# Patient Record
Sex: FEMALE | Race: WHITE | NOT HISPANIC OR LATINO | Employment: OTHER | URBAN - METROPOLITAN AREA
[De-identification: names, ages, dates, MRNs, and addresses within clinical notes are randomized per-mention and may not be internally consistent; named-entity substitution may affect disease eponyms.]

---

## 2017-07-26 ENCOUNTER — RECORDS - HEALTHEAST (OUTPATIENT)
Dept: LAB | Facility: CLINIC | Age: 82
End: 2017-07-26

## 2017-07-26 LAB
CHOLEST SERPL-MCNC: 165 MG/DL
FASTING STATUS PATIENT QL REPORTED: YES
HDLC SERPL-MCNC: 57 MG/DL
LDLC SERPL CALC-MCNC: 93 MG/DL
TRIGL SERPL-MCNC: 74 MG/DL

## 2018-02-28 ENCOUNTER — RECORDS - HEALTHEAST (OUTPATIENT)
Dept: LAB | Facility: CLINIC | Age: 83
End: 2018-02-28

## 2018-02-28 LAB
ANION GAP SERPL CALCULATED.3IONS-SCNC: 6 MMOL/L (ref 5–18)
BUN SERPL-MCNC: 17 MG/DL (ref 8–28)
CALCIUM SERPL-MCNC: 9.3 MG/DL (ref 8.5–10.5)
CHLORIDE BLD-SCNC: 103 MMOL/L (ref 98–107)
CHOLEST SERPL-MCNC: 158 MG/DL
CO2 SERPL-SCNC: 29 MMOL/L (ref 22–31)
CREAT SERPL-MCNC: 0.93 MG/DL (ref 0.6–1.1)
FASTING STATUS PATIENT QL REPORTED: YES
GFR SERPL CREATININE-BSD FRML MDRD: 58 ML/MIN/1.73M2
GLUCOSE BLD-MCNC: 88 MG/DL (ref 70–125)
HDLC SERPL-MCNC: 55 MG/DL
LDLC SERPL CALC-MCNC: 87 MG/DL
POTASSIUM BLD-SCNC: 5.9 MMOL/L (ref 3.5–5)
SODIUM SERPL-SCNC: 138 MMOL/L (ref 136–145)
TRIGL SERPL-MCNC: 82 MG/DL

## 2018-10-19 ENCOUNTER — RECORDS - HEALTHEAST (OUTPATIENT)
Dept: LAB | Facility: CLINIC | Age: 83
End: 2018-10-19

## 2018-10-19 LAB
ANION GAP SERPL CALCULATED.3IONS-SCNC: 10 MMOL/L (ref 5–18)
BUN SERPL-MCNC: 16 MG/DL (ref 8–28)
CALCIUM SERPL-MCNC: 9.7 MG/DL (ref 8.5–10.5)
CHLORIDE BLD-SCNC: 105 MMOL/L (ref 98–107)
CO2 SERPL-SCNC: 26 MMOL/L (ref 22–31)
CREAT SERPL-MCNC: 0.9 MG/DL (ref 0.6–1.1)
GFR SERPL CREATININE-BSD FRML MDRD: 60 ML/MIN/1.73M2
GLUCOSE BLD-MCNC: 85 MG/DL (ref 70–125)
POTASSIUM BLD-SCNC: 5.5 MMOL/L (ref 3.5–5)
SODIUM SERPL-SCNC: 141 MMOL/L (ref 136–145)

## 2019-04-17 ENCOUNTER — RECORDS - HEALTHEAST (OUTPATIENT)
Dept: LAB | Facility: CLINIC | Age: 84
End: 2019-04-17

## 2019-04-17 LAB
ANION GAP SERPL CALCULATED.3IONS-SCNC: 7 MMOL/L (ref 5–18)
BUN SERPL-MCNC: 14 MG/DL (ref 8–28)
CALCIUM SERPL-MCNC: 9.6 MG/DL (ref 8.5–10.5)
CHLORIDE BLD-SCNC: 105 MMOL/L (ref 98–107)
CHOLEST SERPL-MCNC: 162 MG/DL
CO2 SERPL-SCNC: 29 MMOL/L (ref 22–31)
CREAT SERPL-MCNC: 0.93 MG/DL (ref 0.6–1.1)
FASTING STATUS PATIENT QL REPORTED: NORMAL
GFR SERPL CREATININE-BSD FRML MDRD: 58 ML/MIN/1.73M2
GLUCOSE BLD-MCNC: 91 MG/DL (ref 70–125)
HDLC SERPL-MCNC: 63 MG/DL
LDLC SERPL CALC-MCNC: 81 MG/DL
POTASSIUM BLD-SCNC: 4.5 MMOL/L (ref 3.5–5)
SODIUM SERPL-SCNC: 141 MMOL/L (ref 136–145)
TRIGL SERPL-MCNC: 89 MG/DL
TSH SERPL DL<=0.005 MIU/L-ACNC: 1.01 UIU/ML (ref 0.3–5)

## 2019-10-23 ENCOUNTER — RECORDS - HEALTHEAST (OUTPATIENT)
Dept: LAB | Facility: CLINIC | Age: 84
End: 2019-10-23

## 2019-10-23 LAB
ANION GAP SERPL CALCULATED.3IONS-SCNC: 8 MMOL/L (ref 5–18)
BUN SERPL-MCNC: 11 MG/DL (ref 8–28)
CALCIUM SERPL-MCNC: 9.3 MG/DL (ref 8.5–10.5)
CHLORIDE BLD-SCNC: 107 MMOL/L (ref 98–107)
CHOLEST SERPL-MCNC: 142 MG/DL
CO2 SERPL-SCNC: 26 MMOL/L (ref 22–31)
CREAT SERPL-MCNC: 0.84 MG/DL (ref 0.6–1.1)
FASTING STATUS PATIENT QL REPORTED: NORMAL
GFR SERPL CREATININE-BSD FRML MDRD: >60 ML/MIN/1.73M2
GLUCOSE BLD-MCNC: 88 MG/DL (ref 70–125)
HDLC SERPL-MCNC: 59 MG/DL
LDLC SERPL CALC-MCNC: 70 MG/DL
POTASSIUM BLD-SCNC: 4.3 MMOL/L (ref 3.5–5)
SODIUM SERPL-SCNC: 141 MMOL/L (ref 136–145)
TRIGL SERPL-MCNC: 65 MG/DL

## 2020-05-08 ENCOUNTER — RECORDS - HEALTHEAST (OUTPATIENT)
Dept: LAB | Facility: CLINIC | Age: 85
End: 2020-05-08

## 2020-05-08 LAB
ANION GAP SERPL CALCULATED.3IONS-SCNC: 8 MMOL/L (ref 5–18)
BUN SERPL-MCNC: 20 MG/DL (ref 8–28)
CALCIUM SERPL-MCNC: 9.4 MG/DL (ref 8.5–10.5)
CHLORIDE BLD-SCNC: 100 MMOL/L (ref 98–107)
CHOLEST SERPL-MCNC: 179 MG/DL
CO2 SERPL-SCNC: 29 MMOL/L (ref 22–31)
CREAT SERPL-MCNC: 1.03 MG/DL (ref 0.6–1.1)
FASTING STATUS PATIENT QL REPORTED: NORMAL
GFR SERPL CREATININE-BSD FRML MDRD: 51 ML/MIN/1.73M2
GLUCOSE BLD-MCNC: 92 MG/DL (ref 70–125)
HDLC SERPL-MCNC: 66 MG/DL
LDLC SERPL CALC-MCNC: 96 MG/DL
POTASSIUM BLD-SCNC: 3.9 MMOL/L (ref 3.5–5)
SODIUM SERPL-SCNC: 137 MMOL/L (ref 136–145)
TRIGL SERPL-MCNC: 86 MG/DL

## 2020-09-09 ENCOUNTER — AMBULATORY - HEALTHEAST (OUTPATIENT)
Dept: SURGERY | Facility: HOSPITAL | Age: 85
End: 2020-09-09

## 2020-09-09 DIAGNOSIS — Z11.59 ENCOUNTER FOR SCREENING FOR OTHER VIRAL DISEASES: ICD-10-CM

## 2020-11-12 ENCOUNTER — RECORDS - HEALTHEAST (OUTPATIENT)
Dept: LAB | Facility: CLINIC | Age: 85
End: 2020-11-12

## 2020-11-12 LAB
ANION GAP SERPL CALCULATED.3IONS-SCNC: 6 MMOL/L (ref 5–18)
BUN SERPL-MCNC: 13 MG/DL (ref 8–28)
CALCIUM SERPL-MCNC: 9.5 MG/DL (ref 8.5–10.5)
CHLORIDE BLD-SCNC: 104 MMOL/L (ref 98–107)
CHOLEST SERPL-MCNC: 143 MG/DL
CO2 SERPL-SCNC: 29 MMOL/L (ref 22–31)
CREAT SERPL-MCNC: 0.87 MG/DL (ref 0.6–1.1)
FASTING STATUS PATIENT QL REPORTED: NORMAL
GFR SERPL CREATININE-BSD FRML MDRD: >60 ML/MIN/1.73M2
GLUCOSE BLD-MCNC: 89 MG/DL (ref 70–125)
HDLC SERPL-MCNC: 62 MG/DL
LDLC SERPL CALC-MCNC: 67 MG/DL
POTASSIUM BLD-SCNC: 5.5 MMOL/L (ref 3.5–5)
SODIUM SERPL-SCNC: 139 MMOL/L (ref 136–145)
TRIGL SERPL-MCNC: 71 MG/DL

## 2020-12-07 ENCOUNTER — AMBULATORY - HEALTHEAST (OUTPATIENT)
Dept: LAB | Facility: CLINIC | Age: 85
End: 2020-12-07

## 2020-12-07 DIAGNOSIS — Z11.59 ENCOUNTER FOR SCREENING FOR OTHER VIRAL DISEASES: ICD-10-CM

## 2020-12-08 ASSESSMENT — MIFFLIN-ST. JEOR: SCORE: 1053.59

## 2020-12-09 ENCOUNTER — COMMUNICATION - HEALTHEAST (OUTPATIENT)
Dept: SCHEDULING | Facility: CLINIC | Age: 85
End: 2020-12-09

## 2020-12-11 ENCOUNTER — SURGERY - HEALTHEAST (OUTPATIENT)
Dept: SURGERY | Facility: HOSPITAL | Age: 85
End: 2020-12-11

## 2020-12-11 ENCOUNTER — ANESTHESIA - HEALTHEAST (OUTPATIENT)
Dept: SURGERY | Facility: HOSPITAL | Age: 85
End: 2020-12-11

## 2020-12-11 ENCOUNTER — AMBULATORY - HEALTHEAST (OUTPATIENT)
Dept: OTHER | Facility: CLINIC | Age: 85
End: 2020-12-11

## 2021-05-13 ENCOUNTER — RECORDS - HEALTHEAST (OUTPATIENT)
Dept: LAB | Facility: CLINIC | Age: 86
End: 2021-05-13

## 2021-05-13 LAB
CHOLEST SERPL-MCNC: 152 MG/DL
FASTING STATUS PATIENT QL REPORTED: NORMAL
HDLC SERPL-MCNC: 57 MG/DL
LDLC SERPL CALC-MCNC: 77 MG/DL
TRIGL SERPL-MCNC: 89 MG/DL

## 2021-05-29 ENCOUNTER — RECORDS - HEALTHEAST (OUTPATIENT)
Dept: ADMINISTRATIVE | Facility: CLINIC | Age: 86
End: 2021-05-29

## 2021-06-05 VITALS — HEIGHT: 61 IN | WEIGHT: 146 LBS | BODY MASS INDEX: 27.56 KG/M2

## 2021-06-13 NOTE — ANESTHESIA PREPROCEDURE EVALUATION
Anesthesia Evaluation      Patient summary reviewed   No history of anesthetic complications     Airway   Mallampati: I  Neck ROM: full   Pulmonary - negative ROS and normal exam                          Cardiovascular - normal exam  Exercise tolerance: > or = 4 METS  (+) hypertension, valvular problems/murmurs AS, , hypercholesterolemia,     Rhythm: regular  Rate: normal,    murmur Location:upper left sternal border      Neuro/Psych - negative ROS     Endo/Other - negative ROS      GI/Hepatic/Renal    (+)   chronic renal disease CRI,           Dental - normal exam                        Anesthesia Plan  Planned anesthetic: MAC    ASA 3     Anesthetic plan and risks discussed with: patient  Anesthesia plan special considerations: antiemetics,   Post-op plan: routine recovery

## 2021-06-13 NOTE — ANESTHESIA POSTPROCEDURE EVALUATION
Patient: Jade Walker  Procedure(s):  COLONOSCOPY  Anesthesia type: MAC    Patient location: P-5  Last vitals:   Vitals Value Taken Time   /72 12/11/20 1531   Temp 35.6  C (96.1  F) 12/11/20 1517   Pulse 67 12/11/20 1534   Resp 16 12/11/20 1517   SpO2 100 % 12/11/20 1534   Vitals shown include unvalidated device data.  Post vital signs: stable  Level of consciousness: alert and conversant  Post-anesthesia pain: pain controlled  Post-anesthesia nausea and vomiting: no  Pulmonary: room air  Cardiovascular: stable and blood pressure at baseline  Hydration: adequate  Anesthetic events: no    QCDR Measures:  ASA# 11 - Siobhan-op Cardiac Arrest: ASA11B - Patient did NOT experience unanticipated cardiac arrest  ASA# 12 - Siobhan-op Mortality Rate: ASA12B - Patient did NOT die  ASA# 13 - PACU Re-Intubation Rate: ASA13B - Patient did NOT require a new airway mgmt  ASA# 10 - Composite Anes Safety: ASA10A - No serious adverse event    Additional Notes:

## 2021-06-13 NOTE — ANESTHESIA CARE TRANSFER NOTE
Last vitals:   Vitals:    12/11/20 1517   BP: 130/65   Pulse: 71   Resp: 16   Temp: (!) 35.6  C (96.1  F)   SpO2: 99%     Patient's level of consciousness is drowsy  Spontaneous respirations: yes  Maintains airway independently: yes  Dentition unchanged: yes  Oropharynx: oropharynx clear of all foreign objects    QCDR Measures:  ASA# 20 - Surgical Safety Checklist: WHO surgical safety checklist completed prior to induction    PQRS# 430 - Adult PONV Prevention: 4558F - Pt received => 2 anti-emetic agents (different classes) preop & intraop  ASA# 8 - Peds PONV Prevention: NA - Not pediatric patient, not GA or 2 or more risk factors NOT present  PQRS# 424 - Siobhan-op Temp Management: 4559F - At least one body temp DOCUMENTED => 35.5C or 95.9F within required timeframe  PQRS# 426 - PACU Transfer Protocol: - Transfer of care checklist used  ASA# 14 - Acute Post-op Pain: ASA14B - Patient did NOT experience pain >= 7 out of 10

## 2021-11-17 ENCOUNTER — LAB REQUISITION (OUTPATIENT)
Dept: LAB | Facility: CLINIC | Age: 86
End: 2021-11-17

## 2021-11-17 DIAGNOSIS — I12.9 HYPERTENSIVE CHRONIC KIDNEY DISEASE WITH STAGE 1 THROUGH STAGE 4 CHRONIC KIDNEY DISEASE, OR UNSPECIFIED CHRONIC KIDNEY DISEASE: ICD-10-CM

## 2021-11-17 DIAGNOSIS — E78.5 HYPERLIPIDEMIA, UNSPECIFIED: ICD-10-CM

## 2021-11-17 LAB
ANION GAP SERPL CALCULATED.3IONS-SCNC: 9 MMOL/L (ref 5–18)
BUN SERPL-MCNC: 18 MG/DL (ref 8–28)
CALCIUM SERPL-MCNC: 9.5 MG/DL (ref 8.5–10.5)
CHLORIDE BLD-SCNC: 103 MMOL/L (ref 98–107)
CHOLEST SERPL-MCNC: 150 MG/DL
CO2 SERPL-SCNC: 28 MMOL/L (ref 22–31)
CREAT SERPL-MCNC: 0.98 MG/DL (ref 0.6–1.1)
GFR SERPL CREATININE-BSD FRML MDRD: 53 ML/MIN/1.73M2
GLUCOSE BLD-MCNC: 85 MG/DL (ref 70–125)
HDLC SERPL-MCNC: 63 MG/DL
LDLC SERPL CALC-MCNC: 74 MG/DL
POTASSIUM BLD-SCNC: 4.6 MMOL/L (ref 3.5–5)
SODIUM SERPL-SCNC: 140 MMOL/L (ref 136–145)
TRIGL SERPL-MCNC: 66 MG/DL

## 2021-11-17 PROCEDURE — 80061 LIPID PANEL: CPT | Performed by: FAMILY MEDICINE

## 2021-11-17 PROCEDURE — 80048 BASIC METABOLIC PNL TOTAL CA: CPT | Performed by: FAMILY MEDICINE

## 2022-08-09 ENCOUNTER — LAB REQUISITION (OUTPATIENT)
Dept: LAB | Facility: CLINIC | Age: 87
End: 2022-08-09

## 2022-08-09 DIAGNOSIS — E78.5 HYPERLIPIDEMIA, UNSPECIFIED: ICD-10-CM

## 2022-08-09 DIAGNOSIS — I12.9 HYPERTENSIVE CHRONIC KIDNEY DISEASE WITH STAGE 1 THROUGH STAGE 4 CHRONIC KIDNEY DISEASE, OR UNSPECIFIED CHRONIC KIDNEY DISEASE: ICD-10-CM

## 2022-08-09 LAB
ANION GAP SERPL CALCULATED.3IONS-SCNC: 9 MMOL/L (ref 7–15)
BUN SERPL-MCNC: 13.1 MG/DL (ref 8–23)
CALCIUM SERPL-MCNC: 9.3 MG/DL (ref 8.8–10.2)
CHLORIDE SERPL-SCNC: 103 MMOL/L (ref 98–107)
CHOLEST SERPL-MCNC: 148 MG/DL
CREAT SERPL-MCNC: 0.89 MG/DL (ref 0.51–0.95)
DEPRECATED HCO3 PLAS-SCNC: 28 MMOL/L (ref 22–29)
GFR SERPL CREATININE-BSD FRML MDRD: 63 ML/MIN/1.73M2
GLUCOSE SERPL-MCNC: 87 MG/DL (ref 70–99)
HDLC SERPL-MCNC: 77 MG/DL
LDLC SERPL CALC-MCNC: 58 MG/DL
NONHDLC SERPL-MCNC: 71 MG/DL
POTASSIUM SERPL-SCNC: 4.4 MMOL/L (ref 3.4–5.3)
SODIUM SERPL-SCNC: 140 MMOL/L (ref 136–145)
TRIGL SERPL-MCNC: 65 MG/DL

## 2022-08-09 PROCEDURE — 80048 BASIC METABOLIC PNL TOTAL CA: CPT | Performed by: FAMILY MEDICINE

## 2022-08-09 PROCEDURE — 80061 LIPID PANEL: CPT | Performed by: FAMILY MEDICINE

## 2022-08-29 ENCOUNTER — TELEPHONE (OUTPATIENT)
Dept: CARDIOLOGY | Facility: CLINIC | Age: 87
End: 2022-08-29

## 2022-08-29 NOTE — TELEPHONE ENCOUNTER
----- Message from Nora De La Paz sent at 8/29/2022  8:46 AM CDT -----  Regarding: New PT  Referral: Aortic Valve Stenosis,Unspecified etiology

## 2022-09-25 ENCOUNTER — HEALTH MAINTENANCE LETTER (OUTPATIENT)
Age: 87
End: 2022-09-25

## 2022-10-06 ENCOUNTER — OFFICE VISIT (OUTPATIENT)
Dept: CARDIOLOGY | Facility: CLINIC | Age: 87
End: 2022-10-06
Attending: INTERNAL MEDICINE
Payer: COMMERCIAL

## 2022-10-06 ENCOUNTER — OFFICE VISIT (OUTPATIENT)
Dept: CARDIOLOGY | Facility: CLINIC | Age: 87
End: 2022-10-06
Attending: THORACIC SURGERY (CARDIOTHORACIC VASCULAR SURGERY)
Payer: COMMERCIAL

## 2022-10-06 VITALS
DIASTOLIC BLOOD PRESSURE: 77 MMHG | HEART RATE: 59 BPM | WEIGHT: 141.6 LBS | SYSTOLIC BLOOD PRESSURE: 163 MMHG | HEIGHT: 61 IN | BODY MASS INDEX: 26.73 KG/M2 | OXYGEN SATURATION: 100 %

## 2022-10-06 VITALS
WEIGHT: 141.6 LBS | SYSTOLIC BLOOD PRESSURE: 163 MMHG | BODY MASS INDEX: 26.73 KG/M2 | HEART RATE: 59 BPM | DIASTOLIC BLOOD PRESSURE: 77 MMHG | OXYGEN SATURATION: 100 % | HEIGHT: 61 IN

## 2022-10-06 DIAGNOSIS — I35.0 SEVERE AORTIC STENOSIS: Primary | ICD-10-CM

## 2022-10-06 DIAGNOSIS — I51.89 OTHER ILL-DEFINED HEART DISEASES: ICD-10-CM

## 2022-10-06 DIAGNOSIS — R09.89 CAROTID BRUIT, UNSPECIFIED LATERALITY: ICD-10-CM

## 2022-10-06 PROCEDURE — 99204 OFFICE O/P NEW MOD 45 MIN: CPT | Performed by: THORACIC SURGERY (CARDIOTHORACIC VASCULAR SURGERY)

## 2022-10-06 PROCEDURE — G0463 HOSPITAL OUTPT CLINIC VISIT: HCPCS | Mod: 25,27

## 2022-10-06 PROCEDURE — 99205 OFFICE O/P NEW HI 60 MIN: CPT

## 2022-10-06 PROCEDURE — 93005 ELECTROCARDIOGRAM TRACING: CPT

## 2022-10-06 PROCEDURE — G0463 HOSPITAL OUTPT CLINIC VISIT: HCPCS | Mod: 25

## 2022-10-06 RX ORDER — PREDNISONE 10 MG/1
TABLET ORAL
Qty: 9 TABLET | Refills: 1 | Status: SHIPPED | OUTPATIENT
Start: 2022-10-06 | End: 2022-11-11

## 2022-10-06 RX ORDER — LORATADINE 10 MG/1
10 TABLET ORAL DAILY
Status: ON HOLD | COMMUNITY
End: 2022-12-01

## 2022-10-06 RX ORDER — DIPHENHYDRAMINE HCL 25 MG
TABLET ORAL
Qty: 1 TABLET | Refills: 1 | Status: SHIPPED | OUTPATIENT
Start: 2022-10-06 | End: 2022-11-11

## 2022-10-06 RX ORDER — ASPIRIN 81 MG/1
243 TABLET, CHEWABLE ORAL ONCE
Status: CANCELLED | OUTPATIENT
Start: 2022-10-06

## 2022-10-06 RX ORDER — SODIUM CHLORIDE 9 MG/ML
INJECTION, SOLUTION INTRAVENOUS CONTINUOUS
Status: CANCELLED | OUTPATIENT
Start: 2022-10-06

## 2022-10-06 RX ORDER — ASPIRIN 325 MG
325 TABLET ORAL ONCE
Status: CANCELLED | OUTPATIENT
Start: 2022-10-06 | End: 2022-10-06

## 2022-10-06 RX ORDER — LIDOCAINE 40 MG/G
CREAM TOPICAL
Status: CANCELLED | OUTPATIENT
Start: 2022-10-06

## 2022-10-06 ASSESSMENT — PAIN SCALES - GENERAL
PAINLEVEL: NO PAIN (0)
PAINLEVEL: NO PAIN (0)

## 2022-10-06 NOTE — PROGRESS NOTES
[facility] HEART [facility]  CARDIOVASCULAR DIVISION    VALVE CLINIC CONSULTATION    PRIMARY CARDIOLOGIST: N/A      PERTINENT CLINICAL HISTORY & REASON FOR CONSULTATION:     Jade Walker is a very pleasant 87 year old female with severe aortic valvular stenosis referred to our clinic for evaluation and consideration for potential transcatheter aortic valve replacement (TAVR). Her severe aortic stenosis is characterized with an ROBIN of 0.6cm2, mean PG 35 mmHg with LVEF 60-65% echocardiogram 8/15/22 at [facility]. Her additional past medical history if notable for HTN, HLD, CKD stage III, contrast dye allergy, IBS.     Patient has a history of mild to moderate aortic stenosis dating back to 2016. Most recent echo shows progression of her aortic stenosis to the severe range. She says that she has been feeling well. She is fairly active. Over the past month she has been busy moving out of her house of 45 years to senior living. She has doing a lot of walking, lifting, climbing stairs. She denies chest pain. She has noticed a few episodes of shortness of breath and lightheadedness when walking up the stairs quickly. Also reports feeling more tired than normal and perhaps reduced exercise tolerance over the past year. She otherwise has no heart failure symptoms.      PAST MEDICAL HISTORY:     Past Medical History:   Diagnosis Date     Aortic stenosis      Aortic stenosis, moderate      Chronic kidney disease, stage III (moderate)      Colon polyp      Hyperlipidemia      Hypertension      Irritable bowel syndrome      Obesity (BMI 35.0-39.9 without comorbidity)         PAST SURGICAL HISTORY:     Past Surgical History:   Procedure Laterality Date     EYE SURGERY  06/2015     OTHER SURGICAL HISTORY Bilateral     cataracts     ZZHC COLONOSCOPY W/WO BRUSH/WASH N/A 12/11/2020    Procedure: COLONOSCOPY;  Surgeon: Stan Porter MD;  [place] [facility] [place]: [place]        CURRENT MEDICATIONS:      Current Outpatient Medications   Medication Sig Dispense Refill     atenoloL (TENORMIN) 100 MG tablet [ATENOLOL (TENORMIN) 100 MG TABLET] Take 100 mg by mouth daily.       atorvastatin (LIPITOR) 40 MG tablet [ATORVASTATIN (LIPITOR) 40 MG TABLET] Take 40 mg by mouth at bedtime.       B.ani/L.aci/L.sal/L.plan/L.Rey (PROBIOTIC FORMULA ORAL) [B.ANI/L.ACI/L.SAL/L.PLAN/L.REY (PROBIOTIC FORMULA ORAL)] Take 1 capsule by mouth daily.       calcium carbonate (OS-SHAHEEN) 600 mg calcium (1,500 mg) tablet [CALCIUM CARBONATE (OS-SHAHEEN) 600 MG CALCIUM (1,500 MG) TABLET] Take 600 mg by mouth 2 (two) times a day with meals.       cholecalciferol, vitamin D3, 1,000 unit (25 mcg) tablet [CHOLECALCIFEROL, VITAMIN D3, 1,000 UNIT (25 MCG) TABLET] Take 1,000 Units by mouth daily.       diphenhydrAMINE (BENADRYL) 25 mg capsule [DIPHENHYDRAMINE (BENADRYL) 25 MG CAPSULE] Take 25 mg by mouth at bedtime as needed for itching.       famotidine (PEPCID) 20 MG tablet [FAMOTIDINE (PEPCID) 20 MG TABLET] Take 20 mg by mouth 2 (two) times a day.       folic acid (FOLVITE) 1 MG tablet [FOLIC ACID (FOLVITE) 1 MG TABLET] Take 1 mg by mouth daily.       glucosamine/chondr more A sod (GLUCOSAMINE-CHONDROITIN) 1,500-1,200 mg/30 mL Liqd [GLUCOSAMINE/CHONDR MORE A SOD (GLUCOSAMINE-CHONDROITIN) 1,500-1,200 MG/30 ML LIQD] Take 1 tablet by mouth 2 (two) times a day.       ketoconazole (NIZORAL) 2 % cream [KETOCONAZOLE (NIZORAL) 2 % CREAM] Apply 1 application topically daily.       methylcellulose (CITRUCEL, SUCROSE,) [METHYLCELLULOSE (CITRUCEL, SUCROSE,)] Take 2 g by mouth 3 (three) times a day.       mupirocin (BACTROBAN) 2 % ointment [MUPIROCIN (BACTROBAN) 2 % OINTMENT] Apply topically 3 (three) times a day.       vit A/vit C/vit E/zinc/copper (PRESERVISION AREDS ORAL) [VIT A/VIT C/VIT E/ZINC/COPPER (PRESERVISION AREDS ORAL)] Take 1 tablet by mouth 2 (two) times a day.          ALLERGIES:     Allergies   Allergen Reactions     Azithromycin Hives     Iodinated  "Contrast Media [Diagnostic X-Ray Materials] Hives        FAMILY HISTORY:   No family history on file.     SOCIAL HISTORY:     Social History     Socioeconomic History     Marital status:    Tobacco Use     Smoking status: Never Smoker     Smokeless tobacco: Never Used   Substance and Sexual Activity     Alcohol use: Yes     Comment: Alcoholic Drinks/day: once a month     Drug use: Never        REVIEW OF SYSTEMS:     Constitutional: No fevers or chills  Skin: No new rash or itching  Eyes: No acute change in vision  Ears/Nose/Throat: No purulent rhinorrhea, new hearing loss, or new vertigo  Respiratory: No cough or hemoptysis  Cardiovascular: See HPI  Gastrointestinal: No change in appetite, vomiting, hematemesis or diarrhea  Genitourinary: No dysuria or hematuria  Musculoskeletal: No new back pain, neck pain or muscle pain  Neurologic: No new headaches, focal weakness or behavior changes  Psychiatric: No hallucinations, excessive alcohol consumption or illegal drug usage  Hematologic/Lymphatic/Immunologic: No bleeding, chills, fever, night sweats or weight loss  Endocrine: No new cold intolerance, heat intolerance, polyphagia, polydipsia or polyuria      PHYSICAL EXAMINATION:     BP (!) 163/77 (BP Location: Right arm, Patient Position: Supine, Cuff Size: Adult Regular)   Pulse 59   Ht 1.546 m (5' 0.87\")   Wt 64.2 kg (141 lb 9.6 oz)   SpO2 100%   BMI 26.87 kg/m      GENERAL: No acute distress.  HEENT: EOMI. Sclerae white, not injected. Nares clear. Pharynx without erythema or exudate.   Neck: No adenopathy. No thyromegaly. No jugular venous distension.   Heart: Regular rate and rhythm. 3/6 J CARLOS RUSB  Lungs: Clear to auscultation. No ronchi, wheezes, rales.   Abdomen: Soft, nontender, nondistended. Bowel sounds present.  Extremities: No clubbing, cyanosis, or edema.   Neurologic: Alert and oriented to person/place/time, normal speech and affect. No focal deficits.  Skin: No petechiae, purpura or rash.     " LABORATORY DATA:     LIPID RESULTS:  Lab Results   Component Value Date    CHOL 148 08/09/2022    HDL 77 08/09/2022    LDL 58 08/09/2022    TRIG 65 08/09/2022       LIVER ENZYME RESULTS:  No results found for: AST, ALT    CBC RESULTS:  No results found for: WBC, RBC, HGB, HCT, MCV, MCH, MCHC, RDW, PLT    BMP RESULTS:  Lab Results   Component Value Date     08/09/2022    POTASSIUM 4.4 08/09/2022    POTASSIUM 4.6 11/17/2021    CHLORIDE 103 08/09/2022    CHLORIDE 103 11/17/2021    CO2 28 08/09/2022    CO2 28 11/17/2021    ANIONGAP 9 08/09/2022    ANIONGAP 9 11/17/2021    GLC 87 08/09/2022    GLC 85 11/17/2021    BUN 13.1 08/09/2022    BUN 18 11/17/2021    CR 0.89 08/09/2022    GFRESTIMATED 63 08/09/2022    GFRESTIMATED >60 11/12/2020    GFRESTBLACK >60 11/12/2020    SHAHEEN 9.3 08/09/2022        A1C RESULTS:  No results found for: A1C    INR RESULTS:  No results found for: INR       PROCEDURES & FURTHER ASSESSMENTS:     ECG dated 10/7/22  Personally reviewed   NSR HR 62 with LBBB    Echocardiogram dated 8/15/22      STS RISK SCORE: 2.7%  FRAILTY SCORE: pending   NYHA CLASS: II     CLINICAL IMPRESSION:     Jade Walker is a very pleasant 87 year old female with severe aortic valvular stenosis referred to our clinic for evaluation and consideration for potential transcatheter aortic valve replacement (TAVR). Her severe aortic stenosis is characterized with an ROBIN of 0.6cm2, mean PG 35 mmHg with LVEF 60-65% echocardiogram 8/15/22 at [facility]. Her additional past medical history if notable for HTN, HLD, CKD stage III, contrast dye allergy, IBS.    Patient reports feeling well overall but has noticed some mild exertional dyspnea and exercise intolerance over the past year. We discussed the natural history of severe aortic stenosis, associated symptoms and available treatment options including transcatheter versus surgical aortic valve replacement. Patient is an appropriate candidate for transcatheter aortic  valve replacement based on age, frailty, co-morbidities and surgical mortality risk estimation of 2.7% based on the STS score.     The pre-procedural TAVR evaluation currently requires additional assessment with CT TAVR, coronary angiogram, right and left heart catheterization prior to presentation to the Heart team for discussion during our multi-disciplinary conference for consensus decision and procedural planning.     Plan Summary:  1) Severe Aortic Stenosis:  -Patient would like to discuss with her  prior to scheduling work-up  -If agreeable, will proceed with TAVR evaluation   -CT TAVR and carotid US   -Coronary angiogram, right heart catheterization and left heart catheterization  -Following completion of TAVR work-up patient case will be discussed during our multi-disciplinary conference for consensus decision and procedural planning.      Patient was evaluated in clinic with Dr. Tyler.    MAX Hardin, CNP  [facility] Cardiology Team    Greater than 45 minutes were spent with patient and family providing education regarding progression of aortic stenosis, symptom recognition and management as well as approach to treatment and post-procedural expectations.     CC  Patient Care Team:  Rolan Bradford MD as PCP - General (Family Practice)  Dave Merritt MD as MD (Family Medicine)

## 2022-10-06 NOTE — LETTER
9/15/2022      RE: Jade Walker  [address] [facility] [address]       Dear Colleague,    Thank you for the opportunity to participate in the care of your patient, Jade Walker, at the [facility] HEART CLINIC [place] at [facility]. Please see a copy of my visit note below.        [facility] HEART CARE  CARDIOVASCULAR DIVISION    VALVE CLINIC CONSULTATION    PRIMARY CARDIOLOGIST: N/A      PERTINENT CLINICAL HISTORY & REASON FOR CONSULTATION:     Jade Wakler is a very pleasant 87 year old female with severe aortic valvular stenosis referred to our clinic for evaluation and consideration for potential transcatheter aortic valve replacement (TAVR). Her severe aortic stenosis is characterized with an ROBIN of 0.6cm2, mean PG 35 mmHg with LVEF 60-65% echocardiogram 8/15/22 at [facility]. Her additional past medical history if notable for HTN, HLD, CKD stage III, contrast dye allergy, IBS.     Patient has a history of mild to moderate aortic stenosis dating back to 2016. Most recent echo shows progression of her aortic stenosis to the severe range. She says that she has been feeling well. She is fairly active. Over the past month she has been busy moving out of her house of 45 years to senior living. She has doing a lot of walking, lifting, climbing stairs. She denies chest pain. She has noticed a few episodes of shortness of breath and lightheadedness when walking up the stairs quickly. Also reports feeling more tired than normal and perhaps reduced exercise tolerance over the past year. She otherwise has no heart failure symptoms.      PAST MEDICAL HISTORY:     Past Medical History:   Diagnosis Date     Aortic stenosis      Aortic stenosis, moderate      Chronic kidney disease, stage III (moderate)      Colon polyp      Hyperlipidemia      Hypertension      Irritable bowel syndrome      Obesity (BMI 35.0-39.9 without comorbidity)         PAST  SURGICAL HISTORY:     Past Surgical History:   Procedure Laterality Date     EYE SURGERY  06/2015     OTHER SURGICAL HISTORY Bilateral     cataracts     ZZHC COLONOSCOPY W/WO BRUSH/WASH N/A 12/11/2020    Procedure: COLONOSCOPY;  Surgeon: Stan Porter MD;  Location: [facility];  Service: Gastroenterology        CURRENT MEDICATIONS:     Current Outpatient Medications   Medication Sig Dispense Refill     atenoloL (TENORMIN) 100 MG tablet [ATENOLOL (TENORMIN) 100 MG TABLET] Take 100 mg by mouth daily.       atorvastatin (LIPITOR) 40 MG tablet [ATORVASTATIN (LIPITOR) 40 MG TABLET] Take 40 mg by mouth at bedtime.       B.ani/L.aci/L.sal/L.plan/L.Rey (PROBIOTIC FORMULA ORAL) [B.ANI/L.ACI/L.SAL/L.PLAN/L.REY (PROBIOTIC FORMULA ORAL)] Take 1 capsule by mouth daily.       calcium carbonate (OS-SHAHEEN) 600 mg calcium (1,500 mg) tablet [CALCIUM CARBONATE (OS-SHAHEEN) 600 MG CALCIUM (1,500 MG) TABLET] Take 600 mg by mouth 2 (two) times a day with meals.       cholecalciferol, vitamin D3, 1,000 unit (25 mcg) tablet [CHOLECALCIFEROL, VITAMIN D3, 1,000 UNIT (25 MCG) TABLET] Take 1,000 Units by mouth daily.       diphenhydrAMINE (BENADRYL) 25 mg capsule [DIPHENHYDRAMINE (BENADRYL) 25 MG CAPSULE] Take 25 mg by mouth at bedtime as needed for itching.       famotidine (PEPCID) 20 MG tablet [FAMOTIDINE (PEPCID) 20 MG TABLET] Take 20 mg by mouth 2 (two) times a day.       folic acid (FOLVITE) 1 MG tablet [FOLIC ACID (FOLVITE) 1 MG TABLET] Take 1 mg by mouth daily.       glucosamine/chondr more A sod (GLUCOSAMINE-CHONDROITIN) 1,500-1,200 mg/30 mL Liqd [GLUCOSAMINE/CHONDR MORE A SOD (GLUCOSAMINE-CHONDROITIN) 1,500-1,200 MG/30 ML LIQD] Take 1 tablet by mouth 2 (two) times a day.       ketoconazole (NIZORAL) 2 % cream [KETOCONAZOLE (NIZORAL) 2 % CREAM] Apply 1 application topically daily.       methylcellulose (CITRUCEL, SUCROSE,) [METHYLCELLULOSE (CITRUCEL, SUCROSE,)] Take 2 g by mouth 3 (three) times a day.       mupirocin (BACTROBAN) 2 %  "ointment [MUPIROCIN (BACTROBAN) 2 % OINTMENT] Apply topically 3 (three) times a day.       vit A/vit C/vit E/zinc/copper (PRESERVISION AREDS ORAL) [VIT A/VIT C/VIT E/ZINC/COPPER (PRESERVISION AREDS ORAL)] Take 1 tablet by mouth 2 (two) times a day.          ALLERGIES:     Allergies   Allergen Reactions     Azithromycin Hives     Iodinated Contrast Media [Diagnostic X-Ray Materials] Hives        FAMILY HISTORY:   No family history on file.     SOCIAL HISTORY:     Social History     Socioeconomic History     Marital status:    Tobacco Use     Smoking status: Never Smoker     Smokeless tobacco: Never Used   Substance and Sexual Activity     Alcohol use: Yes     Comment: Alcoholic Drinks/day: once a month     Drug use: Never        REVIEW OF SYSTEMS:     Constitutional: No fevers or chills  Skin: No new rash or itching  Eyes: No acute change in vision  Ears/Nose/Throat: No purulent rhinorrhea, new hearing loss, or new vertigo  Respiratory: No cough or hemoptysis  Cardiovascular: See HPI  Gastrointestinal: No change in appetite, vomiting, hematemesis or diarrhea  Genitourinary: No dysuria or hematuria  Musculoskeletal: No new back pain, neck pain or muscle pain  Neurologic: No new headaches, focal weakness or behavior changes  Psychiatric: No hallucinations, excessive alcohol consumption or illegal drug usage  Hematologic/Lymphatic/Immunologic: No bleeding, chills, fever, night sweats or weight loss  Endocrine: No new cold intolerance, heat intolerance, polyphagia, polydipsia or polyuria      PHYSICAL EXAMINATION:     BP (!) 163/77 (BP Location: Right arm, Patient Position: Supine, Cuff Size: Adult Regular)   Pulse 59   Ht 1.546 m (5' 0.87\")   Wt 64.2 kg (141 lb 9.6 oz)   SpO2 100%   BMI 26.87 kg/m      GENERAL: No acute distress.  HEENT: EOMI. Sclerae white, not injected. Nares clear. Pharynx without erythema or exudate.   Neck: No adenopathy. No thyromegaly. No jugular venous distension.   Heart: Regular " rate and rhythm. 3/6 J CARLOS RUSB  Lungs: Clear to auscultation. No ronchi, wheezes, rales.   Abdomen: Soft, nontender, nondistended. Bowel sounds present.  Extremities: No clubbing, cyanosis, or edema.   Neurologic: Alert and oriented to person/place/time, normal speech and affect. No focal deficits.  Skin: No petechiae, purpura or rash.     LABORATORY DATA:     LIPID RESULTS:  Lab Results   Component Value Date    CHOL 148 08/09/2022    HDL 77 08/09/2022    LDL 58 08/09/2022    TRIG 65 08/09/2022       LIVER ENZYME RESULTS:  No results found for: AST, ALT    CBC RESULTS:  No results found for: WBC, RBC, HGB, HCT, MCV, MCH, MCHC, RDW, PLT    BMP RESULTS:  Lab Results   Component Value Date     08/09/2022    POTASSIUM 4.4 08/09/2022    POTASSIUM 4.6 11/17/2021    CHLORIDE 103 08/09/2022    CHLORIDE 103 11/17/2021    CO2 28 08/09/2022    CO2 28 11/17/2021    ANIONGAP 9 08/09/2022    ANIONGAP 9 11/17/2021    GLC 87 08/09/2022    GLC 85 11/17/2021    BUN 13.1 08/09/2022    BUN 18 11/17/2021    CR 0.89 08/09/2022    GFRESTIMATED 63 08/09/2022    GFRESTIMATED >60 11/12/2020    GFRESTBLACK >60 11/12/2020    SHAHEEN 9.3 08/09/2022        A1C RESULTS:  No results found for: A1C    INR RESULTS:  No results found for: INR       PROCEDURES & FURTHER ASSESSMENTS:     ECG dated 10/7/22  Personally reviewed   NSR HR 62 with LBBB    Echocardiogram dated 8/15/22      STS RISK SCORE: 2.7%  FRAILTY SCORE: pending   NYHA CLASS: II     CLINICAL IMPRESSION:     Jade Walker is a very pleasant 87 year old female with severe aortic valvular stenosis referred to our clinic for evaluation and consideration for potential transcatheter aortic valve replacement (TAVR). Her severe aortic stenosis is characterized with an ROBIN of 0.6cm2, mean PG 35 mmHg with LVEF 60-65% echocardiogram 8/15/22 at [facility]. Her additional past medical history if notable for HTN, HLD, CKD stage III, contrast dye allergy, IBS.    Patient reports feeling well  overall but has noticed some mild exertional dyspnea and exercise intolerance over the past year. We discussed the natural history of severe aortic stenosis, associated symptoms and available treatment options including transcatheter versus surgical aortic valve replacement. Patient is an appropriate candidate for transcatheter aortic valve replacement based on age, frailty, co-morbidities and surgical mortality risk estimation of 2.7% based on the STS score.     The pre-procedural TAVR evaluation currently requires additional assessment with CT TAVR, coronary angiogram, right and left heart catheterization prior to presentation to the Heart team for discussion during our multi-disciplinary conference for consensus decision and procedural planning.     Plan Summary:  1) Severe Aortic Stenosis:  -Patient would like to discuss with her  prior to scheduling work-up  -If agreeable, will proceed with TAVR evaluation   -CT TAVR and carotid US   -Coronary angiogram, right heart catheterization and left heart catheterization  -Following completion of TAVR work-up patient case will be discussed during our multi-disciplinary conference for consensus decision and procedural planning.      Patient was evaluated in clinic with Dr. Tyler.    MAX Hardin, CNP  [facility] Cardiology Team    Greater than 45 minutes were spent with patient and family providing education regarding progression of aortic stenosis, symptom recognition and management as well as approach to treatment and post-procedural expectations.     CC  Patient Care Team:  Rolan Bradford MD as PCP - General (Family Practice)  Dave Merritt MD as MD (Family Medicine)

## 2022-10-06 NOTE — PROGRESS NOTES
[facility] CARDIOTHORACIC SURGERY CONSULT  Patient Name: Jade Walker  Medical Record Number: [id number]  YOB: 1935  Room Number: Room/bed info not found  Referring Physician: Dr. Tyler    CC: Severe aortic stenosis    History of Present Illness: Jade Walker is a very pleasant 87 year old female with severe aortic valvular stenosis referred to our clinic for evaluation and consideration for potential transcatheter aortic valve replacement (TAVR). Her severe aortic stenosis is characterized with an ROBIN of 0.6cm2, mean PG 35 mmHg with LVEF 60-65% echocardiogram 8/15/22 at [facility]. Her additional past medical history if notable for HTN, HLD, CKD stage III, contrast dye allergy, IBS.      Patient has a history of mild to moderate aortic stenosis dating back to 2016. Most recent echo shows progression of her aortic stenosis to the severe range. She says that she has been feeling well. She is fairly active. Over the past month she has been busy moving out of her house of 45 years to senior living. She has doing a lot of walking, lifting, climbing stairs. She denies chest pain. She has noticed a few episodes of shortness of breath and lightheadedness when walking up the stairs quickly. Also reports feeling more tired than normal and perhaps reduced exercise tolerance over the past year. She otherwise has no heart failure symptoms.     Assessment and Plan:  Jade Walker is a 87 year old female with severe aortic stenosis  1) Recommend TAVR  2) Surgical bailout - yes   3) Please call with questions or concerns.     Thank you for the opportunity to participate in the care of this patient.    Dallin Bone MD  Cardiothoracic Surgery  [phone]    Past Medical History:  Past Medical History:   Diagnosis Date     Aortic stenosis      Aortic stenosis, moderate      Chronic kidney disease, stage III (moderate) (H)      Colon polyp      Hyperlipidemia      Hypertension      Irritable  bowel syndrome      Obesity (BMI 35.0-39.9 without comorbidity)        Past Surgical History:  Past Surgical History:   Procedure Laterality Date     EYE SURGERY  06/2015     OTHER SURGICAL HISTORY Bilateral     cataracts     ZZHC COLONOSCOPY W/WO BRUSH/WASH N/A 12/11/2020    Procedure: COLONOSCOPY;  Surgeon: Stan Porter MD;  [place] [facility] [place];  Service: Gastroenterology        Family History:   No family history on file.    Social History:  Social History     Socioeconomic History     Marital status:      Spouse name: Not on file     Number of children: Not on file     Years of education: Not on file     Highest education level: Not on file   Occupational History     Not on file   Tobacco Use     Smoking status: Never Smoker     Smokeless tobacco: Never Used   Substance and Sexual Activity     Alcohol use: Yes     Comment: Alcoholic Drinks/day: once a month     Drug use: Never     Sexual activity: Not on file   Other Topics Concern     Not on file   Social History Narrative     Not on file     Social Determinants of Health     Financial Resource Strain: Not on file   Food Insecurity: Not on file   Transportation Needs: Not on file   Physical Activity: Not on file   Stress: Not on file   Social Connections: Not on file   Intimate Partner Violence: Not on file   Housing Stability: Not on file       Allergies:   Allergies   Allergen Reactions     Azithromycin Hives     Iodinated Contrast Media [Diagnostic X-Ray Materials] Hives       Medications:  Current Outpatient Medications   Medication     atenoloL (TENORMIN) 100 MG tablet     atorvastatin (LIPITOR) 40 MG tablet     B.ani/L.aci/L.sal/L.plan/L.Rey (PROBIOTIC FORMULA ORAL)     cholecalciferol, vitamin D3, 1,000 unit (25 mcg) tablet     famotidine (PEPCID) 20 MG tablet     folic acid (FOLVITE) 1 MG tablet     glucosamine/chondr more A sod (GLUCOSAMINE-CHONDROITIN) 1,500-1,200 mg/30 mL Liqd     loratadine (CLARITIN) 10 MG tablet     melatonin 3  MG tablet     polyethylene glycol-propylene glycol (SYSTANE ULTRA) 0.4-0.3 % SOLN ophthalmic solution     vit A/vit C/vit E/zinc/copper (PRESERVISION AREDS ORAL)     calcium carbonate (OS-SHAHEEN) 600 mg calcium (1,500 mg) tablet     diphenhydrAMINE (BENADRYL) 25 mg capsule     ketoconazole (NIZORAL) 2 % cream     methylcellulose (CITRUCEL, SUCROSE,)     mupirocin (BACTROBAN) 2 % ointment     No current facility-administered medications for this visit.       Review of Systems:   A 10 point ROS was performed and is negative other than HPI.    Physical Exam:  Pulse:  [59] 59  BP: (163)/(77) 163/77  SpO2:  [100 %] 100 %    Gen: NAD, resting comfortably in chair   Lungs: CTAB, non-labored breathing   CV: regular rhythm, normal rate   Abd: Soft, not tender, not distended   Ext: Motor, sensation, pulses intact   Neuro: AOx3    Labs:  Lab Results   Component Value Date    WBC 9.1 10/12/2022     Lab Results   Component Value Date    RBC 3.81 10/12/2022     Lab Results   Component Value Date    HGB 12.7 10/12/2022     Lab Results   Component Value Date    HCT 38.3 10/12/2022     No components found for: MCT  Lab Results   Component Value Date     10/12/2022     Lab Results   Component Value Date    MCH 33.3 10/12/2022     Lab Results   Component Value Date    MCHC 33.2 10/12/2022     Lab Results   Component Value Date    RDW 13.9 10/12/2022     Lab Results   Component Value Date     10/12/2022     Last Comprehensive Metabolic Panel:  Sodium   Date Value Ref Range Status   10/12/2022 139 136 - 145 mmol/L Final     Potassium   Date Value Ref Range Status   10/12/2022 3.9 3.4 - 5.3 mmol/L Final   11/17/2021 4.6 3.5 - 5.0 mmol/L Final     Chloride   Date Value Ref Range Status   10/12/2022 102 98 - 107 mmol/L Final   11/17/2021 103 98 - 107 mmol/L Final     Carbon Dioxide (CO2)   Date Value Ref Range Status   10/12/2022 25 22 - 29 mmol/L Final   11/17/2021 28 22 - 31 mmol/L Final     Anion Gap   Date Value Ref Range  Status   10/12/2022 12 7 - 15 mmol/L Final   11/17/2021 9 5 - 18 mmol/L Final     Glucose   Date Value Ref Range Status   10/12/2022 166 (H) 70 - 99 mg/dL Final   11/17/2021 85 70 - 125 mg/dL Final     Urea Nitrogen   Date Value Ref Range Status   10/12/2022 15.7 8.0 - 23.0 mg/dL Final   11/17/2021 18 8 - 28 mg/dL Final     Creatinine   Date Value Ref Range Status   10/12/2022 0.92 0.51 - 0.95 mg/dL Final     GFR Estimate   Date Value Ref Range Status   10/12/2022 61 >60 mL/min/1.73m2 Final     Comment:     Effective December 21, 2021 eGFRcr in adults is calculated using the 2021 CKD-EPI creatinine equation which includes age and gender (Yun vargas al., NEJ, DOI: 10.1056/VNEZea5038602)   11/12/2020 >60 >60 mL/min/1.73m2 Final     Calcium   Date Value Ref Range Status   10/12/2022 9.6 8.8 - 10.2 mg/dL Final       Imaging:  Echocardiogram dated 8/15/22     STS RISK SCORE: 2.7%  FRAILTY SCORE: pending   NYHA CLASS: II

## 2022-10-06 NOTE — LETTER
10/6/2022      RE: Jade Walker  [address] [place] [address]       Dear Colleague,    Thank you for the opportunity to participate in the care of your patient, Jade Walker, at the [facility] HEART [facility]INIC [place] [facility]. Please see a copy of my visit note below.    [facility] CARDIOTHORACIC SURGERY CONSULT  Patient Name: Jade Walker  Medical Record Number: [id number]  YOB: 1935  Room Number: Room/bed info not found  Referring Physician: Dr. Tyler    CC: Severe aortic stenosis    History of Present Illness: Jade Walker is a very pleasant 87 year old female with severe aortic valvular stenosis referred to our clinic for evaluation and consideration for potential transcatheter aortic valve replacement (TAVR). Her severe aortic stenosis is characterized with an ROBIN of 0.6cm2, mean PG 35 mmHg with LVEF 60-65% echocardiogram 8/15/22 at [facility]. Her additional past medical history if notable for HTN, HLD, CKD stage III, contrast dye allergy, IBS.      Patient has a history of mild to moderate aortic stenosis dating back to 2016. Most recent echo shows progression of her aortic stenosis to the severe range. She says that she has been feeling well. She is fairly active. Over the past month she has been busy moving out of her house of 45 years to senior living. She has doing a lot of walking, lifting, climbing stairs. She denies chest pain. She has noticed a few episodes of shortness of breath and lightheadedness when walking up the stairs quickly. Also reports feeling more tired than normal and perhaps reduced exercise tolerance over the past year. She otherwise has no heart failure symptoms.     Assessment and Plan:  Jade Walker is a 87 year old female with severe aortic stenosis  1) Recommend TAVR  2) Surgical bailout - yes   3) Please call with questions or concerns.     Thank you for the opportunity  to participate in the care of this patient.    Dallin Bone MD  Cardiothoracic Surgery  [phone]    Past Medical History:  Past Medical History:   Diagnosis Date     Aortic stenosis      Aortic stenosis, moderate      Chronic kidney disease, stage III (moderate) (H)      Colon polyp      Hyperlipidemia      Hypertension      Irritable bowel syndrome      Obesity (BMI 35.0-39.9 without comorbidity)        Past Surgical History:  Past Surgical History:   Procedure Laterality Date     EYE SURGERY  06/2015     OTHER SURGICAL HISTORY Bilateral     cataracts     ZZHC COLONOSCOPY W/WO BRUSH/WASH N/A 12/11/2020    Procedure: COLONOSCOPY;  Surgeon: Stan Porter MD;  Location: [facility] [place];  Service: Gastroenterology        Family History:   No family history on file.    Social History:  Social History     Socioeconomic History     Marital status:      Spouse name: Not on file     Number of children: Not on file     Years of education: Not on file     Highest education level: Not on file   Occupational History     Not on file   Tobacco Use     Smoking status: Never Smoker     Smokeless tobacco: Never Used   Substance and Sexual Activity     Alcohol use: Yes     Comment: Alcoholic Drinks/day: once a month     Drug use: Never     Sexual activity: Not on file   Other Topics Concern     Not on file   Social History Narrative     Not on file     Social Determinants of Health     Financial Resource Strain: Not on file   Food Insecurity: Not on file   Transportation Needs: Not on file   Physical Activity: Not on file   Stress: Not on file   Social Connections: Not on file   Intimate Partner Violence: Not on file   Housing Stability: Not on file       Allergies:   Allergies   Allergen Reactions     Azithromycin Hives     Iodinated Contrast Media [Diagnostic X-Ray Materials] Hives       Medications:  Current Outpatient Medications   Medication     atenoloL (TENORMIN) 100 MG tablet     atorvastatin (LIPITOR) 40  MG tablet     B.ani/L.aci/L.sal/L.plan/L.Rey (PROBIOTIC FORMULA ORAL)     cholecalciferol, vitamin D3, 1,000 unit (25 mcg) tablet     famotidine (PEPCID) 20 MG tablet     folic acid (FOLVITE) 1 MG tablet     glucosamine/chondr more A sod (GLUCOSAMINE-CHONDROITIN) 1,500-1,200 mg/30 mL Liqd     loratadine (CLARITIN) 10 MG tablet     melatonin 3 MG tablet     polyethylene glycol-propylene glycol (SYSTANE ULTRA) 0.4-0.3 % SOLN ophthalmic solution     vit A/vit C/vit E/zinc/copper (PRESERVISION AREDS ORAL)     calcium carbonate (OS-SHAHEEN) 600 mg calcium (1,500 mg) tablet     diphenhydrAMINE (BENADRYL) 25 mg capsule     ketoconazole (NIZORAL) 2 % cream     methylcellulose (CITRUCEL, SUCROSE,)     mupirocin (BACTROBAN) 2 % ointment     No current facility-administered medications for this visit.       Review of Systems:   A 10 point ROS was performed and is negative other than HPI.    Physical Exam:  Pulse:  [59] 59  BP: (163)/(77) 163/77  SpO2:  [100 %] 100 %    Gen: NAD, resting comfortably in chair   Lungs: CTAB, non-labored breathing   CV: regular rhythm, normal rate   Abd: Soft, not tender, not distended   Ext: Motor, sensation, pulses intact   Neuro: AOx3    Labs:  Lab Results   Component Value Date    WBC 9.1 10/12/2022     Lab Results   Component Value Date    RBC 3.81 10/12/2022     Lab Results   Component Value Date    HGB 12.7 10/12/2022     Lab Results   Component Value Date    HCT 38.3 10/12/2022     No components found for: MCT  Lab Results   Component Value Date     10/12/2022     Lab Results   Component Value Date    MCH 33.3 10/12/2022     Lab Results   Component Value Date    MCHC 33.2 10/12/2022     Lab Results   Component Value Date    RDW 13.9 10/12/2022     Lab Results   Component Value Date     10/12/2022     Last Comprehensive Metabolic Panel:  Sodium   Date Value Ref Range Status   10/12/2022 139 136 - 145 mmol/L Final     Potassium   Date Value Ref Range Status   10/12/2022 3.9 3.4 - 5.3  mmol/L Final   11/17/2021 4.6 3.5 - 5.0 mmol/L Final     Chloride   Date Value Ref Range Status   10/12/2022 102 98 - 107 mmol/L Final   11/17/2021 103 98 - 107 mmol/L Final     Carbon Dioxide (CO2)   Date Value Ref Range Status   10/12/2022 25 22 - 29 mmol/L Final   11/17/2021 28 22 - 31 mmol/L Final     Anion Gap   Date Value Ref Range Status   10/12/2022 12 7 - 15 mmol/L Final   11/17/2021 9 5 - 18 mmol/L Final     Glucose   Date Value Ref Range Status   10/12/2022 166 (H) 70 - 99 mg/dL Final   11/17/2021 85 70 - 125 mg/dL Final     Urea Nitrogen   Date Value Ref Range Status   10/12/2022 15.7 8.0 - 23.0 mg/dL Final   11/17/2021 18 8 - 28 mg/dL Final     Creatinine   Date Value Ref Range Status   10/12/2022 0.92 0.51 - 0.95 mg/dL Final     GFR Estimate   Date Value Ref Range Status   10/12/2022 61 >60 mL/min/1.73m2 Final     Comment:     Effective December 21, 2021 eGFRcr in adults is calculated using the 2021 CKD-EPI creatinine equation which includes age and gender (Yun et al., NEJ, DOI: 10.1056/BEEZyz0061898)   11/12/2020 >60 >60 mL/min/1.73m2 Final     Calcium   Date Value Ref Range Status   10/12/2022 9.6 8.8 - 10.2 mg/dL Final       Imaging:  Echocardiogram dated 8/15/22     STS RISK SCORE: 2.7%  FRAILTY SCORE: pending   NYHA CLASS: II      Please do not hesitate to contact me if you have any questions/concerns.     Sincerely,     Dallin Bone MD

## 2022-10-06 NOTE — PATIENT INSTRUCTIONS
You were seen today in the Cardiovascular Clinic at the [facility].      Cardiology Providers you saw during your visit:  Jazmine Pathak CNP and Dr. Tyler    Recommendations:   forward with the needed testing for the TAVR procedure. Once all the testing is done I will present all the information to the team to determine plan of care.    We are going to schedule you for a CT angiogram of the chest, abdomen, and pelvis (CT TAVR):  -Please drink plenty of water the day before the test  -No caffeine, alcohol or smoking 12 hours prior to test  -Nothing by mouth 3 hours prior, however it is OK to take your medications with a sip of water.  -If you are on oral diabetes medications:  Do not take on the day of the procedure.  If you take Glucophage or Metformin:  Do not take 48 hours post procedure.    -Report to the [facility] Waiting room in the [facility], [place] [address]        You will/have be scheduled for a coronary angiogram on at the [facility].     Address:   [address] [place] [address]     Instructions:   1. Please make arrangements to have a  as you will not be allowed to drive following the procedure.  is available at no additional cost in front of the building.   2. 8 hours prior to arrival stop all food, drink, milk and chewing tobacco.   3. Take a 325 mg aspirin the day before and the day of your procedure, unless you take a daily 81 mg baby aspirin.   4. Make arrangements to have someone stay with you the night after your procedure.   5. You will need to take a home COVID test the day prior to your angiogram. Please take a picture of the results to bring with to show the nursing staff.    Please arrive at the [facility] Waiting Room at.     You will be escorted back to the pre procedure area called 2A. Here they will insert an IV, draw labs, and obtain a short medical history.  Please bring an updated list of your current medications.     A physician will come and talk with you about the procedure and obtain consent.     A nurse from the Cardiac Catheterization Lab will then escort you to the procedure area. You will be receiving sedation during the procedure so you will need someone to drive you to and from the hospital.     After the procedure you will recover for a short period (2 - 6 hours). You will be discharged with instructions for post procedure care.     However, depending on what the angiogram shows you may have to have stents placed. Most patient are able to go home the same day but sometimes it might require an overnight stay. We ask that you bring a small bag of necessities for your comfort if you would need to stay overnight. DO NOT BRING ANY VALUABLES!           Thank you for your visit today!     Mary Reilly, HADLEY  Structural Heart RN Specialists  TAVR, MitraClip and Watchman Programs  [facility]    Aziza Jax, [facility] Office: [phone]

## 2022-10-10 LAB
ATRIAL RATE - MUSE: 62 BPM
DIASTOLIC BLOOD PRESSURE - MUSE: NORMAL MMHG
INTERPRETATION ECG - MUSE: NORMAL
P AXIS - MUSE: 67 DEGREES
PR INTERVAL - MUSE: 192 MS
QRS DURATION - MUSE: 136 MS
QT - MUSE: 446 MS
QTC - MUSE: 452 MS
R AXIS - MUSE: -29 DEGREES
SYSTOLIC BLOOD PRESSURE - MUSE: NORMAL MMHG
T AXIS - MUSE: 100 DEGREES
VENTRICULAR RATE- MUSE: 62 BPM

## 2022-10-10 NOTE — PROGRESS NOTES
Structural Heart Coordinator visit:  Provided additional education regarding TAVR/MitraClip procedure, after being present for discussion with physician. Explained the work-up process and next steps for patient. Patient provided our direct contact number and instructed to call with any questions.     Frailty Score: 1/4    Completed frailty testing and KCCQ.       5 meter walk: 5  Trial 1:5  Trail 2: 5  Trial 3: 5    : Did not perform  Albumin:None on file  Eye ball test: Pass  ADL: 6/6    Dental Clearance: Passed      KCCQ Results:   1a. 5  1b. 5  1c. 5  2. 5  3. 6  4. 6  5. 5  6. 5  7. 4  8a. 5  8b. 5  8c. 5      Millie Camara CMA  Structural Heart   [facility]

## 2022-10-12 ENCOUNTER — HOSPITAL ENCOUNTER (OUTPATIENT)
Dept: ULTRASOUND IMAGING | Facility: CLINIC | Age: 87
Discharge: HOME | End: 2022-10-12
Attending: INTERNAL MEDICINE
Payer: COMMERCIAL

## 2022-10-12 ENCOUNTER — LAB (OUTPATIENT)
Dept: LAB | Facility: CLINIC | Age: 87
End: 2022-10-12
Attending: FAMILY MEDICINE
Payer: COMMERCIAL

## 2022-10-12 ENCOUNTER — HOSPITAL ENCOUNTER (OUTPATIENT)
Dept: CT IMAGING | Facility: CLINIC | Age: 87
Discharge: HOME | End: 2022-10-12
Attending: INTERNAL MEDICINE
Payer: COMMERCIAL

## 2022-10-12 DIAGNOSIS — I51.89 OTHER ILL-DEFINED HEART DISEASES: ICD-10-CM

## 2022-10-12 DIAGNOSIS — I35.0 SEVERE AORTIC STENOSIS: ICD-10-CM

## 2022-10-12 DIAGNOSIS — R09.89 CAROTID BRUIT, UNSPECIFIED LATERALITY: ICD-10-CM

## 2022-10-12 LAB
ALBUMIN SERPL BCG-MCNC: 4.1 G/DL (ref 3.5–5.2)
ALP SERPL-CCNC: 78 U/L (ref 35–104)
ALT SERPL W P-5'-P-CCNC: 17 U/L (ref 10–35)
ANION GAP SERPL CALCULATED.3IONS-SCNC: 12 MMOL/L (ref 7–15)
AST SERPL W P-5'-P-CCNC: 25 U/L (ref 10–35)
BILIRUB SERPL-MCNC: 0.5 MG/DL
BUN SERPL-MCNC: 15.7 MG/DL (ref 8–23)
CALCIUM SERPL-MCNC: 9.6 MG/DL (ref 8.8–10.2)
CHLORIDE SERPL-SCNC: 102 MMOL/L (ref 98–107)
CREAT SERPL-MCNC: 0.92 MG/DL (ref 0.51–0.95)
DEPRECATED HCO3 PLAS-SCNC: 25 MMOL/L (ref 22–29)
ERYTHROCYTE [DISTWIDTH] IN BLOOD BY AUTOMATED COUNT: 13.9 % (ref 10–15)
GFR SERPL CREATININE-BSD FRML MDRD: 61 ML/MIN/1.73M2
GLUCOSE SERPL-MCNC: 166 MG/DL (ref 70–99)
HCT VFR BLD AUTO: 38.3 % (ref 35–47)
HGB BLD-MCNC: 12.7 G/DL (ref 11.7–15.7)
HOLD SPECIMEN: NORMAL
MCH RBC QN AUTO: 33.3 PG (ref 26.5–33)
MCHC RBC AUTO-ENTMCNC: 33.2 G/DL (ref 31.5–36.5)
MCV RBC AUTO: 101 FL (ref 78–100)
PLATELET # BLD AUTO: 313 10E3/UL (ref 150–450)
POTASSIUM SERPL-SCNC: 3.9 MMOL/L (ref 3.4–5.3)
PROT SERPL-MCNC: 7 G/DL (ref 6.4–8.3)
RBC # BLD AUTO: 3.81 10E6/UL (ref 3.8–5.2)
SODIUM SERPL-SCNC: 139 MMOL/L (ref 136–145)
WBC # BLD AUTO: 9.1 10E3/UL (ref 4–11)

## 2022-10-12 PROCEDURE — 85027 COMPLETE CBC AUTOMATED: CPT

## 2022-10-12 PROCEDURE — 74174 CTA ABD&PLVS W/CONTRAST: CPT | Mod: 26 | Performed by: STUDENT IN AN ORGANIZED HEALTH CARE EDUCATION/TRAINING PROGRAM

## 2022-10-12 PROCEDURE — 71275 CT ANGIOGRAPHY CHEST: CPT | Mod: 26 | Performed by: STUDENT IN AN ORGANIZED HEALTH CARE EDUCATION/TRAINING PROGRAM

## 2022-10-12 PROCEDURE — 80053 COMPREHEN METABOLIC PANEL: CPT

## 2022-10-12 PROCEDURE — 250N000011 HC RX IP 250 OP 636: Performed by: STUDENT IN AN ORGANIZED HEALTH CARE EDUCATION/TRAINING PROGRAM

## 2022-10-12 PROCEDURE — 74174 CTA ABD&PLVS W/CONTRAST: CPT

## 2022-10-12 PROCEDURE — 93880 EXTRACRANIAL BILAT STUDY: CPT | Mod: 26 | Performed by: RADIOLOGY

## 2022-10-12 PROCEDURE — 93880 EXTRACRANIAL BILAT STUDY: CPT

## 2022-10-12 PROCEDURE — 71275 CT ANGIOGRAPHY CHEST: CPT

## 2022-10-12 RX ORDER — IOPAMIDOL 755 MG/ML
120 INJECTION, SOLUTION INTRAVASCULAR ONCE
Status: COMPLETED | OUTPATIENT
Start: 2022-10-12 | End: 2022-10-12

## 2022-10-12 RX ADMIN — IOPAMIDOL 120 ML: 755 INJECTION, SOLUTION INTRAVENOUS at 10:13

## 2022-10-19 ENCOUNTER — TELEPHONE (OUTPATIENT)
Dept: CARDIOLOGY | Facility: CLINIC | Age: 87
End: 2022-10-19

## 2022-10-19 NOTE — TELEPHONE ENCOUNTER
Call complete for pre procedure reminder and updated visitor policy.  COVID Negative on home test.

## 2022-10-20 ENCOUNTER — HOSPITAL ENCOUNTER (OUTPATIENT)
Facility: CLINIC | Age: 87
Discharge: HOME | End: 2022-10-20
Attending: INTERNAL MEDICINE | Admitting: INTERNAL MEDICINE
Payer: COMMERCIAL

## 2022-10-20 ENCOUNTER — APPOINTMENT (OUTPATIENT)
Dept: MEDSURG UNIT | Facility: CLINIC | Age: 87
End: 2022-10-20
Attending: INTERNAL MEDICINE
Payer: COMMERCIAL

## 2022-10-20 ENCOUNTER — APPOINTMENT (OUTPATIENT)
Dept: LAB | Facility: CLINIC | Age: 87
End: 2022-10-20
Attending: INTERNAL MEDICINE
Payer: COMMERCIAL

## 2022-10-20 VITALS
DIASTOLIC BLOOD PRESSURE: 58 MMHG | WEIGHT: 139.5 LBS | SYSTOLIC BLOOD PRESSURE: 141 MMHG | OXYGEN SATURATION: 99 % | BODY MASS INDEX: 26.34 KG/M2 | RESPIRATION RATE: 16 BRPM | HEART RATE: 82 BPM | HEIGHT: 61 IN | TEMPERATURE: 97.7 F

## 2022-10-20 DIAGNOSIS — R09.89 CAROTID BRUIT: ICD-10-CM

## 2022-10-20 DIAGNOSIS — I51.89 OTHER ILL-DEFINED HEART DISEASES: ICD-10-CM

## 2022-10-20 DIAGNOSIS — R09.89 CAROTID BRUIT, UNSPECIFIED LATERALITY: ICD-10-CM

## 2022-10-20 DIAGNOSIS — I35.0 SEVERE AORTIC STENOSIS: ICD-10-CM

## 2022-10-20 DIAGNOSIS — Z98.890 S/P CORONARY ANGIOGRAM: Primary | ICD-10-CM

## 2022-10-20 LAB
ANION GAP SERPL CALCULATED.3IONS-SCNC: 11 MMOL/L (ref 7–15)
APTT PPP: 23 SECONDS (ref 22–38)
BUN SERPL-MCNC: 16.8 MG/DL (ref 8–23)
CALCIUM SERPL-MCNC: 9.7 MG/DL (ref 8.8–10.2)
CHLORIDE SERPL-SCNC: 103 MMOL/L (ref 98–107)
CREAT SERPL-MCNC: 0.86 MG/DL (ref 0.51–0.95)
DEPRECATED HCO3 PLAS-SCNC: 25 MMOL/L (ref 22–29)
ERYTHROCYTE [DISTWIDTH] IN BLOOD BY AUTOMATED COUNT: 14.2 % (ref 10–15)
GFR SERPL CREATININE-BSD FRML MDRD: 66 ML/MIN/1.73M2
GLUCOSE SERPL-MCNC: 119 MG/DL (ref 70–99)
HCT VFR BLD AUTO: 38.5 % (ref 35–47)
HGB BLD-MCNC: 11.6 G/DL (ref 11.7–15.7)
HGB BLD-MCNC: 12.8 G/DL (ref 11.7–15.7)
INR PPP: 1 (ref 0.85–1.15)
MCH RBC QN AUTO: 33.2 PG (ref 26.5–33)
MCHC RBC AUTO-ENTMCNC: 33.2 G/DL (ref 31.5–36.5)
MCV RBC AUTO: 100 FL (ref 78–100)
OXYHGB MFR BLDV: 71 % (ref 92–100)
PLATELET # BLD AUTO: 346 10E3/UL (ref 150–450)
POTASSIUM SERPL-SCNC: 4.1 MMOL/L (ref 3.4–5.3)
RBC # BLD AUTO: 3.86 10E6/UL (ref 3.8–5.2)
SODIUM SERPL-SCNC: 139 MMOL/L (ref 136–145)
WBC # BLD AUTO: 8.6 10E3/UL (ref 4–11)

## 2022-10-20 PROCEDURE — 85610 PROTHROMBIN TIME: CPT | Performed by: INTERNAL MEDICINE

## 2022-10-20 PROCEDURE — 85730 THROMBOPLASTIN TIME PARTIAL: CPT | Mod: GZ | Performed by: INTERNAL MEDICINE

## 2022-10-20 PROCEDURE — 36415 COLL VENOUS BLD VENIPUNCTURE: CPT | Performed by: INTERNAL MEDICINE

## 2022-10-20 PROCEDURE — 93456 R HRT CORONARY ARTERY ANGIO: CPT | Performed by: INTERNAL MEDICINE

## 2022-10-20 PROCEDURE — 93456 R HRT CORONARY ARTERY ANGIO: CPT | Mod: 26 | Performed by: INTERNAL MEDICINE

## 2022-10-20 PROCEDURE — 999N000134 HC STATISTIC PP CARE STAGE 3

## 2022-10-20 PROCEDURE — 999N000142 HC STATISTIC PROCEDURE PREP ONLY

## 2022-10-20 PROCEDURE — C1887 CATHETER, GUIDING: HCPCS | Performed by: INTERNAL MEDICINE

## 2022-10-20 PROCEDURE — 999N000054 HC STATISTIC EKG NON-CHARGEABLE

## 2022-10-20 PROCEDURE — 99153 MOD SED SAME PHYS/QHP EA: CPT | Performed by: INTERNAL MEDICINE

## 2022-10-20 PROCEDURE — 93010 ELECTROCARDIOGRAM REPORT: CPT | Mod: 76 | Performed by: INTERNAL MEDICINE

## 2022-10-20 PROCEDURE — 99152 MOD SED SAME PHYS/QHP 5/>YRS: CPT | Mod: GC | Performed by: INTERNAL MEDICINE

## 2022-10-20 PROCEDURE — 258N000003 HC RX IP 258 OP 636: Performed by: INTERNAL MEDICINE

## 2022-10-20 PROCEDURE — 93005 ELECTROCARDIOGRAM TRACING: CPT

## 2022-10-20 PROCEDURE — 99152 MOD SED SAME PHYS/QHP 5/>YRS: CPT | Performed by: INTERNAL MEDICINE

## 2022-10-20 PROCEDURE — 250N000009 HC RX 250: Performed by: NURSE PRACTITIONER

## 2022-10-20 PROCEDURE — 82310 ASSAY OF CALCIUM: CPT | Performed by: INTERNAL MEDICINE

## 2022-10-20 PROCEDURE — 250N000011 HC RX IP 250 OP 636: Performed by: INTERNAL MEDICINE

## 2022-10-20 PROCEDURE — 85018 HEMOGLOBIN: CPT

## 2022-10-20 PROCEDURE — 82810 BLOOD GASES O2 SAT ONLY: CPT

## 2022-10-20 PROCEDURE — C1894 INTRO/SHEATH, NON-LASER: HCPCS | Performed by: INTERNAL MEDICINE

## 2022-10-20 PROCEDURE — 250N000009 HC RX 250: Performed by: INTERNAL MEDICINE

## 2022-10-20 PROCEDURE — 85027 COMPLETE CBC AUTOMATED: CPT | Performed by: INTERNAL MEDICINE

## 2022-10-20 PROCEDURE — 272N000001 HC OR GENERAL SUPPLY STERILE: Performed by: INTERNAL MEDICINE

## 2022-10-20 RX ORDER — LIDOCAINE 40 MG/G
CREAM TOPICAL
Status: DISCONTINUED | OUTPATIENT
Start: 2022-10-20 | End: 2022-10-20 | Stop reason: HOSPADM

## 2022-10-20 RX ORDER — NALOXONE HYDROCHLORIDE 0.4 MG/ML
0.2 INJECTION, SOLUTION INTRAMUSCULAR; INTRAVENOUS; SUBCUTANEOUS
Status: DISCONTINUED | OUTPATIENT
Start: 2022-10-20 | End: 2022-10-20 | Stop reason: HOSPADM

## 2022-10-20 RX ORDER — NICARDIPINE HYDROCHLORIDE 2.5 MG/ML
INJECTION INTRAVENOUS
Status: DISCONTINUED | OUTPATIENT
Start: 2022-10-20 | End: 2022-10-20 | Stop reason: HOSPADM

## 2022-10-20 RX ORDER — OXYCODONE HYDROCHLORIDE 5 MG/1
5 TABLET ORAL EVERY 4 HOURS PRN
Status: DISCONTINUED | OUTPATIENT
Start: 2022-10-20 | End: 2022-10-20 | Stop reason: HOSPADM

## 2022-10-20 RX ORDER — IOPAMIDOL 755 MG/ML
INJECTION, SOLUTION INTRAVASCULAR
Status: DISCONTINUED | OUTPATIENT
Start: 2022-10-20 | End: 2022-10-20 | Stop reason: HOSPADM

## 2022-10-20 RX ORDER — DIPHENHYDRAMINE HYDROCHLORIDE 50 MG/ML
INJECTION INTRAMUSCULAR; INTRAVENOUS
Status: DISCONTINUED | OUTPATIENT
Start: 2022-10-20 | End: 2022-10-20 | Stop reason: HOSPADM

## 2022-10-20 RX ORDER — FENTANYL CITRATE 50 UG/ML
INJECTION, SOLUTION INTRAMUSCULAR; INTRAVENOUS
Status: DISCONTINUED | OUTPATIENT
Start: 2022-10-20 | End: 2022-10-20 | Stop reason: HOSPADM

## 2022-10-20 RX ORDER — FENTANYL CITRATE 50 UG/ML
25 INJECTION, SOLUTION INTRAMUSCULAR; INTRAVENOUS
Status: DISCONTINUED | OUTPATIENT
Start: 2022-10-20 | End: 2022-10-20 | Stop reason: HOSPADM

## 2022-10-20 RX ORDER — ATROPINE SULFATE 0.1 MG/ML
0.5 INJECTION INTRAVENOUS
Status: DISCONTINUED | OUTPATIENT
Start: 2022-10-20 | End: 2022-10-20 | Stop reason: HOSPADM

## 2022-10-20 RX ORDER — ASPIRIN 81 MG/1
243 TABLET, CHEWABLE ORAL ONCE
Status: DISCONTINUED | OUTPATIENT
Start: 2022-10-20 | End: 2022-10-20 | Stop reason: HOSPADM

## 2022-10-20 RX ORDER — FLUMAZENIL 0.1 MG/ML
0.2 INJECTION, SOLUTION INTRAVENOUS
Status: DISCONTINUED | OUTPATIENT
Start: 2022-10-20 | End: 2022-10-20 | Stop reason: HOSPADM

## 2022-10-20 RX ORDER — NALOXONE HYDROCHLORIDE 0.4 MG/ML
0.4 INJECTION, SOLUTION INTRAMUSCULAR; INTRAVENOUS; SUBCUTANEOUS
Status: DISCONTINUED | OUTPATIENT
Start: 2022-10-20 | End: 2022-10-20 | Stop reason: HOSPADM

## 2022-10-20 RX ORDER — ASPIRIN 325 MG
325 TABLET ORAL ONCE
Status: DISCONTINUED | OUTPATIENT
Start: 2022-10-20 | End: 2022-10-20 | Stop reason: HOSPADM

## 2022-10-20 RX ORDER — HEPARIN SODIUM 1000 [USP'U]/ML
INJECTION, SOLUTION INTRAVENOUS; SUBCUTANEOUS
Status: DISCONTINUED | OUTPATIENT
Start: 2022-10-20 | End: 2022-10-20 | Stop reason: HOSPADM

## 2022-10-20 RX ORDER — LIDOCAINE 40 MG/G
CREAM TOPICAL ONCE
Status: COMPLETED | OUTPATIENT
Start: 2022-10-20 | End: 2022-10-20

## 2022-10-20 RX ORDER — SODIUM CHLORIDE 9 MG/ML
INJECTION, SOLUTION INTRAVENOUS CONTINUOUS
Status: DISCONTINUED | OUTPATIENT
Start: 2022-10-20 | End: 2022-10-20 | Stop reason: HOSPADM

## 2022-10-20 RX ORDER — OXYCODONE HYDROCHLORIDE 10 MG/1
10 TABLET ORAL EVERY 4 HOURS PRN
Status: DISCONTINUED | OUTPATIENT
Start: 2022-10-20 | End: 2022-10-20 | Stop reason: HOSPADM

## 2022-10-20 RX ADMIN — LIDOCAINE: 40 CREAM TOPICAL at 12:59

## 2022-10-20 RX ADMIN — SODIUM CHLORIDE: 9 INJECTION, SOLUTION INTRAVENOUS at 12:19

## 2022-10-20 ASSESSMENT — ACTIVITIES OF DAILY LIVING (ADL)
ADLS_ACUITY_SCORE: 35

## 2022-10-20 NOTE — PROGRESS NOTES
Pt is here for RHC and coronary angiogram for TAVR work up.  Pt is stable, AVSS, pain free.  Prep completed. Consent signed. Lab results reviewed.  IVF infusing at 150ml/hr via PIV.  Groin prepped and pp marked.  Pt is allergy to Contrast dye (rash and hives).  Pt reported that she premedicated herself with Prednisone 60 mg last night and 30 mg PO this morning.  Pt also stated she took Benadryl 25 mg PO about an hour ago.  Pt already has rash on arms and chest area from the contrast dye she was given yesterday for  CTA.  Pt's spouse, Trevon is at the [facility] waiting room and will be transporting pt to home post procedure.  Trevon does not have a cell phone with him.  He will be waiting at the [facility] waiting room.

## 2022-10-20 NOTE — Clinical Note
dry, intact, no bleeding and no hematoma. 7fr RIJ sheath removed, manual pressure applied, hemostasis achieved, bandage placed.  6fr RRA sheath removed, TR band placed with 14ml of air.

## 2022-10-20 NOTE — PROGRESS NOTES
Patient meets criteria for discharge. PIV removed. Patient and  verbalized understanding of all discharge instructions, all questions answered. Patient declined offer of wheelchair and ambulated to main entrance with this RN and  to get car from [facility] for discharge home. Patient has all belongings.

## 2022-10-20 NOTE — DISCHARGE INSTRUCTIONS
Going Home after Coronary Angiogram  For 24 hours:        Have an adult stay with you for 24 hours.        Relax and take it easy.        Drink plenty of fluids.        You may eat your normal diet, unless your doctor tells you otherwise.        Do NOT make any important or legal decisions.        Do NOT drive or operate machines at home or at work.        Do NOT drink alcohol.   Do NOT smoke.     Medicines:        If you have begun Plavix (clopidogrel), Effient (prasugrel), or Brilinta (ticagrelor), do not stop taking it until you talk to your heart doctor (cardiologist).        If you are on metformin (Glucophage), do not restart it until you have blood tests (within 2 to 3 days after discharge). When your doctor tells you it is safe, you may restart the metformin.        If you have stopped any other medicines, check with your nurse or provider about when to restart them.    Care of wrist or arm site:        It is normal to have soreness at the puncture site and mild tingling in your hand for up to 3 days.        Remove the Band-Aid after 24 hours. If there is minor oozing, apply another Band-aid and remove it after 12 hours.         Do NOT take a bath, or use a hot tub or pool for the next 48 hours. You may shower tomorrow afternoon.        It is normal to have a small bruise.  There should not be a lump at the site.        Do not scrub the site.        Do not use lotion or powder near the puncture site for 3 days.        For 2 days, do not use your hand or arm to support your weight (such as rising from a chair) or bend your wrist (such as lifting a garage door).        For 2 days, do not lift more than 5 pounds or exercise your arm (tennis, golf or bowling).    If you start bleeding from the site in your arm: Sit down and press firmly on the site with your fingers for 10 minutes. Call your doctor as soon as you can.    Call 911 right away if you have bleeding that is heavy or does not stop.     Call your  doctor if:        You have a large or growing hard lump around the site.        The site is red, swollen, hot or tender.        Blood or fluid is draining from the site.        You have chills or a fever greater than 101 F (38 C).        Your arm turns bluish, feels numb or cool.        You have hives, a rash or unusual itching.     Discharge Instructions Post Right Heart Cath      AFTER YOU GO HOME:  DO drink plenty of fluids  DO resume your regular diet and medications unless otherwise instructed by your Primary Physician  Do Not scrub the procedure site vigorously  No lotion or powder to the puncture site for 3 days    CALL YOUR PRIMARY PHYSICIAN IF: You may resume all normal activity.  Monitor neck site for bleeding, swelling, or voice changes. If you notice bleeding or swelling immediately apply pressure to the site and call number below to speak with Cardiology Fellow.  If you experience any changes in your breathing you should call your doctor immediately or come to the closest Emergency Department.  Do not drive yourself.    ADDITIONAL INSTRUCTIONS: Medications: You are to resume all home medications including anticoagulation therapy unless otherwise advised by your primary cardiologist or nurse coordinator.    Follow Up: Per your primary cardiology team                                                         [facility] at [place]:   [phone] (7 days a week)      Cardiology Fellow on call (24 hours per day) at [facility]:   [phone] (ask for Cardiology Fellow on call)

## 2022-10-20 NOTE — PROGRESS NOTES
Patient ambulated in the hallway, to the restroom, and back to room. She voided without issue. She tolerated a snack box and fluids. Right neck site is soft and flat to palpation with no bleeding or hematoma. Right wrist TR band is deflated and removed, site is soft and flat to palpation with no bleeding or hematoma. She is alert and oriented x 4 and able to make needs known. Will continue to monitor.

## 2022-10-20 NOTE — PROGRESS NOTES
Patient arrived to , bay 31, via litter from the cardiac cath lab s/p CORs/RHC/LHC. Right neck site has small bandage in place with no bleeding or hematoma, right wrist site has a TR band in place with 14 ml of air with plan to begin deflation at 1535. Patient denies pain or discomfort. She is alert and oriented x 4 and able to make needs known. Will continue to monitor.

## 2022-10-21 LAB
ATRIAL RATE - MUSE: 70 BPM
ATRIAL RATE - MUSE: 71 BPM
DIASTOLIC BLOOD PRESSURE - MUSE: NORMAL MMHG
DIASTOLIC BLOOD PRESSURE - MUSE: NORMAL MMHG
INTERPRETATION ECG - MUSE: NORMAL
INTERPRETATION ECG - MUSE: NORMAL
P AXIS - MUSE: 69 DEGREES
P AXIS - MUSE: 70 DEGREES
PR INTERVAL - MUSE: 180 MS
PR INTERVAL - MUSE: 194 MS
QRS DURATION - MUSE: 138 MS
QRS DURATION - MUSE: 144 MS
QT - MUSE: 442 MS
QT - MUSE: 444 MS
QTC - MUSE: 477 MS
QTC - MUSE: 482 MS
R AXIS - MUSE: -28 DEGREES
R AXIS - MUSE: -43 DEGREES
SYSTOLIC BLOOD PRESSURE - MUSE: NORMAL MMHG
SYSTOLIC BLOOD PRESSURE - MUSE: NORMAL MMHG
T AXIS - MUSE: 105 DEGREES
T AXIS - MUSE: 105 DEGREES
VENTRICULAR RATE- MUSE: 70 BPM
VENTRICULAR RATE- MUSE: 71 BPM

## 2022-10-27 DIAGNOSIS — I35.0 SEVERE AORTIC STENOSIS: Primary | ICD-10-CM

## 2022-10-27 NOTE — PROGRESS NOTES
Date: 10/27/2022    Time of Call: 2:27 PM     Diagnosis:  Severe aortic stenosis     [ TORB ] Ordering provider: Jazmine Pathak  Order: ECHO     Order received by: Mary Reilly RN     Follow-up/additional notes:

## 2022-10-28 ENCOUNTER — ANCILLARY PROCEDURE (OUTPATIENT)
Dept: CARDIOLOGY | Facility: CLINIC | Age: 87
End: 2022-10-28
Attending: NURSE PRACTITIONER
Payer: COMMERCIAL

## 2022-10-28 DIAGNOSIS — I35.0 SEVERE AORTIC STENOSIS: ICD-10-CM

## 2022-10-28 LAB — LVEF ECHO: NORMAL

## 2022-10-28 PROCEDURE — 93306 TTE W/DOPPLER COMPLETE: CPT | Performed by: INTERNAL MEDICINE

## 2022-11-03 DIAGNOSIS — I50.33 ACUTE ON CHRONIC DIASTOLIC (CONGESTIVE) HEART FAILURE (H): ICD-10-CM

## 2022-11-03 DIAGNOSIS — I35.0 SEVERE AORTIC STENOSIS: Primary | ICD-10-CM

## 2022-11-03 NOTE — PROGRESS NOTES
Patient's case was discussed at Valve conference, attended by structural heart cardiologists, CV surgeons, CNP's, and structural heart care coordinators, on November 3, 2022    Patient is appropriate to proceed with TAVR  Contreras Valve  Size 23 mm   Approach: TF  Alma:  No   Special considerations:  None at this time.      Contacted patient to review discussion at Valve Conference.  Through shared decision making, patient agrees with the plan as outlined above.       Mary Reilly, RN  Structural Heart RN Specialists  TAVR, MitraClip and Watchman Programs  [facility]  Office: [phone]

## 2022-11-03 NOTE — PROGRESS NOTES
Date: 11/3/2022    Time of Call: 11:53 AM     Diagnosis:  Severe aortic stenosis     [ TORB ] Ordering provider: Jazmine Pathak  Order: TAVR scheduling order, ECHO, Labs and covid testing     Order received by: Mary Reilly RN     Follow-up/additional notes:

## 2022-11-11 DIAGNOSIS — R09.89 CAROTID BRUIT, UNSPECIFIED LATERALITY: ICD-10-CM

## 2022-11-11 DIAGNOSIS — I51.89 OTHER ILL-DEFINED HEART DISEASES: ICD-10-CM

## 2022-11-11 DIAGNOSIS — I35.0 SEVERE AORTIC STENOSIS: Primary | ICD-10-CM

## 2022-11-11 DIAGNOSIS — I35.0 SEVERE AORTIC STENOSIS: ICD-10-CM

## 2022-11-11 RX ORDER — ASPIRIN 325 MG
325 TABLET ORAL DAILY
Status: CANCELLED | OUTPATIENT
Start: 2022-11-11

## 2022-11-11 RX ORDER — PREDNISONE 10 MG/1
TABLET ORAL
Qty: 9 TABLET | Refills: 1 | Status: ON HOLD | OUTPATIENT
Start: 2022-11-11 | End: 2022-12-01

## 2022-11-11 RX ORDER — LIDOCAINE 40 MG/G
CREAM TOPICAL
Status: CANCELLED | OUTPATIENT
Start: 2022-11-11

## 2022-11-11 RX ORDER — CEFAZOLIN SODIUM 2 G/50ML
2 SOLUTION INTRAVENOUS
Status: CANCELLED | OUTPATIENT
Start: 2022-11-11

## 2022-11-11 RX ORDER — DIPHENHYDRAMINE HCL 25 MG
CAPSULE ORAL
Qty: 1 CAPSULE | Refills: 1 | Status: ON HOLD | OUTPATIENT
Start: 2022-11-11 | End: 2022-12-01

## 2022-11-11 RX ORDER — SODIUM CHLORIDE 9 MG/ML
INJECTION, SOLUTION INTRAVENOUS CONTINUOUS
Status: CANCELLED | OUTPATIENT
Start: 2022-11-11

## 2022-11-11 NOTE — PROGRESS NOTES
Date: 11/11/2022    Time of Call: 9:34 AM     Diagnosis:  Severe aortic stenosis     [ TORB ] Ordering provider: Jazmine Pathak  Order: TAVR pre procedure order set, blood components     Order received by: Carlie Reilly RN     Follow-up/additional notes:

## 2022-11-11 NOTE — PROGRESS NOTES
Check in to the hospital Admissions/3C at 8:00am.    Nothing to eat after midnight; water, apple juice or 7up/Sprite is OK up to two hours prior to your scheduled procedure.  You may take your medications in the morning with a sip of water.    Take a shower, with antibacterial soap/wipes, the night before the procedure, and the morning of the procedure.      Diagnosis: Severe aortic stenosis  Plan:  1. TAVR procedure scheduled for 11/16/22.      2. Medications instructions:  -- Take Prednisone 60 mg the evening before and 30 mg the morning of your TAVR procedure  -- Take Aspirin 325 mg the evening before and 325 mg the morning of your TAVR procedure.  -- Hold all supplements the morning of your TAVR.  -- Hold your Metformin the morning of your TAVR procedure.      If any questions please contact:  Mary Reilly RN  Structural Heart RN Specialists  TAVR, MitraClip and Watchman Programs  [facility]  [phone]

## 2022-11-13 LAB
ABO/RH(D): NORMAL
ANTIBODY SCREEN: NEGATIVE
SPECIMEN EXPIRATION DATE: NORMAL

## 2022-11-13 NOTE — PROGRESS NOTES
STRUCTURAL HEART CLINIC  H&P    Referring Provider: Dr. Tyler/Jazmine VIDAL    History of Present Illness  Jade Walker is an 87 year old female who presents for pre-operative H&P in preparation for transcatheter aortic valve replacement on 11/16/22 at [facility].     She has severe aortic stenosis characterized with an ROBIN of 0.68cm2, mean PG 41 mmHg, peak V 4.1 m/s with LVEF 60-65% and DI 0.21 by echo October 2022. Patient reports symptoms of occasional shortness of breath and lightheadedness when walking up the stairs quickly. Also reports feeling more tired than normal and perhaps reduced exercise tolerance over the past year. Patient was evaluated in structural heart clinic where she was deemed an appropriate TAVR candidate. She was counseled for the above procedure.     Her additional past medical history if notable for HTN, HLD, non obstructive CAD, CKD stage III, contrast dye allergy, IBS.     History of blood transfusions/reactions: No  History of abnormal bleeding/anti-platelet use: No  Steroid use in the last year: yes for contrast dye allergy  Personal or family history with difficulty with anesthesia: No   Infection precautions: No   Difficulty w/bedrest: No    Current Medications:  Current Outpatient Medications   Medication Sig Dispense Refill     atenoloL (TENORMIN) 100 MG tablet [ATENOLOL (TENORMIN) 100 MG TABLET] Take 100 mg by mouth daily.       atorvastatin (LIPITOR) 40 MG tablet [ATORVASTATIN (LIPITOR) 40 MG TABLET] Take 40 mg by mouth at bedtime.       B.ani/L.aci/L.sal/L.plan/L.Rey (PROBIOTIC FORMULA ORAL) [B.ANI/L.ACI/L.SAL/L.PLAN/L.REY (PROBIOTIC FORMULA ORAL)] Take 1 capsule by mouth daily.       cholecalciferol, vitamin D3, 1,000 unit (25 mcg) tablet [CHOLECALCIFEROL, VITAMIN D3, 1,000 UNIT (25 MCG) TABLET] Take 1,000 Units by mouth daily.       diphenhydrAMINE (BANOPHEN) 25 MG capsule TAKE 1 CAPSULE (25) MG AT THE TIME OF TESTING WHEN YOU ARRIVE TO  THE HOSPITAL 1 capsule 1     diphenhydrAMINE (BENADRYL) 25 mg capsule Take 25 mg by mouth nightly as needed       famotidine (PEPCID) 20 MG tablet Take 20 mg by mouth daily       folic acid (FOLVITE) 1 MG tablet [FOLIC ACID (FOLVITE) 1 MG TABLET] Take 1 mg by mouth daily.       loratadine (CLARITIN) 10 MG tablet Take 10 mg by mouth daily       melatonin 3 MG tablet Take 1 mg by mouth nightly as needed for sleep       polyethylene glycol-propylene glycol (SYSTANE ULTRA) 0.4-0.3 % SOLN ophthalmic solution Place 1 drop into both eyes every hour as needed for dry eyes       predniSONE (DELTASONE) 10 MG tablet TAKE 6 TABLETS (60 MG) NIGHT BEFORE AND 3 TABLETS (30 MG) THE MORNING OF CT 9 tablet 1     vit A/vit C/vit E/zinc/copper (PRESERVISION AREDS ORAL) [VIT A/VIT C/VIT E/ZINC/COPPER (PRESERVISION AREDS ORAL)] Take 1 tablet by mouth 2 (two) times a day.         Allergies:     Allergies   Allergen Reactions     Azithromycin Hives     Iodinated Contrast Media [Diagnostic X-Ray Materials] Hives and Rash       Past Medical History:  Past Medical History:   Diagnosis Date     Aortic stenosis      Aortic stenosis, moderate      Chronic kidney disease, stage III (moderate) (H)      Colon polyp      Hyperlipidemia      Hypertension      Irritable bowel syndrome      Obesity (BMI 35.0-39.9 without comorbidity)        Past Surgical History:  Past Surgical History:   Procedure Laterality Date     CV CORONARY ANGIOGRAM N/A 10/20/2022    Procedure: Coronary Angiogram [[id number]];  Surgeon: Tone Hollins MD;  Location:  HEART CARDIAC CATH LAB     CV LEFT HEART CATH N/A 10/20/2022    Procedure: Left Heart Cath;  Surgeon: Tone Hollins MD;  Location:  HEART CARDIAC CATH LAB     CV RIGHT HEART CATH MEASUREMENTS RECORDED N/A 10/20/2022    Procedure: Right Heart Cath;  Surgeon: Tone Hollins MD;  Location:  HEART CARDIAC CATH LAB     EYE SURGERY  06/2015     OTHER SURGICAL HISTORY Bilateral     cataracts      "ZZHC COLONOSCOPY W/WO BRUSH/WASH N/A 12/11/2020    Procedure: COLONOSCOPY;  Surgeon: Stan Porter MD;  Location: [facility];  Service: Gastroenterology       Family History:  No family history on file.    Social History:  Social History     Socioeconomic History     Marital status:    Tobacco Use     Smoking status: Never     Smokeless tobacco: Never   Substance and Sexual Activity     Alcohol use: Yes     Comment: Alcoholic Drinks/day: once a month     Drug use: Never       Review of Systems:    Functional status: Independent in ADL's. Able to achieve METS > 4.     Constitutional: No fever, chills, or sweats. No weight gain/loss   ENT: No visual disturbance, ear ache, epistaxis, sore throat  Allergies/Immunologic: Negative.   Respiratory: No cough, hemoptysia  Cardiovascular: As per HPI  GI: No nausea, vomiting, hematemesis, melena, or hematochezia  : No urinary frequency, dysuria, or hematuria  Integument: Negative  Psychiatric: Negative  Neuro: Negative  Endocrinology: Negative   Musculoskeletal: Negative      Physical Exam:  Vitals: BP (!) 149/76 (BP Location: Right arm, Patient Position: Chair, Cuff Size: Adult Regular)   Pulse 58   Ht 1.549 m (5' 1\")   Wt 64.3 kg (141 lb 11.2 oz)   SpO2 99%   BMI 26.77 kg/m     General: NAD  HEENT:  Dentition intact.    Neck: No jugular venous distension.   Heart: RRR with 3./6 J CARLOS at RUSB  Lungs: CTA.    Abdomen: Soft, nontender, nondistended.   Extremities: No clubbing, cyanosis, or edema.  The pulses are +4/4 at the radial, brachial, femoral, popliteal, DP, and PT sites bilaterally.  No bruits are noted.  Neurologic: Alert and oriented to person/place/time, normal speech, gait and affect  Skin: No petechiae, purpura or rash.      Diagnostic Studies:      ECG 10/20/22:  Personally reviewed and interpreted by me.  NSR HR 70s with LBBB    ECHO 10/2022:  ______________________________________________________________________________  Interpretation " Summary  Severe aortic stenosis (PV 4.0 m/s MG 42 mmHg DVI 0.21.)  Mild aortic insufficiency is present.  Global and regional left ventricular function is normal with an EF of 60-65%.  Right ventricular function, chamber size, wall motion, and thickness are  normal.  Previous study not available for comparison.  ______________________________________________________________________________  Left Ventricle  Global and regional left ventricular function is normal with an EF of 60-65%.  Left ventricular size is normal. Relative wall thickness is increased  consistent with concentric remodeling. Thickening of the anterobasal septum is  present. Left ventricular diastolic function is indeterminate.     Right Ventricle  Right ventricular function, chamber size, wall motion, and thickness are  normal.     Atria  The right atria appears normal. Mild left atrial enlargement is present.     Mitral Valve  Mild mitral insufficiency is present.     Aortic Valve  Mild aortic insufficiency is present. Severe aortic stenosis (PV 4.0 m/s MG 42  mmHg DVI 0.21.).     Tricuspid Valve  Mild tricuspid insufficiency is present. The right ventricular systolic  pressure is approximated at 30.2 mmHg plus the right atrial pressure.     Pulmonic Valve  The pulmonic valve is normal.     Vessels  The aorta root is normal. The inferior vena cava was normal in size with  preserved respiratory variability. IVC diameter <2.1 cm collapsing >50% with  sniff suggests a normal RA pressure of 3 mmHg.     Pericardium  No pericardial effusion is present.     Compared to Previous Study  Previous study not available for comparison.    Laboratory Studies:  Personally reviewed and interpreted by me.    LIPID RESULTS:  Lab Results   Component Value Date    CHOL 148 08/09/2022    HDL 77 08/09/2022    LDL 58 08/09/2022    TRIG 65 08/09/2022       LIVER ENZYME RESULTS:  Lab Results   Component Value Date    AST 25 10/12/2022    ALT 17 10/12/2022       CBC  RESULTS:  Lab Results   Component Value Date    WBC 8.0 11/14/2022    RBC 3.45 (L) 11/14/2022    HGB 11.7 11/14/2022    HCT 35.0 11/14/2022     (H) 11/14/2022    MCH 33.9 (H) 11/14/2022    MCHC 33.4 11/14/2022    RDW 13.8 11/14/2022     11/14/2022       BMP RESULTS:  Lab Results   Component Value Date     10/20/2022    POTASSIUM 4.1 10/20/2022    POTASSIUM 4.6 11/17/2021    CHLORIDE 103 10/20/2022    CHLORIDE 103 11/17/2021    CO2 25 10/20/2022    CO2 28 11/17/2021    ANIONGAP 11 10/20/2022    ANIONGAP 9 11/17/2021     (H) 10/20/2022    GLC 85 11/17/2021    BUN 16.8 10/20/2022    BUN 18 11/17/2021    CR 0.86 10/20/2022    GFRESTIMATED 66 10/20/2022    GFRESTIMATED >60 11/12/2020    GFRESTBLACK >60 11/12/2020    SHAHEEN 9.7 10/20/2022        A1C RESULTS:  No results found for: A1C    INR RESULTS:  Lab Results   Component Value Date    INR 1.00 10/20/2022       Assessment and Plan   Jade Walker is an 87 year old female who presents for pre-operative H&P in preparation for transcatheter aortic valve replacement on 11/16/22 at [facility].     She has the following specific operative considerations:      - RCRI score 1 corresponding with a 1% perioperative risk of MACE      - JORDEN # of risks 2/8      - VTE risk = 1. If equal to or > 4, pharmacologic prophylaxis is indicated      - Risk of PONV score = 2. If > 2, anti-emetic intervention recommended    1. Severe aortic valve stenosis:  2. Chronic diastolic heart failure, r/t valvular disease:   Patient with severe aortic stenosis by echo w/ symptoms of mild exertional dyspnea and fatigue. She has been evaluated by the our structural heart team and has been deemed an appropriate candidate for transcatheter aortic valve replacement. We discussed the procedure in detail, including pre and post-procedure care, restrictions and follow-up. She understands risks including 5-10% risk of heart block leading to permanent  pacemaker placement, 3% risk of bleeding, infection, vascular complication, 1-3% risk of stroke and very low risk (<1%) of serious complications such as cardiac perforation, aortic root rupture, dissection or death. Patient is unsure if she would like surgical bailout, she would like to think about it and let us know day of procedure.     All questions answered  Type and screen orders complete  COVID test ordered  Supplies for scrubbing provided  Has known contrast dye allergy, will be pre treated  No trouble with anesthesia in the past  Labs today WNL, no s/s of infection    3. Non obstructive CAD:  4. HTN:  5. HLD:  History of HTN and HLD well controlled. Pre-TAVR coronary angiogram showed non obstructive CAD. Patient denies angina. Plan to continue ASA, atenolol and atorvastatin     6. Mild CKD  7. Contrast dye allergy:  Baseline creatinine GFR upper 50s to 60s. Stable today. Has known contrast dye allergy, will pretreat with prednisone and benadryl.     Medication Recommendations:  Acei/ARB: N/A  Diuretic: N/A  Antiplatelet: Start  mg night before and morning of procedure  Coumadin/NOAC: N/A  BB: Continue perioperatively without interruption  Supplements: Hold morning of procedure    Patient is optimized and is acceptable candidate for the proposed procedure.  No further diagnostic evaluation is needed. Pre-procedure instructions provided in written format.     MAX Hardin, CNP  Structural Heart Care Nurse Practitioner  [facility]  Clinic: [phone]  Pager: [phone]

## 2022-11-14 ENCOUNTER — OFFICE VISIT (OUTPATIENT)
Dept: CARDIOLOGY | Facility: CLINIC | Age: 87
End: 2022-11-14
Attending: NURSE PRACTITIONER
Payer: COMMERCIAL

## 2022-11-14 ENCOUNTER — LAB (OUTPATIENT)
Dept: LAB | Facility: CLINIC | Age: 87
End: 2022-11-14
Payer: COMMERCIAL

## 2022-11-14 VITALS
SYSTOLIC BLOOD PRESSURE: 149 MMHG | HEIGHT: 61 IN | BODY MASS INDEX: 26.75 KG/M2 | WEIGHT: 141.7 LBS | HEART RATE: 58 BPM | OXYGEN SATURATION: 99 % | DIASTOLIC BLOOD PRESSURE: 76 MMHG

## 2022-11-14 DIAGNOSIS — Z98.890 S/P CORONARY ANGIOGRAM: ICD-10-CM

## 2022-11-14 DIAGNOSIS — I50.33 ACUTE ON CHRONIC DIASTOLIC (CONGESTIVE) HEART FAILURE (H): ICD-10-CM

## 2022-11-14 DIAGNOSIS — I35.0 SEVERE AORTIC STENOSIS: ICD-10-CM

## 2022-11-14 DIAGNOSIS — Z01.818 PRE-OP EXAM: Primary | ICD-10-CM

## 2022-11-14 LAB
ALBUMIN SERPL BCG-MCNC: 4.1 G/DL (ref 3.5–5.2)
ALP SERPL-CCNC: 76 U/L (ref 35–104)
ALT SERPL W P-5'-P-CCNC: 13 U/L (ref 10–35)
ANION GAP SERPL CALCULATED.3IONS-SCNC: 8 MMOL/L (ref 7–15)
AST SERPL W P-5'-P-CCNC: 22 U/L (ref 10–35)
BILIRUB SERPL-MCNC: 0.4 MG/DL
BUN SERPL-MCNC: 15.7 MG/DL (ref 8–23)
CALCIUM SERPL-MCNC: 9.2 MG/DL (ref 8.8–10.2)
CHLORIDE SERPL-SCNC: 105 MMOL/L (ref 98–107)
CREAT SERPL-MCNC: 0.88 MG/DL (ref 0.51–0.95)
DEPRECATED HCO3 PLAS-SCNC: 28 MMOL/L (ref 22–29)
ERYTHROCYTE [DISTWIDTH] IN BLOOD BY AUTOMATED COUNT: 13.8 % (ref 10–15)
GFR SERPL CREATININE-BSD FRML MDRD: 64 ML/MIN/1.73M2
GLUCOSE SERPL-MCNC: 87 MG/DL (ref 70–99)
HCT VFR BLD AUTO: 35 % (ref 35–47)
HGB BLD-MCNC: 11.7 G/DL (ref 11.7–15.7)
INR PPP: 1 (ref 0.85–1.15)
MCH RBC QN AUTO: 33.9 PG (ref 26.5–33)
MCHC RBC AUTO-ENTMCNC: 33.4 G/DL (ref 31.5–36.5)
MCV RBC AUTO: 101 FL (ref 78–100)
NT-PROBNP SERPL-MCNC: 564 PG/ML (ref 0–1800)
PLATELET # BLD AUTO: 281 10E3/UL (ref 150–450)
POTASSIUM SERPL-SCNC: 4.1 MMOL/L (ref 3.4–5.3)
PROT SERPL-MCNC: 6.6 G/DL (ref 6.4–8.3)
RBC # BLD AUTO: 3.45 10E6/UL (ref 3.8–5.2)
SARS-COV-2 RNA RESP QL NAA+PROBE: NEGATIVE
SODIUM SERPL-SCNC: 141 MMOL/L (ref 136–145)
WBC # BLD AUTO: 8 10E3/UL (ref 4–11)

## 2022-11-14 PROCEDURE — 86900 BLOOD TYPING SEROLOGIC ABO: CPT | Mod: 90 | Performed by: PATHOLOGY

## 2022-11-14 PROCEDURE — 36415 COLL VENOUS BLD VENIPUNCTURE: CPT | Performed by: PATHOLOGY

## 2022-11-14 PROCEDURE — U0005 INFEC AGEN DETEC AMPLI PROBE: HCPCS | Mod: 90 | Performed by: PATHOLOGY

## 2022-11-14 PROCEDURE — 86901 BLOOD TYPING SEROLOGIC RH(D): CPT | Mod: 90 | Performed by: PATHOLOGY

## 2022-11-14 PROCEDURE — 85610 PROTHROMBIN TIME: CPT | Performed by: PATHOLOGY

## 2022-11-14 PROCEDURE — U0003 INFECTIOUS AGENT DETECTION BY NUCLEIC ACID (DNA OR RNA); SEVERE ACUTE RESPIRATORY SYNDROME CORONAVIRUS 2 (SARS-COV-2) (CORONAVIRUS DISEASE [COVID-19]), AMPLIFIED PROBE TECHNIQUE, MAKING USE OF HIGH THROUGHPUT TECHNOLOGIES AS DESCRIBED BY CMS-2020-01-R: HCPCS | Mod: 90 | Performed by: PATHOLOGY

## 2022-11-14 PROCEDURE — 85027 COMPLETE CBC AUTOMATED: CPT | Performed by: PATHOLOGY

## 2022-11-14 PROCEDURE — 80053 COMPREHEN METABOLIC PANEL: CPT | Performed by: PATHOLOGY

## 2022-11-14 PROCEDURE — 83880 ASSAY OF NATRIURETIC PEPTIDE: CPT | Performed by: PATHOLOGY

## 2022-11-14 PROCEDURE — 99214 OFFICE O/P EST MOD 30 MIN: CPT | Performed by: NURSE PRACTITIONER

## 2022-11-14 PROCEDURE — 99000 SPECIMEN HANDLING OFFICE-LAB: CPT | Performed by: PATHOLOGY

## 2022-11-14 PROCEDURE — 86850 RBC ANTIBODY SCREEN: CPT | Mod: 90 | Performed by: PATHOLOGY

## 2022-11-14 RX ORDER — ASPIRIN 81 MG/1
81 TABLET, CHEWABLE ORAL DAILY
Status: ON HOLD | COMMUNITY
End: 2022-11-24

## 2022-11-14 ASSESSMENT — PAIN SCALES - GENERAL: PAINLEVEL: NO PAIN (0)

## 2022-11-14 NOTE — LETTER
11/14/2022      RE: Jade Walker  [address] [facility] [address]       Dear Colleague,    Thank you for the opportunity to participate in the care of your patient, Jade Walker, at the [facility] [place] at [facility]. Please see a copy of my visit note below.        STRUCTURAL HEART CLINIC  H&P    Referring Provider: Dr. Tyler/Jazmine VIDAL    History of Present Illness  Jade Walker is an 87 year old female who presents for pre-operative H&P in preparation for transcatheter aortic valve replacement on 11/16/22 at [facility].     She has severe aortic stenosis characterized with an ROBIN of 0.68cm2, mean PG 41 mmHg, peak V 4.1 m/s with LVEF 60-65% and DI 0.21 by echo October 2022. Patient reports symptoms of occasional shortness of breath and lightheadedness when walking up the stairs quickly. Also reports feeling more tired than normal and perhaps reduced exercise tolerance over the past year. Patient was evaluated in structural heart clinic where she was deemed an appropriate TAVR candidate. She was counseled for the above procedure.     Her additional past medical history if notable for HTN, HLD, non obstructive CAD, CKD stage III, contrast dye allergy, IBS.     History of blood transfusions/reactions: No  History of abnormal bleeding/anti-platelet use: No  Steroid use in the last year: yes for contrast dye allergy  Personal or family history with difficulty with anesthesia: No   Infection precautions: No   Difficulty w/bedrest: No    Current Medications:  Current Outpatient Medications   Medication Sig Dispense Refill     atenoloL (TENORMIN) 100 MG tablet [ATENOLOL (TENORMIN) 100 MG TABLET] Take 100 mg by mouth daily.       atorvastatin (LIPITOR) 40 MG tablet [ATORVASTATIN (LIPITOR) 40 MG TABLET] Take 40 mg by mouth at bedtime.       B.ani/L.aci/L.sal/L.plan/L.Rey (PROBIOTIC FORMULA ORAL)  [B.ANI/L.ACI/L.SAL/L.PLAN/L.CHAPARRITA (PROBIOTIC FORMULA ORAL)] Take 1 capsule by mouth daily.       cholecalciferol, vitamin D3, 1,000 unit (25 mcg) tablet [CHOLECALCIFEROL, VITAMIN D3, 1,000 UNIT (25 MCG) TABLET] Take 1,000 Units by mouth daily.       diphenhydrAMINE (BANOPHEN) 25 MG capsule TAKE 1 CAPSULE (25) MG AT THE TIME OF TESTING WHEN YOU ARRIVE TO THE HOSPITAL 1 capsule 1     diphenhydrAMINE (BENADRYL) 25 mg capsule Take 25 mg by mouth nightly as needed       famotidine (PEPCID) 20 MG tablet Take 20 mg by mouth daily       folic acid (FOLVITE) 1 MG tablet [FOLIC ACID (FOLVITE) 1 MG TABLET] Take 1 mg by mouth daily.       loratadine (CLARITIN) 10 MG tablet Take 10 mg by mouth daily       melatonin 3 MG tablet Take 1 mg by mouth nightly as needed for sleep       polyethylene glycol-propylene glycol (SYSTANE ULTRA) 0.4-0.3 % SOLN ophthalmic solution Place 1 drop into both eyes every hour as needed for dry eyes       predniSONE (DELTASONE) 10 MG tablet TAKE 6 TABLETS (60 MG) NIGHT BEFORE AND 3 TABLETS (30 MG) THE MORNING OF CT 9 tablet 1     vit A/vit C/vit E/zinc/copper (PRESERVISION AREDS ORAL) [VIT A/VIT C/VIT E/ZINC/COPPER (PRESERVISION AREDS ORAL)] Take 1 tablet by mouth 2 (two) times a day.         Allergies:     Allergies   Allergen Reactions     Azithromycin Hives     Iodinated Contrast Media [Diagnostic X-Ray Materials] Hives and Rash       Past Medical History:  Past Medical History:   Diagnosis Date     Aortic stenosis      Aortic stenosis, moderate      Chronic kidney disease, stage III (moderate) (H)      Colon polyp      Hyperlipidemia      Hypertension      Irritable bowel syndrome      Obesity (BMI 35.0-39.9 without comorbidity)        Past Surgical History:  Past Surgical History:   Procedure Laterality Date     CV CORONARY ANGIOGRAM N/A 10/20/2022    Procedure: Coronary Angiogram [[id number]];  Surgeon: Tone Hollins MD;  Location:  HEART CARDIAC CATH LAB     CV LEFT HEART CATH N/A  "10/20/2022    Procedure: Left Heart Cath;  Surgeon: Tone Hollins MD;  Location:  HEART CARDIAC CATH LAB     CV RIGHT HEART CATH MEASUREMENTS RECORDED N/A 10/20/2022    Procedure: Right Heart Cath;  Surgeon: Tone Hollins MD;  Location:  HEART CARDIAC CATH LAB     EYE SURGERY  06/2015     OTHER SURGICAL HISTORY Bilateral     cataracts     ZZHC COLONOSCOPY W/WO BRUSH/WASH N/A 12/11/2020    Procedure: COLONOSCOPY;  Surgeon: Stan Porter MD;  [place] [facility] [place]  Service: Gastroenterology       Family History:  No family history on file.    Social History:  Social History     Socioeconomic History     Marital status:    Tobacco Use     Smoking status: Never     Smokeless tobacco: Never   Substance and Sexual Activity     Alcohol use: Yes     Comment: Alcoholic Drinks/day: once a month     Drug use: Never       Review of Systems:    Functional status: Independent in ADL's. Able to achieve METS > 4.     Constitutional: No fever, chills, or sweats. No weight gain/loss   ENT: No visual disturbance, ear ache, epistaxis, sore throat  Allergies/Immunologic: Negative.   Respiratory: No cough, hemoptysia  Cardiovascular: As per HPI  GI: No nausea, vomiting, hematemesis, melena, or hematochezia  : No urinary frequency, dysuria, or hematuria  Integument: Negative  Psychiatric: Negative  Neuro: Negative  Endocrinology: Negative   Musculoskeletal: Negative      Physical Exam:  Vitals: BP (!) 149/76 (BP Location: Right arm, Patient Position: Chair, Cuff Size: Adult Regular)   Pulse 58   Ht 1.549 m (5' 1\")   Wt 64.3 kg (141 lb 11.2 oz)   SpO2 99%   BMI 26.77 kg/m     General: NAD  HEENT:  Dentition intact.    Neck: No jugular venous distension.   Heart: RRR with 3./6 J CARLOS at RUSB  Lungs: CTA.    Abdomen: Soft, nontender, nondistended.   Extremities: No clubbing, cyanosis, or edema.  The pulses are +4/4 at the radial, brachial, femoral, popliteal, DP, and PT sites bilaterally.  No " bruits are noted.  Neurologic: Alert and oriented to person/place/time, normal speech, gait and affect  Skin: No petechiae, purpura or rash.      Diagnostic Studies:      ECG 10/20/22:  Personally reviewed and interpreted by me.  NSR HR 70s with LBBB    ECHO 10/2022:  ______________________________________________________________________________  Interpretation Summary  Severe aortic stenosis (PV 4.0 m/s MG 42 mmHg DVI 0.21.)  Mild aortic insufficiency is present.  Global and regional left ventricular function is normal with an EF of 60-65%.  Right ventricular function, chamber size, wall motion, and thickness are  normal.  Previous study not available for comparison.  ______________________________________________________________________________  Left Ventricle  Global and regional left ventricular function is normal with an EF of 60-65%.  Left ventricular size is normal. Relative wall thickness is increased  consistent with concentric remodeling. Thickening of the anterobasal septum is  present. Left ventricular diastolic function is indeterminate.     Right Ventricle  Right ventricular function, chamber size, wall motion, and thickness are  normal.     Atria  The right atria appears normal. Mild left atrial enlargement is present.     Mitral Valve  Mild mitral insufficiency is present.     Aortic Valve  Mild aortic insufficiency is present. Severe aortic stenosis (PV 4.0 m/s MG 42  mmHg DVI 0.21.).     Tricuspid Valve  Mild tricuspid insufficiency is present. The right ventricular systolic  pressure is approximated at 30.2 mmHg plus the right atrial pressure.     Pulmonic Valve  The pulmonic valve is normal.     Vessels  The aorta root is normal. The inferior vena cava was normal in size with  preserved respiratory variability. IVC diameter <2.1 cm collapsing >50% with  sniff suggests a normal RA pressure of 3 mmHg.     Pericardium  No pericardial effusion is present.     Compared to Previous Study  Previous  study not available for comparison.    Laboratory Studies:  Personally reviewed and interpreted by me.    LIPID RESULTS:  Lab Results   Component Value Date    CHOL 148 08/09/2022    HDL 77 08/09/2022    LDL 58 08/09/2022    TRIG 65 08/09/2022       LIVER ENZYME RESULTS:  Lab Results   Component Value Date    AST 25 10/12/2022    ALT 17 10/12/2022       CBC RESULTS:  Lab Results   Component Value Date    WBC 8.0 11/14/2022    RBC 3.45 (L) 11/14/2022    HGB 11.7 11/14/2022    HCT 35.0 11/14/2022     (H) 11/14/2022    MCH 33.9 (H) 11/14/2022    MCHC 33.4 11/14/2022    RDW 13.8 11/14/2022     11/14/2022       BMP RESULTS:  Lab Results   Component Value Date     10/20/2022    POTASSIUM 4.1 10/20/2022    POTASSIUM 4.6 11/17/2021    CHLORIDE 103 10/20/2022    CHLORIDE 103 11/17/2021    CO2 25 10/20/2022    CO2 28 11/17/2021    ANIONGAP 11 10/20/2022    ANIONGAP 9 11/17/2021     (H) 10/20/2022    GLC 85 11/17/2021    BUN 16.8 10/20/2022    BUN 18 11/17/2021    CR 0.86 10/20/2022    GFRESTIMATED 66 10/20/2022    GFRESTIMATED >60 11/12/2020    GFRESTBLACK >60 11/12/2020    SHAHEEN 9.7 10/20/2022        A1C RESULTS:  No results found for: A1C    INR RESULTS:  Lab Results   Component Value Date    INR 1.00 10/20/2022       Assessment and Plan   Jade Walker is an 87 year old female who presents for pre-operative H&P in preparation for transcatheter aortic valve replacement on 11/16/22 at [facility].     She has the following specific operative considerations:      - RCRI score 1 corresponding with a 1% perioperative risk of MACE      - JORDEN # of risks 2/8      - VTE risk = 1. If equal to or > 4, pharmacologic prophylaxis is indicated      - Risk of PONV score = 2. If > 2, anti-emetic intervention recommended    1. Severe aortic valve stenosis:  2. Chronic diastolic heart failure, r/t valvular disease:   Patient with severe aortic stenosis by echo w/ symptoms of mild  exertional dyspnea and fatigue. She has been evaluated by the our structural heart team and has been deemed an appropriate candidate for transcatheter aortic valve replacement. We discussed the procedure in detail, including pre and post-procedure care, restrictions and follow-up. She understands risks including 5-10% risk of heart block leading to permanent pacemaker placement, 3% risk of bleeding, infection, vascular complication, 1-3% risk of stroke and very low risk (<1%) of serious complications such as cardiac perforation, aortic root rupture, dissection or death. Patient is unsure if she would like surgical bailout, she would like to think about it and let us know day of procedure.     All questions answered  Type and screen orders complete  COVID test ordered  Supplies for scrubbing provided  Has known contrast dye allergy, will be pre treated  No trouble with anesthesia in the past  Labs today WNL, no s/s of infection    3. Non obstructive CAD:  4. HTN:  5. HLD:  History of HTN and HLD well controlled. Pre-TAVR coronary angiogram showed non obstructive CAD. Patient denies angina. Plan to continue ASA, atenolol and atorvastatin     6. Mild CKD  7. Contrast dye allergy:  Baseline creatinine GFR upper 50s to 60s. Stable today. Has known contrast dye allergy, will pretreat with prednisone and benadryl.     Medication Recommendations:  Acei/ARB: N/A  Diuretic: N/A  Antiplatelet: Start  mg night before and morning of procedure  Coumadin/NOAC: N/A  BB: Continue perioperatively without interruption  Supplements: Hold morning of procedure    Patient is optimized and is acceptable candidate for the proposed procedure.  No further diagnostic evaluation is needed. Pre-procedure instructions provided in written format.     MAX Hardin, CNP  Structural Heart Care Nurse Practitioner  [facility]  Clinic: [phone]  Pager: [phone]

## 2022-11-14 NOTE — NURSING NOTE
Chief Complaint   Patient presents with     Follow Up     Return TAVR- H&P- TAVR     Vitals were taken and medications reconciled.    Dominguez Eduardo, JULISSA  12:41 PM

## 2022-11-14 NOTE — PATIENT INSTRUCTIONS
Check in to the hospital Admissions/3C at 8:00am.     Nothing to eat after midnight; water, apple juice or 7up/Sprite is OK up to two hours prior to your scheduled procedure.  You may take your medications in the morning with a sip of water.     Take a shower, with antibacterial soap/wipes, the night before the procedure, and the morning of the procedure.        Diagnosis: Severe aortic stenosis  Plan:  1. TAVR procedure scheduled for 11/16/22.       2. Medications instructions:  -- Take Prednisone 60 mg the evening before and 30 mg the morning of your TAVR procedure  -- Take 25 mg benadryl morning of procedure  -- Take Aspirin 325 mg the evening before and 325 mg the morning of your TAVR procedure.  -- Okay to atorvastatin and atenolol prior to procedure  -- Hold all supplements the morning of your TAVR.     If any questions please contact:  Mary Reilly RN  Structural Heart RN Specialists  TAVR, [facility]ra[facility] and Watchman Programs  [facility]  [phone]

## 2022-11-15 ENCOUNTER — ANESTHESIA EVENT (OUTPATIENT)
Dept: SURGERY | Facility: CLINIC | Age: 87
DRG: 266 | End: 2022-11-15
Payer: COMMERCIAL

## 2022-11-16 ENCOUNTER — APPOINTMENT (OUTPATIENT)
Dept: GENERAL RADIOLOGY | Facility: CLINIC | Age: 87
DRG: 266 | End: 2022-11-16
Attending: THORACIC SURGERY (CARDIOTHORACIC VASCULAR SURGERY)
Payer: COMMERCIAL

## 2022-11-16 ENCOUNTER — ANESTHESIA (OUTPATIENT)
Dept: SURGERY | Facility: CLINIC | Age: 87
DRG: 266 | End: 2022-11-16
Payer: COMMERCIAL

## 2022-11-16 ENCOUNTER — APPOINTMENT (OUTPATIENT)
Dept: GENERAL RADIOLOGY | Facility: CLINIC | Age: 87
DRG: 266 | End: 2022-11-16
Attending: INTERNAL MEDICINE
Payer: COMMERCIAL

## 2022-11-16 ENCOUNTER — HOSPITAL ENCOUNTER (INPATIENT)
Facility: CLINIC | Age: 87
LOS: 15 days | Discharge: NURSING, REHAB OR LONG-TERM CARE | DRG: 266 | End: 2022-12-01
Attending: INTERNAL MEDICINE | Admitting: INTERNAL MEDICINE
Payer: COMMERCIAL

## 2022-11-16 ENCOUNTER — HOSPITAL ENCOUNTER (OUTPATIENT)
Dept: CARDIOLOGY | Facility: CLINIC | Age: 87
Discharge: HOME | DRG: 266 | End: 2022-11-16
Attending: NURSE PRACTITIONER | Admitting: INTERNAL MEDICINE
Payer: COMMERCIAL

## 2022-11-16 DIAGNOSIS — I35.0 SEVERE AORTIC STENOSIS: Primary | ICD-10-CM

## 2022-11-16 DIAGNOSIS — I48.0 PAROXYSMAL ATRIAL FIBRILLATION (H): ICD-10-CM

## 2022-11-16 DIAGNOSIS — I35.0 SEVERE AORTIC STENOSIS: ICD-10-CM

## 2022-11-16 LAB
ACT BLD: 105 SECONDS (ref 74–150)
ACT BLD: 233 SECONDS (ref 74–150)
ALBUMIN SERPL BCG-MCNC: 2.5 G/DL (ref 3.5–5.2)
ALBUMIN SERPL BCG-MCNC: 2.8 G/DL (ref 3.5–5.2)
ALP SERPL-CCNC: 40 U/L (ref 35–104)
ALP SERPL-CCNC: 41 U/L (ref 35–104)
ALT SERPL W P-5'-P-CCNC: 24 U/L (ref 10–35)
ALT SERPL W P-5'-P-CCNC: 24 U/L (ref 10–35)
ANION GAP SERPL CALCULATED.3IONS-SCNC: 12 MMOL/L (ref 7–15)
ANION GAP SERPL CALCULATED.3IONS-SCNC: 12 MMOL/L (ref 7–15)
APTT PPP: 30 SECONDS (ref 22–38)
APTT PPP: 33 SECONDS (ref 22–38)
AST SERPL W P-5'-P-CCNC: 69 U/L (ref 10–35)
AST SERPL W P-5'-P-CCNC: 95 U/L (ref 10–35)
BASE EXCESS BLDA CALC-SCNC: -1 MMOL/L (ref -9.6–2)
BASE EXCESS BLDA CALC-SCNC: -1.2 MMOL/L (ref -9–1.8)
BASE EXCESS BLDA CALC-SCNC: -1.5 MMOL/L (ref -9–1.8)
BASE EXCESS BLDA CALC-SCNC: -1.8 MMOL/L (ref -9.6–2)
BASE EXCESS BLDA CALC-SCNC: -1.8 MMOL/L (ref -9.6–2)
BASE EXCESS BLDA CALC-SCNC: -2 MMOL/L (ref -9.6–2)
BASE EXCESS BLDA CALC-SCNC: 0.4 MMOL/L (ref -9.6–2)
BASE EXCESS BLDA CALC-SCNC: 1 MMOL/L (ref -9.6–2)
BASE EXCESS BLDA CALC-SCNC: 2.2 MMOL/L (ref -9.6–2)
BASE EXCESS BLDA CALC-SCNC: 2.6 MMOL/L (ref -9–1.8)
BASE EXCESS BLDV CALC-SCNC: 2.3 MMOL/L (ref -8.1–1.9)
BILIRUB SERPL-MCNC: 0.8 MG/DL
BILIRUB SERPL-MCNC: 1 MG/DL
BLD PROD TYP BPU: NORMAL
BLOOD COMPONENT TYPE: NORMAL
BUN SERPL-MCNC: 12.2 MG/DL (ref 8–23)
BUN SERPL-MCNC: 14 MG/DL (ref 8–23)
CA-I BLD-MCNC: 3.3 MG/DL (ref 4.4–5.2)
CA-I BLD-MCNC: 3.4 MG/DL (ref 4.4–5.2)
CA-I BLD-MCNC: 3.6 MG/DL (ref 4.4–5.2)
CA-I BLD-MCNC: 3.9 MG/DL (ref 4.4–5.2)
CA-I BLD-MCNC: 4.1 MG/DL (ref 4.4–5.2)
CA-I BLD-MCNC: 4.3 MG/DL (ref 4.4–5.2)
CA-I BLD-MCNC: 4.5 MG/DL (ref 4.4–5.2)
CA-I BLD-MCNC: 4.7 MG/DL (ref 4.4–5.2)
CA-I BLD-MCNC: 4.8 MG/DL (ref 4.4–5.2)
CA-I BLD-MCNC: 4.9 MG/DL (ref 4.4–5.2)
CALCIUM SERPL-MCNC: 8.7 MG/DL (ref 8.8–10.2)
CALCIUM SERPL-MCNC: 9.3 MG/DL (ref 8.8–10.2)
CHLORIDE SERPL-SCNC: 106 MMOL/L (ref 98–107)
CHLORIDE SERPL-SCNC: 106 MMOL/L (ref 98–107)
CODING SYSTEM: NORMAL
CREAT SERPL-MCNC: 0.68 MG/DL (ref 0.51–0.95)
CREAT SERPL-MCNC: 0.83 MG/DL (ref 0.51–0.95)
CROSSMATCH: NORMAL
DEPRECATED HCO3 PLAS-SCNC: 20 MMOL/L (ref 22–29)
DEPRECATED HCO3 PLAS-SCNC: 20 MMOL/L (ref 22–29)
ERYTHROCYTE [DISTWIDTH] IN BLOOD BY AUTOMATED COUNT: 16 % (ref 10–15)
ERYTHROCYTE [DISTWIDTH] IN BLOOD BY AUTOMATED COUNT: 16.1 % (ref 10–15)
ERYTHROCYTE [DISTWIDTH] IN BLOOD BY AUTOMATED COUNT: 16.7 % (ref 10–15)
FIBRINOGEN PPP-MCNC: 155 MG/DL (ref 170–490)
GFR SERPL CREATININE-BSD FRML MDRD: 69 ML/MIN/1.73M2
GFR SERPL CREATININE-BSD FRML MDRD: 85 ML/MIN/1.73M2
GLUCOSE BLD-MCNC: 139 MG/DL (ref 70–99)
GLUCOSE BLD-MCNC: 149 MG/DL (ref 70–99)
GLUCOSE BLD-MCNC: 157 MG/DL (ref 70–99)
GLUCOSE BLD-MCNC: 169 MG/DL (ref 70–99)
GLUCOSE BLD-MCNC: 171 MG/DL (ref 70–99)
GLUCOSE BLD-MCNC: 182 MG/DL (ref 70–99)
GLUCOSE BLD-MCNC: 184 MG/DL (ref 70–99)
GLUCOSE BLD-MCNC: 191 MG/DL (ref 70–99)
GLUCOSE BLDC GLUCOMTR-MCNC: 123 MG/DL (ref 70–99)
GLUCOSE BLDC GLUCOMTR-MCNC: 137 MG/DL (ref 70–99)
GLUCOSE BLDC GLUCOMTR-MCNC: 138 MG/DL (ref 70–99)
GLUCOSE BLDC GLUCOMTR-MCNC: 144 MG/DL (ref 70–99)
GLUCOSE BLDC GLUCOMTR-MCNC: 146 MG/DL (ref 70–99)
GLUCOSE BLDC GLUCOMTR-MCNC: 173 MG/DL (ref 70–99)
GLUCOSE SERPL-MCNC: 146 MG/DL (ref 70–99)
GLUCOSE SERPL-MCNC: 180 MG/DL (ref 70–99)
HCO3 BLD-SCNC: 22 MMOL/L (ref 21–28)
HCO3 BLD-SCNC: 23 MMOL/L (ref 21–28)
HCO3 BLD-SCNC: 24 MMOL/L (ref 21–28)
HCO3 BLDA-SCNC: 22 MMOL/L (ref 21–28)
HCO3 BLDA-SCNC: 23 MMOL/L (ref 21–28)
HCO3 BLDA-SCNC: 25 MMOL/L (ref 21–28)
HCO3 BLDA-SCNC: 26 MMOL/L (ref 21–28)
HCO3 BLDV-SCNC: 27 MMOL/L (ref 21–28)
HCT VFR BLD AUTO: 26.4 % (ref 35–47)
HCT VFR BLD AUTO: 27 % (ref 35–47)
HCT VFR BLD AUTO: 29.3 % (ref 35–47)
HGB BLD-MCNC: 10.3 G/DL (ref 11.7–15.7)
HGB BLD-MCNC: 10.5 G/DL (ref 11.7–15.7)
HGB BLD-MCNC: 11.6 G/DL (ref 11.7–15.7)
HGB BLD-MCNC: 6.7 G/DL (ref 11.7–15.7)
HGB BLD-MCNC: 6.8 G/DL (ref 11.7–15.7)
HGB BLD-MCNC: 8.2 G/DL (ref 11.7–15.7)
HGB BLD-MCNC: 8.6 G/DL (ref 11.7–15.7)
HGB BLD-MCNC: 9.1 G/DL (ref 11.7–15.7)
HGB BLD-MCNC: 9.2 G/DL (ref 11.7–15.7)
HGB BLD-MCNC: 9.4 G/DL (ref 11.7–15.7)
HGB BLD-MCNC: 9.5 G/DL (ref 11.7–15.7)
INR PPP: 1.3 (ref 0.85–1.15)
INR PPP: 1.36 (ref 0.85–1.15)
ISSUE DATE AND TIME: NORMAL
LACTATE BLD-SCNC: 0.8 MMOL/L
LACTATE BLD-SCNC: 1.3 MMOL/L
LACTATE BLD-SCNC: 1.8 MMOL/L
LACTATE BLD-SCNC: 2 MMOL/L
LACTATE BLD-SCNC: 2 MMOL/L
LACTATE BLD-SCNC: 2.1 MMOL/L
LACTATE BLD-SCNC: 2.2 MMOL/L
LACTATE BLD-SCNC: 3 MMOL/L
LACTATE SERPL-SCNC: 2 MMOL/L (ref 0.7–2)
LACTATE SERPL-SCNC: 2.1 MMOL/L (ref 0.7–2)
LACTATE SERPL-SCNC: 2.4 MMOL/L (ref 0.7–2)
LVEF ECHO: NORMAL
MAGNESIUM SERPL-MCNC: 1.9 MG/DL (ref 1.7–2.3)
MAGNESIUM SERPL-MCNC: 3.1 MG/DL (ref 1.7–2.3)
MCH RBC QN AUTO: 30.7 PG (ref 26.5–33)
MCH RBC QN AUTO: 30.8 PG (ref 26.5–33)
MCH RBC QN AUTO: 31.1 PG (ref 26.5–33)
MCHC RBC AUTO-ENTMCNC: 34.1 G/DL (ref 31.5–36.5)
MCHC RBC AUTO-ENTMCNC: 34.5 G/DL (ref 31.5–36.5)
MCHC RBC AUTO-ENTMCNC: 35.2 G/DL (ref 31.5–36.5)
MCV RBC AUTO: 87 FL (ref 78–100)
MCV RBC AUTO: 90 FL (ref 78–100)
MCV RBC AUTO: 90 FL (ref 78–100)
O2/TOTAL GAS SETTING VFR VENT: 100 %
O2/TOTAL GAS SETTING VFR VENT: 30 %
O2/TOTAL GAS SETTING VFR VENT: 40 %
O2/TOTAL GAS SETTING VFR VENT: 40 %
O2/TOTAL GAS SETTING VFR VENT: 50 %
O2/TOTAL GAS SETTING VFR VENT: 60 %
O2/TOTAL GAS SETTING VFR VENT: 60 %
O2/TOTAL GAS SETTING VFR VENT: 80 %
O2/TOTAL GAS SETTING VFR VENT: 80 %
OXYHGB MFR BLD: 98 % (ref 92–100)
OXYHGB MFR BLD: 98 % (ref 92–100)
OXYHGB MFR BLD: 99 % (ref 92–100)
OXYHGB MFR BLDA: 98 % (ref 92–100)
OXYHGB MFR BLDV: 77 % (ref 92–100)
PCO2 BLD: 26 MM HG (ref 35–45)
PCO2 BLD: 29 MM HG (ref 35–45)
PCO2 BLD: 38 MM HG (ref 35–45)
PCO2 BLDA: 30 MM HG (ref 35–45)
PCO2 BLDA: 31 MM HG (ref 35–45)
PCO2 BLDA: 34 MM HG (ref 35–45)
PCO2 BLDA: 35 MM HG (ref 35–45)
PCO2 BLDA: 35 MM HG (ref 35–45)
PCO2 BLDA: 39 MM HG (ref 35–45)
PCO2 BLDA: 41 MM HG (ref 35–45)
PCO2 BLDV: 40 MM HG (ref 40–50)
PH BLD: 7.39 [PH] (ref 7.35–7.45)
PH BLD: 7.49 [PH] (ref 7.35–7.45)
PH BLD: 7.58 [PH] (ref 7.35–7.45)
PH BLDA: 7.37 [PH] (ref 7.35–7.45)
PH BLDA: 7.38 [PH] (ref 7.35–7.45)
PH BLDA: 7.41 [PH] (ref 7.35–7.45)
PH BLDA: 7.47 [PH] (ref 7.35–7.45)
PH BLDA: 7.47 [PH] (ref 7.35–7.45)
PH BLDA: 7.48 [PH] (ref 7.35–7.45)
PH BLDA: 7.49 [PH] (ref 7.35–7.45)
PH BLDV: 7.43 [PH] (ref 7.32–7.43)
PHOSPHATE SERPL-MCNC: 3.1 MG/DL (ref 2.5–4.5)
PHOSPHATE SERPL-MCNC: 3.6 MG/DL (ref 2.5–4.5)
PLATELET # BLD AUTO: 201 10E3/UL (ref 150–450)
PLATELET # BLD AUTO: 207 10E3/UL (ref 150–450)
PLATELET # BLD AUTO: 228 10E3/UL (ref 150–450)
PO2 BLD: 171 MM HG (ref 80–105)
PO2 BLD: 192 MM HG (ref 80–105)
PO2 BLD: 237 MM HG (ref 80–105)
PO2 BLDA: 137 MM HG (ref 80–105)
PO2 BLDA: 170 MM HG (ref 80–105)
PO2 BLDA: 203 MM HG (ref 80–105)
PO2 BLDA: 292 MM HG (ref 80–105)
PO2 BLDA: 292 MM HG (ref 80–105)
PO2 BLDA: 462 MM HG (ref 80–105)
PO2 BLDA: 507 MM HG (ref 80–105)
PO2 BLDV: 38 MM HG (ref 25–47)
POTASSIUM BLD-SCNC: 3.4 MMOL/L (ref 3.5–5)
POTASSIUM BLD-SCNC: 3.5 MMOL/L (ref 3.5–5)
POTASSIUM BLD-SCNC: 3.9 MMOL/L (ref 3.5–5)
POTASSIUM BLD-SCNC: 4 MMOL/L (ref 3.5–5)
POTASSIUM BLD-SCNC: 4.3 MMOL/L (ref 3.5–5)
POTASSIUM BLD-SCNC: 4.5 MMOL/L (ref 3.5–5)
POTASSIUM SERPL-SCNC: 3.9 MMOL/L (ref 3.4–5.3)
POTASSIUM SERPL-SCNC: 4 MMOL/L (ref 3.4–5.3)
PROT SERPL-MCNC: 3.8 G/DL (ref 6.4–8.3)
PROT SERPL-MCNC: 4.2 G/DL (ref 6.4–8.3)
RBC # BLD AUTO: 2.93 10E6/UL (ref 3.8–5.2)
RBC # BLD AUTO: 2.99 10E6/UL (ref 3.8–5.2)
RBC # BLD AUTO: 3.36 10E6/UL (ref 3.8–5.2)
SODIUM BLD-SCNC: 135 MMOL/L (ref 133–144)
SODIUM BLD-SCNC: 136 MMOL/L (ref 133–144)
SODIUM BLD-SCNC: 136 MMOL/L (ref 133–144)
SODIUM BLD-SCNC: 137 MMOL/L (ref 133–144)
SODIUM BLD-SCNC: 137 MMOL/L (ref 133–144)
SODIUM BLD-SCNC: 138 MMOL/L (ref 133–144)
SODIUM BLD-SCNC: 141 MMOL/L (ref 133–144)
SODIUM BLD-SCNC: 141 MMOL/L (ref 133–144)
SODIUM SERPL-SCNC: 138 MMOL/L (ref 136–145)
SODIUM SERPL-SCNC: 138 MMOL/L (ref 136–145)
UNIT ABO/RH: NORMAL
UNIT NUMBER: NORMAL
UNIT STATUS: NORMAL
UNIT TYPE ISBT: 2800
UNIT TYPE ISBT: 5100
UNIT TYPE ISBT: 6200
UNIT TYPE ISBT: 8400
UNIT TYPE ISBT: 8400
WBC # BLD AUTO: 10.5 10E3/UL (ref 4–11)
WBC # BLD AUTO: 15.4 10E3/UL (ref 4–11)
WBC # BLD AUTO: 16.5 10E3/UL (ref 4–11)

## 2022-11-16 PROCEDURE — 999N000063 XR CHEST PORT 1 VIEW

## 2022-11-16 PROCEDURE — 93010 ELECTROCARDIOGRAM REPORT: CPT | Mod: 76 | Performed by: INTERNAL MEDICINE

## 2022-11-16 PROCEDURE — 93325 DOPPLER ECHO COLOR FLOW MAPG: CPT | Mod: 26 | Performed by: INTERNAL MEDICINE

## 2022-11-16 PROCEDURE — 82805 BLOOD GASES W/O2 SATURATION: CPT

## 2022-11-16 PROCEDURE — P9016 RBC LEUKOCYTES REDUCED: HCPCS

## 2022-11-16 PROCEDURE — P9037 PLATE PHERES LEUKOREDU IRRAD: HCPCS

## 2022-11-16 PROCEDURE — 84155 ASSAY OF PROTEIN SERUM: CPT

## 2022-11-16 PROCEDURE — 250N000009 HC RX 250: Performed by: REGISTERED NURSE

## 2022-11-16 PROCEDURE — 30283B1 TRANSFUSION OF NONAUTOLOGOUS 4-FACTOR PROTHROMBIN COMPLEX CONCENTRATE INTO VEIN, PERCUTANEOUS APPROACH: ICD-10-PCS | Performed by: THORACIC SURGERY (CARDIOTHORACIC VASCULAR SURGERY)

## 2022-11-16 PROCEDURE — C1730 CATH, EP, 19 OR FEW ELECT: HCPCS | Performed by: INTERNAL MEDICINE

## 2022-11-16 PROCEDURE — 85027 COMPLETE CBC AUTOMATED: CPT

## 2022-11-16 PROCEDURE — 999N000065 XR CHEST PORT 1 VIEW

## 2022-11-16 PROCEDURE — 82330 ASSAY OF CALCIUM: CPT

## 2022-11-16 PROCEDURE — 250N000011 HC RX IP 250 OP 636: Performed by: NURSE PRACTITIONER

## 2022-11-16 PROCEDURE — 85018 HEMOGLOBIN: CPT

## 2022-11-16 PROCEDURE — 02QL0ZZ REPAIR LEFT VENTRICLE, OPEN APPROACH: ICD-10-PCS | Performed by: THORACIC SURGERY (CARDIOTHORACIC VASCULAR SURGERY)

## 2022-11-16 PROCEDURE — 85384 FIBRINOGEN ACTIVITY: CPT | Performed by: INTERNAL MEDICINE

## 2022-11-16 PROCEDURE — C1760 CLOSURE DEV, VASC: HCPCS | Performed by: INTERNAL MEDICINE

## 2022-11-16 PROCEDURE — 250N000011 HC RX IP 250 OP 636: Performed by: INTERNAL MEDICINE

## 2022-11-16 PROCEDURE — 85347 COAGULATION TIME ACTIVATED: CPT

## 2022-11-16 PROCEDURE — 83735 ASSAY OF MAGNESIUM: CPT

## 2022-11-16 PROCEDURE — 278N000051 HC OR IMPLANT GENERAL: Performed by: INTERNAL MEDICINE

## 2022-11-16 PROCEDURE — 83605 ASSAY OF LACTIC ACID: CPT

## 2022-11-16 PROCEDURE — 85610 PROTHROMBIN TIME: CPT

## 2022-11-16 PROCEDURE — P9059 PLASMA, FRZ BETWEEN 8-24HOUR: HCPCS

## 2022-11-16 PROCEDURE — 258N000003 HC RX IP 258 OP 636: Performed by: REGISTERED NURSE

## 2022-11-16 PROCEDURE — 5A1955Z RESPIRATORY VENTILATION, GREATER THAN 96 CONSECUTIVE HOURS: ICD-10-PCS | Performed by: THORACIC SURGERY (CARDIOTHORACIC VASCULAR SURGERY)

## 2022-11-16 PROCEDURE — C1769 GUIDE WIRE: HCPCS | Performed by: INTERNAL MEDICINE

## 2022-11-16 PROCEDURE — 250N000009 HC RX 250: Performed by: NURSE ANESTHETIST, CERTIFIED REGISTERED

## 2022-11-16 PROCEDURE — 258N000003 HC RX IP 258 OP 636

## 2022-11-16 PROCEDURE — 272N000085 HC PACK CELL SAVER CSP: Performed by: INTERNAL MEDICINE

## 2022-11-16 PROCEDURE — 250N000011 HC RX IP 250 OP 636: Performed by: SURGERY

## 2022-11-16 PROCEDURE — 250N000011 HC RX IP 250 OP 636: Performed by: REGISTERED NURSE

## 2022-11-16 PROCEDURE — C1898 LEAD, PMKR, OTHER THAN TRANS: HCPCS | Performed by: INTERNAL MEDICINE

## 2022-11-16 PROCEDURE — 33335 INSERT MAJOR VESSEL GRAFT: CPT | Performed by: THORACIC SURGERY (CARDIOTHORACIC VASCULAR SURGERY)

## 2022-11-16 PROCEDURE — 999N000141 HC STATISTIC PRE-PROCEDURE NURSING ASSESSMENT: Performed by: INTERNAL MEDICINE

## 2022-11-16 PROCEDURE — 999N000157 HC STATISTIC RCP TIME EA 10 MIN

## 2022-11-16 PROCEDURE — 250N000009 HC RX 250: Performed by: INTERNAL MEDICINE

## 2022-11-16 PROCEDURE — 83735 ASSAY OF MAGNESIUM: CPT | Performed by: SURGERY

## 2022-11-16 PROCEDURE — 250N000015 HC RX FACTOR IP MED 636 OP 636: Performed by: INTERNAL MEDICINE

## 2022-11-16 PROCEDURE — 250N000024 HC ISOFLURANE, PER MIN: Performed by: INTERNAL MEDICINE

## 2022-11-16 PROCEDURE — 82803 BLOOD GASES ANY COMBINATION: CPT

## 2022-11-16 PROCEDURE — 85730 THROMBOPLASTIN TIME PARTIAL: CPT

## 2022-11-16 PROCEDURE — 250N000013 HC RX MED GY IP 250 OP 250 PS 637

## 2022-11-16 PROCEDURE — 360N000079 HC SURGERY LEVEL 6, PER MIN: Performed by: INTERNAL MEDICINE

## 2022-11-16 PROCEDURE — 82805 BLOOD GASES W/O2 SATURATION: CPT | Performed by: SURGERY

## 2022-11-16 PROCEDURE — 85014 HEMATOCRIT: CPT | Performed by: INTERNAL MEDICINE

## 2022-11-16 PROCEDURE — 71045 X-RAY EXAM CHEST 1 VIEW: CPT | Mod: 26 | Performed by: RADIOLOGY

## 2022-11-16 PROCEDURE — 85610 PROTHROMBIN TIME: CPT | Performed by: INTERNAL MEDICINE

## 2022-11-16 PROCEDURE — 93321 DOPPLER ECHO F-UP/LMTD STD: CPT | Mod: 26 | Performed by: INTERNAL MEDICINE

## 2022-11-16 PROCEDURE — 5A1221Z PERFORMANCE OF CARDIAC OUTPUT, CONTINUOUS: ICD-10-PCS | Performed by: THORACIC SURGERY (CARDIOTHORACIC VASCULAR SURGERY)

## 2022-11-16 PROCEDURE — 272N000001 HC OR GENERAL SUPPLY STERILE: Performed by: INTERNAL MEDICINE

## 2022-11-16 PROCEDURE — 93325 DOPPLER ECHO COLOR FLOW MAPG: CPT

## 2022-11-16 PROCEDURE — 84450 TRANSFERASE (AST) (SGOT): CPT

## 2022-11-16 PROCEDURE — 410N000003 HC PER-PERFUSION 1ST 30 MIN: Performed by: INTERNAL MEDICINE

## 2022-11-16 PROCEDURE — 272N000088 HC PUMP APP ADULT PERFUSION: Performed by: INTERNAL MEDICINE

## 2022-11-16 PROCEDURE — 258N000003 HC RX IP 258 OP 636: Performed by: SURGERY

## 2022-11-16 PROCEDURE — 84100 ASSAY OF PHOSPHORUS: CPT | Performed by: SURGERY

## 2022-11-16 PROCEDURE — 83605 ASSAY OF LACTIC ACID: CPT | Performed by: SURGERY

## 2022-11-16 PROCEDURE — 33361 REPLACE AORTIC VALVE PERQ: CPT | Mod: Q0 | Performed by: INTERNAL MEDICINE

## 2022-11-16 PROCEDURE — 84100 ASSAY OF PHOSPHORUS: CPT

## 2022-11-16 PROCEDURE — 84295 ASSAY OF SERUM SODIUM: CPT

## 2022-11-16 PROCEDURE — 93308 TTE F-UP OR LMTD: CPT | Mod: 26 | Performed by: INTERNAL MEDICINE

## 2022-11-16 PROCEDURE — 200N000002 HC R&B ICU UMMC

## 2022-11-16 PROCEDURE — 86923 COMPATIBILITY TEST ELECTRIC: CPT

## 2022-11-16 PROCEDURE — 94002 VENT MGMT INPAT INIT DAY: CPT

## 2022-11-16 PROCEDURE — 93321 DOPPLER ECHO F-UP/LMTD STD: CPT

## 2022-11-16 PROCEDURE — 250N000011 HC RX IP 250 OP 636

## 2022-11-16 PROCEDURE — 93005 ELECTROCARDIOGRAM TRACING: CPT

## 2022-11-16 PROCEDURE — 84132 ASSAY OF SERUM POTASSIUM: CPT

## 2022-11-16 PROCEDURE — 85018 HEMOGLOBIN: CPT | Performed by: SURGERY

## 2022-11-16 PROCEDURE — 74018 RADEX ABDOMEN 1 VIEW: CPT | Mod: 26 | Performed by: RADIOLOGY

## 2022-11-16 PROCEDURE — 272N000002 HC OR SUPPLY OTHER OPNP: Performed by: INTERNAL MEDICINE

## 2022-11-16 PROCEDURE — 85730 THROMBOPLASTIN TIME PARTIAL: CPT | Performed by: INTERNAL MEDICINE

## 2022-11-16 PROCEDURE — 3E043XZ INTRODUCTION OF VASOPRESSOR INTO CENTRAL VEIN, PERCUTANEOUS APPROACH: ICD-10-PCS | Performed by: ANESTHESIOLOGY

## 2022-11-16 PROCEDURE — 370N000017 HC ANESTHESIA TECHNICAL FEE, PER MIN: Performed by: INTERNAL MEDICINE

## 2022-11-16 PROCEDURE — 999N000065 XR ABDOMEN PORT 1 VIEW

## 2022-11-16 PROCEDURE — 82810 BLOOD GASES O2 SAT ONLY: CPT

## 2022-11-16 PROCEDURE — 02RF38Z REPLACEMENT OF AORTIC VALVE WITH ZOOPLASTIC TISSUE, PERCUTANEOUS APPROACH: ICD-10-PCS | Performed by: THORACIC SURGERY (CARDIOTHORACIC VASCULAR SURGERY)

## 2022-11-16 PROCEDURE — 33335 INSERT MAJOR VESSEL GRAFT: CPT | Mod: 80 | Performed by: SURGERY

## 2022-11-16 PROCEDURE — 82330 ASSAY OF CALCIUM: CPT | Performed by: SURGERY

## 2022-11-16 PROCEDURE — C1894 INTRO/SHEATH, NON-LASER: HCPCS | Performed by: INTERNAL MEDICINE

## 2022-11-16 DEVICE — IMP VALVE AORTIC SAPEIN 3 TAVR 23MM COMMANDER S3UCM223A: Type: IMPLANTABLE DEVICE | Site: HEART | Status: FUNCTIONAL

## 2022-11-16 RX ORDER — CEFAZOLIN SODIUM/WATER 2 G/20 ML
2 SYRINGE (ML) INTRAVENOUS
Status: COMPLETED | OUTPATIENT
Start: 2022-11-16 | End: 2022-11-16

## 2022-11-16 RX ORDER — ATROPINE SULFATE 0.4 MG/ML
AMPUL (ML) INJECTION PRN
Status: DISCONTINUED | OUTPATIENT
Start: 2022-11-16 | End: 2022-11-16

## 2022-11-16 RX ORDER — PROCHLORPERAZINE MALEATE 5 MG
5 TABLET ORAL EVERY 6 HOURS PRN
Status: DISCONTINUED | OUTPATIENT
Start: 2022-11-16 | End: 2022-11-18

## 2022-11-16 RX ORDER — HEPARIN SODIUM 1000 [USP'U]/ML
INJECTION, SOLUTION INTRAVENOUS; SUBCUTANEOUS PRN
Status: DISCONTINUED | OUTPATIENT
Start: 2022-11-16 | End: 2022-11-16

## 2022-11-16 RX ORDER — CALCIUM GLUCONATE 20 MG/ML
1 INJECTION, SOLUTION INTRAVENOUS
Status: DISPENSED | OUTPATIENT
Start: 2022-11-16 | End: 2022-11-22

## 2022-11-16 RX ORDER — PANTOPRAZOLE SODIUM 40 MG/1
40 TABLET, DELAYED RELEASE ORAL DAILY
Status: DISCONTINUED | OUTPATIENT
Start: 2022-11-16 | End: 2022-11-17

## 2022-11-16 RX ORDER — PROPOFOL 10 MG/ML
5-75 INJECTION, EMULSION INTRAVENOUS CONTINUOUS
Status: DISCONTINUED | OUTPATIENT
Start: 2022-11-16 | End: 2022-11-22

## 2022-11-16 RX ORDER — VASOPRESSIN IN 0.9 % NACL 2 UNIT/2ML
SYRINGE (ML) INTRAVENOUS PRN
Status: DISCONTINUED | OUTPATIENT
Start: 2022-11-16 | End: 2022-11-16

## 2022-11-16 RX ORDER — MAGNESIUM SULFATE HEPTAHYDRATE 40 MG/ML
2 INJECTION, SOLUTION INTRAVENOUS ONCE
Status: COMPLETED | OUTPATIENT
Start: 2022-11-16 | End: 2022-11-16

## 2022-11-16 RX ORDER — SODIUM CHLORIDE, SODIUM LACTATE, POTASSIUM CHLORIDE, CALCIUM CHLORIDE 600; 310; 30; 20 MG/100ML; MG/100ML; MG/100ML; MG/100ML
INJECTION, SOLUTION INTRAVENOUS CONTINUOUS PRN
Status: DISCONTINUED | OUTPATIENT
Start: 2022-11-16 | End: 2022-11-16

## 2022-11-16 RX ORDER — SODIUM CHLORIDE 9 MG/ML
INJECTION, SOLUTION INTRAVENOUS CONTINUOUS
Status: DISCONTINUED | OUTPATIENT
Start: 2022-11-16 | End: 2022-11-16 | Stop reason: HOSPADM

## 2022-11-16 RX ORDER — CEFAZOLIN SODIUM 1 G/3ML
1 INJECTION, POWDER, FOR SOLUTION INTRAMUSCULAR; INTRAVENOUS EVERY 8 HOURS
Status: COMPLETED | OUTPATIENT
Start: 2022-11-16 | End: 2022-11-17

## 2022-11-16 RX ORDER — ASPIRIN 325 MG
325 TABLET ORAL DAILY
Status: DISCONTINUED | OUTPATIENT
Start: 2022-11-16 | End: 2022-11-16 | Stop reason: HOSPADM

## 2022-11-16 RX ORDER — HYDROMORPHONE HCL IN WATER/PF 6 MG/30 ML
0.2 PATIENT CONTROLLED ANALGESIA SYRINGE INTRAVENOUS
Status: DISCONTINUED | OUTPATIENT
Start: 2022-11-16 | End: 2022-12-01

## 2022-11-16 RX ORDER — SODIUM CHLORIDE, SODIUM GLUCONATE, SODIUM ACETATE, POTASSIUM CHLORIDE AND MAGNESIUM CHLORIDE 526; 502; 368; 37; 30 MG/100ML; MG/100ML; MG/100ML; MG/100ML; MG/100ML
INJECTION, SOLUTION INTRAVENOUS CONTINUOUS PRN
Status: DISCONTINUED | OUTPATIENT
Start: 2022-11-16 | End: 2022-11-16

## 2022-11-16 RX ORDER — BISACODYL 10 MG
10 SUPPOSITORY, RECTAL RECTAL DAILY PRN
Status: DISCONTINUED | OUTPATIENT
Start: 2022-11-16 | End: 2022-12-01 | Stop reason: HOSPADM

## 2022-11-16 RX ORDER — HEPARIN SODIUM 5000 [USP'U]/.5ML
5000 INJECTION, SOLUTION INTRAVENOUS; SUBCUTANEOUS EVERY 8 HOURS
Status: DISCONTINUED | OUTPATIENT
Start: 2022-11-17 | End: 2022-11-26

## 2022-11-16 RX ORDER — NICOTINE POLACRILEX 4 MG
15-30 LOZENGE BUCCAL
Status: DISCONTINUED | OUTPATIENT
Start: 2022-11-16 | End: 2022-11-27 | Stop reason: CLARIF

## 2022-11-16 RX ORDER — ESMOLOL HYDROCHLORIDE 10 MG/ML
INJECTION INTRAVENOUS PRN
Status: DISCONTINUED | OUTPATIENT
Start: 2022-11-16 | End: 2022-11-16

## 2022-11-16 RX ORDER — ACETAMINOPHEN 325 MG/1
650 TABLET ORAL EVERY 4 HOURS PRN
Status: DISCONTINUED | OUTPATIENT
Start: 2022-11-19 | End: 2022-11-18

## 2022-11-16 RX ORDER — NICARDIPINE HCL-0.9% SOD CHLOR 1 MG/10 ML
SYRINGE (ML) INTRAVENOUS PRN
Status: DISCONTINUED | OUTPATIENT
Start: 2022-11-16 | End: 2022-11-16

## 2022-11-16 RX ORDER — HYDROMORPHONE HCL IN WATER/PF 6 MG/30 ML
0.1 PATIENT CONTROLLED ANALGESIA SYRINGE INTRAVENOUS
Status: DISCONTINUED | OUTPATIENT
Start: 2022-11-16 | End: 2022-12-01

## 2022-11-16 RX ORDER — NALOXONE HYDROCHLORIDE 0.4 MG/ML
0.2 INJECTION, SOLUTION INTRAMUSCULAR; INTRAVENOUS; SUBCUTANEOUS
Status: DISCONTINUED | OUTPATIENT
Start: 2022-11-16 | End: 2022-12-01 | Stop reason: HOSPADM

## 2022-11-16 RX ORDER — FENTANYL CITRATE 50 UG/ML
INJECTION, SOLUTION INTRAMUSCULAR; INTRAVENOUS PRN
Status: DISCONTINUED | OUTPATIENT
Start: 2022-11-16 | End: 2022-11-16

## 2022-11-16 RX ORDER — IOPAMIDOL 755 MG/ML
INJECTION, SOLUTION INTRAVASCULAR
Status: DISCONTINUED | OUTPATIENT
Start: 2022-11-16 | End: 2022-11-16 | Stop reason: HOSPADM

## 2022-11-16 RX ORDER — NOREPINEPHRINE BITARTRATE 0.06 MG/ML
.01-.6 INJECTION, SOLUTION INTRAVENOUS CONTINUOUS
Status: DISCONTINUED | OUTPATIENT
Start: 2022-11-16 | End: 2022-11-16 | Stop reason: HOSPADM

## 2022-11-16 RX ORDER — CALCIUM CHLORIDE 100 MG/ML
INJECTION INTRAVENOUS; INTRAVENTRICULAR PRN
Status: DISCONTINUED | OUTPATIENT
Start: 2022-11-16 | End: 2022-11-16

## 2022-11-16 RX ORDER — DEXMEDETOMIDINE HYDROCHLORIDE 4 UG/ML
.1-1.2 INJECTION, SOLUTION INTRAVENOUS CONTINUOUS
Status: DISCONTINUED | OUTPATIENT
Start: 2022-11-16 | End: 2022-11-16 | Stop reason: HOSPADM

## 2022-11-16 RX ORDER — PROTAMINE SULFATE 10 MG/ML
INJECTION, SOLUTION INTRAVENOUS PRN
Status: DISCONTINUED | OUTPATIENT
Start: 2022-11-16 | End: 2022-11-16

## 2022-11-16 RX ORDER — LIDOCAINE 40 MG/G
CREAM TOPICAL
Status: DISCONTINUED | OUTPATIENT
Start: 2022-11-16 | End: 2022-12-01

## 2022-11-16 RX ORDER — POLYETHYLENE GLYCOL 3350 17 G/17G
17 POWDER, FOR SOLUTION ORAL DAILY
Status: DISCONTINUED | OUTPATIENT
Start: 2022-11-17 | End: 2022-11-18

## 2022-11-16 RX ORDER — AMOXICILLIN 250 MG
1 CAPSULE ORAL 2 TIMES DAILY
Status: DISCONTINUED | OUTPATIENT
Start: 2022-11-16 | End: 2022-11-18

## 2022-11-16 RX ORDER — HYDRALAZINE HYDROCHLORIDE 20 MG/ML
10 INJECTION INTRAMUSCULAR; INTRAVENOUS EVERY 30 MIN PRN
Status: DISCONTINUED | OUTPATIENT
Start: 2022-11-16 | End: 2022-12-01

## 2022-11-16 RX ORDER — OXYCODONE HYDROCHLORIDE 5 MG/1
5 TABLET ORAL EVERY 4 HOURS PRN
Status: DISCONTINUED | OUTPATIENT
Start: 2022-11-16 | End: 2022-11-18

## 2022-11-16 RX ORDER — NITROGLYCERIN 10 MG/100ML
INJECTION INTRAVENOUS PRN
Status: DISCONTINUED | OUTPATIENT
Start: 2022-11-16 | End: 2022-11-16

## 2022-11-16 RX ORDER — ONDANSETRON 2 MG/ML
INJECTION INTRAMUSCULAR; INTRAVENOUS PRN
Status: DISCONTINUED | OUTPATIENT
Start: 2022-11-16 | End: 2022-11-16

## 2022-11-16 RX ORDER — ASPIRIN 81 MG/1
81 TABLET, CHEWABLE ORAL DAILY
Status: DISCONTINUED | OUTPATIENT
Start: 2022-11-17 | End: 2022-11-25

## 2022-11-16 RX ORDER — ACETAMINOPHEN 325 MG/1
975 TABLET ORAL EVERY 8 HOURS
Status: COMPLETED | OUTPATIENT
Start: 2022-11-16 | End: 2022-11-19

## 2022-11-16 RX ORDER — DEXTROSE MONOHYDRATE 25 G/50ML
25-50 INJECTION, SOLUTION INTRAVENOUS
Status: DISCONTINUED | OUTPATIENT
Start: 2022-11-16 | End: 2022-11-27 | Stop reason: CLARIF

## 2022-11-16 RX ORDER — ONDANSETRON 2 MG/ML
4 INJECTION INTRAMUSCULAR; INTRAVENOUS EVERY 6 HOURS PRN
Status: DISCONTINUED | OUTPATIENT
Start: 2022-11-16 | End: 2022-12-01 | Stop reason: HOSPADM

## 2022-11-16 RX ORDER — MUPIROCIN 20 MG/G
0.5 OINTMENT TOPICAL 2 TIMES DAILY
Status: DISCONTINUED | OUTPATIENT
Start: 2022-11-16 | End: 2022-11-19

## 2022-11-16 RX ORDER — LIDOCAINE 40 MG/G
CREAM TOPICAL
Status: DISCONTINUED | OUTPATIENT
Start: 2022-11-16 | End: 2022-11-16 | Stop reason: HOSPADM

## 2022-11-16 RX ORDER — NALOXONE HYDROCHLORIDE 0.4 MG/ML
0.4 INJECTION, SOLUTION INTRAMUSCULAR; INTRAVENOUS; SUBCUTANEOUS
Status: DISCONTINUED | OUTPATIENT
Start: 2022-11-16 | End: 2022-12-01 | Stop reason: HOSPADM

## 2022-11-16 RX ORDER — ONDANSETRON 4 MG/1
4 TABLET, ORALLY DISINTEGRATING ORAL EVERY 6 HOURS PRN
Status: DISCONTINUED | OUTPATIENT
Start: 2022-11-16 | End: 2022-12-01 | Stop reason: HOSPADM

## 2022-11-16 RX ORDER — CALCIUM GLUCONATE 20 MG/ML
2 INJECTION, SOLUTION INTRAVENOUS
Status: ACTIVE | OUTPATIENT
Start: 2022-11-16 | End: 2022-11-22

## 2022-11-16 RX ORDER — DEXMEDETOMIDINE HYDROCHLORIDE 4 UG/ML
.2-.7 INJECTION, SOLUTION INTRAVENOUS CONTINUOUS
Status: DISCONTINUED | OUTPATIENT
Start: 2022-11-16 | End: 2022-11-18

## 2022-11-16 RX ORDER — NOREPINEPHRINE BITARTRATE 0.06 MG/ML
.01-.6 INJECTION, SOLUTION INTRAVENOUS CONTINUOUS
Status: DISCONTINUED | OUTPATIENT
Start: 2022-11-16 | End: 2022-11-21

## 2022-11-16 RX ORDER — PROPOFOL 10 MG/ML
INJECTION, EMULSION INTRAVENOUS PRN
Status: DISCONTINUED | OUTPATIENT
Start: 2022-11-16 | End: 2022-11-16

## 2022-11-16 RX ORDER — EPHEDRINE SULFATE 50 MG/ML
INJECTION, SOLUTION INTRAVENOUS PRN
Status: DISCONTINUED | OUTPATIENT
Start: 2022-11-16 | End: 2022-11-16

## 2022-11-16 RX ADMIN — Medication 100 MG: at 13:22

## 2022-11-16 RX ADMIN — EPHEDRINE SULFATE 15 MG: 50 INJECTION, SOLUTION INTRAVENOUS at 13:16

## 2022-11-16 RX ADMIN — PHENYLEPHRINE HYDROCHLORIDE 100 MCG: 10 INJECTION INTRAVENOUS at 12:24

## 2022-11-16 RX ADMIN — HEPARIN SODIUM 26000 UNITS: 1000 INJECTION INTRAVENOUS; SUBCUTANEOUS at 13:44

## 2022-11-16 RX ADMIN — Medication 2 G: at 14:21

## 2022-11-16 RX ADMIN — Medication 40 MG: at 19:59

## 2022-11-16 RX ADMIN — ONDANSETRON 4 MG: 2 INJECTION INTRAMUSCULAR; INTRAVENOUS at 12:00

## 2022-11-16 RX ADMIN — FENTANYL CITRATE 250 MCG: 50 INJECTION, SOLUTION INTRAMUSCULAR; INTRAVENOUS at 13:25

## 2022-11-16 RX ADMIN — EPHEDRINE SULFATE 10 MG: 50 INJECTION, SOLUTION INTRAVENOUS at 11:15

## 2022-11-16 RX ADMIN — Medication 100 MCG: at 10:55

## 2022-11-16 RX ADMIN — Medication 100 MCG: at 11:28

## 2022-11-16 RX ADMIN — NOREPINEPHRINE BITARTRATE 12.8 MCG: 1 INJECTION, SOLUTION, CONCENTRATE INTRAVENOUS at 12:56

## 2022-11-16 RX ADMIN — PROTHROMBIN, COAGULATION FACTOR VII HUMAN, COAGULATION FACTOR IX HUMAN, COAGULATION FACTOR X HUMAN, PROTEIN C, PROTEIN S HUMAN, AND WATER 1090 UNITS: KIT at 12:41

## 2022-11-16 RX ADMIN — Medication 1 UNITS: at 13:56

## 2022-11-16 RX ADMIN — PHENYLEPHRINE HYDROCHLORIDE 100 MCG: 10 INJECTION INTRAVENOUS at 10:49

## 2022-11-16 RX ADMIN — PHENYLEPHRINE HYDROCHLORIDE 200 MCG: 10 INJECTION INTRAVENOUS at 11:16

## 2022-11-16 RX ADMIN — EPHEDRINE SULFATE 10 MG: 50 INJECTION, SOLUTION INTRAVENOUS at 15:06

## 2022-11-16 RX ADMIN — PHENYLEPHRINE HYDROCHLORIDE 100 MCG: 10 INJECTION INTRAVENOUS at 12:04

## 2022-11-16 RX ADMIN — SODIUM CHLORIDE, POTASSIUM CHLORIDE, SODIUM LACTATE AND CALCIUM CHLORIDE 1000 ML: 600; 310; 30; 20 INJECTION, SOLUTION INTRAVENOUS at 22:05

## 2022-11-16 RX ADMIN — Medication 30 MG: at 14:57

## 2022-11-16 RX ADMIN — Medication 50 MCG/HR: at 19:41

## 2022-11-16 RX ADMIN — FENTANYL CITRATE 25 MCG: 50 INJECTION, SOLUTION INTRAMUSCULAR; INTRAVENOUS at 10:54

## 2022-11-16 RX ADMIN — PROPOFOL 60 MG: 10 INJECTION, EMULSION INTRAVENOUS at 13:22

## 2022-11-16 RX ADMIN — PHENYLEPHRINE HYDROCHLORIDE 200 MCG: 10 INJECTION INTRAVENOUS at 13:22

## 2022-11-16 RX ADMIN — PHENYLEPHRINE HYDROCHLORIDE 100 MCG: 10 INJECTION INTRAVENOUS at 11:35

## 2022-11-16 RX ADMIN — PROTAMINE SULFATE 250 MG: 10 INJECTION, SOLUTION INTRAVENOUS at 14:26

## 2022-11-16 RX ADMIN — NITROGLYCERIN 100 MCG: 10 INJECTION INTRAVENOUS at 13:38

## 2022-11-16 RX ADMIN — PHENYLEPHRINE HYDROCHLORIDE 50 MCG: 10 INJECTION INTRAVENOUS at 11:11

## 2022-11-16 RX ADMIN — ATROPINE SULFATE 0.4 MG: 0.4 INJECTION, SOLUTION INTRAMUSCULAR; INTRAVENOUS; SUBCUTANEOUS at 13:12

## 2022-11-16 RX ADMIN — Medication 20 MG: at 16:15

## 2022-11-16 RX ADMIN — EPHEDRINE SULFATE 15 MG: 50 INJECTION, SOLUTION INTRAVENOUS at 12:57

## 2022-11-16 RX ADMIN — FENTANYL CITRATE 50 MCG: 50 INJECTION, SOLUTION INTRAMUSCULAR; INTRAVENOUS at 10:16

## 2022-11-16 RX ADMIN — SODIUM CHLORIDE, POTASSIUM CHLORIDE, SODIUM LACTATE AND CALCIUM CHLORIDE 500 ML: 600; 310; 30; 20 INJECTION, SOLUTION INTRAVENOUS at 20:28

## 2022-11-16 RX ADMIN — PHENYLEPHRINE HYDROCHLORIDE 100 MCG: 10 INJECTION INTRAVENOUS at 10:47

## 2022-11-16 RX ADMIN — FENTANYL CITRATE 250 MCG: 50 INJECTION, SOLUTION INTRAMUSCULAR; INTRAVENOUS at 13:34

## 2022-11-16 RX ADMIN — PHENYLEPHRINE HYDROCHLORIDE 100 MCG: 10 INJECTION INTRAVENOUS at 10:52

## 2022-11-16 RX ADMIN — ACETAMINOPHEN 975 MG: 325 TABLET, FILM COATED ORAL at 19:59

## 2022-11-16 RX ADMIN — SENNOSIDES AND DOCUSATE SODIUM 1 TABLET: 50; 8.6 TABLET ORAL at 20:02

## 2022-11-16 RX ADMIN — PHENYLEPHRINE HYDROCHLORIDE 300 MCG: 10 INJECTION INTRAVENOUS at 13:44

## 2022-11-16 RX ADMIN — CEFAZOLIN 1 G: 1 INJECTION, POWDER, FOR SOLUTION INTRAMUSCULAR; INTRAVENOUS at 21:48

## 2022-11-16 RX ADMIN — NITROGLYCERIN 100 MCG: 10 INJECTION INTRAVENOUS at 13:40

## 2022-11-16 RX ADMIN — MAGNESIUM SULFATE IN WATER 2 G: 40 INJECTION, SOLUTION INTRAVENOUS at 20:43

## 2022-11-16 RX ADMIN — PHENYLEPHRINE HYDROCHLORIDE 100 MCG: 10 INJECTION INTRAVENOUS at 13:31

## 2022-11-16 RX ADMIN — Medication 2 G: at 10:25

## 2022-11-16 RX ADMIN — HEPARIN SODIUM 10000 UNITS: 1000 INJECTION INTRAVENOUS; SUBCUTANEOUS at 10:57

## 2022-11-16 RX ADMIN — FENTANYL CITRATE 250 MCG: 50 INJECTION, SOLUTION INTRAMUSCULAR; INTRAVENOUS at 13:21

## 2022-11-16 RX ADMIN — SODIUM CHLORIDE, POTASSIUM CHLORIDE, SODIUM LACTATE AND CALCIUM CHLORIDE: 600; 310; 30; 20 INJECTION, SOLUTION INTRAVENOUS at 10:06

## 2022-11-16 RX ADMIN — PROPOFOL 50 MCG/KG/MIN: 10 INJECTION, EMULSION INTRAVENOUS at 16:42

## 2022-11-16 RX ADMIN — MIDAZOLAM 1 MG: 1 INJECTION INTRAMUSCULAR; INTRAVENOUS at 12:47

## 2022-11-16 RX ADMIN — NICARDIPINE HYDROCHLORIDE 2 MG/HR: 0.2 INJECTION, SOLUTION INTRAVENOUS at 11:02

## 2022-11-16 RX ADMIN — PROTHROMBIN, COAGULATION FACTOR VII HUMAN, COAGULATION FACTOR IX HUMAN, COAGULATION FACTOR X HUMAN, PROTEIN C, PROTEIN S HUMAN, AND WATER 531 UNITS: KIT at 14:46

## 2022-11-16 RX ADMIN — Medication 0.5 UNITS: at 13:34

## 2022-11-16 RX ADMIN — PHENYLEPHRINE HYDROCHLORIDE 200 MCG: 10 INJECTION INTRAVENOUS at 13:32

## 2022-11-16 RX ADMIN — FENTANYL CITRATE 250 MCG: 50 INJECTION, SOLUTION INTRAMUSCULAR; INTRAVENOUS at 13:38

## 2022-11-16 RX ADMIN — HEPARIN SODIUM 3000 UNITS: 1000 INJECTION INTRAVENOUS; SUBCUTANEOUS at 11:10

## 2022-11-16 RX ADMIN — DEXMEDETOMIDINE HYDROCHLORIDE 0.3 MCG/KG/HR: 4 INJECTION, SOLUTION INTRAVENOUS at 10:17

## 2022-11-16 RX ADMIN — SODIUM CHLORIDE, POTASSIUM CHLORIDE, SODIUM LACTATE AND CALCIUM CHLORIDE 500 ML: 600; 310; 30; 20 INJECTION, SOLUTION INTRAVENOUS at 18:16

## 2022-11-16 RX ADMIN — CALCIUM CHLORIDE 1000 MG: 100 INJECTION INTRAVENOUS; INTRAVENTRICULAR at 15:53

## 2022-11-16 RX ADMIN — PROTAMINE SULFATE 100 MG: 10 INJECTION, SOLUTION INTRAVENOUS at 11:29

## 2022-11-16 RX ADMIN — NITROGLYCERIN 50 MCG: 10 INJECTION INTRAVENOUS at 14:29

## 2022-11-16 RX ADMIN — ESMOLOL HYDROCHLORIDE 20 MG: 10 INJECTION, SOLUTION INTRAVENOUS at 13:29

## 2022-11-16 RX ADMIN — VASOPRESSIN 2.4 UNITS/HR: 20 INJECTION INTRAVENOUS at 23:18

## 2022-11-16 RX ADMIN — PHENYLEPHRINE HYDROCHLORIDE 50 MCG: 10 INJECTION INTRAVENOUS at 11:14

## 2022-11-16 RX ADMIN — ATROPINE SULFATE 0.2 MG: 0.4 INJECTION, SOLUTION INTRAMUSCULAR; INTRAVENOUS; SUBCUTANEOUS at 12:57

## 2022-11-16 RX ADMIN — NITROGLYCERIN 100 MCG: 10 INJECTION INTRAVENOUS at 14:31

## 2022-11-16 RX ADMIN — PHENYLEPHRINE HYDROCHLORIDE 100 MCG: 10 INJECTION INTRAVENOUS at 13:29

## 2022-11-16 RX ADMIN — MIDAZOLAM 1 MG: 1 INJECTION INTRAMUSCULAR; INTRAVENOUS at 10:16

## 2022-11-16 RX ADMIN — HUMAN INSULIN 1 UNITS/HR: 100 INJECTION, SOLUTION SUBCUTANEOUS at 14:44

## 2022-11-16 RX ADMIN — DEXMEDETOMIDINE HYDROCHLORIDE 3 MCG/KG/HR: 4 INJECTION, SOLUTION INTRAVENOUS at 10:25

## 2022-11-16 RX ADMIN — MIDAZOLAM 1 MG: 1 INJECTION INTRAMUSCULAR; INTRAVENOUS at 12:51

## 2022-11-16 RX ADMIN — Medication 0.03 MCG/KG/MIN: at 10:51

## 2022-11-16 RX ADMIN — SODIUM CHLORIDE, SODIUM GLUCONATE, SODIUM ACETATE, POTASSIUM CHLORIDE AND MAGNESIUM CHLORIDE: 526; 502; 368; 37; 30 INJECTION, SOLUTION INTRAVENOUS at 10:06

## 2022-11-16 ASSESSMENT — ENCOUNTER SYMPTOMS: SEIZURES: 0

## 2022-11-16 ASSESSMENT — ACTIVITIES OF DAILY LIVING (ADL)
ADLS_ACUITY_SCORE: 31
ADLS_ACUITY_SCORE: 20
ADLS_ACUITY_SCORE: 31
ADLS_ACUITY_SCORE: 20

## 2022-11-16 ASSESSMENT — COPD QUESTIONNAIRES: COPD: 0

## 2022-11-16 NOTE — ANESTHESIA CARE TRANSFER NOTE
Patient: Jade Walker    Procedure: Procedure(s):  Transfemoral (Contreras) Transcatheter Aortic Valve Replacement  Median Sternotomy, repair of aortic VALVE,  cardiopulmonary bypass PUMP, INTRAOPERATIVE TRANSESOPHAGEAL ECHOCARDIOGRAM PER ANESTHESIA       Diagnosis: Severe aortic stenosis [I35.0]  Diagnosis Additional Information: No value filed.    Anesthesia Type:   MAC     Note:    Oropharynx: endotracheal tube in place and ventilatory support  Level of Consciousness: unresponsive      Independent Airway: airway patency not satisfactory and stable  Dentition: dentition unchanged  Vital Signs Stable: post-procedure vital signs reviewed and stable  Report to RN Given: handoff report given  Patient transferred to: ICU    ICU Handoff: Call for PAUSE to initiate/utilize ICU HANDOFF, Identified Patient, Identified Responsible Provider, Reviewed the Pertinent Medical History, Discussed Surgical Course, Reviewed Intra-OP Anesthesia Management and Issues during Anesthesia, Set Expectations for Post Procedure Period and Allowed Opportunity for Questions and Acknowledgement of Understanding      Vitals:  Vitals Value Taken Time   /78    Temp 37    Pulse 99 11/16/22 1727   Resp 19 11/16/22 1727   SpO2 99    Vitals shown include unvalidated device data.    Electronically Signed By: Charles Patrick Schlatter, APRN CRNA  November 16, 2022  5:28 PM

## 2022-11-16 NOTE — ANESTHESIA PROCEDURE NOTES
Airway       Patient location during procedure: OR       Procedure Start/Stop Times: 11/16/2022 1:23 PM  Staff -        CRNA: Kev Pizarro APRN CRNA       Other Anesthesia Staff: Kev Pizarro APRN CRNA       Performed By: CRNAIndications and Patient Condition       Indications for airway management: dolly-procedural       Induction type:intravenous       Mask difficulty assessment: 1 - vent by mask    Final Airway Details       Final airway type: endotracheal airway       Successful airway: ETT - single  Endotracheal Airway Details        ETT size (mm): 7.0       Cuffed: yes       Successful intubation technique: direct laryngoscopy       DL Blade Type: MAC 3       Grade View of Cords: 1       Adjucts: stylet       Position: Right       Measured from: gums/teeth       Secured at (cm): 22       Bite block used: None    Post intubation assessment        Placement verified by: capnometry and chest rise        Number of attempts at approach: 1       Number of other approaches attempted: 0       Secured with: cloth tape       Ease of procedure: easy       Dentition: Intact and Unchanged    Medication(s) Administered   Medication Administration Time: 11/16/2022 1:23 PM

## 2022-11-16 NOTE — Clinical Note
Patient went into complete heart block [General Appearance - Well Developed] : well developed [Normal Appearance] : normal appearance [Well Groomed] : well groomed [General Appearance - Well Nourished] : well nourished [No Deformities] : no deformities [General Appearance - In No Acute Distress] : no acute distress [Normal Conjunctiva] : the conjunctiva exhibited no abnormalities [Eyelids - No Xanthelasma] : the eyelids demonstrated no xanthelasmas [Normal Oral Mucosa] : normal oral mucosa [No Oral Pallor] : no oral pallor [No Oral Cyanosis] : no oral cyanosis [Normal Jugular Venous A Waves Present] : normal jugular venous A waves present [Normal Jugular Venous V Waves Present] : normal jugular venous V waves present [No Jugular Venous Richard A Waves] : no jugular venous richard A waves [Respiration, Rhythm And Depth] : normal respiratory rhythm and effort [Exaggerated Use Of Accessory Muscles For Inspiration] : no accessory muscle use [Auscultation Breath Sounds / Voice Sounds] : lungs were clear to auscultation bilaterally [Abdomen Soft] : soft [Abdomen Tenderness] : non-tender [Abdomen Mass (___ Cm)] : no abdominal mass palpated [Abnormal Walk] : normal gait [Gait - Sufficient For Exercise Testing] : the gait was sufficient for exercise testing [Nail Clubbing] : no clubbing of the fingernails [Cyanosis, Localized] : no localized cyanosis [Petechial Hemorrhages (___cm)] : no petechial hemorrhages [Skin Color & Pigmentation] : normal skin color and pigmentation [] : no rash [No Venous Stasis] : no venous stasis [No Skin Ulcers] : no skin ulcer [Skin Lesions] : no skin lesions [No Xanthoma] : no  xanthoma was observed [Oriented To Time, Place, And Person] : oriented to person, place, and time [Affect] : the affect was normal [Mood] : the mood was normal [No Anxiety] : not feeling anxious [FreeTextEntry1] : Regular rate and rhythm, NL S1, S2, non-displaced PMI, chest non-tender; no rubs,heaves  or gallops a  Grade 2/6 systolic murmur noted at the LSB

## 2022-11-16 NOTE — Clinical Note
Temporary pacemaker Rate= 130bpmPacer wire was captured appropriately and Pacing catheter was left in place

## 2022-11-16 NOTE — LETTER
Transition Communication Hand-off for Care Transitions to Next Level of Care Provider    Name: Jade Walker    : 1935    MRN #: [id number]    Primary Care Provider: Dave Merritt     Primary Clinic: [address] [place] [address]; [facility] [address]     Reason for Hospitalization:  Severe aortic stenosis [I35.0]    Admit Date/Time: 2022  7:37 AM    Discharge Date: 2022  12:30 PM    Payor Source: Payor: [facility] / Plan: [facility] MEDICARE / Product Type: HMO         Reason for Communication Hand-off Referral: Avoidable readmission within 30 days    Discharge Plan: Transitional Care    Concern for non-adherence with plan of care: N     Discharge Needs Assessment:  Needs    Flowsheet Row Most Recent Value   Equipment Currently Used at Home none        Follow-up specialty is recommended: Y   Follow-up plan:    Future Appointments   Date Time Provider Department Center   2022 11:15 AM Donovan Carrasco, PT [facility]   2022  1:00 PM Jazmine Pathak NP [facility]     Any outstanding tests or procedures:        Referrals     Future Labs/Procedures    CARDIAC REHAB REFERRAL     Process Instructions:    Advance to Wellness and Exercise for Life (WEL) Program or to another maintenance exercise program upon completion of phase 2 cardiac rehab.    Weight mgt program is only offered at [facility].    *This therapy referral will be filtered to a centralized scheduling office at [facility] and the patient will receive a call to schedule an appointment at a [employer] [place] location most convenient for them. *        Comments:    Please be aware that coverage of these services is subject to the terms and limitations of your health insurance plan.  Call member services at your health plan with any benefit or coverage questions.     Order is sent electronically to central rehab scheduling. Call [phone] if you haven't been contacted regarding these appointments within 2 business days of  discharge.  If you have not heard from the scheduling office within 2 business days, please call [phone] for [place], [phone] for [place] and [phone] for [place].    Occupational Therapy Adult Consult     Comments:    Evaluate and treat as clinically indicated.    Reason:  Debility    Physical Therapy Adult Consult     Comments:    Evaluate and treat as clinically indicated.    Reason:  Debility after cardiac surgery            Key Recommendations:  External Handoff    ABILIO Collins    AVS/Discharge Summary is the source of truth; this is a helpful guide for improved communication of patient story

## 2022-11-16 NOTE — H&P
CV ICU H&P    ASSESSMENT: Jade Walker is a 87 year old female with PMHx of severe aortic stenosis HTN, HLD, non-obstructive CAD, CKD stage III, IBS s/p TAVR by c/b LV perforation surgically repaired by Dr. Pulido 11/16. Intraop got 600 cellsaver, 2 plts, 1 ffp, 4 prbcs, and 1500 kcentra. Valve well seated.        CO-MORBIDITIES:   Severe aortic stenosis      PLAN:  Neuro/ pain/ sedation:  #Acute postoperative pain  #Sedation  - Monitor neurological status. Notify the MD for any acute changes in exam.  - Pain: fentanyl gtt. Scheduled tylenol. PRN tylenol, oxycodone, dilaudid.  - Sedation: propofol gtt    Pulmonary care:   - mechanical ventilation  - Goal SpO2 > 92%  - Incentive spirometer Q15-30 minutes when awake.    Cardiovascular:    # Severe aortic stenosis s/p TAVR  # Pericardial effusion s/p pericardial drain  # Hypertension  # Hyperlipidemia  # Non obstructive CAD  - Epinephrine, norepinephrine gtts for inotropic and pressor support  - wean pressors as tolerated  - Monitor hemodynamic status.     GI care:   # Irritable Bowel Syndrome     - NPO except ice chips and medications  - Nutrition consulted, appreciate recommendations  - PPI for bowel prophylaxis  - Bowel regimen: MiraLAX, senna    Renal/ Fluid Balance:    # Chronic Kidney Disease Stage 3  - ICU electrolyte replacement protocol  - Strict I&Os  - monitor UOP    Endocrine:    #Stress hyperglycemia  - insulin gtt    ID/ Antibiotics:  #Postoperative prophylactic antibiotics  - Completing perioperative course of antibiotics  - no indication for further antibiotics    Heme:     # Acute blood loss anemia  # Thrombocytopenia  # Pericardial effusion s/p pericardiocentesis  - Hgb goal > 7.0  - Hemoglobin stable.    Prophylaxis:    - VTE: SCD's, heparin 5000u sq  - Bowel regimen: PPI, MiraLAX, senna    Lines/ tubes/ drains:  - ETT  - L femoral MAC CVC,  - L femoral sheath a line  - 2x set of v wires (not paced)  - 2x med CTs  - Vivian  OG    Disposition:  - CVICU    ICU Checklist  F - Feeding:   A - Analgesia:   S - Sedation:   T - Thromboembolic prophylaxis:      H - Head of bed elevated  U - Ulcer prophylaxis:  G - Glycemic control  S - SBT     B - Bowel regimen  I - Indwelling catheter  D - De-escalation of antibiotics    Patient seen, findings and plan discussed with CVICU staff.    Lucius Shirley MD  Anesthesiology PGY-3 / CA-2  [facility]com *[id number]     ====================================    HPI:   Jade Walker is a 87 year old female with PMHx of severe aortic stenosis HTN, HLD, non-obstructive CAD, CKD stage III, IBS s/p TAVR by c/b LV perforation surgically repaired by Dr. Pulido 11/16. Intraop got 600 cellsaver, 2 plts, 1 ffp, 4 prbcs, and 1500 kcentra. Valve well seated.     PAST MEDICAL HISTORY:   Past Medical History:   Diagnosis Date     Aortic stenosis      Aortic stenosis, moderate      Chronic kidney disease, stage III (moderate) (H)      Colon polyp      Hyperlipidemia      Hypertension      Irritable bowel syndrome      Obesity (BMI 35.0-39.9 without comorbidity)        PAST SURGICAL HISTORY:   Past Surgical History:   Procedure Laterality Date     CV CORONARY ANGIOGRAM N/A 10/20/2022    Procedure: Coronary Angiogram [[id number]];  Surgeon: Tone oHllins MD;  Location:  HEART CARDIAC CATH LAB     CV LEFT HEART CATH N/A 10/20/2022    Procedure: Left Heart Cath;  Surgeon: Tone Hollins MD;  Location:  HEART CARDIAC CATH LAB     CV RIGHT HEART CATH MEASUREMENTS RECORDED N/A 10/20/2022    Procedure: Right Heart Cath;  Surgeon: Tone Hollins MD;  Location:  HEART CARDIAC CATH LAB     EYE SURGERY  06/2015     OTHER SURGICAL HISTORY Bilateral     cataracts     ZZHC COLONOSCOPY W/WO BRUSH/WASH N/A 12/11/2020    Procedure: COLONOSCOPY;  Surgeon: Stan Porter MD;  Location: [facility] [place];  Service: Gastroenterology       FAMILY HISTORY: History reviewed. No pertinent family history.    SOCIAL HISTORY:    Social History     Tobacco Use     Smoking status: Never     Smokeless tobacco: Never   Substance Use Topics     Alcohol use: Yes     Comment: Alcoholic Drinks/day: once a month         OBJECTIVE:  1. VITAL SIGNS:   Temp:  [97.6  F (36.4  C)] 97.6  F (36.4  C)  Pulse:  [64] 64  Resp:  [15] 15  BP: (170)/(74) 170/74  SpO2:  [99 %] 99 %    Vent Mode: CMV/AC  (Continuous Mandatory Ventilation/ Assist Control)  Resp Rate (Set): 16 breaths/min  Tidal Volume (Set, mL): 450 mL  PEEP (cm H2O): 5 cmH2O  Resp: 15      2. INTAKE/ OUTPUT:   I/O last 3 completed shifts:  In: 5532 [I.V.:4002]  Out: -       3. PHYSICAL EXAMINATION:   General: sedated intubated  Neuro: pupils equal and reactive, 3mm  Resp: Vent sound B/l  CV: RRR  Abdomen: Soft, Non-distended  Incisions:CDI  Extremities: warm and well perfused      4. INVESTIGATIONS:   Arterial Blood Gases   Recent Labs   Lab 11/16/22 1737 11/16/22  1627 11/16/22  1542 11/16/22  1436   PH 7.58* 7.49* 7.47* 7.47*   PCO2 26* 30* 31* 34*   PO2 237* 203* 292* 507*   HCO3 24 23 22 25     Complete Blood Count   Recent Labs   Lab 11/16/22  1736 11/16/22  1630 11/16/22  1627 11/16/22  1542 11/16/22  1145 11/14/22  1346   WBC 15.4* 10.5  --   --   --  8.0   HGB 10.3* 9.2* 9.5* 10.5*   < > 11.7    201  --   --   --  281    < > = values in this interval not displayed.     Basic Metabolic Panel  Recent Labs   Lab 11/16/22  1733 11/16/22  1627 11/16/22  1542 11/16/22  1436 11/16/22  1411 11/16/22  0826 11/14/22  1346   NA  --  141 141 137 136   < > 141   POTASSIUM  --  3.5 3.4* 4.0 4.3   < > 4.1   CHLORIDE  --   --   --   --   --   --  105   CO2  --   --   --   --   --   --  28   BUN  --   --   --   --   --   --  15.7   CR  --   --   --   --   --   --  0.88   * 149* 157* 191* 184*   < > 87    < > = values in this interval not displayed.     Liver Function Tests  Recent Labs   Lab 11/16/22  1630 11/14/22  1346   AST  --  22   ALT  --  13   ALKPHOS  --  76   BILITOTAL  --  0.4    ALBUMIN  --  4.1   INR 1.36* 1.00     Pancreatic Enzymes  No lab results found in last 7 days.  Coagulation Profile  Recent Labs   Lab 22  1630 22  1346   INR 1.36* 1.00   PTT 33  --          5. RADIOLOGY:   Recent Results (from the past 24 hour(s))   Echocardiogram Complete   Result Value    LVEF  60-65%    [facility]    [id number]  [id number]  [id number]  [id number]^REI^BRENDAN^NICOLLE     [facility],[place]  Echocardiography Laboratory  [address] [place] [id number]     Name: KALA PEPPER  MRN: [id number]  : 1935  Study Date: 2022 09:23 AM  Age: 87 yrs  Gender: Female  Patient Location: [facility]  Reason For Study: Severe aortic stenosis  Ordering Physician: BRENDAN MINAYA  Referring Physician: BRENDAN MINAYA  Performed By: Lelo Mckeon     BSA: 1.6 m2  Height: 61 in  Weight: 141 lb  HR: 64  ______________________________________________________________________________  Procedure  Complete Portable Echo Adult.  ______________________________________________________________________________  Interpretation Summary  Intra-procedure TTE for TAVR with Contreras 23 mm valve     Pre-procedure:  Global and regional left ventricular function is normal with an EF of 60-65%.  No pericardial effusion is present.  Severe aortic stenosis.     Post-procedure:  Valve is well seated. Mean gr 3 mmHg, no PVL. Normal appearance of aortic  root.  No change in cardiac function.  Moderate circumferential pericardial effusion noted. Emergent  pericardiocentesis performed with trivial residual fluid.  ______________________________________________________________________________  Left Ventricle  Global and regional left ventricular function is normal with an EF of 60-65%.  Mild concentric wall thickening consistent with left ventricular hypertrophy  is present. Thickening of the anterobasal septum is present.     Right Ventricle  The right ventricle is normal size.  Global right ventricular function is  normal.     Mitral Valve  Severe mitral annular calcification is present.     Tricuspid Valve  Mild tricuspid insufficiency is present. The right ventricular systolic  pressure is approximated at 27.9 mmHg plus the right atrial pressure.     Pericardium  No pericardial effusion is present.  ______________________________________________________________________________  MMode/2D Measurements & Calculations  LVOT diam: 2.0 cm  LVOT area: 3.1 cm2     Doppler Measurements & Calculations  MV max P.1 mmHg  MV mean PG: 3.0 mmHg  MV V2 VTI: 43.3 cm  MVA(VTI): 1.7 cm2  Ao V2 max: 125.0 cm/sec  Ao max P.3 mmHg  Ao V2 mean: 87.0 cm/sec  Ao mean PG: 3.0 mmHg  Ao V2 VTI: 29.6 cm  ROBIN(I,D): 2.5 cm2  ROBIN(V,D): 2.5 cm2  LV V1 max PG: 3.9 mmHg  LV V1 max: 98.5 cm/sec  LV V1 VTI: 23.5 cm  SV(LVOT): 73.8 ml  SI(LVOT): 45.3 ml/m2  TR max domo: 264.0 cm/sec  TR max P.9 mmHg  AV Domo Ratio (DI): 0.79  ROBIN Index (cm2/m2): 1.5     ______________________________________________________________________________  Report approved by: Teresa Walsh 2022 01:19 PM         XR Chest Port 1 View    Narrative    Chest x-ray one view  2022 3:13 PM      HISTORY: Emergency case portable chest in OR to check for anything  retained    COMPARISON: None    TECHNIQUE: Portable supine AP    FINDINGS:   TAVR noted. 2 surgical sponges in the surgical field. Transesophageal  echo probe. ET tube projects 2 cm above the [place]. Temporary pacer  wires noted. Trachea is midline. Cardiomediastinal silhouette is  within normal limits. No appreciable pleural effusion or pneumothorax.  No focal airspace opacities. Visualized upper abdomen is unremarkable.  No acute osseous abnormality.        Impression    IMPRESSION:   2 surgical sponges are seen in the surgical field. No metallic foreign  bodies noted. Findings were communicated to the OR nurse who expressed  understanding.    I have personally  reviewed the examination and initial interpretation  and I agree with the findings.    KHLOE STOUT MD         SYSTEM ID:  [id number]       =========================================

## 2022-11-16 NOTE — Clinical Note
Potential access sites were evaluated for patency using ultrasound.   The left femoral artery was selected. Access was obtained under with Sonosite guidance using a standard 18 guage needle with direct visualization of needle entry.

## 2022-11-16 NOTE — ANESTHESIA PREPROCEDURE EVALUATION
Anesthesia Pre-Procedure Evaluation    Patient: Jade Walker   MRN: [id number] : 1935        Procedure : Procedure(s):  Transfemoral (Contreras) Transcatheter Aortic Valve Replacement  with possible open heart bypass and or balloon pump placement and any indicated procedure          Past Medical History:   Diagnosis Date     Aortic stenosis      Aortic stenosis, moderate      Chronic kidney disease, stage III (moderate) (H)      Colon polyp      Hyperlipidemia      Hypertension      Irritable bowel syndrome      Obesity (BMI 35.0-39.9 without comorbidity)       Past Surgical History:   Procedure Laterality Date     CV CORONARY ANGIOGRAM N/A 10/20/2022    Procedure: Coronary Angiogram [[id number]];  Surgeon: Tone Hollins MD;  Location:  HEART CARDIAC CATH LAB     CV LEFT HEART CATH N/A 10/20/2022    Procedure: Left Heart Cath;  Surgeon: Tone Hollins MD;  Location:  HEART CARDIAC CATH LAB     CV RIGHT HEART CATH MEASUREMENTS RECORDED N/A 10/20/2022    Procedure: Right Heart Cath;  Surgeon: Tone Hollins MD;  Location:  HEART CARDIAC CATH LAB     EYE SURGERY  2015     OTHER SURGICAL HISTORY Bilateral     cataracts     ZZHC COLONOSCOPY W/WO BRUSH/WASH N/A 2020    Procedure: COLONOSCOPY;  Surgeon: Stan Porter MD;  Location: [facility];  Service: Gastroenterology      Allergies   Allergen Reactions     Azithromycin Hives     Iodinated Contrast Media [Diagnostic X-Ray Materials] Hives and Rash      Social History     Tobacco Use     Smoking status: Never     Smokeless tobacco: Never   Substance Use Topics     Alcohol use: Yes     Comment: Alcoholic Drinks/day: once a month      Wt Readings from Last 1 Encounters:   22 64 kg (141 lb 1.5 oz)        Anesthesia Evaluation   Pt has had prior anesthetic. Type: MAC (endoscopy and epidural for childbirth).        ROS/MED HX  ENT/Pulmonary:  - neg pulmonary ROS  (-) asthma and COPD   Neurologic:  - neg  neurologic ROS  (-) no seizures and no CVA   Cardiovascular:     (+) hypertension-----JOHN. valvular problems/murmurs type: AS and AI severe AS, mild AI.     METS/Exercise Tolerance:     Hematologic:  - neg hematologic  ROS     Musculoskeletal:  - neg musculoskeletal ROS     GI/Hepatic:  - neg GI/hepatic ROS  (-) GERD (takes PPI, but no GERD)   Renal/Genitourinary:     (+) renal disease, Pt does not require dialysis,     Endo:  - neg endo ROS  (-) Type I DM   Psychiatric/Substance Use:  - neg psychiatric ROS     Infectious Disease:  - neg infectious disease ROS     Malignancy:  - neg malignancy ROS     Other:  - neg other ROS          Physical Exam    Airway        Mallampati: I       Respiratory Devices and Support         Dental           Cardiovascular   cardiovascular exam normal       Rhythm and rate: regular and normal     Pulmonary   pulmonary exam normal                OUTSIDE LABS:  CBC:   Lab Results   Component Value Date    WBC 8.0 11/14/2022    WBC 8.6 10/20/2022    HGB 11.7 11/14/2022    HGB 11.6 (L) 10/20/2022    HCT 35.0 11/14/2022    HCT 38.5 10/20/2022     11/14/2022     10/20/2022     BMP:   Lab Results   Component Value Date     11/14/2022     10/20/2022    POTASSIUM 4.1 11/14/2022    POTASSIUM 4.1 10/20/2022    CHLORIDE 105 11/14/2022    CHLORIDE 103 10/20/2022    CO2 28 11/14/2022    CO2 25 10/20/2022    BUN 15.7 11/14/2022    BUN 16.8 10/20/2022    CR 0.88 11/14/2022    CR 0.86 10/20/2022     (H) 11/16/2022    GLC 87 11/14/2022     COAGS:   Lab Results   Component Value Date    PTT 23 10/20/2022    INR 1.00 11/14/2022     POC: No results found for: BGM, HCG, HCGS  HEPATIC:   Lab Results   Component Value Date    ALBUMIN 4.1 11/14/2022    PROTTOTAL 6.6 11/14/2022    ALT 13 11/14/2022    AST 22 11/14/2022    ALKPHOS 76 11/14/2022    BILITOTAL 0.4 11/14/2022     OTHER:   Lab Results   Component Value Date    SHAHEEN 9.2 11/14/2022    TSH 1.01 04/17/2019        Anesthesia Plan    ASA Status:  3   NPO Status:  NPO Appropriate    Anesthesia Type: MAC.     - Reason for MAC: chronic cardiopulmonary disease, immobility needed, straight local not clinically adequate   Induction: Intravenous.           Consents    Anesthesia Plan(s) and associated risks, benefits, and realistic alternatives discussed. Questions answered and patient/representative(s) expressed understanding.     - Discussed: Risks, Benefits and Alternatives for BOTH SEDATION and the PROCEDURE were discussed     - Discussed with:  Spouse, Patient    Use of blood products discussed: Yes.     - Discussed with: Patient.     - Consented: consented to blood products            Reason for refusal: other.     Postoperative Care    Pain management: IV analgesics, Oral pain medications.   PONV prophylaxis: Ondansetron (or other 5HT-3)     Comments:                Raul Nixon MD

## 2022-11-16 NOTE — Clinical Note
6 Fr pigtail exchanged for a 4 Fr pigtail through the 7 Fr sheath in the left femoral artery over the exchange length wire

## 2022-11-16 NOTE — ANESTHESIA PROCEDURE NOTES
Perioperative KULDIP Procedure Note    Staff -        Anesthesiologist:  Raul Nixon MD       Performed By: anesthesiologist  Preanesthesia Checklist:  Patient identified, IV assessed, risks and benefits discussed, monitors and equipment assessed, procedure being performed at surgeon's request and anesthesia consent obtained.    KULDIP Probe Insertion    Probe Status PRE Insertion: NO obvious damage  Probe type:  Adult 2D  Bite block used:   Soft  Insertion Technique: Easy, no oropharyngeal manipulation  Insertion complications: None obvious  Billing Report:KULDIP report by Anesthesiologist (See Separate Report note)  Probe Status POST Removal: NO obvious damage    KULDIP Report  General Procedure Information  Images for this study have been archived.  Modalities: 2D, CW Doppler and PW Doppler  Diagnostic indications for KULDIP:   Aortic stenosis  Aortic insufficiency  Diagnostic Indications comments: TAVR /w bleeding, emergent sternotomy.  Echocardiographic and Doppler Measurements  Right Ventricle:  Cavity size normal.    Hypertrophy not present.   Thrombus not present.    Global function normal.     Left Ventricle:  Cavity size normal.    Hypertrophy not present.   Thrombus not present.   Global Function normal.       Ventricular Regional Function:  1- Basal Anteroseptal:  normal  2- Basal Anterior:  normal  3- Basal Anterolateral:  normal  4- Basal Inferolateral:  normal  5- Basal Inferior:  normal  6- Basal Inferoseptal:  normal  7- Mid Anteroseptal:  normal  8- Mid Anterior:  normal  9- Mid Anterolateral:  normal  10- Mid Inferolateral:  normal  11- Mid Inferior:  normal  12- Mid Inferoseptal:  normal  13- Apical Anterior:  normal  14- Apical Lateral:  normal  15- Apical Inferior:  normal  16- Apical Septal:  normal  17- [place]:  normal    Valves  Aortic Valve: Annulus bioprosthetic.  Stenosis not present.  Mean Gradient: 7 mmHg.  Regurgitation absent.  Leaflets normal.  Leaflet motions normal.    Mitral Valve: Annulus  normal.  Stenosis not present.  Regurgitation +1.  Leaflets normal.  Leaflet motions normal.    Tricuspid Valve: Annulus normal.  Stenosis not present.  Regurgitation +1.  Leaflets normal.  Leaflet motions normal.    Pulmonic Valve: Annulus normal.  Stenosis not present.  Regurgitation absent.      Aorta: Ascending Aorta: Size normal.  Dissection not present.  Plaque thickness less than 3 mm.  Mobile plaque not present.    Aortic Arch: Size normal.    Dissection not present.   Plaque thickness less than 3 mm.   Mobile plaque not present.    Descending Aorta: Size normal.    Dissection not present.   Plaque thickness less than 3 mm.   Mobile plaque not present.      Right Atrium:  Size normal.   Spontaneous echo contrast not present.   Thrombus not present.   Tumor not present.   Device not present.     Left Atrium: Size normal.  Spontaneous echo contrast not present.  Thrombus not present.  Tumor not present.  Device not present.    Left atrial appendage normal.     Atrial Septum: Intra-atrial septal morphology normal.       Ventricular Septum: Intra-ventricular septum morphology normal.          Post Intervention Findings  Procedure(s) performed:  Other (see comments). Global function:  Unchanged. Regional wall motion: Unchanged. Surgeon(s) notified of all postintervention findings: Yes.             Post Intervention comments: S/p TAVR bleeding into pericardial space initially undeterminate but on opening chest discovered lateral LV perforation near the base that was  repaired by surgeon. Pre intervention small, underfilled LV despite lack of pericardial effusion (chest had been opened). Normal BiV Fx. Post intervention normal BiV Fx, Good Functioning prosthetic AV. No AI. Mild TR/MR. No aortic damage. Aortic calcifications. .    Echocardiogram Comments

## 2022-11-16 NOTE — Clinical Note
Potential access sites were evaluated for patency using ultrasound.   The right femoral artery was selected. Access was obtained under with Sonosite guidance using a standard 18 guage needle with direct visualization of needle entry.

## 2022-11-16 NOTE — Clinical Note
Potential access sites were evaluated for patency using ultrasound.   The left femoral vein was selected. Access was obtained under with Sonosite guidance using a standard 18 guage needle with direct visualization of needle entry.

## 2022-11-17 ENCOUNTER — APPOINTMENT (OUTPATIENT)
Dept: CARDIOLOGY | Facility: CLINIC | Age: 87
DRG: 266 | End: 2022-11-17
Attending: INTERNAL MEDICINE
Payer: COMMERCIAL

## 2022-11-17 ENCOUNTER — APPOINTMENT (OUTPATIENT)
Dept: GENERAL RADIOLOGY | Facility: CLINIC | Age: 87
DRG: 266 | End: 2022-11-17
Attending: INTERNAL MEDICINE
Payer: COMMERCIAL

## 2022-11-17 DIAGNOSIS — Z95.2 S/P TAVR (TRANSCATHETER AORTIC VALVE REPLACEMENT): Primary | ICD-10-CM

## 2022-11-17 LAB
ALBUMIN SERPL BCG-MCNC: 3.2 G/DL (ref 3.5–5.2)
ALBUMIN SERPL BCG-MCNC: 3.3 G/DL (ref 3.5–5.2)
ALP SERPL-CCNC: 34 U/L (ref 35–104)
ALP SERPL-CCNC: 37 U/L (ref 35–104)
ALT SERPL W P-5'-P-CCNC: 24 U/L (ref 10–35)
ALT SERPL W P-5'-P-CCNC: 27 U/L (ref 10–35)
ANION GAP SERPL CALCULATED.3IONS-SCNC: 12 MMOL/L (ref 7–15)
ANION GAP SERPL CALCULATED.3IONS-SCNC: 9 MMOL/L (ref 7–15)
ANION GAP SERPL CALCULATED.3IONS-SCNC: 9 MMOL/L (ref 7–15)
APTT PPP: 29 SECONDS (ref 22–38)
AST SERPL W P-5'-P-CCNC: 141 U/L (ref 10–35)
AST SERPL W P-5'-P-CCNC: 172 U/L (ref 10–35)
ATRIAL RATE - MUSE: 59 BPM
ATRIAL RATE - MUSE: 77 BPM
ATRIAL RATE - MUSE: 89 BPM
BASE EXCESS BLDA CALC-SCNC: -0.3 MMOL/L (ref -9–1.8)
BASE EXCESS BLDA CALC-SCNC: -1.9 MMOL/L (ref -9–1.8)
BASE EXCESS BLDA CALC-SCNC: -2.7 MMOL/L (ref -9–1.8)
BASE EXCESS BLDA CALC-SCNC: -3.1 MMOL/L (ref -9–1.8)
BASE EXCESS BLDA CALC-SCNC: -3.8 MMOL/L (ref -9–1.8)
BILIRUB SERPL-MCNC: 0.4 MG/DL
BILIRUB SERPL-MCNC: 0.5 MG/DL
BLD PROD TYP BPU: NORMAL
BLD PROD TYP BPU: NORMAL
BLOOD COMPONENT TYPE: NORMAL
BLOOD COMPONENT TYPE: NORMAL
BUN SERPL-MCNC: 15.3 MG/DL (ref 8–23)
BUN SERPL-MCNC: 16 MG/DL (ref 8–23)
BUN SERPL-MCNC: 16.6 MG/DL (ref 8–23)
CA-I BLD-MCNC: 6.6 MG/DL (ref 4.4–5.2)
CALCIUM SERPL-MCNC: 10 MG/DL (ref 8.8–10.2)
CALCIUM SERPL-MCNC: 11.6 MG/DL (ref 8.8–10.2)
CALCIUM SERPL-MCNC: 8.7 MG/DL (ref 8.8–10.2)
CHLORIDE SERPL-SCNC: 106 MMOL/L (ref 98–107)
CHLORIDE SERPL-SCNC: 108 MMOL/L (ref 98–107)
CHLORIDE SERPL-SCNC: 108 MMOL/L (ref 98–107)
CODING SYSTEM: NORMAL
CODING SYSTEM: NORMAL
CREAT SERPL-MCNC: 1.06 MG/DL (ref 0.51–0.95)
CREAT SERPL-MCNC: 1.08 MG/DL (ref 0.51–0.95)
CREAT SERPL-MCNC: 1.09 MG/DL (ref 0.51–0.95)
CROSSMATCH: NORMAL
CROSSMATCH: NORMAL
DEPRECATED HCO3 PLAS-SCNC: 19 MMOL/L (ref 22–29)
DEPRECATED HCO3 PLAS-SCNC: 20 MMOL/L (ref 22–29)
DEPRECATED HCO3 PLAS-SCNC: 20 MMOL/L (ref 22–29)
DIASTOLIC BLOOD PRESSURE - MUSE: NORMAL MMHG
ERYTHROCYTE [DISTWIDTH] IN BLOOD BY AUTOMATED COUNT: 15.1 % (ref 10–15)
ERYTHROCYTE [DISTWIDTH] IN BLOOD BY AUTOMATED COUNT: 15.2 % (ref 10–15)
ERYTHROCYTE [DISTWIDTH] IN BLOOD BY AUTOMATED COUNT: 16.9 % (ref 10–15)
GFR SERPL CREATININE-BSD FRML MDRD: 50 ML/MIN/1.73M2
GFR SERPL CREATININE-BSD FRML MDRD: 50 ML/MIN/1.73M2
GFR SERPL CREATININE-BSD FRML MDRD: 51 ML/MIN/1.73M2
GLUCOSE BLDC GLUCOMTR-MCNC: 123 MG/DL (ref 70–99)
GLUCOSE BLDC GLUCOMTR-MCNC: 124 MG/DL (ref 70–99)
GLUCOSE BLDC GLUCOMTR-MCNC: 128 MG/DL (ref 70–99)
GLUCOSE BLDC GLUCOMTR-MCNC: 131 MG/DL (ref 70–99)
GLUCOSE BLDC GLUCOMTR-MCNC: 133 MG/DL (ref 70–99)
GLUCOSE BLDC GLUCOMTR-MCNC: 133 MG/DL (ref 70–99)
GLUCOSE BLDC GLUCOMTR-MCNC: 135 MG/DL (ref 70–99)
GLUCOSE BLDC GLUCOMTR-MCNC: 137 MG/DL (ref 70–99)
GLUCOSE BLDC GLUCOMTR-MCNC: 142 MG/DL (ref 70–99)
GLUCOSE BLDC GLUCOMTR-MCNC: 144 MG/DL (ref 70–99)
GLUCOSE BLDC GLUCOMTR-MCNC: 145 MG/DL (ref 70–99)
GLUCOSE BLDC GLUCOMTR-MCNC: 149 MG/DL (ref 70–99)
GLUCOSE BLDC GLUCOMTR-MCNC: 151 MG/DL (ref 70–99)
GLUCOSE BLDC GLUCOMTR-MCNC: 155 MG/DL (ref 70–99)
GLUCOSE BLDC GLUCOMTR-MCNC: 161 MG/DL (ref 70–99)
GLUCOSE BLDC GLUCOMTR-MCNC: 168 MG/DL (ref 70–99)
GLUCOSE BLDC GLUCOMTR-MCNC: 193 MG/DL (ref 70–99)
GLUCOSE SERPL-MCNC: 152 MG/DL (ref 70–99)
GLUCOSE SERPL-MCNC: 155 MG/DL (ref 70–99)
GLUCOSE SERPL-MCNC: 179 MG/DL (ref 70–99)
HCO3 BLD-SCNC: 22 MMOL/L (ref 21–28)
HCO3 BLD-SCNC: 23 MMOL/L (ref 21–28)
HCO3 BLD-SCNC: 23 MMOL/L (ref 21–28)
HCO3 BLD-SCNC: 24 MMOL/L (ref 21–28)
HCO3 BLD-SCNC: 25 MMOL/L (ref 21–28)
HCT VFR BLD AUTO: 20.7 % (ref 35–47)
HCT VFR BLD AUTO: 29.6 % (ref 35–47)
HCT VFR BLD AUTO: 32.5 % (ref 35–47)
HGB BLD-MCNC: 10.3 G/DL (ref 11.7–15.7)
HGB BLD-MCNC: 11.1 G/DL (ref 11.7–15.7)
HGB BLD-MCNC: 7 G/DL (ref 11.7–15.7)
HGB BLD-MCNC: 8.7 G/DL (ref 11.7–15.7)
HGB BLD-MCNC: 9.7 G/DL (ref 11.7–15.7)
INR PPP: 1.35 (ref 0.85–1.15)
INTERPRETATION ECG - MUSE: NORMAL
ISSUE DATE AND TIME: NORMAL
ISSUE DATE AND TIME: NORMAL
LACTATE SERPL-SCNC: 2.3 MMOL/L (ref 0.7–2)
LACTATE SERPL-SCNC: 3 MMOL/L (ref 0.7–2)
LACTATE SERPL-SCNC: 3.6 MMOL/L (ref 0.7–2)
LVEF ECHO: NORMAL
LVEF ECHO: NORMAL
MAGNESIUM SERPL-MCNC: 2.2 MG/DL (ref 1.7–2.3)
MAGNESIUM SERPL-MCNC: 2.6 MG/DL (ref 1.7–2.3)
MCH RBC QN AUTO: 30.6 PG (ref 26.5–33)
MCH RBC QN AUTO: 31.1 PG (ref 26.5–33)
MCH RBC QN AUTO: 31.2 PG (ref 26.5–33)
MCHC RBC AUTO-ENTMCNC: 33.8 G/DL (ref 31.5–36.5)
MCHC RBC AUTO-ENTMCNC: 34.2 G/DL (ref 31.5–36.5)
MCHC RBC AUTO-ENTMCNC: 34.8 G/DL (ref 31.5–36.5)
MCV RBC AUTO: 90 FL (ref 78–100)
MCV RBC AUTO: 90 FL (ref 78–100)
MCV RBC AUTO: 92 FL (ref 78–100)
O2/TOTAL GAS SETTING VFR VENT: 30 %
O2/TOTAL GAS SETTING VFR VENT: 40 %
OXYHGB MFR BLD: 97 % (ref 92–100)
OXYHGB MFR BLD: 98 % (ref 92–100)
OXYHGB MFR BLD: 98 % (ref 92–100)
P AXIS - MUSE: 67 DEGREES
P AXIS - MUSE: 70 DEGREES
P AXIS - MUSE: 73 DEGREES
PCO2 BLD: 42 MM HG (ref 35–45)
PCO2 BLD: 42 MM HG (ref 35–45)
PCO2 BLD: 43 MM HG (ref 35–45)
PCO2 BLD: 43 MM HG (ref 35–45)
PCO2 BLD: 44 MM HG (ref 35–45)
PH BLD: 7.32 [PH] (ref 7.35–7.45)
PH BLD: 7.33 [PH] (ref 7.35–7.45)
PH BLD: 7.34 [PH] (ref 7.35–7.45)
PH BLD: 7.36 [PH] (ref 7.35–7.45)
PH BLD: 7.38 [PH] (ref 7.35–7.45)
PHOSPHATE SERPL-MCNC: 5.3 MG/DL (ref 2.5–4.5)
PLATELET # BLD AUTO: 115 10E3/UL (ref 150–450)
PLATELET # BLD AUTO: 131 10E3/UL (ref 150–450)
PLATELET # BLD AUTO: 171 10E3/UL (ref 150–450)
PO2 BLD: 107 MM HG (ref 80–105)
PO2 BLD: 108 MM HG (ref 80–105)
PO2 BLD: 111 MM HG (ref 80–105)
PO2 BLD: 125 MM HG (ref 80–105)
PO2 BLD: 166 MM HG (ref 80–105)
POTASSIUM SERPL-SCNC: 3.7 MMOL/L (ref 3.4–5.3)
POTASSIUM SERPL-SCNC: 4.6 MMOL/L (ref 3.4–5.3)
POTASSIUM SERPL-SCNC: 5.8 MMOL/L (ref 3.4–5.3)
PR INTERVAL - MUSE: 160 MS
PR INTERVAL - MUSE: 166 MS
PR INTERVAL - MUSE: 180 MS
PROT SERPL-MCNC: 4.3 G/DL (ref 6.4–8.3)
PROT SERPL-MCNC: 4.4 G/DL (ref 6.4–8.3)
QRS DURATION - MUSE: 126 MS
QRS DURATION - MUSE: 130 MS
QRS DURATION - MUSE: 136 MS
QT - MUSE: 416 MS
QT - MUSE: 440 MS
QT - MUSE: 456 MS
QTC - MUSE: 451 MS
QTC - MUSE: 497 MS
QTC - MUSE: 506 MS
R AXIS - MUSE: -28 DEGREES
R AXIS - MUSE: -30 DEGREES
R AXIS - MUSE: -32 DEGREES
RBC # BLD AUTO: 2.25 10E6/UL (ref 3.8–5.2)
RBC # BLD AUTO: 3.3 10E6/UL (ref 3.8–5.2)
RBC # BLD AUTO: 3.63 10E6/UL (ref 3.8–5.2)
SODIUM SERPL-SCNC: 137 MMOL/L (ref 136–145)
SYSTOLIC BLOOD PRESSURE - MUSE: NORMAL MMHG
T AXIS - MUSE: 46 DEGREES
T AXIS - MUSE: 53 DEGREES
T AXIS - MUSE: 94 DEGREES
UNIT ABO/RH: NORMAL
UNIT ABO/RH: NORMAL
UNIT NUMBER: NORMAL
UNIT NUMBER: NORMAL
UNIT STATUS: NORMAL
UNIT STATUS: NORMAL
UNIT TYPE ISBT: 5100
UNIT TYPE ISBT: 5100
VENTRICULAR RATE- MUSE: 59 BPM
VENTRICULAR RATE- MUSE: 77 BPM
VENTRICULAR RATE- MUSE: 89 BPM
WBC # BLD AUTO: 12 10E3/UL (ref 4–11)
WBC # BLD AUTO: 12.4 10E3/UL (ref 4–11)
WBC # BLD AUTO: 15.2 10E3/UL (ref 4–11)

## 2022-11-17 PROCEDURE — 85610 PROTHROMBIN TIME: CPT | Performed by: THORACIC SURGERY (CARDIOTHORACIC VASCULAR SURGERY)

## 2022-11-17 PROCEDURE — 250N000011 HC RX IP 250 OP 636: Performed by: SURGERY

## 2022-11-17 PROCEDURE — 250N000009 HC RX 250: Performed by: SURGERY

## 2022-11-17 PROCEDURE — 272N000278 HC DEVICE 5FR SECURACATH

## 2022-11-17 PROCEDURE — 93321 DOPPLER ECHO F-UP/LMTD STD: CPT

## 2022-11-17 PROCEDURE — 84100 ASSAY OF PHOSPHORUS: CPT

## 2022-11-17 PROCEDURE — 82310 ASSAY OF CALCIUM: CPT | Performed by: SURGERY

## 2022-11-17 PROCEDURE — 85041 AUTOMATED RBC COUNT: CPT

## 2022-11-17 PROCEDURE — 83605 ASSAY OF LACTIC ACID: CPT | Performed by: SURGERY

## 2022-11-17 PROCEDURE — 999N000015 HC STATISTIC ARTERIAL MONITORING DAILY

## 2022-11-17 PROCEDURE — 999N000157 HC STATISTIC RCP TIME EA 10 MIN

## 2022-11-17 PROCEDURE — 93321 DOPPLER ECHO F-UP/LMTD STD: CPT | Mod: 26 | Performed by: INTERNAL MEDICINE

## 2022-11-17 PROCEDURE — 82805 BLOOD GASES W/O2 SATURATION: CPT

## 2022-11-17 PROCEDURE — 258N000003 HC RX IP 258 OP 636: Performed by: THORACIC SURGERY (CARDIOTHORACIC VASCULAR SURGERY)

## 2022-11-17 PROCEDURE — 93325 DOPPLER ECHO COLOR FLOW MAPG: CPT

## 2022-11-17 PROCEDURE — 272N000452 HC KIT SHRLOCK 5FR POWER PICC TRIPLE LUMEN

## 2022-11-17 PROCEDURE — 85018 HEMOGLOBIN: CPT | Performed by: THORACIC SURGERY (CARDIOTHORACIC VASCULAR SURGERY)

## 2022-11-17 PROCEDURE — 83735 ASSAY OF MAGNESIUM: CPT

## 2022-11-17 PROCEDURE — 85018 HEMOGLOBIN: CPT

## 2022-11-17 PROCEDURE — 258N000003 HC RX IP 258 OP 636

## 2022-11-17 PROCEDURE — 85027 COMPLETE CBC AUTOMATED: CPT | Performed by: SURGERY

## 2022-11-17 PROCEDURE — P9016 RBC LEUKOCYTES REDUCED: HCPCS | Performed by: SURGERY

## 2022-11-17 PROCEDURE — 93320 DOPPLER ECHO COMPLETE: CPT | Mod: 26 | Performed by: INTERNAL MEDICINE

## 2022-11-17 PROCEDURE — 71045 X-RAY EXAM CHEST 1 VIEW: CPT

## 2022-11-17 PROCEDURE — 93312 ECHO TRANSESOPHAGEAL: CPT | Mod: 26 | Performed by: INTERNAL MEDICINE

## 2022-11-17 PROCEDURE — 85730 THROMBOPLASTIN TIME PARTIAL: CPT | Performed by: THORACIC SURGERY (CARDIOTHORACIC VASCULAR SURGERY)

## 2022-11-17 PROCEDURE — 82330 ASSAY OF CALCIUM: CPT

## 2022-11-17 PROCEDURE — 82805 BLOOD GASES W/O2 SATURATION: CPT | Performed by: SURGERY

## 2022-11-17 PROCEDURE — 250N000011 HC RX IP 250 OP 636

## 2022-11-17 PROCEDURE — 258N000003 HC RX IP 258 OP 636: Performed by: SURGERY

## 2022-11-17 PROCEDURE — 250N000011 HC RX IP 250 OP 636: Performed by: THORACIC SURGERY (CARDIOTHORACIC VASCULAR SURGERY)

## 2022-11-17 PROCEDURE — 93325 DOPPLER ECHO COLOR FLOW MAPG: CPT | Mod: 26 | Performed by: INTERNAL MEDICINE

## 2022-11-17 PROCEDURE — 99152 MOD SED SAME PHYS/QHP 5/>YRS: CPT | Performed by: INTERNAL MEDICINE

## 2022-11-17 PROCEDURE — 272N000019 HC KIT OPEN ENDED DOUBLE LUMEN

## 2022-11-17 PROCEDURE — 83735 ASSAY OF MAGNESIUM: CPT | Performed by: STUDENT IN AN ORGANIZED HEALTH CARE EDUCATION/TRAINING PROGRAM

## 2022-11-17 PROCEDURE — 80048 BASIC METABOLIC PNL TOTAL CA: CPT | Performed by: STUDENT IN AN ORGANIZED HEALTH CARE EDUCATION/TRAINING PROGRAM

## 2022-11-17 PROCEDURE — 250N000013 HC RX MED GY IP 250 OP 250 PS 637

## 2022-11-17 PROCEDURE — 71045 X-RAY EXAM CHEST 1 VIEW: CPT | Mod: 26 | Performed by: RADIOLOGY

## 2022-11-17 PROCEDURE — 200N000002 HC R&B ICU UMMC

## 2022-11-17 PROCEDURE — 93308 TTE F-UP OR LMTD: CPT | Mod: 26 | Performed by: INTERNAL MEDICINE

## 2022-11-17 PROCEDURE — 85027 COMPLETE CBC AUTOMATED: CPT

## 2022-11-17 PROCEDURE — P9045 ALBUMIN (HUMAN), 5%, 250 ML: HCPCS | Performed by: SURGERY

## 2022-11-17 PROCEDURE — 85018 HEMOGLOBIN: CPT | Performed by: SURGERY

## 2022-11-17 PROCEDURE — 80053 COMPREHEN METABOLIC PANEL: CPT | Performed by: SURGERY

## 2022-11-17 PROCEDURE — 94003 VENT MGMT INPAT SUBQ DAY: CPT

## 2022-11-17 PROCEDURE — 36569 INSJ PICC 5 YR+ W/O IMAGING: CPT

## 2022-11-17 DEVICE — VALVE AORTIC SAPEIN 3 UTLRA TAVR 23MM 9750TFX23A: Type: IMPLANTABLE DEVICE | Status: FUNCTIONAL

## 2022-11-17 RX ORDER — POTASSIUM CHLORIDE 29.8 MG/ML
20 INJECTION INTRAVENOUS ONCE
Status: COMPLETED | OUTPATIENT
Start: 2022-11-17 | End: 2022-11-17

## 2022-11-17 RX ORDER — LIDOCAINE 40 MG/G
CREAM TOPICAL
Status: ACTIVE | OUTPATIENT
Start: 2022-11-17 | End: 2022-11-20

## 2022-11-17 RX ADMIN — Medication 0.09 MCG/KG/MIN: at 23:50

## 2022-11-17 RX ADMIN — PROPOFOL 25 MCG/KG/MIN: 10 INJECTION, EMULSION INTRAVENOUS at 00:11

## 2022-11-17 RX ADMIN — SODIUM CHLORIDE, POTASSIUM CHLORIDE, SODIUM LACTATE AND CALCIUM CHLORIDE 500 ML: 600; 310; 30; 20 INJECTION, SOLUTION INTRAVENOUS at 13:00

## 2022-11-17 RX ADMIN — SENNOSIDES AND DOCUSATE SODIUM 1 TABLET: 50; 8.6 TABLET ORAL at 07:52

## 2022-11-17 RX ADMIN — Medication 40 MG: at 07:52

## 2022-11-17 RX ADMIN — PROPOFOL 15 MCG/KG/MIN: 10 INJECTION, EMULSION INTRAVENOUS at 22:42

## 2022-11-17 RX ADMIN — ALBUMIN (HUMAN) 500 ML: 12.5 INJECTION, SOLUTION INTRAVENOUS at 01:21

## 2022-11-17 RX ADMIN — POLYETHYLENE GLYCOL 3350 17 G: 17 POWDER, FOR SOLUTION ORAL at 07:52

## 2022-11-17 RX ADMIN — SODIUM CHLORIDE, POTASSIUM CHLORIDE, SODIUM LACTATE AND CALCIUM CHLORIDE 500 ML: 600; 310; 30; 20 INJECTION, SOLUTION INTRAVENOUS at 22:42

## 2022-11-17 RX ADMIN — CEFAZOLIN 1 G: 1 INJECTION, POWDER, FOR SOLUTION INTRAMUSCULAR; INTRAVENOUS at 14:35

## 2022-11-17 RX ADMIN — SENNOSIDES AND DOCUSATE SODIUM 1 TABLET: 50; 8.6 TABLET ORAL at 19:36

## 2022-11-17 RX ADMIN — ACETAMINOPHEN 975 MG: 325 TABLET, FILM COATED ORAL at 10:10

## 2022-11-17 RX ADMIN — PROPOFOL 20 MCG/KG/MIN: 10 INJECTION, EMULSION INTRAVENOUS at 13:06

## 2022-11-17 RX ADMIN — SODIUM CHLORIDE, POTASSIUM CHLORIDE, SODIUM LACTATE AND CALCIUM CHLORIDE 1000 ML: 600; 310; 30; 20 INJECTION, SOLUTION INTRAVENOUS at 08:19

## 2022-11-17 RX ADMIN — SODIUM CHLORIDE, POTASSIUM CHLORIDE, SODIUM LACTATE AND CALCIUM CHLORIDE 500 ML: 600; 310; 30; 20 INJECTION, SOLUTION INTRAVENOUS at 16:47

## 2022-11-17 RX ADMIN — Medication 2 UNITS/HR: at 23:49

## 2022-11-17 RX ADMIN — SODIUM CHLORIDE, POTASSIUM CHLORIDE, SODIUM LACTATE AND CALCIUM CHLORIDE 1000 ML: 600; 310; 30; 20 INJECTION, SOLUTION INTRAVENOUS at 11:20

## 2022-11-17 RX ADMIN — VASOPRESSIN 2.4 UNITS/HR: 20 INJECTION INTRAVENOUS at 05:44

## 2022-11-17 RX ADMIN — POTASSIUM CHLORIDE 20 MEQ: 29.8 INJECTION, SOLUTION INTRAVENOUS at 05:48

## 2022-11-17 RX ADMIN — ACETAMINOPHEN 975 MG: 325 TABLET, FILM COATED ORAL at 01:55

## 2022-11-17 RX ADMIN — CEFAZOLIN 1 G: 1 INJECTION, POWDER, FOR SOLUTION INTRAMUSCULAR; INTRAVENOUS at 06:44

## 2022-11-17 RX ADMIN — ACETAMINOPHEN 975 MG: 325 TABLET, FILM COATED ORAL at 18:06

## 2022-11-17 RX ADMIN — Medication 2 UNITS/HR: at 12:27

## 2022-11-17 RX ADMIN — SODIUM CHLORIDE, POTASSIUM CHLORIDE, SODIUM LACTATE AND CALCIUM CHLORIDE 1000 ML: 600; 310; 30; 20 INJECTION, SOLUTION INTRAVENOUS at 14:57

## 2022-11-17 RX ADMIN — CALCIUM CHLORIDE 2 G: 100 INJECTION, SOLUTION INTRAVENOUS at 02:34

## 2022-11-17 ASSESSMENT — ACTIVITIES OF DAILY LIVING (ADL)
ADLS_ACUITY_SCORE: 28
ADLS_ACUITY_SCORE: 30
ADLS_ACUITY_SCORE: 29
ADLS_ACUITY_SCORE: 28
ADLS_ACUITY_SCORE: 28
ADLS_ACUITY_SCORE: 29
ADLS_ACUITY_SCORE: 28
ADLS_ACUITY_SCORE: 31
ADLS_ACUITY_SCORE: 31
ADLS_ACUITY_SCORE: 28
ADLS_ACUITY_SCORE: 28
ADLS_ACUITY_SCORE: 30

## 2022-11-17 NOTE — PROGRESS NOTES
TAVR PROCEDURE REPORT:     PROCEDURES PERFORMED:   1. Temporary pacemaker insertion.  2. Iliofemoral artery angiography.  3. Ascending aorta angiography.  4. Left heart catheterization.  5. Successful transfemoral transcatheter aortic valve replacement with a 23mm Sapien3 valve.  7. Arteriotomy closure.    PHYSICIANS:  1. Attending Interventional Cardiology Staff:MD Gurvinder   2. Attending Cardiovascular Surgery Staff: MD Smitha      INDICATION:  Jade Walker is a 87 year old female with severe symptomatic aortic stenosis who presents on an elective outpatient basis for transfemoral transcatheter aortic valve replacement with plan for an Contreras Danilo 3 valve.    DESCRIPTION:  1. Consent obtained with discussion of risks.  All questions were answered.  2. Sterile prep and procedure per OR protocol.  3. Location: right and left common femoral arteries and left common femoral vein.  4. Access: Local anesthetic with lidocaine.  A standard (18 g)with ultrasound guidance was used to establish vascular access using a modified Seldinger technique.  5. Sheath:  14Fr(for 23mm and 26mm valve)in  the RCFA, 6Fr standardlong sheath in the LCFA,  6Fr long sheath in the LCFV  6. Catheters: 6Fr angled pigtail and 6Fr AL-1.  7. Estimated blood loss of 20 mL.  8. See below for procedure details.    MEDICATIONS:  1. Sedation per anesthesia.  2. Heparin administered to achieve a goal ACT > 300 sec per anesthesia.   3. Protamine IV.          SUMMARY:  1. Access was obtained in the right and left common femoral arteries and the left common femoral vein by the interventional cardiology team.  The arterial site used for valve delivery was pre-closed with two Perclose Proglide devices.   2. A 5Fr PACEL temporary pacemaker was positioned in the right ventricle and tested for adequate capture.    3. Iliofemoral angiography was used to guide RCFA and LCFA access for insertion of the valve sheath.  A Where Was it FilmederPrixtel wire was used to  support the Contreras eSheath insertion.    4. The 6Fr pigtail catheter was positioned in the right-coronary cusp and angiography was used to confirm the optimal implant angle.  The pigtail was then moved to the non-coronary cusp.    5. Access into the LV was obtained using an AL-1 catheter and a straight wire.  The AL-1 catheter was used to exchange the straight wire for a J-wire and a pigtail catheter was then positioned in the left ventricle.  The LVEDP was measured with a pressure of 15 mmHg.  The pigtail catheter was used to advance a Safari wire into in the LV and the pigtail was removed.    6. 23mm Contreras Danilo XT prosthetic valve was then positioned across the native aortic valve in an 90/10 position.  Angiography and echocardiography were used to confirm optimal valve position.  Rapid pacing at 170 bpm was initiated and the valve was implanted with expansion of the balloon using a final balloon volume of nominal isntructions.  7. Subsequent trans thoracic echocardiography no  paravalvular insufficiency. A pericardial effusion without hemodynamic effect was noticed         8. The valve sheath access arteriotomy was successfully closed with the double suture closure devicesand manual pressure was applied for successful hemostasis.    9. A final iliofemoral angiogram showed no evidence of extravasation or stenosis.   10. The LCFA 6Fr arteriotomy was successfully closed with a 6Fr AngioSeal closure device.  11. The temporary pacemaker was then removed.          COMPLICATIONS:  1.A pericardiocentesis was performed immediately and 250 ml of blood was removed with complete resolution of the effusion. While waiting for 30 min we observed reaccumulation.   The decision was made to do an open thoracotomy to deal if an unknown origin at that tme perforation. The thoracotomy procedure was perfromed by Jose Miguel Bone and Eddi and revealed a posterolateral wall LV perforation that was treated and the chest was closed.          SUMMARY:    1. Lifestyle-limiting severe aortic stenosis.  2. Successful transfemoral transcatheter aortic valve replacement with a 23mmEdwards Danilo 3 valve.  3. Temporary pacemaker insertion.  4. Pericardial tamponade with lateral LV wall wire device related perforation   6. Right and left common femoral arteriotomies successfully closed with closure devices.    PLAN:    1. Aspirin 81 mg po daily lifelong.  2. Plavix 300 mg po today then Plavix 75 mg po daily.  3. Bedrest per protocol.  4. Admit to the CVICU for post pericardiotomy care   5. Echocardiogram tomorrow.   6. Lifelong antibiotic prophylaxis prior to all dental procedures.      Tone Hollins MD  Interventional Cardiology  Pager: [phone]

## 2022-11-17 NOTE — PROGRESS NOTES
CV ICU PROGRESS NOTE  November 17, 2022      CO-MORBIDITIES:   Severe aortic stenosis  (primary encounter diagnosis)    ASSESSMENT: Jade Walker is a 87 year old female with PMHx of severe aortic stenosis HTN, HLD, non-obstructive CAD, CKD stage III, IBS s/p TAVR by c/b LV perforation surgically repaired by Dr. Pulido 11/16. Intraop got 600 cellsaver, 2 plts, 1 ffp, 4 prbcs, and 1500 kcentra. Valve well seated.     Last 24 hours:  Hypotensive, added epi & vaso  1.5 LR  500 Albumin  BSUS negative for pericardial effusion    Today's Progress:  Switch Epi over to levo, wean as tolerated  TTE showing trivial posterior pericardial effusion, plan for KULDIP  Holding ASA, SQ  Keep NPO  PICC placement  Goal net positive    PLAN:  Neuro/ pain/ sedation:  #Acute postoperative pain  #Sedation  - Monitor neurological status. Notify the MD for any acute changes in exam.  - Pain: fentanyl gtt. Scheduled tylenol. PRN tylenol, oxycodone, dilaudid.  - Sedation: propofol gtt     Pulmonary care:   - mechanical ventilation 12/300/5/30%  - Goal SpO2 > 92%  - Incentive spirometer Q15-30 minutes when awake.     Cardiovascular:    # Severe aortic stenosis s/p TAVR  # Pericardial effusion s/p pericardial drain  # Hypertension  # Hyperlipidemia  # Non obstructive CAD  - Epi, NE, vaso gtts for inotropic and pressor support, wean favoring NE  - Monitor hemodynamic status.   - hold ASA  - TTE, then KULDIP for possible pericardial effusion     GI care:   # Irritable Bowel Syndrome     - NPO except ice chips and medications, consider trickle feeds tonight  - Nutrition consulted, appreciate recommendations  - PPI for bowel prophylaxis  - Bowel regimen: MiraLAX, senna    Renal/ Fluid Balance:    # Chronic Kidney Disease Stage 3  - ICU electrolyte replacement protocol   - Baseline Cr 0.9  - Strict I&Os  - monitor UOP  - 2L LR bolus for resuscitation    Endocrine:    #Stress hyperglycemia  - insulin gtt     ID/ Antibiotics:  #Postoperative  prophylactic antibiotics  - Completing perioperative course of antibiotics: ancef  - no indication for further antibiotics     Heme:     # Acute blood loss anemia  # Thrombocytopenia  # Pericardial effusion s/p pericardiocentesis  2 pt Hgb drop overnight  - Hgb goal > 7.0  - Hemoglobin stable.  - 2U pRBC (11/17)  - hold SQH     Prophylaxis:    - VTE: SCD's, heparin 5000u sq  - Bowel regimen: PPI, MiraLAX, senna     Lines/ tubes/ drains:  - ETT  - L femoral MAC CVC,  - L femoral sheath a line  - 2x set of v wires (not paced)  - 2x med CTs  - Park, OG  - PICC placement    Disposition:  - CVICU     ICU Checklist  F - Feeding:   A - Analgesia:   S - Sedation:   T - Thromboembolic prophylaxis:      H - Head of bed elevated  U - Ulcer prophylaxis:  G - Glycemic control  S - SBT     B - Bowel regimen  I - Indwelling catheter  D - De-escalation of antibiotics     Patient seen, findings and plan discussed with CVICU staff.    Jesus Irizarry MD on 11/17/2022 at 7:28 AM, PGY2    ====================================    SUBJECTIVE:   Intubated.    OBJECTIVE:   1. VITAL SIGNS:   Temp:  [97.5  F (36.4  C)-98.8  F (37.1  C)] 97.5  F (36.4  C)  Pulse:  [] 78  Resp:  [0-22] 17  BP: (83-94)/(48-50) 94/48  MAP:  [56 mmHg-93 mmHg] 69 mmHg  Arterial Line BP: ()/(42-73) 103/51  FiO2 (%):  [30 %-50 %] 30 %  SpO2:  [98 %-100 %] 100 %  Vent Mode: CMV/AC  (Continuous Mandatory Ventilation/ Assist Control)  FiO2 (%): 30 %  Resp Rate (Set): 12 breaths/min  Tidal Volume (Set, mL): 300 mL  PEEP (cm H2O): 5 cmH2O  Resp: 17      2. INTAKE/ OUTPUT:   I/O last 3 completed shifts:  In: 9746.72 [I.V.:4818.72; Other:100; NG/GT:210; IV Piggyback:1500]  Out: 1644 [Urine:1223; Emesis/NG output:200; Chest Tube:221]    3. PHYSICAL EXAMINATION:   General: NAD  Neuro: A&Ox3  Resp: Breathing non-labored, intubated  CV: RRR  Abdomen: Soft, Non-distended, Non-tender  Incisions: c/d/i  Extremities: warm and well perfused    4. INVESTIGATIONS:   Arterial  Blood Gases   Recent Labs   Lab 22  0639 22  0338 22  0010 22   PH 7.33* 7.32* 7.36 7.39   PCO2 42 44 42 38   PO2 107* 125* 166* 171*   HCO3      Complete Blood Count   Recent Labs   Lab 22  0639 22  0337 22  0008 22   WBC 12.0* 15.2*  --  16.5* 15.4*   HGB 11.1* 7.0* 8.7* 9.1* 10.3*   * 171  --  228 207     Basic Metabolic Panel  Recent Labs   Lab 22  1006 22  0814 22  0639 22  0638 22  0358 22  1824 22   NA  --   --  137  --   --  137  --  138  --  138   POTASSIUM  --   --  5.8*  --   --  3.7  --  4.0  --  3.9   CHLORIDE  --   --  108*  --   --  106  --  106  --  106   CO2  --   --  20*  --   --  19*  --  20*  --  20*   BUN  --   --  16.0  --   --  15.3  --  14.0  --  12.2   CR  --   --  1.09*  --   --  1.08*  --  0.83  --  0.68   * 144* 179* 168*   < > 152*   < > 180*   < > 146*    < > = values in this interval not displayed.     Liver Function Tests  Recent Labs   Lab 22  0639 227 22  1630 22  1346   * 141* 95* 69*  --  22   ALT 27 24 24 24  --  13   ALKPHOS 37 34* 40 41  --  76   BILITOTAL 0.5 0.4 0.8 1.0  --  0.4   ALBUMIN 3.2* 3.3* 2.5* 2.8*  --  4.1   INR  --   --   --  1.30* 1.36* 1.00     Pancreatic Enzymes  No lab results found in last 7 days.  Coagulation Profile  Recent Labs   Lab 22  1736 22  1630 22  1346   INR 1.30* 1.36* 1.00   PTT 30 33  --          5. RADIOLOGY:   Recent Results (from the past 24 hour(s))   Echocardiogram Complete   Result Value    LVEF  60-65%    [facility]    [id number]  [id number]  [id number]  [id number]^REI^BRENDAN^NICOLLE     [facility],[place]  Echocardiography Laboratory  [address] [place] [id number]     Name: KALA PEPPER  MRN: [id number]  : 1935  Study  Date: 2022 09:23 AM  Age: 87 yrs  Gender: Female  Patient Location: [facility]  Reason For Study: Severe aortic stenosis  Ordering Physician: BRENDAN MINAYA  Referring Physician: BRENDAN MINAYA  Performed By: Lelo Mckeon     BSA: 1.6 m2  Height: 61 in  Weight: 141 lb  HR: 64  ______________________________________________________________________________  Procedure  Complete Portable Echo Adult.  ______________________________________________________________________________  Interpretation Summary  Intra-procedure TTE for TAVR with Contreras 23 mm valve     Pre-procedure:  Global and regional left ventricular function is normal with an EF of 60-65%.  No pericardial effusion is present.  Severe aortic stenosis.     Post-procedure:  Valve is well seated. Mean gr 3 mmHg, no PVL. Normal appearance of aortic  root.  No change in cardiac function.  Moderate circumferential pericardial effusion noted. Emergent  pericardiocentesis performed with trivial residual fluid.  ______________________________________________________________________________  Left Ventricle  Global and regional left ventricular function is normal with an EF of 60-65%.  Mild concentric wall thickening consistent with left ventricular hypertrophy  is present. Thickening of the anterobasal septum is present.     Right Ventricle  The right ventricle is normal size. Global right ventricular function is  normal.     Mitral Valve  Severe mitral annular calcification is present.     Tricuspid Valve  Mild tricuspid insufficiency is present. The right ventricular systolic  pressure is approximated at 27.9 mmHg plus the right atrial pressure.     Pericardium  No pericardial effusion is present.  ______________________________________________________________________________  MMode/2D Measurements & Calculations  LVOT diam: 2.0 cm  LVOT area: 3.1 cm2     Doppler Measurements & Calculations  MV max P.1 mmHg  MV mean PG: 3.0 mmHg  MV V2 VTI: 43.3  cm  MVA(VTI): 1.7 cm2  Ao V2 max: 125.0 cm/sec  Ao max P.3 mmHg  Ao V2 mean: 87.0 cm/sec  Ao mean PG: 3.0 mmHg  Ao V2 VTI: 29.6 cm  ROBIN(I,D): 2.5 cm2  ROBIN(V,D): 2.5 cm2  LV V1 max PG: 3.9 mmHg  LV V1 max: 98.5 cm/sec  LV V1 VTI: 23.5 cm  SV(LVOT): 73.8 ml  SI(LVOT): 45.3 ml/m2  TR max domo: 264.0 cm/sec  TR max P.9 mmHg  AV Domo Ratio (DI): 0.79  ROBIN Index (cm2/m2): 1.5     ______________________________________________________________________________  Report approved by: Teresa Walsh 2022 01:19 PM         XR Chest Port 1 View    Narrative    Chest x-ray one view  2022 3:13 PM      HISTORY: Emergency case portable chest in OR to check for anything  retained    COMPARISON: None    TECHNIQUE: Portable supine AP    FINDINGS:   TAVR noted. 2 surgical sponges in the surgical field. Transesophageal  echo probe. ET tube projects 2 cm above the [place]. Temporary pacer  wires noted. Trachea is midline. Cardiomediastinal silhouette is  within normal limits. No appreciable pleural effusion or pneumothorax.  No focal airspace opacities. Visualized upper abdomen is unremarkable.  No acute osseous abnormality.        Impression    IMPRESSION:   2 surgical sponges are seen in the surgical field. No metallic foreign  bodies noted. Findings were communicated to the OR nurse who expressed  understanding.    I have personally reviewed the examination and initial interpretation  and I agree with the findings.    KHLOE STOUT MD         SYSTEM ID:  [id number]   XR Abdomen Port 1 View    Narrative    EXAM: XR ABDOMEN PORT 1 VIEW, 2022 9:58 PM    INDICATION: Og placement    COMPARISON: CT 10/12/2022    FINDINGS:  Portable supine view of the abdomen. Gastric tube tip and sidehole  project over the stomach. Nonobstructive bowel gas pattern. No  significant stool burden. No pneumatosis. Multiple partially  visualized drains overlying the abdomen. Right femoral approach  central venous catheter tip  projects over the expected location of the  proximal external iliac vein. Femoral arterial sheath tip at L3.       Impression    IMPRESSION:   Gastric tube tip and sidehole project over the stomach. Nonobstructive  bowel gas pattern.    I have personally reviewed the examination and initial interpretation  and I agree with the findings.    MOHSEN NUÑEZ MD         SYSTEM ID:  [id number]   XR Chest Port 1 View    Narrative    Exam: XR CHEST PORT 1 VIEW, 11/16/2022 6:18 PM    Indication: Post Op CVTS Surgery    Comparison: Same day chest radiograph    Findings:   Single portable AP view of the chest. Endotracheal tube in the  midthoracic trachea. Partial visualized enteric tube. Mediastinal  drains. Sternotomy wires. TAVR. Trachea is midline. No pneumothorax or  definite pleural effusion. Bilateral perihilar and basilar hazy  opacities. Stable cardiac and mediastinal silhouette.      Impression    Impression:   1. Postoperative changes of midline sternotomy. Interval placement of  enteric tube. Otherwise no substantial interval changes in support  devices.  2. Bilateral perihilar and basilar hazy opacities of likely pulmonary  edema and/or atelectasis perhaps mildly improved from prior.    I have personally reviewed the examination and initial interpretation  and I agree with the findings.    MOHSEN NUÑEZ MD         SYSTEM ID:  [id number]   XR Chest Port 1 View    Narrative    EXAM: XR CHEST PORT 1 VIEW  11/17/2022 12:33 AM      HISTORY: Post Op CVTS Surgery    COMPARISON: Chest x-ray 11/16/2022    FINDINGS: Portable supine AP radiograph of the chest. Postsurgical  changes of the chest with intact median sternotomy wires. Aortic valve  replacement. The sidehole of the NG/OG tube projects over the stomach  and the tip extends below the field of view. Two mediastinal drains.  The tip of the endotracheal tube projects over the midthoracic  trachea.    The trachea is midline. The cardiac silhouette is not  enlarged.  Atherosclerotic calcification of the aortic arch. No pleural effusion  or pneumothorax. Mild retrocardiac opacity. The visualized upper  abdomen is unremarkable.       Impression    IMPRESSION:   1. Postsurgical changes of aortic valve replacement with support  devices as described above.  2. Mild retrocardiac capacity, likely postoperative atelectasis.    I have personally reviewed the examination and initial interpretation  and I agree with the findings.    MOHSEN NUÑEZ MD         SYSTEM ID:  [id number]   Echo Limited    Narrative    [id number]  [id number]  [id number]  [id number]^MARYAM^ERIKA     [facility],[place]  Echocardiography Laboratory  [address] [place] [id number]     Name: KALA PEPPER  MRN: [id number]  : 1935  Study Date: 2022 08:47 AM  Age: 87 yrs  Gender: Female  Patient Location: [facility]  Reason For Study: Assess for tamponade  History: s/p TAVR 2022 c/b perforation  Ordering Physician: ERIKA FRANCISCO  Performed By: MEET Vaca     BSA: 1.7 m2  Height: 61 in  Weight: 161 lb  BP: 105/52 mmHg  ______________________________________________________________________________  Procedure  Limited Portable Echo Adult.  ______________________________________________________________________________  Interpretation Summary  Extremely limited imaging windows due to open heart surgery yesterday,  multiple bandages.     Biventricular function appears to be preserved. No valvular assessment  possible. Trivial posterior pericardial effusion  IVC diameter and respiratory changes fall into an intermediate range  suggesting an RA pressure of 8 mmHg.  ______________________________________________________________________________  Vessels  IVC diameter and respiratory changes fall into an intermediate range  suggesting an RA pressure of 8 mmHg.     Pericardium  Trivial posterior pericardial effusion.      ______________________________________________________________________________  Report approved by: Teresa KIRKLAND 11/17/2022 09:44 AM     ______________________________________________________________________________          =========================================

## 2022-11-17 NOTE — ANESTHESIA POSTPROCEDURE EVALUATION
Patient: Jade Walker    Procedure: Procedure(s):  Transfemoral (Contreras) Transcatheter Aortic Valve Replacement  Median Sternotomy, repair of aortic VALVE,  cardiopulmonary bypass PUMP, INTRAOPERATIVE TRANSESOPHAGEAL ECHOCARDIOGRAM PER ANESTHESIA       Anesthesia Type:  MAC, General    Note:  Disposition: Admission; ICU            ICU Sign Out: Anesthesiologist/ICU physician sign out WAS performed   Postop Pain Control: Uneventful            Sign Out: Well controlled pain   PONV: No   Neuro/Psych: Uneventful            Sign Out: PLANNED postop sedation   Airway/Respiratory: Uneventful            Sign Out: AIRWAY IN SITU/Resp. Support               Airway in situ/Resp. Support: ETT                 Reason: Unplanned   CV/Hemodynamics: Uneventful            Sign Out: Acceptable CV status; No obvious hypovolemia; No obvious fluid overload   Other NRE: NONE   DID A NON-ROUTINE EVENT OCCUR? No    Event details/Postop Comments:  Patient transported from OR to ICU w/ O2, ambu, infusions, emergency medications and emergency airway equipment. VSS throughout transport and handoff.           Last vitals:  Vitals:    11/16/22 1815 11/16/22 1830 11/16/22 1845   BP:      Pulse: 87 82 81   Resp: 16 16 16   Temp: 36.7  C (98.1  F)     SpO2: 100% 100% 100%       Electronically Signed By: Jesus Kwan MD  November 16, 2022  6:58 PM

## 2022-11-17 NOTE — PLAN OF CARE
Major Shift Events:     Neuro: When sedation weaned about to open eyes, HENLEY, and follow commands. Sedated with propofol and fentanyl overnight.     Card: NSR 70-80s with increased ectopy when epinephrine >0.04mcg/kg/min. Increased pressor need overnight. Norepi max of 0.22mcg/kg/min, now 0.14mcg/kg/min; Epi started at titrated up to max 0.1mcg/kg/min, now off; vaso started and currently at 2.4units/hr. Total 1.5L LR, 500mL albumin and 2PRBCs overnight. Hemoglobin down to 7 with AM labs, no obvious signs of bleeds. Cards completed bedside echo without any noted pericardial effusion/tamponade or bleeding. Minimal 10-20mL/hr chest tube output.     Resp: Patient began shift alkalotic with vent changes to CMV 30%/12RR/300TV/5PEEP with acid/base imbalance resolved.     GI/: Oliguric with increasing creatinine. OG to LIS. Insulin gtt titrated per MAR.    Plan: Closely monitor for s/s of bleeding. Wean pressors as able. Closely monitor renal function.     For vital signs and complete assessments, please see documentation flowsheets.

## 2022-11-17 NOTE — PLAN OF CARE
ICU End of Shift Summary. See flowsheets for vital signs and detailed assessment.  Care provided [id number]  Changes this shift: Weaned pressors as able, decreased sedation as tolerated. Albumin given for increased pressure needs. Hgb 7.0 at 0400, orders to transfuse 2 units.    Plan: Recheck labs after transfusions

## 2022-11-17 NOTE — PROGRESS NOTES
CLINICAL NUTRITION SERVICES - BRIEF NOTE    Received provider consult for nutrition education with comments post op cardiovascular surgery (automatic consult on post-op order set). S/p TAVR on 11/16. Nutrition education not indicated.    RD will follow per LOS protocol or if re-consulted.     Tania Hutchinson MS, RD, LD  4E (CVICU) RD pager: [phone]  Ascom: [id number]  Weekend/Holiday RD pager: [phone]

## 2022-11-17 NOTE — PROCEDURES
[facility]    Triple Lumen PICC Placement    Date/Time: 11/17/2022 12:10 PM  Performed by: Lindy Alonso RN  Authorized by: Dallin Bone MD   Indications: vascular access      UNIVERSAL PROTOCOL   Site Marked: Yes  Prior Images Obtained and Reviewed:  Yes  Required items: Required blood products, implants, devices and special equipment available    Patient identity confirmed:  Arm band and hospital-assigned identification number  NA - No sedation, light sedation, or local anesthesia  Confirmation Checklist:  Patient's identity using two indicators  Time out: Immediately prior to the procedure a time out was called    Universal Protocol: the Joint Commission Universal Protocol was followed    Preparation: Patient was prepped and draped in usual sterile fashion       ANESTHESIA    Anesthesia: Local infiltration  Local Anesthetic:  Lidocaine 1% without epinephrine  Anesthetic Total (mL):  5      SEDATION    Patient Sedated: No        Preparation: skin prepped with ChloraPrep  Skin prep agent: skin prep agent completely dried prior to procedure  Sterile barriers: maximum sterile barriers were used: cap, mask, sterile gown, sterile gloves, and large sterile sheet  Hand hygiene: hand hygiene performed prior to central venous catheter insertion  Type of line used: PICC  Catheter type: triple lumen  Lumen type: power PICC and non-valved  Lumen Identification: Gray, Red and White  Catheter size: 5 Fr  Brand: Bard  Lot number: [id number]  Placement method: venipuncture, MST, ultrasound and tip navigation system  Number of attempts: 1  Difficulty threading catheter: no  Successful placement: yes  Orientation: right    Location: basilic vein (vd 0.465 cm)  Tip Location: SVC  Arm circumference: adults 10 cm  Extremity circumference: 30  Visible catheter length: 2  Total catheter length: 39  Dressing and securement: adhesive securement device, alcohol  impregnated caps, blood cleaned with CHG, chlorhexidine disc applied, line secured, dressing applied, glue, securement device, site cleansed, sterile dressing applied, subcutaneous anchor securement system, transparent securement dressing, transparent dressing and tissue adhesive  Post procedure assessment: free fluid flow, placement verified by 3CG technology and blood return through all ports  PROCEDURE Describe Procedure: PICC ok to use

## 2022-11-17 NOTE — PLAN OF CARE
Major Shift Events:  Arrived at  around 1700. Initally had labile blood pressure. Given 500mL LR bolus and was able to achieve more steady MAPs. On norepinephrine 0.03 mcg/kg/min. Sedated with Propofol. Was able to follow commands and move all four extremities during sedation interruption. Chest tube output WNL in amount. OG inserted - waiting for abdominal xray reading from MD. Alkalotic ABG - waiting for MD to order vent settings.  -Pending orders from CVICU MD: Norepinephrine gtt. Vent setting change.   -2 RN Skin Assessment done by Jorge LOTT RN and Varghese YU RN.  Plan: Notify Dr. Bone for signs of bleeding. Wean vent, sedation, and pressor.  For vital signs and complete assessments, please see documentation flowsheets.

## 2022-11-18 ENCOUNTER — APPOINTMENT (OUTPATIENT)
Dept: GENERAL RADIOLOGY | Facility: CLINIC | Age: 87
DRG: 266 | End: 2022-11-18
Attending: INTERNAL MEDICINE
Payer: COMMERCIAL

## 2022-11-18 ENCOUNTER — APPOINTMENT (OUTPATIENT)
Dept: GENERAL RADIOLOGY | Facility: CLINIC | Age: 87
DRG: 266 | End: 2022-11-18
Attending: THORACIC SURGERY (CARDIOTHORACIC VASCULAR SURGERY)
Payer: COMMERCIAL

## 2022-11-18 LAB
ALBUMIN SERPL BCG-MCNC: 2.8 G/DL (ref 3.5–5.2)
ALP SERPL-CCNC: 44 U/L (ref 35–104)
ALT SERPL W P-5'-P-CCNC: 20 U/L (ref 10–35)
ANION GAP SERPL CALCULATED.3IONS-SCNC: 8 MMOL/L (ref 7–15)
APTT PPP: 28 SECONDS (ref 22–38)
AST SERPL W P-5'-P-CCNC: 146 U/L (ref 10–35)
BASE EXCESS BLDA CALC-SCNC: 0 MMOL/L (ref -9–1.8)
BASE EXCESS BLDA CALC-SCNC: 3.4 MMOL/L (ref -9–1.8)
BASE EXCESS BLDV CALC-SCNC: 0.4 MMOL/L (ref -7.7–1.9)
BASE EXCESS BLDV CALC-SCNC: 1.6 MMOL/L (ref -7.7–1.9)
BASOPHILS # BLD AUTO: 0 10E3/UL (ref 0–0.2)
BASOPHILS NFR BLD AUTO: 0 %
BILIRUB SERPL-MCNC: 0.3 MG/DL
BUN SERPL-MCNC: 16.9 MG/DL (ref 8–23)
CA-I BLD-MCNC: 5 MG/DL (ref 4.4–5.2)
CALCIUM SERPL-MCNC: 8.4 MG/DL (ref 8.8–10.2)
CHLORIDE SERPL-SCNC: 105 MMOL/L (ref 98–107)
CREAT SERPL-MCNC: 0.92 MG/DL (ref 0.51–0.95)
DEPRECATED HCO3 PLAS-SCNC: 22 MMOL/L (ref 22–29)
EOSINOPHIL # BLD AUTO: 0 10E3/UL (ref 0–0.7)
EOSINOPHIL NFR BLD AUTO: 0 %
ERYTHROCYTE [DISTWIDTH] IN BLOOD BY AUTOMATED COUNT: 15.7 % (ref 10–15)
GFR SERPL CREATININE-BSD FRML MDRD: 61 ML/MIN/1.73M2
GLUCOSE BLDC GLUCOMTR-MCNC: 103 MG/DL (ref 70–99)
GLUCOSE BLDC GLUCOMTR-MCNC: 104 MG/DL (ref 70–99)
GLUCOSE BLDC GLUCOMTR-MCNC: 112 MG/DL (ref 70–99)
GLUCOSE BLDC GLUCOMTR-MCNC: 114 MG/DL (ref 70–99)
GLUCOSE BLDC GLUCOMTR-MCNC: 122 MG/DL (ref 70–99)
GLUCOSE BLDC GLUCOMTR-MCNC: 129 MG/DL (ref 70–99)
GLUCOSE BLDC GLUCOMTR-MCNC: 131 MG/DL (ref 70–99)
GLUCOSE BLDC GLUCOMTR-MCNC: 89 MG/DL (ref 70–99)
GLUCOSE SERPL-MCNC: 123 MG/DL (ref 70–99)
HCO3 BLD-SCNC: 25 MMOL/L (ref 21–28)
HCO3 BLD-SCNC: 27 MMOL/L (ref 21–28)
HCO3 BLDV-SCNC: 26 MMOL/L (ref 21–28)
HCO3 BLDV-SCNC: 27 MMOL/L (ref 21–28)
HCT VFR BLD AUTO: 28.4 % (ref 35–47)
HGB BLD-MCNC: 9.8 G/DL (ref 11.7–15.7)
HOLD SPECIMEN HIT: NORMAL
IMM GRANULOCYTES # BLD: 0.1 10E3/UL
IMM GRANULOCYTES NFR BLD: 0 %
INR PPP: 1.24 (ref 0.85–1.15)
LACTATE SERPL-SCNC: 1 MMOL/L (ref 0.7–2)
LACTATE SERPL-SCNC: 1.4 MMOL/L (ref 0.7–2)
LYMPHOCYTES # BLD AUTO: 1.3 10E3/UL (ref 0.8–5.3)
LYMPHOCYTES NFR BLD AUTO: 10 %
MAGNESIUM SERPL-MCNC: 1.9 MG/DL (ref 1.7–2.3)
MCH RBC QN AUTO: 31 PG (ref 26.5–33)
MCHC RBC AUTO-ENTMCNC: 34.5 G/DL (ref 31.5–36.5)
MCV RBC AUTO: 90 FL (ref 78–100)
MONOCYTES # BLD AUTO: 1.5 10E3/UL (ref 0–1.3)
MONOCYTES NFR BLD AUTO: 12 %
NEUTROPHILS # BLD AUTO: 9.7 10E3/UL (ref 1.6–8.3)
NEUTROPHILS NFR BLD AUTO: 78 %
NRBC # BLD AUTO: 0.1 10E3/UL
NRBC BLD AUTO-RTO: 1 /100
O2/TOTAL GAS SETTING VFR VENT: 30 %
OXYHGB MFR BLD: 98 % (ref 92–100)
OXYHGB MFR BLD: 98 % (ref 92–100)
OXYHGB MFR BLDV: 65 % (ref 70–75)
OXYHGB MFR BLDV: 70 % (ref 70–75)
PCO2 BLD: 36 MM HG (ref 35–45)
PCO2 BLD: 44 MM HG (ref 35–45)
PCO2 BLDV: 46 MM HG (ref 40–50)
PCO2 BLDV: 47 MM HG (ref 40–50)
PF4 HEPARIN CMPLX AB SER QL: NEGATIVE
PH BLD: 7.37 [PH] (ref 7.35–7.45)
PH BLD: 7.48 [PH] (ref 7.35–7.45)
PH BLDV: 7.35 [PH] (ref 7.32–7.43)
PH BLDV: 7.38 [PH] (ref 7.32–7.43)
PHOSPHATE SERPL-MCNC: 3.3 MG/DL (ref 2.5–4.5)
PLATELET # BLD AUTO: 98 10E3/UL (ref 150–450)
PO2 BLD: 103 MM HG (ref 80–105)
PO2 BLD: 107 MM HG (ref 80–105)
PO2 BLDV: 36 MM HG (ref 25–47)
PO2 BLDV: 40 MM HG (ref 25–47)
POTASSIUM BLD-SCNC: 4.2 MMOL/L (ref 3.4–5.3)
POTASSIUM SERPL-SCNC: 4.3 MMOL/L (ref 3.4–5.3)
PROT SERPL-MCNC: 4.3 G/DL (ref 6.4–8.3)
RBC # BLD AUTO: 3.16 10E6/UL (ref 3.8–5.2)
SODIUM SERPL-SCNC: 135 MMOL/L (ref 136–145)
WBC # BLD AUTO: 12.6 10E3/UL (ref 4–11)

## 2022-11-18 PROCEDURE — 74018 RADEX ABDOMEN 1 VIEW: CPT | Mod: 26 | Performed by: STUDENT IN AN ORGANIZED HEALTH CARE EDUCATION/TRAINING PROGRAM

## 2022-11-18 PROCEDURE — 250N000011 HC RX IP 250 OP 636: Performed by: SURGERY

## 2022-11-18 PROCEDURE — 93005 ELECTROCARDIOGRAM TRACING: CPT

## 2022-11-18 PROCEDURE — 93010 ELECTROCARDIOGRAM REPORT: CPT | Performed by: INTERNAL MEDICINE

## 2022-11-18 PROCEDURE — 84100 ASSAY OF PHOSPHORUS: CPT | Performed by: SURGERY

## 2022-11-18 PROCEDURE — 85730 THROMBOPLASTIN TIME PARTIAL: CPT | Performed by: SURGERY

## 2022-11-18 PROCEDURE — 94003 VENT MGMT INPAT SUBQ DAY: CPT

## 2022-11-18 PROCEDURE — 250N000013 HC RX MED GY IP 250 OP 250 PS 637: Performed by: STUDENT IN AN ORGANIZED HEALTH CARE EDUCATION/TRAINING PROGRAM

## 2022-11-18 PROCEDURE — 258N000003 HC RX IP 258 OP 636: Performed by: SURGERY

## 2022-11-18 PROCEDURE — 250N000011 HC RX IP 250 OP 636: Performed by: THORACIC SURGERY (CARDIOTHORACIC VASCULAR SURGERY)

## 2022-11-18 PROCEDURE — 83735 ASSAY OF MAGNESIUM: CPT | Performed by: SURGERY

## 2022-11-18 PROCEDURE — 83605 ASSAY OF LACTIC ACID: CPT | Performed by: SURGERY

## 2022-11-18 PROCEDURE — 84132 ASSAY OF SERUM POTASSIUM: CPT | Performed by: SURGERY

## 2022-11-18 PROCEDURE — 250N000013 HC RX MED GY IP 250 OP 250 PS 637: Performed by: THORACIC SURGERY (CARDIOTHORACIC VASCULAR SURGERY)

## 2022-11-18 PROCEDURE — 258N000003 HC RX IP 258 OP 636: Performed by: THORACIC SURGERY (CARDIOTHORACIC VASCULAR SURGERY)

## 2022-11-18 PROCEDURE — 999N000065 XR ABDOMEN PORT 1 VIEW

## 2022-11-18 PROCEDURE — 85610 PROTHROMBIN TIME: CPT | Performed by: SURGERY

## 2022-11-18 PROCEDURE — 44500 INTRO GASTROINTESTINAL TUBE: CPT

## 2022-11-18 PROCEDURE — 82805 BLOOD GASES W/O2 SATURATION: CPT

## 2022-11-18 PROCEDURE — 86022 PLATELET ANTIBODIES: CPT | Performed by: STUDENT IN AN ORGANIZED HEALTH CARE EDUCATION/TRAINING PROGRAM

## 2022-11-18 PROCEDURE — 82805 BLOOD GASES W/O2 SATURATION: CPT | Performed by: ANESTHESIOLOGY

## 2022-11-18 PROCEDURE — 71045 X-RAY EXAM CHEST 1 VIEW: CPT

## 2022-11-18 PROCEDURE — 999N000155 HC STATISTIC RAPCV CVP MONITORING

## 2022-11-18 PROCEDURE — 999N000185 HC STATISTIC TRANSPORT TIME EA 15 MIN

## 2022-11-18 PROCEDURE — 250N000011 HC RX IP 250 OP 636

## 2022-11-18 PROCEDURE — 80053 COMPREHEN METABOLIC PANEL: CPT | Performed by: SURGERY

## 2022-11-18 PROCEDURE — 999N000015 HC STATISTIC ARTERIAL MONITORING DAILY

## 2022-11-18 PROCEDURE — 250N000009 HC RX 250: Performed by: STUDENT IN AN ORGANIZED HEALTH CARE EDUCATION/TRAINING PROGRAM

## 2022-11-18 PROCEDURE — 85025 COMPLETE CBC W/AUTO DIFF WBC: CPT | Performed by: SURGERY

## 2022-11-18 PROCEDURE — 250N000013 HC RX MED GY IP 250 OP 250 PS 637

## 2022-11-18 PROCEDURE — 999N000157 HC STATISTIC RCP TIME EA 10 MIN

## 2022-11-18 PROCEDURE — 71045 X-RAY EXAM CHEST 1 VIEW: CPT | Mod: 26 | Performed by: RADIOLOGY

## 2022-11-18 PROCEDURE — 99291 CRITICAL CARE FIRST HOUR: CPT | Mod: 24 | Performed by: ANESTHESIOLOGY

## 2022-11-18 PROCEDURE — 200N000002 HC R&B ICU UMMC

## 2022-11-18 PROCEDURE — 82330 ASSAY OF CALCIUM: CPT

## 2022-11-18 PROCEDURE — 250N000009 HC RX 250

## 2022-11-18 RX ORDER — DEXMEDETOMIDINE HYDROCHLORIDE 4 UG/ML
.1-.7 INJECTION, SOLUTION INTRAVENOUS CONTINUOUS
Status: DISCONTINUED | OUTPATIENT
Start: 2022-11-18 | End: 2022-11-19

## 2022-11-18 RX ORDER — LIDOCAINE HYDROCHLORIDE 20 MG/ML
5 SOLUTION OROPHARYNGEAL ONCE
Status: COMPLETED | OUTPATIENT
Start: 2022-11-18 | End: 2022-11-18

## 2022-11-18 RX ORDER — PROCHLORPERAZINE MALEATE 5 MG
5 TABLET ORAL EVERY 6 HOURS PRN
Status: DISCONTINUED | OUTPATIENT
Start: 2022-11-18 | End: 2022-12-01

## 2022-11-18 RX ORDER — ACETAMINOPHEN 325 MG/1
650 TABLET ORAL EVERY 4 HOURS PRN
Status: DISCONTINUED | OUTPATIENT
Start: 2022-11-19 | End: 2022-12-01 | Stop reason: HOSPADM

## 2022-11-18 RX ORDER — OXYCODONE HYDROCHLORIDE 5 MG/1
5 TABLET ORAL EVERY 4 HOURS PRN
Status: DISCONTINUED | OUTPATIENT
Start: 2022-11-18 | End: 2022-12-01 | Stop reason: HOSPADM

## 2022-11-18 RX ORDER — GUAIFENESIN 600 MG/1
15 TABLET, EXTENDED RELEASE ORAL DAILY
Status: DISCONTINUED | OUTPATIENT
Start: 2022-11-18 | End: 2022-11-26

## 2022-11-18 RX ORDER — DEXTROSE MONOHYDRATE 100 MG/ML
INJECTION, SOLUTION INTRAVENOUS CONTINUOUS PRN
Status: DISCONTINUED | OUTPATIENT
Start: 2022-11-18 | End: 2022-11-27 | Stop reason: CLARIF

## 2022-11-18 RX ORDER — POLYETHYLENE GLYCOL 3350 17 G/17G
17 POWDER, FOR SOLUTION ORAL DAILY
Status: DISCONTINUED | OUTPATIENT
Start: 2022-11-19 | End: 2022-11-20

## 2022-11-18 RX ORDER — AMOXICILLIN 250 MG
1 CAPSULE ORAL 2 TIMES DAILY
Status: DISCONTINUED | OUTPATIENT
Start: 2022-11-18 | End: 2022-11-20

## 2022-11-18 RX ORDER — FUROSEMIDE 10 MG/ML
40 INJECTION INTRAMUSCULAR; INTRAVENOUS ONCE
Status: COMPLETED | OUTPATIENT
Start: 2022-11-18 | End: 2022-11-18

## 2022-11-18 RX ORDER — AMIODARONE HYDROCHLORIDE 200 MG/1
200 TABLET ORAL 2 TIMES DAILY
Status: DISCONTINUED | OUTPATIENT
Start: 2022-11-18 | End: 2022-11-19

## 2022-11-18 RX ADMIN — PROPOFOL 5 MCG/KG/MIN: 10 INJECTION, EMULSION INTRAVENOUS at 21:43

## 2022-11-18 RX ADMIN — LIDOCAINE HYDROCHLORIDE 5 ML: 20 SOLUTION ORAL at 14:03

## 2022-11-18 RX ADMIN — DEXMEDETOMIDINE HYDROCHLORIDE 0.3 MCG/KG/HR: 400 INJECTION INTRAVENOUS at 16:01

## 2022-11-18 RX ADMIN — Medication 1 PACKET: at 16:59

## 2022-11-18 RX ADMIN — SODIUM CHLORIDE, POTASSIUM CHLORIDE, SODIUM LACTATE AND CALCIUM CHLORIDE 500 ML: 600; 310; 30; 20 INJECTION, SOLUTION INTRAVENOUS at 04:58

## 2022-11-18 RX ADMIN — SENNOSIDES AND DOCUSATE SODIUM 1 TABLET: 50; 8.6 TABLET ORAL at 07:19

## 2022-11-18 RX ADMIN — MUPIROCIN 0.5 G: 20 OINTMENT TOPICAL at 20:29

## 2022-11-18 RX ADMIN — HEPARIN SODIUM 5000 UNITS: 5000 INJECTION, SOLUTION INTRAVENOUS; SUBCUTANEOUS at 19:45

## 2022-11-18 RX ADMIN — DEXMEDETOMIDINE HYDROCHLORIDE 0.2 MCG/KG/HR: 400 INJECTION INTRAVENOUS at 15:26

## 2022-11-18 RX ADMIN — ACETAMINOPHEN 975 MG: 325 TABLET, FILM COATED ORAL at 02:40

## 2022-11-18 RX ADMIN — Medication 2 UNITS/HR: at 21:28

## 2022-11-18 RX ADMIN — SENNOSIDES AND DOCUSATE SODIUM 1 TABLET: 8.6; 5 TABLET ORAL at 19:45

## 2022-11-18 RX ADMIN — FUROSEMIDE 40 MG: 10 INJECTION, SOLUTION INTRAVENOUS at 18:37

## 2022-11-18 RX ADMIN — Medication 15 ML: at 17:10

## 2022-11-18 RX ADMIN — AMIODARONE HYDROCHLORIDE 200 MG: 200 TABLET ORAL at 19:45

## 2022-11-18 RX ADMIN — Medication 75 MCG/HR: at 06:49

## 2022-11-18 RX ADMIN — AMIODARONE HYDROCHLORIDE 200 MG: 200 TABLET ORAL at 12:03

## 2022-11-18 RX ADMIN — PROPOFOL 20 MCG/KG/MIN: 10 INJECTION, EMULSION INTRAVENOUS at 12:03

## 2022-11-18 RX ADMIN — ACETAMINOPHEN 975 MG: 325 TABLET, FILM COATED ORAL at 17:10

## 2022-11-18 RX ADMIN — ACETAMINOPHEN 975 MG: 325 TABLET, FILM COATED ORAL at 12:03

## 2022-11-18 RX ADMIN — POLYETHYLENE GLYCOL 3350 17 G: 17 POWDER, FOR SOLUTION ORAL at 07:19

## 2022-11-18 RX ADMIN — MUPIROCIN 0.5 G: 20 OINTMENT TOPICAL at 07:19

## 2022-11-18 RX ADMIN — Medication 40 MG: at 07:19

## 2022-11-18 ASSESSMENT — ACTIVITIES OF DAILY LIVING (ADL)
ADLS_ACUITY_SCORE: 30
DEPENDENT_IADLS:: INDEPENDENT
ADLS_ACUITY_SCORE: 30

## 2022-11-18 NOTE — PLAN OF CARE
Major Shift Events:   Sedation vacation trial - able to follow commands & move all extremities.   KULDIP today to evaluate for bleeding - no concerning findings. Able to slightly decrease pressor support. R/p'd 4L LR per providers. R side turns not tolerated - MAPs <65, SBP <90. Team aware.   UOP 10-25 ml/hr. CT OP ~10-30 ml/hr sang.   PICC insertion RUE. CVP hooked up - 14.   Family updated via phone by RN & medical team.   Plan: Continue to monitor. Update as needed. Wean pressors as able.   For vital signs and complete assessments, please see documentation flowsheets.

## 2022-11-18 NOTE — PLAN OF CARE
Major Shift Events:  Labile BP overnight, though tolerating turns to R side better. Given 500ml LR bolus for downtrending CVP and low UOP. CVP after bolus 13-14. Repeat 500ml LR bolus at 5AM for CVP 9 and UOP 20-25.  Follows commands and HENLEY on prop/fentanyl, though became restless and attempted to sit up and bending LLE when sedation lightened. Pupils equal 3mm.   CT x2 10ml/hr. Stable vent settings CMV 12/300/30/5. Scant ETT secretions. Clear/dim.  OG with 350ml output overnight.   LDA L fem arterial sheath- transduced.   L femoral MAC- also has RUE PICC, unable to convert pressors to PICC d/t pressor needs.   PIV x2.   Lytes stable this AM. Hgb 9.8.     Plan: Wean pressors as able.   For vital signs and complete assessments, please see documentation flowsheets.       Goal Outcome Evaluation:      Plan of Care Reviewed With: patient    Overall Patient Progress: improvingOverall Patient Progress: improving    Outcome Evaluation: Remains on levo/vaso/epi overnight. Follows commands and HENLEY.

## 2022-11-18 NOTE — PROGRESS NOTES
CV ICU PROGRESS NOTE  November 17, 2022      CO-MORBIDITIES:   Severe aortic stenosis  (primary encounter diagnosis)    ASSESSMENT: Jade Walker is a 87 year old female with PMHx of severe aortic stenosis HTN, HLD, non-obstructive CAD, CKD stage III, IBS s/p TAVR by c/b LV perforation surgically repaired by Dr. Pulido 11/16. Intraop got 600 cellsaver, 2 plts, 1 ffp, 4 prbcs, and 1500 kcentra. Valve well seated.     Last 24 hours:  Switch Epi over to levo, wean as tolerated  TTE trivial posterior pericardial effusion  KULDIP small posterior pericardial effusion  Holding ASA, SQ  Keep NPO  PICC placement  Goal net positive    Today's Progress:  Goal net even  PPFT placement  PST in PM  [place] placement    PLAN:  Neuro/ pain/ sedation:  #Acute postoperative pain  #Sedation  - Monitor neurological status. Notify the MD for any acute changes in exam.  - Pain: fentanyl gtt. Scheduled tylenol. PRN tylenol, oxycodone, dilaudid.  - Sedation: propofol gtt, precedex gtt     Pulmonary care:   - mechanical ventilation 12/300/5/30%  - Goal SpO2 > 92%  - Incentive spirometer Q15-30 minutes when awake.  - PST in PM     Cardiovascular:    # Severe aortic stenosis s/p TAVR  # Pericardial effusion s/p pericardial drain  # Hypertension  # Hyperlipidemia  # Non obstructive CAD  - Epi, NE, vaso gtts for inotropic and pressor support, wean favoring NE  - Monitor hemodynamic status.   - hold ASA  - TTE, then KULDIP for possible pericardial effusion  - amiodarone 200 bid d/t Afib    GI care:   # Irritable Bowel Syndrome     - NPO except ice chips and medications, consider trickle feeds tonight  - Nutrition consulted, appreciate recommendations  - NGT placement  - PPI for bowel prophylaxis  - Bowel regimen: MiraLAX, senna    Renal/ Fluid Balance:    # Chronic Kidney Disease Stage 3  - ICU electrolyte replacement protocol   - Baseline Cr 0.9  - Strict I&Os  - monitor UOP  - Goal Net Even  - Lasix 40    Endocrine:    #Stress  hyperglycemia  - insulin gtt     ID/ Antibiotics:  #Postoperative prophylactic antibiotics  - Completing perioperative course of antibiotics: ancef  - no indication for further antibiotics     Heme:     # Acute blood loss anemia  # Thrombocytopenia  # Pericardial effusion s/p pericardiocentesis  2 pt Hgb drop overnight  - Hgb goal > 7.0  - Hemoglobin stable.  - 2U pRBC (11/17)  - resume SQH  - HIT test negative     Prophylaxis:    - VTE: SCD's, heparin 5000u sq  - Bowel regimen: PPI, MiraLAX, senna     Lines/ tubes/ drains:  - ETT  - L femoral MAC CVC,  - L A line  - 2x set of v wires (not paced)  - 2x med CTs  - Park, NG  - PICC    Disposition:  - CVICU     ICU Checklist  F - Feeding:   A - Analgesia:   S - Sedation:   T - Thromboembolic prophylaxis:      H - Head of bed elevated  U - Ulcer prophylaxis:  G - Glycemic control  S - SBT     B - Bowel regimen  I - Indwelling catheter  D - De-escalation of antibiotics     Patient seen, findings and plan discussed with CVICU staff.    Jesus Irizarry MD on 11/17/2022 at 7:28 AM, PGY2    ====================================    SUBJECTIVE:   Intubated.    OBJECTIVE:   1. VITAL SIGNS:   Temp:  [97.5  F (36.4  C)-100  F (37.8  C)] 98.6  F (37  C)  Pulse:  [67-90] 77  Resp:  [0-44] 12  BP: (82-87)/(45-50) 82/45  MAP:  [60 mmHg-85 mmHg] 74 mmHg  Arterial Line BP: ()/(43-71) 114/52  FiO2 (%):  [30 %] 30 %  SpO2:  [95 %-100 %] 99 %  Vent Mode: CMV/AC  (Continuous Mandatory Ventilation/ Assist Control)  FiO2 (%): 30 %  Resp Rate (Set): 12 breaths/min  Tidal Volume (Set, mL): 300 mL  PEEP (cm H2O): 5 cmH2O  Resp: 12      2. INTAKE/ OUTPUT:   I/O last 3 completed shifts:  In: 4232.19 [I.V.:862.19; NG/GT:370; IV Piggyback:3000]  Out: 1055 [Urine:455; Emesis/NG output:350; Chest Tube:250]    3. PHYSICAL EXAMINATION:   General: NAD  Neuro: A&Ox3  Resp: Breathing non-labored, intubated  CV: RRR  Abdomen: Soft, Non-distended, Non-tender  Incisions: c/d/i  Extremities: warm and well  perfused    4. INVESTIGATIONS:   Arterial Blood Gases   Recent Labs   Lab 11/18/22 0242 11/17/22  1940 11/17/22  1159 11/17/22  0639   PH 7.37 7.38 7.34* 7.33*   PCO2 44 43 43 42   PO2 107* 108* 111* 107*   HCO3 25 25 23 22     Complete Blood Count   Recent Labs   Lab 11/18/22 0241 11/17/22  1940 11/17/22  1200 11/17/22  0639 11/17/22 0337   WBC 12.6*  --  12.4* 12.0* 15.2*   HGB 9.8* 9.7* 10.3* 11.1* 7.0*   PLT 98*  --  115* 131* 171     Basic Metabolic Panel  Recent Labs   Lab 11/18/22 0623 11/18/22 0242 11/18/22 0241 11/18/22  0015 11/17/22 2212 11/17/22  1201 11/17/22  1200 11/17/22  0814 11/17/22 0639 11/17/22  0358 11/17/22 0337   NA  --   --  135*  --   --   --  137  --  137  --  137   POTASSIUM  --  4.2 4.3  --   --   --  4.6  --  5.8*  --  3.7   CHLORIDE  --   --  105  --   --   --  108*  --  108*  --  106   CO2  --   --  22  --   --   --  20*  --  20*  --  19*   BUN  --   --  16.9  --   --   --  16.6  --  16.0  --  15.3   CR  --   --  0.92  --   --   --  1.06*  --  1.09*  --  1.08*   *  --  123* 131* 133*   < > 155*   < > 179*   < > 152*    < > = values in this interval not displayed.     Liver Function Tests  Recent Labs   Lab 11/18/22 0241 11/17/22  1200 11/17/22  0639 11/17/22  0337 11/16/22  2202 11/16/22  1736 11/16/22  1630   *  --  172* 141* 95* 69*  --    ALT 20  --  27 24 24 24  --    ALKPHOS 44  --  37 34* 40 41  --    BILITOTAL 0.3  --  0.5 0.4 0.8 1.0  --    ALBUMIN 2.8*  --  3.2* 3.3* 2.5* 2.8*  --    INR 1.24* 1.35*  --   --   --  1.30* 1.36*     Pancreatic Enzymes  No lab results found in last 7 days.  Coagulation Profile  Recent Labs   Lab 11/18/22  0241 11/17/22  1200 11/16/22  1736 11/16/22  1630   INR 1.24* 1.35* 1.30* 1.36*   PTT  --  29 30 33         5. RADIOLOGY:   Recent Results (from the past 24 hour(s))   Echo Limited    Narrative    [id number]  [id number]  [id number]  [id number]^MARYAM^[facility],[place]  Echocardiography  Laboratory  [address] [place] [address]     Name: KALA PEPPER  MRN: [id number]  : 1935  Study Date: 2022 08:47 AM  Age: 87 yrs  Gender: Female  Patient Location: [facility]  Reason For Study: Assess for tamponade  History: s/p TAVR 2022 c/b perforation  Ordering Physician: ERIKA FRANCISCO  Performed By: MEET Vaca     BSA: 1.7 m2  Height: 61 in  Weight: 161 lb  BP: 105/52 mmHg  ______________________________________________________________________________  Procedure  Limited Portable Echo Adult.  ______________________________________________________________________________  Interpretation Summary  Extremely limited imaging windows due to open heart surgery yesterday,  multiple bandages.     Biventricular function appears to be preserved. No valvular assessment  possible. Trivial posterior pericardial effusion  IVC diameter and respiratory changes fall into an intermediate range  suggesting an RA pressure of 8 mmHg.  ______________________________________________________________________________  Vessels  IVC diameter and respiratory changes fall into an intermediate range  suggesting an RA pressure of 8 mmHg.     Pericardium  Trivial posterior pericardial effusion.     ______________________________________________________________________________  Report approved by: Teresa KIRKLAND 2022 09:44 AM     ______________________________________________________________________________      Echo Limited   Result Value    LVEF  60-65%    Narrative    [id number]  [id number]  [id number]  [id number]^MARYAM^ERIKA     [facility],[place]  Echocardiography Laboratory  [address] [place] [address]     Name: KALA PEPPER  MRN: [id number]  : 1935  Study Date: 2022 02:08 PM  Age: 87 yrs  Gender: Female  Patient Location: [facility]  Reason For Study: eval effusion  Ordering Physician: ERIKA FRANCISCO  Performed By: Melania Hooper     BSA: 1.7  m2  Height: 61 in  Weight: 161 lb  ______________________________________________________________________________  Procedure  Limited Portable Echo Adult.  ______________________________________________________________________________  Interpretation Summary  Small pericardial effusion (<1cm) posterior to the left ventricle. Unchanged  in size from this morning. Unable to adequately visualize the pericardium  anterior to the right ventricle.  ______________________________________________________________________________  Left Ventricle  Global and regional left ventricular function is normal with an EF of 60-65%.     Pericardium  Small pericardial effusion (<1cm) posterior to the left ventricle. Unable to  adequately visualize the pericardium anterior to the right ventricle.     ______________________________________________________________________________  Report approved by: Teresa Garcia 2022 02:44 PM     ______________________________________________________________________________      Echo KULDIP   Result Value    LVEF  60-65%    Narrative    [id number]  [id number]  [id number]  [id number]^RENE^DARLIN     [facility],[place]  Echocardiography Laboratory  [address] [place] [address]     Name: KALA PEPPER  MRN: [id number]  : 1935  Study Date: 2022 03:30 PM  Age: 87 yrs  Gender: Female  Patient Location: [facility]  Reason For Study: Pericardial Effusion  Ordering Physician: DARLIN LÓPEZ  Performed By: Sesar Chowdhury MD     BSA: 1.7 m2  Height: 62 in  Weight: 161 lb  HR: 63  BP: 103/55 mmHg  Attestation  I was present during KULDIP probe placement by the fellow, Sesar Chowdhury. I  personally viewed the imaging and agree with the interpretation and report as  documented by the fellow.  ______________________________________________________________________________  Interpretation Summary  Small posterior and lateral pericardial effusion with no evidence  of  tamponade. There is also small amount of mediastinal fluid, likely hematoma.     The visual ejection fraction is 60-65%.  The left atrial appendage is normal. It is free of spontaneous echo contrast  and thrombus.  There is a 23mm Contreras Danilo bioprostetic valve in place by history. The  valve is well seated with no evidence of perivalvular leak.  This study was compared with the study from 11/17/2022 .  There has been no change.  ______________________________________________________________________________  Procedure  Transesophageal Echocardiogram with color and spectral Doppler performed.  Procedure location Patient Floor. The procedure was performed on Patient  Floor. Informed consent for Transesophegeal echo obtained. KULDIP Probe #63 was  used during the procedure. Sedation, endotracheal intubation, and mechanical  ventilation were initiated prior to the KULDIP and were monitored by the ICU  team. I determined this patient to be an appropriate candidate for the planned  sedation and procedure and have reassessed the patient immediately prior to  sedation and procedure. Total sedation time: 46 minutes of continuous bedside  1:1 monitoring. The Transducer was inserted with some difficulty . The patient  tolerated the procedure well. Complications None. ECG shows normal sinus  rhythm. Good quality two-dimensional was performed and interpreted.     Left Ventricle  Moderate to severe concentric wall thickening consistent with left ventricular  hypertrophy is present. The visual ejection fraction is 60-65%. No regional  wall motion abnormalities are seen.     Right Ventricle  The right ventricle is normal size. Global right ventricular function is  normal.     Atria  The left atrial appendage is normal. It is free of spontaneous echo contrast  and thrombus. The atrial septum is intact as assessed by color Doppler .     Mitral Valve  Mild mitral annular calcification is present. Trace to mild mitral  insufficiency  is present.     Aortic Valve  A bioprosthetic aortic valve is present. Transcutaneous aortic valve  replacement is present. There is a 23mm Contreras Danilo bioprostetic valve in  place by history. The valve is well seated with no evidence of perivalvular  leak.     Tricuspid Valve  The tricuspid valve is normal.     Pulmonic Valve  The pulmonic valve is normal.     Pericardium  Small posterior and lateral pericardial effusion with no evidence of  tamponade. There is also small amount of mediastinal fluid, likely hematoma.  Chamber compression is not present; there is no evidence for tamponade.     Compared to Previous Study  This study was compared with the study from 11/17/2022 . There has been no  change.     ______________________________________________________________________________  Report approved by: Teresa Garcia 11/17/2022 05:07 PM     ______________________________________________________________________________      XR Chest Port 1 View    Narrative    EXAM: XR CHEST PORT 1 VIEW  11/18/2022 1:07 AM      HISTORY: Mediastinal drains s/p TAVR    COMPARISON: Chest x-ray 11/17/2022    FINDINGS: Portable supine AP radiograph of the chest. Postsurgical  changes of aortic valve replacement with intact median sternotomy  wires. The tip of the endotracheal tube projects over the mid thoracic  trachea. Right upper extremity PICC tip projects over the low SVC. The  sidehole of the NG/OG tube projects over the stomach and the tip  extends below the field of view. Stable positioning of two mediastinal  drains.    The trachea is midline. The cardiac silhouette is not enlarged.  Atherosclerotic calcification of the aortic arch. Small left pleural  effusion. No pneumothorax. Slightly increased retrocardiac opacity.  The visualized upper abdomen is unremarkable.       Impression    IMPRESSION:   1. Increased radiographic opacity, likely atelectasis.  2. Small stable left pleural effusion.  3. Stable postsurgical  changes of the chest with two mediastinal  drains in place.    I have personally reviewed the examination and initial interpretation  and I agree with the findings.    MOHSEN NUÑEZ MD         SYSTEM ID:  [id number]       =========================================

## 2022-11-18 NOTE — PROCEDURES
BEDSIDE PROCEDURE - ARTERIAL LINE    Date of Procedure: 11/18/2022     Patient: Jade Walker   MRN: [id number]     Staff: Dr. Dmitri Vargas      SEDATION: Fentanyl gtt, Propofol gtt      ESTIMATED BLOOD LOSS: Minimal       COMPLICATIONS: None.        INDICATIONS FOR PROCEDURE: Hemodynamic monitoring    DESCRIPTION OF PROCEDURE:  The risks, benefits and alternatives were described to the patient s family, and they were willing to proceed; consent was obtained.   A time-out was completed verifying correct patient, procedure, site, positioning, and special equipment if applicable. The patient s left wrist was prepped and draped in sterile fashion. A 20G needle was introduced into the radial artery under ultrasound guidance with appropriate pulsatile blood return. The guidewire was easily introduced into the needle and the needle was removed. The catheter was threaded over the guide wire and the guidewire was removed. It was connected to the transducer and a good waveform was confirmed. The catheter was then sutured in place to the skin and a sterile dressing applied. Dr. Diamond Glover was immediately available if needed.    Jesus Irizarry MD on 11/18/2022 at 5:46 PM, PGY2

## 2022-11-18 NOTE — PROCEDURES
Small Bowel Feeding Tube Placement Assessment  Reason for Feeding Tube Placement: administration of nutrition and medication  Cortrak Start Time: 1315   Cortrak End Time: 1400  Medicine Delivered During Procedure: lidocaine gel  Placement Successful: presumed NGT    Procedure Complications: FT coiling around OGT; OGT removed, however FT unable to pass pylorus  Final Placement Sudarshan at exit of nare: 63 cm  Face to Face time with patient: 45 minutes    Bridle Placement:   Reason for bridle placement: securement of FT   Medicine delivered during procedure: lubricating jelly   Procedure: Successful  Location of top of clip on FT: @ 65 cm marker   Condition of nose/skin at time of bridle placement: Unremarkable   Face to Face time with patient: <5 minutes.    Tania Hutchinson, MS, RD, LD  4E (CVICU) RD pager: [phone]  Ascom: [phone]  Weekend/Holiday RD pager: [phone]

## 2022-11-18 NOTE — PROGRESS NOTES
"CLINICAL NUTRITION SERVICES - ASSESSMENT NOTE     Nutrition Prescription    RECOMMENDATIONS FOR MDs/PROVIDERS TO ORDER:  Daily fluid adjustments per MD    Malnutrition Status:    Patient does not meet two of the established criteria necessary for diagnosing malnutrition    Recommendations already ordered by Registered Dietitian (RD):  -Once FT placement verified by AXR, recommend initiating EN:  Osmolite 1.5 @ 20 ml/hr + 1 pkt Prosource TF20  - Do not start until lytes (Mg++,K+) WNL and phos>2.0  - Recommend 30 ml q4hr fluid flushes for tube patency. Additional fluids and/or adjustments per MD.    - Order multivitamin/mineral (15 ml/day via FT) to help ensure micronutrient needs being met with suspected hypermetabolic demands and potential interruptions to TF infusions.  - Elevated HOB with gastric feeds     Future/Additional Recommendations:  -Monitor TF tolerance/adequacy  -Monitor labs, stool output, weight trends   -GOAL TF: Osmolite 1.5 Alejandro @ goal of 40ml/hr (960ml/day) + 1 pkt Prosource TF20 will provide: 1520 kcals (28 kcal/kg), 80 g PRO (1.5 g/kg), 731 ml free H20, 195 g CHO, and 0 g fiber daily.    --- Advance by 10 ml q8hr as tolerated     REASON FOR ASSESSMENT  Jade Walker is a/an 87 year old female assessed by the dietitian for Provider Order - Registered Dietitian to Assess and Order TF per Medical Nutrition Therapy Protocol    NUTRITION HISTORY  Unable to obtain nutrition hx at this time as pt intubated and sedated. No family in room at time of visit.     CURRENT NUTRITION ORDERS  Diet: NPO    LABS  Labs reviewed    MEDICATIONS  Medications reviewed    ANTHROPOMETRICS  Height: 154.9 cm (5' 1\")  Most Recent Weight: 78.8 kg (173 lb 11.6 oz)    IBW: 47.7 kg  BMI: Obesity Grade I BMI 30-34.9  Weight History:   Wt Readings from Last 10 Encounters:   11/18/22 78.8 kg (173 lb 11.6 oz)   11/14/22 64.3 kg (141 lb 11.2 oz)   10/20/22 63.3 kg (139 lb 8 oz)   10/06/22 64.2 kg (141 lb 9.6 oz)   10/06/22 64.2 " kg (141 lb 9.6 oz)     Dosing Weight: 54 kg (adjusted BW based on IBW and actual lowest BW of 64 kg)    ASSESSED NUTRITION NEEDS  Estimated Energy Needs: 4877-0152 kcals/day (25 - 30 kcals/kg)  Justification: Maintenance  Estimated Protein Needs: 65-80 grams protein/day (1.2 - 1.5 grams of pro/kg)  Justification: Hypercatabolism with acute illness  Estimated Fluid Needs: 1650 mL/day (1 mL/kcal)   Justification: Maintenance    PHYSICAL FINDINGS  See malnutrition section below.  No abnormal nutrition-related physical findings observed.     MALNUTRITION  % Intake: Unable to assess  % Weight Loss: None noted  Subcutaneous Fat Loss: None observed  Muscle Loss: None observed  Fluid Accumulation/Edema: None noted  Malnutrition Diagnosis: Patient does not meet two of the established criteria necessary for diagnosing malnutrition    NUTRITION DIAGNOSIS  Inadequate oral intake related to intubation as evidenced by NPO status      INTERVENTIONS  Implementation  Enteral Nutrition - Initiate  Multivitamin/mineral supplement therapy     Goals  Total avg nutritional intake to meet a minimum of 25 kcal/kg and 1.2 g PRO/kg daily (per dosing wt 54 kg).     Monitoring/Evaluation  Progress toward goals will be monitored and evaluated per protocol.    Tania Hutchinson, MS, RD, LD  4E (CVICU) RD pager: [phone]  Ascom: [phone]  Weekend/Holiday RD pager: [phone]

## 2022-11-18 NOTE — PLAN OF CARE
Goal Outcome Evaluation:      Plan of Care Reviewed With: other (see comments) (unable to discuss with pt at this time)    Overall Patient Progress: improvingOverall Patient Progress: improving    Outcome Evaluation: Placed FT. Will initiate trophic feeds.

## 2022-11-18 NOTE — CONSULTS
Care Management Initial Consult    General Information  Assessment completed with: Children, Chino  Type of CM/SW Visit: Initial Assessment    Primary Care Provider verified and updated as needed: Yes   Readmission within the last 30 days: previous discharge plan unsuccessful      Reason for Consult: other (see comments) (Elevated Risk Score)  Advance Care Planning: Advance Care Planning Reviewed: no concerns identified        Communication Assessment  Patient's communication style: spoken language (English or Bilingual)    Hearing Difficulty or Deaf: no   Wear Glasses or Blind: yes    Cognitive  Cognitive/Neuro/Behavioral: .WDL except  Level of Consciousness: sedated  Arousal Level: arouses to voice, opens eyes spontaneously  Orientation: other (see comments) (CLAUDIA)  Mood/Behavior: other (see comments) (Intubated/sedated)  Best Language:  (CLAUDIA)  Speech: endotracheal tube    Living Environment:   People in home: spouse  Trevon  Current living Arrangements: apartment      Able to return to prior arrangements: yes     Family/Social Support:  Care provided by: self  Provides care for: no one  Marital Status:   , Children          Description of Support System: Supportive, Involved    Support Assessment: Adequate family and caregiver support    Current Resources:   Patient receiving home care services: No     Community Resources: None  Equipment currently used at home: none  Supplies currently used at home: None    Employment/Financial:  Employment Status: retired        Financial Concerns: No concerns identified   Referral to Financial Worker: No     Lifestyle & Psychosocial Needs:  Social Determinants of Health     Tobacco Use: Low Risk      Smoking Tobacco Use: Never     Smokeless Tobacco Use: Never     Passive Exposure: Not on file   Alcohol Use: Not on file   Financial Resource Strain: Not on file   Food Insecurity: Not on file   Transportation Needs: Not on file   Physical Activity: Not on file   Stress:  Not on file   Social Connections: Not on file   Intimate Partner Violence: Not on file   Depression: Not on file   Housing Stability: Not on file     Functional Status:  Prior to admission patient needed assistance:   Dependent ADLs:: Independent  Dependent IADLs:: Independent  Assesssment of Functional Status: Not at baseline with ADL Functioning    Mental Health Status:  Mental Health Status: No Current Concerns       Chemical Dependency Status:  Chemical Dependency Status: No Current Concerns           Values/Beliefs:  Spiritual, Cultural Beliefs, Worship Practices, Values that affect care: no             Additional Information:  Per H&P, patient is a 87 year old female with PMHx of severe aortic stenosis HTN, HLD, non-obstructive CAD, CKD stage III, IBS s/p TAVR by c/b LV perforation surgically repaired by Dr. Pulido 11/16. Intraop got 600 cellsaver, 2 plts, 1 ffp, 4 prbcs, and 1500 kcentra. Valve well seated.   Writer completed assessment with patient's son Chino, due to patient being intubated and  was unreachable. Son reports that patient resides with spouse in independent senior apartment. She does not use any equipment or received any assistance. Son states that assistance can be added as needed, but there is not an Assisted Living, Transitional Care or Skilled Nursing associated with the apartment. She is a retired teacher. She does not have any financial concerns as she receives shelter and social security. She does not drive due to macular degeneration. She has two sons, one in the area and on in [place]. Son denies patient having any chemical dependency or mental health concerns. Contact information for social work provided to son. SW to continue to follow for support or any discharge needs that arise.     CONNOR Clemens, MSW  Adult Acute Care Float   Pager [phone]

## 2022-11-19 ENCOUNTER — APPOINTMENT (OUTPATIENT)
Dept: GENERAL RADIOLOGY | Facility: CLINIC | Age: 87
DRG: 266 | End: 2022-11-19
Attending: THORACIC SURGERY (CARDIOTHORACIC VASCULAR SURGERY)
Payer: COMMERCIAL

## 2022-11-19 LAB
ALBUMIN SERPL BCG-MCNC: 2.5 G/DL (ref 3.5–5.2)
ALBUMIN UR-MCNC: 20 MG/DL
ALP SERPL-CCNC: 52 U/L (ref 35–104)
ALT SERPL W P-5'-P-CCNC: 11 U/L (ref 10–35)
ANION GAP SERPL CALCULATED.3IONS-SCNC: 12 MMOL/L (ref 7–15)
ANION GAP SERPL CALCULATED.3IONS-SCNC: 8 MMOL/L (ref 7–15)
APPEARANCE UR: CLEAR
AST SERPL W P-5'-P-CCNC: 79 U/L (ref 10–35)
BASE EXCESS BLDA CALC-SCNC: 0.4 MMOL/L (ref -9–1.8)
BASE EXCESS BLDA CALC-SCNC: 0.6 MMOL/L (ref -9–1.8)
BASE EXCESS BLDA CALC-SCNC: 1.2 MMOL/L (ref -9–1.8)
BILIRUB SERPL-MCNC: 0.4 MG/DL
BILIRUB UR QL STRIP: NEGATIVE
BUN SERPL-MCNC: 22.3 MG/DL (ref 8–23)
BUN SERPL-MCNC: 34.2 MG/DL (ref 8–23)
CA-I BLD-MCNC: 4.6 MG/DL (ref 4.4–5.2)
CA-I BLD-MCNC: 4.8 MG/DL (ref 4.4–5.2)
CALCIUM SERPL-MCNC: 7.9 MG/DL (ref 8.8–10.2)
CALCIUM SERPL-MCNC: 8.1 MG/DL (ref 8.8–10.2)
CHLORIDE SERPL-SCNC: 104 MMOL/L (ref 98–107)
CHLORIDE SERPL-SCNC: 108 MMOL/L (ref 98–107)
COLOR UR AUTO: YELLOW
CREAT SERPL-MCNC: 0.79 MG/DL (ref 0.51–0.95)
CREAT SERPL-MCNC: 0.88 MG/DL (ref 0.51–0.95)
DEPRECATED HCO3 PLAS-SCNC: 19 MMOL/L (ref 22–29)
DEPRECATED HCO3 PLAS-SCNC: 24 MMOL/L (ref 22–29)
ERYTHROCYTE [DISTWIDTH] IN BLOOD BY AUTOMATED COUNT: 15.5 % (ref 10–15)
ERYTHROCYTE [DISTWIDTH] IN BLOOD BY AUTOMATED COUNT: 15.5 % (ref 10–15)
GFR SERPL CREATININE-BSD FRML MDRD: 64 ML/MIN/1.73M2
GFR SERPL CREATININE-BSD FRML MDRD: 73 ML/MIN/1.73M2
GLUCOSE BLDC GLUCOMTR-MCNC: 107 MG/DL (ref 70–99)
GLUCOSE BLDC GLUCOMTR-MCNC: 111 MG/DL (ref 70–99)
GLUCOSE BLDC GLUCOMTR-MCNC: 114 MG/DL (ref 70–99)
GLUCOSE BLDC GLUCOMTR-MCNC: 119 MG/DL (ref 70–99)
GLUCOSE BLDC GLUCOMTR-MCNC: 123 MG/DL (ref 70–99)
GLUCOSE BLDC GLUCOMTR-MCNC: 130 MG/DL (ref 70–99)
GLUCOSE BLDC GLUCOMTR-MCNC: 138 MG/DL (ref 70–99)
GLUCOSE BLDC GLUCOMTR-MCNC: 140 MG/DL (ref 70–99)
GLUCOSE BLDC GLUCOMTR-MCNC: 151 MG/DL (ref 70–99)
GLUCOSE BLDC GLUCOMTR-MCNC: 154 MG/DL (ref 70–99)
GLUCOSE BLDC GLUCOMTR-MCNC: 161 MG/DL (ref 70–99)
GLUCOSE BLDC GLUCOMTR-MCNC: 161 MG/DL (ref 70–99)
GLUCOSE BLDC GLUCOMTR-MCNC: 166 MG/DL (ref 70–99)
GLUCOSE BLDC GLUCOMTR-MCNC: 166 MG/DL (ref 70–99)
GLUCOSE BLDC GLUCOMTR-MCNC: 179 MG/DL (ref 70–99)
GLUCOSE BLDC GLUCOMTR-MCNC: 181 MG/DL (ref 70–99)
GLUCOSE BLDC GLUCOMTR-MCNC: 181 MG/DL (ref 70–99)
GLUCOSE BLDC GLUCOMTR-MCNC: 195 MG/DL (ref 70–99)
GLUCOSE BLDC GLUCOMTR-MCNC: 196 MG/DL (ref 70–99)
GLUCOSE BLDC GLUCOMTR-MCNC: 201 MG/DL (ref 70–99)
GLUCOSE SERPL-MCNC: 110 MG/DL (ref 70–99)
GLUCOSE SERPL-MCNC: 213 MG/DL (ref 70–99)
GLUCOSE UR STRIP-MCNC: NEGATIVE MG/DL
HCO3 BLD-SCNC: 24 MMOL/L (ref 21–28)
HCO3 BLD-SCNC: 25 MMOL/L (ref 21–28)
HCO3 BLD-SCNC: 26 MMOL/L (ref 21–28)
HCT VFR BLD AUTO: 25 % (ref 35–47)
HCT VFR BLD AUTO: 26.2 % (ref 35–47)
HGB BLD-MCNC: 8.5 G/DL (ref 11.7–15.7)
HGB BLD-MCNC: 8.7 G/DL (ref 11.7–15.7)
HGB UR QL STRIP: ABNORMAL
HYALINE CASTS: 42 /LPF
INR PPP: 1.15 (ref 0.85–1.15)
KETONES UR STRIP-MCNC: NEGATIVE MG/DL
LACTATE SERPL-SCNC: 1.8 MMOL/L (ref 0.7–2)
LEUKOCYTE ESTERASE UR QL STRIP: ABNORMAL
MCH RBC QN AUTO: 30.7 PG (ref 26.5–33)
MCH RBC QN AUTO: 31.4 PG (ref 26.5–33)
MCHC RBC AUTO-ENTMCNC: 33.2 G/DL (ref 31.5–36.5)
MCHC RBC AUTO-ENTMCNC: 34 G/DL (ref 31.5–36.5)
MCV RBC AUTO: 92 FL (ref 78–100)
MCV RBC AUTO: 93 FL (ref 78–100)
MRSA DNA SPEC QL NAA+PROBE: NEGATIVE
MUCOUS THREADS #/AREA URNS LPF: PRESENT /LPF
NITRATE UR QL: NEGATIVE
O2/TOTAL GAS SETTING VFR VENT: 30 %
OXYHGB MFR BLD: 94 % (ref 92–100)
OXYHGB MFR BLD: 95 % (ref 92–100)
OXYHGB MFR BLD: 97 % (ref 92–100)
PCO2 BLD: 35 MM HG (ref 35–45)
PCO2 BLD: 36 MM HG (ref 35–45)
PCO2 BLD: 43 MM HG (ref 35–45)
PH BLD: 7.39 [PH] (ref 7.35–7.45)
PH BLD: 7.45 [PH] (ref 7.35–7.45)
PH BLD: 7.45 [PH] (ref 7.35–7.45)
PH UR STRIP: 6 [PH] (ref 5–7)
PLATELET # BLD AUTO: 72 10E3/UL (ref 150–450)
PLATELET # BLD AUTO: 85 10E3/UL (ref 150–450)
PO2 BLD: 100 MM HG (ref 80–105)
PO2 BLD: 76 MM HG (ref 80–105)
PO2 BLD: 77 MM HG (ref 80–105)
POTASSIUM SERPL-SCNC: 3.7 MMOL/L (ref 3.4–5.3)
POTASSIUM SERPL-SCNC: 3.9 MMOL/L (ref 3.4–5.3)
POTASSIUM SERPL-SCNC: 4.3 MMOL/L (ref 3.4–5.3)
PROT SERPL-MCNC: 4.4 G/DL (ref 6.4–8.3)
RBC # BLD AUTO: 2.71 10E6/UL (ref 3.8–5.2)
RBC # BLD AUTO: 2.83 10E6/UL (ref 3.8–5.2)
RBC URINE: 96 /HPF
SA TARGET DNA: NEGATIVE
SODIUM SERPL-SCNC: 136 MMOL/L (ref 136–145)
SODIUM SERPL-SCNC: 139 MMOL/L (ref 136–145)
SP GR UR STRIP: 1.03 (ref 1–1.03)
SQUAMOUS EPITHELIAL: 1 /HPF
TRANSITIONAL EPI: 1 /HPF
UROBILINOGEN UR STRIP-MCNC: NORMAL MG/DL
WBC # BLD AUTO: 8.9 10E3/UL (ref 4–11)
WBC # BLD AUTO: 9.9 10E3/UL (ref 4–11)
WBC URINE: 21 /HPF

## 2022-11-19 PROCEDURE — 80053 COMPREHEN METABOLIC PANEL: CPT | Performed by: THORACIC SURGERY (CARDIOTHORACIC VASCULAR SURGERY)

## 2022-11-19 PROCEDURE — 99291 CRITICAL CARE FIRST HOUR: CPT | Mod: 24 | Performed by: ANESTHESIOLOGY

## 2022-11-19 PROCEDURE — 93010 ELECTROCARDIOGRAM REPORT: CPT | Mod: 59 | Performed by: INTERNAL MEDICINE

## 2022-11-19 PROCEDURE — 999N000127 HC STATISTIC PERIPHERAL IV START W US GUIDANCE

## 2022-11-19 PROCEDURE — 999N000157 HC STATISTIC RCP TIME EA 10 MIN

## 2022-11-19 PROCEDURE — 94003 VENT MGMT INPAT SUBQ DAY: CPT

## 2022-11-19 PROCEDURE — 999N000155 HC STATISTIC RAPCV CVP MONITORING

## 2022-11-19 PROCEDURE — 85027 COMPLETE CBC AUTOMATED: CPT | Performed by: STUDENT IN AN ORGANIZED HEALTH CARE EDUCATION/TRAINING PROGRAM

## 2022-11-19 PROCEDURE — 258N000003 HC RX IP 258 OP 636: Performed by: ANESTHESIOLOGY

## 2022-11-19 PROCEDURE — 200N000002 HC R&B ICU UMMC

## 2022-11-19 PROCEDURE — 250N000009 HC RX 250: Performed by: STUDENT IN AN ORGANIZED HEALTH CARE EDUCATION/TRAINING PROGRAM

## 2022-11-19 PROCEDURE — 250N000013 HC RX MED GY IP 250 OP 250 PS 637

## 2022-11-19 PROCEDURE — 71045 X-RAY EXAM CHEST 1 VIEW: CPT | Mod: 26 | Performed by: RADIOLOGY

## 2022-11-19 PROCEDURE — 83605 ASSAY OF LACTIC ACID: CPT | Performed by: STUDENT IN AN ORGANIZED HEALTH CARE EDUCATION/TRAINING PROGRAM

## 2022-11-19 PROCEDURE — 250N000011 HC RX IP 250 OP 636

## 2022-11-19 PROCEDURE — 999N000015 HC STATISTIC ARTERIAL MONITORING DAILY

## 2022-11-19 PROCEDURE — 87641 MR-STAPH DNA AMP PROBE: CPT | Performed by: THORACIC SURGERY (CARDIOTHORACIC VASCULAR SURGERY)

## 2022-11-19 PROCEDURE — 258N000003 HC RX IP 258 OP 636

## 2022-11-19 PROCEDURE — 250N000013 HC RX MED GY IP 250 OP 250 PS 637: Performed by: THORACIC SURGERY (CARDIOTHORACIC VASCULAR SURGERY)

## 2022-11-19 PROCEDURE — 85610 PROTHROMBIN TIME: CPT | Performed by: STUDENT IN AN ORGANIZED HEALTH CARE EDUCATION/TRAINING PROGRAM

## 2022-11-19 PROCEDURE — 81001 URINALYSIS AUTO W/SCOPE: CPT

## 2022-11-19 PROCEDURE — 82805 BLOOD GASES W/O2 SATURATION: CPT

## 2022-11-19 PROCEDURE — 82330 ASSAY OF CALCIUM: CPT | Performed by: STUDENT IN AN ORGANIZED HEALTH CARE EDUCATION/TRAINING PROGRAM

## 2022-11-19 PROCEDURE — 250N000011 HC RX IP 250 OP 636: Performed by: THORACIC SURGERY (CARDIOTHORACIC VASCULAR SURGERY)

## 2022-11-19 PROCEDURE — 84132 ASSAY OF SERUM POTASSIUM: CPT | Performed by: THORACIC SURGERY (CARDIOTHORACIC VASCULAR SURGERY)

## 2022-11-19 PROCEDURE — 87086 URINE CULTURE/COLONY COUNT: CPT

## 2022-11-19 PROCEDURE — 82805 BLOOD GASES W/O2 SATURATION: CPT | Performed by: THORACIC SURGERY (CARDIOTHORACIC VASCULAR SURGERY)

## 2022-11-19 PROCEDURE — 71045 X-RAY EXAM CHEST 1 VIEW: CPT

## 2022-11-19 PROCEDURE — 82310 ASSAY OF CALCIUM: CPT | Performed by: STUDENT IN AN ORGANIZED HEALTH CARE EDUCATION/TRAINING PROGRAM

## 2022-11-19 PROCEDURE — 82330 ASSAY OF CALCIUM: CPT

## 2022-11-19 PROCEDURE — 250N000013 HC RX MED GY IP 250 OP 250 PS 637: Performed by: STUDENT IN AN ORGANIZED HEALTH CARE EDUCATION/TRAINING PROGRAM

## 2022-11-19 PROCEDURE — 93005 ELECTROCARDIOGRAM TRACING: CPT

## 2022-11-19 PROCEDURE — 258N000003 HC RX IP 258 OP 636: Performed by: THORACIC SURGERY (CARDIOTHORACIC VASCULAR SURGERY)

## 2022-11-19 PROCEDURE — 87077 CULTURE AEROBIC IDENTIFY: CPT

## 2022-11-19 PROCEDURE — 250N000011 HC RX IP 250 OP 636: Performed by: STUDENT IN AN ORGANIZED HEALTH CARE EDUCATION/TRAINING PROGRAM

## 2022-11-19 PROCEDURE — 87205 SMEAR GRAM STAIN: CPT

## 2022-11-19 PROCEDURE — 258N000003 HC RX IP 258 OP 636: Performed by: STUDENT IN AN ORGANIZED HEALTH CARE EDUCATION/TRAINING PROGRAM

## 2022-11-19 RX ORDER — PIPERACILLIN SODIUM, TAZOBACTAM SODIUM 4; .5 G/20ML; G/20ML
4.5 INJECTION, POWDER, LYOPHILIZED, FOR SOLUTION INTRAVENOUS EVERY 6 HOURS
Status: DISCONTINUED | OUTPATIENT
Start: 2022-11-19 | End: 2022-11-21

## 2022-11-19 RX ORDER — FUROSEMIDE 10 MG/ML
10 INJECTION INTRAMUSCULAR; INTRAVENOUS ONCE
Status: COMPLETED | OUTPATIENT
Start: 2022-11-19 | End: 2022-11-19

## 2022-11-19 RX ORDER — MIDAZOLAM HCL IN 0.9 % NACL/PF 1 MG/ML
1-8 PLASTIC BAG, INJECTION (ML) INTRAVENOUS CONTINUOUS
Status: DISCONTINUED | OUTPATIENT
Start: 2022-11-19 | End: 2022-11-20

## 2022-11-19 RX ORDER — MAGNESIUM SULFATE HEPTAHYDRATE 40 MG/ML
2 INJECTION, SOLUTION INTRAVENOUS ONCE
Status: COMPLETED | OUTPATIENT
Start: 2022-11-19 | End: 2022-11-19

## 2022-11-19 RX ORDER — POTASSIUM CHLORIDE 20MEQ/15ML
20 LIQUID (ML) ORAL ONCE
Status: COMPLETED | OUTPATIENT
Start: 2022-11-19 | End: 2022-11-19

## 2022-11-19 RX ADMIN — ASPIRIN 81 MG CHEWABLE TABLET 81 MG: 81 TABLET CHEWABLE at 07:29

## 2022-11-19 RX ADMIN — OXYCODONE HYDROCHLORIDE 2.5 MG: 5 TABLET ORAL at 09:28

## 2022-11-19 RX ADMIN — PIPERACILLIN SODIUM AND TAZOBACTAM SODIUM 4.5 G: 4; .5 INJECTION, POWDER, LYOPHILIZED, FOR SOLUTION INTRAVENOUS at 21:16

## 2022-11-19 RX ADMIN — AMIODARONE HYDROCHLORIDE 200 MG: 200 TABLET ORAL at 07:29

## 2022-11-19 RX ADMIN — DEXMEDETOMIDINE HYDROCHLORIDE 0.3 MCG/KG/HR: 400 INJECTION INTRAVENOUS at 13:44

## 2022-11-19 RX ADMIN — MAGNESIUM SULFATE IN WATER 2 G: 40 INJECTION, SOLUTION INTRAVENOUS at 04:42

## 2022-11-19 RX ADMIN — MIDAZOLAM HYDROCHLORIDE 1 MG/HR: 1 INJECTION, SOLUTION INTRAVENOUS at 15:48

## 2022-11-19 RX ADMIN — PROPOFOL 5 MCG/KG/MIN: 10 INJECTION, EMULSION INTRAVENOUS at 12:21

## 2022-11-19 RX ADMIN — MUPIROCIN 0.5 G: 20 OINTMENT TOPICAL at 07:29

## 2022-11-19 RX ADMIN — HEPARIN SODIUM 5000 UNITS: 5000 INJECTION, SOLUTION INTRAVENOUS; SUBCUTANEOUS at 11:42

## 2022-11-19 RX ADMIN — POTASSIUM CHLORIDE 20 MEQ: 20 SOLUTION ORAL at 04:51

## 2022-11-19 RX ADMIN — DEXMEDETOMIDINE HYDROCHLORIDE 0.6 MCG/KG/HR: 400 INJECTION INTRAVENOUS at 02:09

## 2022-11-19 RX ADMIN — HYDROCORTISONE SODIUM SUCCINATE 100 MG: 100 INJECTION, POWDER, FOR SOLUTION INTRAMUSCULAR; INTRAVENOUS at 17:34

## 2022-11-19 RX ADMIN — ACETAMINOPHEN 975 MG: 325 TABLET, FILM COATED ORAL at 01:51

## 2022-11-19 RX ADMIN — PIPERACILLIN SODIUM AND TAZOBACTAM SODIUM 4.5 G: 4; .5 INJECTION, POWDER, LYOPHILIZED, FOR SOLUTION INTRAVENOUS at 09:51

## 2022-11-19 RX ADMIN — SODIUM CHLORIDE, POTASSIUM CHLORIDE, SODIUM LACTATE AND CALCIUM CHLORIDE 500 ML: 600; 310; 30; 20 INJECTION, SOLUTION INTRAVENOUS at 12:20

## 2022-11-19 RX ADMIN — HEPARIN SODIUM 5000 UNITS: 5000 INJECTION, SOLUTION INTRAVENOUS; SUBCUTANEOUS at 20:00

## 2022-11-19 RX ADMIN — Medication 1 PACKET: at 07:29

## 2022-11-19 RX ADMIN — SODIUM CHLORIDE, POTASSIUM CHLORIDE, SODIUM LACTATE AND CALCIUM CHLORIDE 1000 ML: 600; 310; 30; 20 INJECTION, SOLUTION INTRAVENOUS at 09:27

## 2022-11-19 RX ADMIN — SENNOSIDES AND DOCUSATE SODIUM 1 TABLET: 8.6; 5 TABLET ORAL at 07:29

## 2022-11-19 RX ADMIN — SODIUM CHLORIDE, POTASSIUM CHLORIDE, SODIUM LACTATE AND CALCIUM CHLORIDE 500 ML: 600; 310; 30; 20 INJECTION, SOLUTION INTRAVENOUS at 16:18

## 2022-11-19 RX ADMIN — ACETAMINOPHEN 975 MG: 325 TABLET, FILM COATED ORAL at 09:27

## 2022-11-19 RX ADMIN — HEPARIN SODIUM 5000 UNITS: 5000 INJECTION, SOLUTION INTRAVENOUS; SUBCUTANEOUS at 03:48

## 2022-11-19 RX ADMIN — AMIODARONE HYDROCHLORIDE 1 MG/MIN: 50 INJECTION, SOLUTION INTRAVENOUS at 10:11

## 2022-11-19 RX ADMIN — POLYETHYLENE GLYCOL 3350 17 G: 17 POWDER, FOR SOLUTION ORAL at 07:29

## 2022-11-19 RX ADMIN — Medication 15 ML: at 07:29

## 2022-11-19 RX ADMIN — PIPERACILLIN SODIUM AND TAZOBACTAM SODIUM 4.5 G: 4; .5 INJECTION, POWDER, LYOPHILIZED, FOR SOLUTION INTRAVENOUS at 14:32

## 2022-11-19 RX ADMIN — FUROSEMIDE 10 MG: 10 INJECTION, SOLUTION INTRAVENOUS at 04:37

## 2022-11-19 RX ADMIN — SENNOSIDES AND DOCUSATE SODIUM 1 TABLET: 8.6; 5 TABLET ORAL at 20:00

## 2022-11-19 RX ADMIN — SODIUM CHLORIDE 1500 MG: 9 INJECTION, SOLUTION INTRAVENOUS at 10:05

## 2022-11-19 RX ADMIN — Medication 40 MG: at 07:29

## 2022-11-19 ASSESSMENT — ACTIVITIES OF DAILY LIVING (ADL)
ADLS_ACUITY_SCORE: 34
ADLS_ACUITY_SCORE: 30

## 2022-11-19 NOTE — PLAN OF CARE
Major Shift Events: pt vented on minimal settings - CMV 30%/12/5/300. CT output ~10mL q2h. Arouses to voice and follows commands. Fent, prop, and dex running for comfort and sedation. PERRLA, afebrile. Rhythm converted from NS to Afib with rates . CVTS notified, 12 lead completed. Levo and vaso gtts titrated up to maintain MAP >65. CVP 13. UOP 10-40 mL/hr, 10mg lasix given. TF running at goal. Insulin gtt titrated to maintain BG within ordered parameters. Potassium and mag replaced.    Plan: stabilize hemodynamics, continue POC, retry PST today    For vital signs and complete assessments, please see documentation flowsheets.

## 2022-11-19 NOTE — PHARMACY-VANCOMYCIN DOSING SERVICE
"Pharmacy Vancomycin Initial Note  Date of Service 2022  Patient's  1935  87 year old, female    Indication: Sepsis    Current estimated CrCl = Estimated Creatinine Clearance: 48.2 mL/min (based on SCr of 0.79 mg/dL).    Creatinine for last 3 days  2022:  5:36 PM Creatinine 0.68 mg/dL; 10:02 PM Creatinine 0.83 mg/dL  2022:  3:37 AM Creatinine 1.08 mg/dL;  6:39 AM Creatinine 1.09 mg/dL; 12:00 PM Creatinine 1.06 mg/dL  2022:  2:41 AM Creatinine 0.92 mg/dL  2022:  3:21 AM Creatinine 0.79 mg/dL    Recent Vancomycin Level(s) for last 3 days  No results found for requested labs within last 72 hours.      Vancomycin IV Administrations (past 72 hours)                   vancomycin (VANCOCIN) 1,500 mg in 0.9% NaCl 250 mL intermittent infusion (mg) 1,500 mg New Bag 22 1005                Nephrotoxins and other renal medications (From now, onward)    Start     Dose/Rate Route Frequency Ordered Stop    22 1000  vancomycin (VANCOCIN) 1,250 mg in 0.9% NaCl 250 mL intermittent infusion         1,250 mg  over 90 Minutes Intravenous EVERY 24 HOURS 22 1116      22 0930  vancomycin (VANCOCIN) 1,500 mg in 0.9% NaCl 250 mL intermittent infusion         1,500 mg  over 90 Minutes Intravenous ONCE 22 0923      22 0930  piperacillin-tazobactam (ZOSYN) 4.5 g vial to attach to  mL bag        Note to Pharmacy: For [facility], [facility] and [facility]: For Zosyn-naive patients, use the \"Zosyn initial dose + extended infusion\" order panel.    4.5 g  over 30 Minutes Intravenous EVERY 6 HOURS 22 0924      22 1200  vasopressin 1 unit/mL MAX Conc (PITRESSIN) infusion         0.5-4 Units/hr  0.5-4 mL/hr  Intravenous CONTINUOUS 22 1124      22 2000  norepinephrine (LEVOPHED) 16 mg in  mL infusion MAX CONC CENTRAL LINE         0.01-0.6 mcg/kg/min × 64 kg (Dosing Weight)  0.6-36 mL/hr  Intravenous CONTINUOUS 22            Contrast Orders - past 72 " hours (72h ago, onward)    Start     Dose/Rate Route Frequency Stop    11/16/22 1450  iopamidol (ISOVUE-370) solution  Status:  Discontinued           ONCE PRN 11/16/22 1712          [employer] Prediction of Planned Initial Vancomycin Regimen  Loading dose: 1500 mg   Regimen: 1250 mg IV every 24 hours.  Start time: 12:14 on 11/19/2022  Exposure target: AUC24 (range)400-600 mg/L.hr   AUC24,ss: 475 mg/L.hr  Probability of AUC24 > 400: 69 %  Ctrough,ss: 13.9 mg/L  Probability of Ctrough,ss > 20: 19 %  Probability of nephrotoxicity (Lodise PATRICIA 2009): 9 %          Plan:  1. Start vancomycin  1500 mg IV once followed by 1250 mg IV q24h.   2. Vancomycin monitoring method: AUC  3. Vancomycin therapeutic monitoring goal: 400-600 mg*h/L  4. Pharmacy will check vancomycin levels as appropriate in 3-5 Days.    5. Serum creatinine levels will be ordered daily for the first week of therapy and at least twice weekly for subsequent weeks.      Leila Hutchinson RPH

## 2022-11-19 NOTE — PHARMACY
Pharmacy Tube Feeding Consult    Medication reviewed for administration by feeding tube and for potential food/drug interactions.    Recommendation: No changes are needed at this time.     Pharmacy will continue to follow as new medications are ordered.    Mary Kate Weaver, MeenakshiD

## 2022-11-19 NOTE — PLAN OF CARE
See flowsheets for vital signs, hemodynamics, and detailed assessment.    Major Shift Updates/Changes: Follows commands, when awake immensely agitated. Fent, prop, and dex stopped per team, PRN oxy given. Versed started, @ 3. Amio gtt started for Afib, pt converted to NSR @ 1100. PS today for 1 hour, maps dropped, increased in pressors. Decreased in UOP. About 10cc/hr. Bladder scan 104cc.  2L total of LR given, with no response. Start abx and flotrac set up.       Laila Box RN  Shift cared for: 0700 - 1930

## 2022-11-19 NOTE — PROGRESS NOTES
CV ICU PROGRESS NOTE  November 17, 2022      CO-MORBIDITIES:   Severe aortic stenosis  (primary encounter diagnosis)    ASSESSMENT: Jade Walker is a 87 year old female with PMHx of severe aortic stenosis HTN, HLD, non-obstructive CAD, CKD stage III, IBS s/p TAVR by c/b LV perforation surgically repaired by Dr. Pulido 11/16. Intraop got 600 cellsaver, 2 plts, 1 ffp, 4 prbcs, and 1500 kcentra. Valve well seated.     Last 24 hours:  Switch Epi over to levo, wean as tolerated  TTE trivial posterior pericardial effusion  KULDIP small posterior pericardial effusion  Holding ASA, SQ  Keep NPO  PICC placement  Goal net positive    Today's Progress:  Goal net even  PPFT placement  PST in PM  [place] placement    PLAN:  Neuro/ pain/ sedation:  #Acute postoperative pain  #Sedation  Previously agitated while weaning sedation  - Monitor neurological status. Notify the MD for any acute changes in exam.  - Pain: fentanyl gtt. Scheduled tylenol. PRN tylenol, oxycodone, dilaudid.  - Sedation: precedex gtt  - for weaning sedation consider seroquel 25/25/50 and scheduled oxy 2.5 tmrw    Pulmonary care:   - mechanical ventilation 12/300/5/30%  - Goal SpO2 > 92%  - Incentive spirometer Q15-30 minutes when awake.  - no PST today     Cardiovascular:    # Severe aortic stenosis s/p TAVR  # Pericardial effusion s/p pericardial drain  # Hypertension  # Hyperlipidemia  # Non obstructive CAD  Increasing pressor needs 11/19, unclear for infectious etiology  - Epi, NE, vaso gtts for inotropic and pressor support, wean favoring NE  - Monitor hemodynamic status.   - hold ASA  - TTE, then KULDIP for possible pericardial effusion  - started amiodarone bolus 150 and drip for Afib HR   - if pressor needs increases further, consider stress dose steroids    GI care:   # Irritable Bowel Syndrome     - NPO except ice chips and medications, consider trickle feeds tonight  - Nutrition consulted, appreciate recommendations  - NGT placement  - PPI  for bowel prophylaxis  - Bowel regimen: MiraLAX, senna    Renal/ Fluid Balance:    # Chronic Kidney Disease Stage 3  - ICU electrolyte replacement protocol   - Baseline Cr 0.9  - Strict I&Os  - monitor UOP  - fluid resuscitation as needed    Endocrine:    #Stress hyperglycemia  - insulin gtt     ID/ Antibiotics:  #Postoperative prophylactic antibiotics  - Completing perioperative course of antibiotics: ancef  - no indication for further antibiotics  - Bcx and sputum cx  - started zosyn and vanc  - MRSA nares negative 11/19    Heme:     # Acute blood loss anemia  # Thrombocytopenia  # Pericardial effusion s/p pericardiocentesis  2 pt Hgb drop overnight  - Hgb goal > 7.0  - Hemoglobin stable.  - 2U pRBC (11/17)  - resume SQH  - HIT test negative     Prophylaxis:    - VTE: SCD's, heparin 5000u sq  - Bowel regimen: PPI, MiraLAX, senna     Lines/ tubes/ drains:  - ETT  - L A line  - 2x set of v wires (not paced)  - 2x med CTs  - [place], NG  - PICC    Disposition:  - CVICU     ICU Checklist  F - Feeding:   A - Analgesia:   S - Sedation:   T - Thromboembolic prophylaxis:      H - Head of bed elevated  U - Ulcer prophylaxis:  G - Glycemic control  S - SBT     B - Bowel regimen  I - Indwelling catheter  D - De-escalation of antibiotics     Patient seen, findings and plan discussed with CVICU staff.    Jesus Irizarry MD PGY2    ====================================    SUBJECTIVE:   Intubated.    OBJECTIVE:   1. VITAL SIGNS:   Temp:  [98.2  F (36.8  C)-100.2  F (37.9  C)] 98.8  F (37.1  C)  Pulse:  [] 88  Resp:  [12-16] 16  BP: ()/(45-66) 96/53  MAP:  [57 mmHg-273 mmHg] 71 mmHg  Arterial Line BP: ()/() 102/55  FiO2 (%):  [30 %] 30 %  SpO2:  [96 %-100 %] 100 %  Vent Mode: CMV/AC  (Continuous Mandatory Ventilation/ Assist Control)  FiO2 (%): 30 %  Resp Rate (Set): 12 breaths/min  Tidal Volume (Set, mL): 300 mL  PEEP (cm H2O): 5 cmH2O  Pressure Support (cm H2O): 7 cmH2O  Resp: 16      2. INTAKE/ OUTPUT:   I/O  last 3 completed shifts:  In: 2225.26 [I.V.:625.26; NG/GT:460; IV Piggyback:1000]  Out: 1960 [Urine:1470; Emesis/NG output:350; Chest Tube:140]    3. PHYSICAL EXAMINATION:   General: NAD  Neuro: A&Ox3  Resp: Breathing non-labored, intubated  CV: RRR  Abdomen: Soft, Non-distended, Non-tender  Incisions: c/d/i  Extremities: warm and well perfused    4. INVESTIGATIONS:   Arterial Blood Gases   Recent Labs   Lab 11/18/22 1954 11/18/22 0242 11/17/22 1940 11/17/22  1159   PH 7.48* 7.37 7.38 7.34*   PCO2 36 44 43 43   PO2 103 107* 108* 111*   HCO3 27 25 25 23     Complete Blood Count   Recent Labs   Lab 11/19/22 0320 11/18/22  0241 11/17/22  1940 11/17/22  1200 11/17/22  0639   WBC 8.9 12.6*  --  12.4* 12.0*   HGB 8.5* 9.8* 9.7* 10.3* 11.1*   PLT 72* 98*  --  115* 131*     Basic Metabolic Panel  Recent Labs   Lab 11/19/22  0627 11/19/22  0537 11/19/22  0432 11/19/22  0321 11/18/22  0623 11/18/22  0242 11/18/22  0241 11/17/22  1201 11/17/22  1200 11/17/22  0814 11/17/22  0639   NA  --   --   --  136  --   --  135*  --  137  --  137   POTASSIUM  --   --   --  3.7  --  4.2 4.3  --  4.6  --  5.8*   CHLORIDE  --   --   --  104  --   --  105  --  108*  --  108*   CO2  --   --   --  24  --   --  22  --  20*  --  20*   BUN  --   --   --  22.3  --   --  16.9  --  16.6  --  16.0   CR  --   --   --  0.79  --   --  0.92  --  1.06*  --  1.09*   * 140* 161* 110*   < >  --  123*   < > 155*   < > 179*    < > = values in this interval not displayed.     Liver Function Tests  Recent Labs   Lab 11/19/22  0321 11/19/22  0320 11/18/22  0241 11/17/22  1200 11/17/22  0639 11/17/22  0337 11/16/22  2202 11/16/22  1736   AST 79*  --  146*  --  172* 141*   < > 69*   ALT 11  --  20  --  27 24   < > 24   ALKPHOS 52  --  44  --  37 34*   < > 41   BILITOTAL 0.4  --  0.3  --  0.5 0.4   < > 1.0   ALBUMIN 2.5*  --  2.8*  --  3.2* 3.3*   < > 2.8*   INR  --  1.15 1.24* 1.35*  --   --   --  1.30*    < > = values in this interval not displayed.      Pancreatic Enzymes  No lab results found in last 7 days.  Coagulation Profile  Recent Labs   Lab 22  0320 22  0927 22  0241 22  1200 22  1736 22  1630   INR 1.15  --  1.24* 1.35* 1.30* 1.36*   PTT  --  28  --  29 30 33         5. RADIOLOGY:   Recent Results (from the past 24 hour(s))   Echo KULDIP   Result Value    LVEF  60-65%    Narrative    [id number]  [id number]  [id number]  [id number]^RENE^DARLIN     [facility]  Echocardiography Laboratory  [address] [place] [address]     Name: KALA PEPPER  MRN: [id number]  : 1935  Study Date: 2022 03:30 PM  Age: 87 yrs  Gender: Female  Patient Location: [facility]  Reason For Study: Pericardial Effusion  Ordering Physician: DARLIN LÓPEZ  Performed By: Sesar Chowdhury MD     BSA: 1.7 m2  Height: 62 in  Weight: 161 lb  HR: 63  BP: 103/55 mmHg  Attestation  I was present during KULDIP probe placement by the fellow, Sesar Chowdhury. I  personally viewed the imaging and agree with the interpretation and report as  documented by the fellow.  ______________________________________________________________________________  Interpretation Summary  Small posterior and lateral pericardial effusion with no evidence of  tamponade. There is also small amount of mediastinal fluid, likely hematoma.     The visual ejection fraction is 60-65%.  The left atrial appendage is normal. It is free of spontaneous echo contrast  and thrombus.  There is a 23mm Contreras Danilo bioprostetic valve in place by history. The  valve is well seated with no evidence of perivalvular leak.  This study was compared with the study from 2022 .  There has been no change.  ______________________________________________________________________________  Procedure  Transesophageal Echocardiogram with color and spectral Doppler performed.  Procedure location Patient Floor. The procedure was performed on Patient  Floor. Informed  consent for Transesophegeal echo obtained. KULDIP Probe #63 was  used during the procedure. Sedation, endotracheal intubation, and mechanical  ventilation were initiated prior to the KULDIP and were monitored by the ICU  team. I determined this patient to be an appropriate candidate for the planned  sedation and procedure and have reassessed the patient immediately prior to  sedation and procedure. Total sedation time: 46 minutes of continuous bedside  1:1 monitoring. The Transducer was inserted with some difficulty . The patient  tolerated the procedure well. Complications None. ECG shows normal sinus  rhythm. Good quality two-dimensional was performed and interpreted.     Left Ventricle  Moderate to severe concentric wall thickening consistent with left ventricular  hypertrophy is present. The visual ejection fraction is 60-65%. No regional  wall motion abnormalities are seen.     Right Ventricle  The right ventricle is normal size. Global right ventricular function is  normal.     Atria  The left atrial appendage is normal. It is free of spontaneous echo contrast  and thrombus. The atrial septum is intact as assessed by color Doppler .     Mitral Valve  Mild mitral annular calcification is present. Trace to mild mitral  insufficiency is present.     Aortic Valve  A bioprosthetic aortic valve is present. Transcutaneous aortic valve  replacement is present. There is a 23mm Contreras Danilo bioprostetic valve in  place by history. The valve is well seated with no evidence of perivalvular  leak.     Tricuspid Valve  The tricuspid valve is normal.     Pulmonic Valve  The pulmonic valve is normal.     Pericardium  Small posterior and lateral pericardial effusion with no evidence of  tamponade. There is also small amount of mediastinal fluid, likely hematoma.  Chamber compression is not present; there is no evidence for tamponade.     Compared to Previous Study  This study was compared with the study from 11/17/2022 . There has  been no  change.     ______________________________________________________________________________  Report approved by: Teresa Garcia 11/17/2022 05:07 PM     ______________________________________________________________________________      XR Abdomen Port 1 View    Narrative    EXAM: XR ABDOMEN PORT 1 VIEW  11/18/2022 3:07 PM     HISTORY:  Verify small bowel feeding tube bedside placement       COMPARISON:  Abdominal radiograph 11/16/2022    FINDINGS: Supine radiograph of the abdomen.  Feeding tube with tip  overlying the stomach with the tip directed towards the gastric  antrum. Previously visualized enteric tube has been removed.  Nonspecific, nonobstructive bowel gas pattern in the visualized  abdomen. No pneumatosis . Left pleural effusion. No acute osseous  abnormality. Prosthetic cardiac valve, mediastinal drains.      Impression    IMPRESSION: Feeding tube projects over the distal stomach.    I have personally reviewed the examination and initial interpretation  and I agree with the findings.    ARLINE DINH MD         SYSTEM ID:  [id number]       =========================================

## 2022-11-20 ENCOUNTER — APPOINTMENT (OUTPATIENT)
Dept: GENERAL RADIOLOGY | Facility: CLINIC | Age: 87
DRG: 266 | End: 2022-11-20
Attending: THORACIC SURGERY (CARDIOTHORACIC VASCULAR SURGERY)
Payer: COMMERCIAL

## 2022-11-20 LAB
ANION GAP SERPL CALCULATED.3IONS-SCNC: 7 MMOL/L (ref 7–15)
BASE EXCESS BLDA CALC-SCNC: 2.1 MMOL/L (ref -9–1.8)
BASE EXCESS BLDV CALC-SCNC: 3.8 MMOL/L (ref -7.7–1.9)
BUN SERPL-MCNC: 33.1 MG/DL (ref 8–23)
CA-I BLD-MCNC: 4.5 MG/DL (ref 4.4–5.2)
CALCIUM SERPL-MCNC: 7.8 MG/DL (ref 8.8–10.2)
CHLORIDE SERPL-SCNC: 106 MMOL/L (ref 98–107)
CREAT SERPL-MCNC: 0.78 MG/DL (ref 0.51–0.95)
DEPRECATED HCO3 PLAS-SCNC: 22 MMOL/L (ref 22–29)
ERYTHROCYTE [DISTWIDTH] IN BLOOD BY AUTOMATED COUNT: 15.7 % (ref 10–15)
GFR SERPL CREATININE-BSD FRML MDRD: 74 ML/MIN/1.73M2
GLUCOSE BLDC GLUCOMTR-MCNC: 102 MG/DL (ref 70–99)
GLUCOSE BLDC GLUCOMTR-MCNC: 121 MG/DL (ref 70–99)
GLUCOSE BLDC GLUCOMTR-MCNC: 142 MG/DL (ref 70–99)
GLUCOSE BLDC GLUCOMTR-MCNC: 145 MG/DL (ref 70–99)
GLUCOSE BLDC GLUCOMTR-MCNC: 148 MG/DL (ref 70–99)
GLUCOSE BLDC GLUCOMTR-MCNC: 149 MG/DL (ref 70–99)
GLUCOSE BLDC GLUCOMTR-MCNC: 154 MG/DL (ref 70–99)
GLUCOSE BLDC GLUCOMTR-MCNC: 159 MG/DL (ref 70–99)
GLUCOSE BLDC GLUCOMTR-MCNC: 160 MG/DL (ref 70–99)
GLUCOSE BLDC GLUCOMTR-MCNC: 167 MG/DL (ref 70–99)
GLUCOSE BLDC GLUCOMTR-MCNC: 189 MG/DL (ref 70–99)
GLUCOSE BLDC GLUCOMTR-MCNC: 193 MG/DL (ref 70–99)
GLUCOSE SERPL-MCNC: 149 MG/DL (ref 70–99)
HCO3 BLD-SCNC: 25 MMOL/L (ref 21–28)
HCO3 BLDV-SCNC: 29 MMOL/L (ref 21–28)
HCT VFR BLD AUTO: 25.2 % (ref 35–47)
HGB BLD-MCNC: 8.5 G/DL (ref 11.7–15.7)
LACTATE SERPL-SCNC: 1.5 MMOL/L (ref 0.7–2)
MAGNESIUM SERPL-MCNC: 2.1 MG/DL (ref 1.7–2.3)
MCH RBC QN AUTO: 31.3 PG (ref 26.5–33)
MCHC RBC AUTO-ENTMCNC: 33.7 G/DL (ref 31.5–36.5)
MCV RBC AUTO: 93 FL (ref 78–100)
O2/TOTAL GAS SETTING VFR VENT: 30 %
O2/TOTAL GAS SETTING VFR VENT: 30 %
OXYHGB MFR BLDV: 46 % (ref 70–75)
PCO2 BLD: 33 MM HG (ref 35–45)
PCO2 BLDV: 43 MM HG (ref 40–50)
PH BLD: 7.49 [PH] (ref 7.35–7.45)
PH BLDV: 7.43 [PH] (ref 7.32–7.43)
PHOSPHATE SERPL-MCNC: 2 MG/DL (ref 2.5–4.5)
PLATELET # BLD AUTO: 89 10E3/UL (ref 150–450)
PO2 BLD: 75 MM HG (ref 80–105)
PO2 BLDV: 27 MM HG (ref 25–47)
POTASSIUM SERPL-SCNC: 3.9 MMOL/L (ref 3.4–5.3)
RBC # BLD AUTO: 2.72 10E6/UL (ref 3.8–5.2)
SODIUM SERPL-SCNC: 135 MMOL/L (ref 136–145)
WBC # BLD AUTO: 9.3 10E3/UL (ref 4–11)

## 2022-11-20 PROCEDURE — 250N000011 HC RX IP 250 OP 636

## 2022-11-20 PROCEDURE — 250N000011 HC RX IP 250 OP 636: Performed by: STUDENT IN AN ORGANIZED HEALTH CARE EDUCATION/TRAINING PROGRAM

## 2022-11-20 PROCEDURE — 87040 BLOOD CULTURE FOR BACTERIA: CPT

## 2022-11-20 PROCEDURE — 250N000013 HC RX MED GY IP 250 OP 250 PS 637: Performed by: SURGERY

## 2022-11-20 PROCEDURE — 71045 X-RAY EXAM CHEST 1 VIEW: CPT

## 2022-11-20 PROCEDURE — 250N000013 HC RX MED GY IP 250 OP 250 PS 637: Performed by: THORACIC SURGERY (CARDIOTHORACIC VASCULAR SURGERY)

## 2022-11-20 PROCEDURE — 83735 ASSAY OF MAGNESIUM: CPT | Performed by: STUDENT IN AN ORGANIZED HEALTH CARE EDUCATION/TRAINING PROGRAM

## 2022-11-20 PROCEDURE — 82330 ASSAY OF CALCIUM: CPT

## 2022-11-20 PROCEDURE — 250N000013 HC RX MED GY IP 250 OP 250 PS 637

## 2022-11-20 PROCEDURE — 250N000009 HC RX 250

## 2022-11-20 PROCEDURE — 85027 COMPLETE CBC AUTOMATED: CPT | Performed by: STUDENT IN AN ORGANIZED HEALTH CARE EDUCATION/TRAINING PROGRAM

## 2022-11-20 PROCEDURE — 82805 BLOOD GASES W/O2 SATURATION: CPT | Performed by: STUDENT IN AN ORGANIZED HEALTH CARE EDUCATION/TRAINING PROGRAM

## 2022-11-20 PROCEDURE — 84100 ASSAY OF PHOSPHORUS: CPT | Performed by: STUDENT IN AN ORGANIZED HEALTH CARE EDUCATION/TRAINING PROGRAM

## 2022-11-20 PROCEDURE — 999N000157 HC STATISTIC RCP TIME EA 10 MIN

## 2022-11-20 PROCEDURE — 82803 BLOOD GASES ANY COMBINATION: CPT | Performed by: THORACIC SURGERY (CARDIOTHORACIC VASCULAR SURGERY)

## 2022-11-20 PROCEDURE — 82310 ASSAY OF CALCIUM: CPT | Performed by: STUDENT IN AN ORGANIZED HEALTH CARE EDUCATION/TRAINING PROGRAM

## 2022-11-20 PROCEDURE — 71045 X-RAY EXAM CHEST 1 VIEW: CPT | Mod: 26 | Performed by: RADIOLOGY

## 2022-11-20 PROCEDURE — 999N000015 HC STATISTIC ARTERIAL MONITORING DAILY

## 2022-11-20 PROCEDURE — 94003 VENT MGMT INPAT SUBQ DAY: CPT

## 2022-11-20 PROCEDURE — 250N000013 HC RX MED GY IP 250 OP 250 PS 637: Performed by: STUDENT IN AN ORGANIZED HEALTH CARE EDUCATION/TRAINING PROGRAM

## 2022-11-20 PROCEDURE — 200N000002 HC R&B ICU UMMC

## 2022-11-20 PROCEDURE — 99291 CRITICAL CARE FIRST HOUR: CPT | Mod: 24 | Performed by: ANESTHESIOLOGY

## 2022-11-20 PROCEDURE — 83605 ASSAY OF LACTIC ACID: CPT

## 2022-11-20 RX ORDER — POLYETHYLENE GLYCOL 3350 17 G/17G
17 POWDER, FOR SOLUTION ORAL 2 TIMES DAILY
Status: DISCONTINUED | OUTPATIENT
Start: 2022-11-20 | End: 2022-11-20

## 2022-11-20 RX ORDER — AMOXICILLIN 250 MG
2 CAPSULE ORAL 2 TIMES DAILY
Status: DISCONTINUED | OUTPATIENT
Start: 2022-11-20 | End: 2022-11-21

## 2022-11-20 RX ORDER — FUROSEMIDE 10 MG/ML
60 INJECTION INTRAMUSCULAR; INTRAVENOUS ONCE
Status: COMPLETED | OUTPATIENT
Start: 2022-11-20 | End: 2022-11-20

## 2022-11-20 RX ORDER — QUETIAPINE FUMARATE 25 MG/1
25 TABLET, FILM COATED ORAL 2 TIMES DAILY
Status: DISCONTINUED | OUTPATIENT
Start: 2022-11-20 | End: 2022-11-21

## 2022-11-20 RX ORDER — POLYETHYLENE GLYCOL 3350 17 G/17G
17 POWDER, FOR SOLUTION ORAL DAILY
Status: DISCONTINUED | OUTPATIENT
Start: 2022-11-21 | End: 2022-11-21

## 2022-11-20 RX ORDER — QUETIAPINE FUMARATE 50 MG/1
50 TABLET, FILM COATED ORAL AT BEDTIME
Status: DISCONTINUED | OUTPATIENT
Start: 2022-11-20 | End: 2022-11-22

## 2022-11-20 RX ORDER — FUROSEMIDE 10 MG/ML
40 INJECTION INTRAMUSCULAR; INTRAVENOUS ONCE
Status: COMPLETED | OUTPATIENT
Start: 2022-11-20 | End: 2022-11-20

## 2022-11-20 RX ADMIN — POTASSIUM & SODIUM PHOSPHATES POWDER PACK 280-160-250 MG 1 PACKET: 280-160-250 PACK at 13:52

## 2022-11-20 RX ADMIN — Medication 40 MG: at 07:54

## 2022-11-20 RX ADMIN — PROPOFOL 5 MCG/KG/MIN: 10 INJECTION, EMULSION INTRAVENOUS at 09:54

## 2022-11-20 RX ADMIN — ASPIRIN 81 MG CHEWABLE TABLET 81 MG: 81 TABLET CHEWABLE at 07:54

## 2022-11-20 RX ADMIN — POTASSIUM & SODIUM PHOSPHATES POWDER PACK 280-160-250 MG 1 PACKET: 280-160-250 PACK at 05:31

## 2022-11-20 RX ADMIN — HYDROCORTISONE SODIUM SUCCINATE 100 MG: 100 INJECTION, POWDER, FOR SOLUTION INTRAMUSCULAR; INTRAVENOUS at 09:39

## 2022-11-20 RX ADMIN — OXYCODONE HYDROCHLORIDE 5 MG: 5 TABLET ORAL at 21:24

## 2022-11-20 RX ADMIN — SENNOSIDES AND DOCUSATE SODIUM 2 TABLET: 8.6; 5 TABLET ORAL at 07:54

## 2022-11-20 RX ADMIN — HUMAN INSULIN 3 UNITS/HR: 100 INJECTION, SOLUTION SUBCUTANEOUS at 10:06

## 2022-11-20 RX ADMIN — Medication 15 ML: at 07:54

## 2022-11-20 RX ADMIN — FUROSEMIDE 40 MG: 10 INJECTION, SOLUTION INTRAVENOUS at 13:51

## 2022-11-20 RX ADMIN — PIPERACILLIN SODIUM AND TAZOBACTAM SODIUM 4.5 G: 4; .5 INJECTION, POWDER, LYOPHILIZED, FOR SOLUTION INTRAVENOUS at 09:39

## 2022-11-20 RX ADMIN — POLYETHYLENE GLYCOL 3350 17 G: 17 POWDER, FOR SOLUTION ORAL at 07:54

## 2022-11-20 RX ADMIN — HEPARIN SODIUM 5000 UNITS: 5000 INJECTION, SOLUTION INTRAVENOUS; SUBCUTANEOUS at 20:57

## 2022-11-20 RX ADMIN — PIPERACILLIN SODIUM AND TAZOBACTAM SODIUM 4.5 G: 4; .5 INJECTION, POWDER, LYOPHILIZED, FOR SOLUTION INTRAVENOUS at 16:19

## 2022-11-20 RX ADMIN — HEPARIN SODIUM 5000 UNITS: 5000 INJECTION, SOLUTION INTRAVENOUS; SUBCUTANEOUS at 03:36

## 2022-11-20 RX ADMIN — QUETIAPINE FUMARATE 50 MG: 50 TABLET ORAL at 21:24

## 2022-11-20 RX ADMIN — OXYCODONE HYDROCHLORIDE 5 MG: 5 TABLET ORAL at 03:36

## 2022-11-20 RX ADMIN — PIPERACILLIN SODIUM AND TAZOBACTAM SODIUM 4.5 G: 4; .5 INJECTION, POWDER, LYOPHILIZED, FOR SOLUTION INTRAVENOUS at 20:57

## 2022-11-20 RX ADMIN — Medication 1 PACKET: at 07:55

## 2022-11-20 RX ADMIN — POTASSIUM & SODIUM PHOSPHATES POWDER PACK 280-160-250 MG 1 PACKET: 280-160-250 PACK at 09:39

## 2022-11-20 RX ADMIN — HYDROCORTISONE SODIUM SUCCINATE 100 MG: 100 INJECTION, POWDER, FOR SOLUTION INTRAMUSCULAR; INTRAVENOUS at 17:14

## 2022-11-20 RX ADMIN — FUROSEMIDE 60 MG: 10 INJECTION, SOLUTION INTRAVENOUS at 16:19

## 2022-11-20 RX ADMIN — HEPARIN SODIUM 5000 UNITS: 5000 INJECTION, SOLUTION INTRAVENOUS; SUBCUTANEOUS at 12:17

## 2022-11-20 RX ADMIN — HYDROCORTISONE SODIUM SUCCINATE 100 MG: 100 INJECTION, POWDER, FOR SOLUTION INTRAMUSCULAR; INTRAVENOUS at 01:57

## 2022-11-20 RX ADMIN — PIPERACILLIN SODIUM AND TAZOBACTAM SODIUM 4.5 G: 4; .5 INJECTION, POWDER, LYOPHILIZED, FOR SOLUTION INTRAVENOUS at 02:56

## 2022-11-20 RX ADMIN — QUETIAPINE FUMARATE 25 MG: 25 TABLET ORAL at 13:52

## 2022-11-20 ASSESSMENT — ACTIVITIES OF DAILY LIVING (ADL)
ADLS_ACUITY_SCORE: 34
ADLS_ACUITY_SCORE: 30
ADLS_ACUITY_SCORE: 34
ADLS_ACUITY_SCORE: 34
ADLS_ACUITY_SCORE: 30

## 2022-11-20 NOTE — PROGRESS NOTES
CV ICU PROGRESS NOTE  November 17, 2022      CO-MORBIDITIES:   Severe aortic stenosis  (primary encounter diagnosis)    ASSESSMENT: Jade Walker is a 87 year old female with PMHx of severe aortic stenosis HTN, HLD, non-obstructive CAD, CKD stage III, IBS s/p TAVR by c/b LV perforation surgically repaired by Dr. Pulido 11/16. Intraop got 600 cellsaver, 2 plts, 1 ffp, 4 prbcs, and 1500 kcentra. Valve well seated.     Last 24 hours:  Goal net even  PPFT placement  PST in PM  [place] placement    Today's Progress:  Restart fentanyl gtt  Stopped Versed gtt, consider propofol gtt for sedation  Seroquel 25/25/50  Stopped Vancomycin  Advancing TFs      PLAN:  Neuro/ pain/ sedation:  #Acute postoperative pain  #Sedation  Previously agitated while weaning sedation  - Monitor neurological status. Notify the MD for any acute changes in exam.  - Pain:    Scheduled tylenol.    PRN tylenol, oxycodone, dilaudid.  - Sedation: switch from versed to propofol gtt while using PRN regimen  - seroquel 25/25/50     Pulmonary care:   - mechanical ventilation 12/300/5/30%  - Goal SpO2 > 92%  - Incentive spirometer Q15-30 minutes when awake.  - no PST today     Cardiovascular:    # Severe aortic stenosis s/p TAVR  # Pericardial effusion s/p pericardial drain  # Hypertension  # Hyperlipidemia  # Non obstructive CAD  Increasing pressor needs 11/19, unclear for infectious etiology  - off pressors  - Monitor hemodynamic status.   - d ASA  - TTE (11/16), then KULDIP (11/16) negative for significantpericardial effusion  - amio drip for Afib HR , sinus  - if pressor needs increases further, consider stress dose steroids    GI care:   # Irritable Bowel Syndrome     - NPO except ice chips and medications, consider trickle feeds tonight  - Nutrition consulted, appreciate recommendations  - PPI for bowel prophylaxis  - Bowel regimen: MiraLAX bid, senna 2 tabs bid, milk of mag  - advance TF to 30cc/hr  - NJT placement needed, currently  NG    Renal/ Fluid Balance:    # Chronic Kidney Disease Stage 3  - ICU electrolyte replacement protocol   - Baseline Cr 0.9  - Strict I&Os  - monitor UOP  - fluid resuscitation as needed  - lasix 40 x1, lasix 60  - Goal net even    Endocrine:    #Stress hyperglycemia  - insulin gtt     ID/ Antibiotics:  #Postoperative prophylactic antibiotics  - Completing perioperative course of antibiotics: ancef  - no indication for further antibiotics  - follow Bcx and sputum cx  - started zosyn  - MRSA nares negative 11/19, discontinue vanc    Heme:     # Acute blood loss anemia  # Thrombocytopenia  # Pericardial effusion s/p pericardiocentesis  2 pt Hgb drop overnight  - Hgb goal > 7.0  - Hemoglobin stable.  - 2U pRBC (11/17)  - SQH  - HIT test negative     Prophylaxis:    - VTE: SCD's, heparin 5000u sq  - Bowel regimen: PPI, MiraLAX, senna     Lines/ tubes/ drains:  - ETT  - L A line  - 2x set of v wires (not paced)  - 2x med CTs  - Park, NG  - PICC    Disposition:  - CVICU     ICU Checklist  F - Feeding:   A - Analgesia:   S - Sedation:   T - Thromboembolic prophylaxis:      H - Head of bed elevated  U - Ulcer prophylaxis:  G - Glycemic control  S - SBT     B - Bowel regimen  I - Indwelling catheter  D - De-escalation of antibiotics     Patient seen, findings and plan discussed with CVICU staff.    Jesus Irizarry MD PGY2    ====================================    SUBJECTIVE:   Intubated.    OBJECTIVE:   1. VITAL SIGNS:   Temp:  [97.3  F (36.3  C)-99.5  F (37.5  C)] 97.3  F (36.3  C)  Pulse:  [] 68  Resp:  [12-22] 15  BP: ()/(40-73) 122/63  MAP:  [47 mmHg-101 mmHg] 87 mmHg  Arterial Line BP: ()/(32-80) 92/80  FiO2 (%):  [30 %] 30 %  SpO2:  [84 %-100 %] 98 %  Vent Mode: CMV/AC  (Continuous Mandatory Ventilation/ Assist Control)  FiO2 (%): 30 %  Resp Rate (Set): 12 breaths/min  Tidal Volume (Set, mL): 300 mL  PEEP (cm H2O): 5 cmH2O  Pressure Support (cm H2O): 7 cmH2O  Resp: 15      2. INTAKE/ OUTPUT:   I/O last  3 completed shifts:  In: 4125.26 [I.V.:1315.26; NG/GT:330; IV Piggyback:2000]  Out: 495 [Urine:395; Chest Tube:100]    3. PHYSICAL EXAMINATION:   General: NAD  Neuro: A&Ox3  Resp: Breathing non-labored, intubated  CV: RRR  Abdomen: Soft, Non-distended, Non-tender  Incisions: c/d/i  Extremities: warm and well perfused    4. INVESTIGATIONS:   Arterial Blood Gases   Recent Labs   Lab 11/20/22  0327 11/19/22  1730 11/19/22  1145 11/19/22  0838   PH 7.49* 7.45 7.39 7.45   PCO2 33* 35 43 36   PO2 75* 76* 77* 100   HCO3 25 24 26 25     Complete Blood Count   Recent Labs   Lab 11/20/22  0327 11/19/22  1729 11/19/22  0320 11/18/22  0241   WBC 9.3 9.9 8.9 12.6*   HGB 8.5* 8.7* 8.5* 9.8*   PLT 89* 85* 72* 98*     Basic Metabolic Panel  Recent Labs   Lab 11/20/22  0645 11/20/22  0327 11/20/22  0326 11/20/22  0108 11/19/22  2114 11/19/22  1729 11/19/22  0942 11/19/22  0940 11/19/22  0432 11/19/22  0321 11/18/22  0242 11/18/22  0241   NA  --  135*  --   --   --  139  --   --   --  136  --  135*   POTASSIUM  --  3.9  --   --   --  4.3  --  3.9  --  3.7   < > 4.3   CHLORIDE  --  106  --   --   --  108*  --   --   --  104  --  105   CO2  --  22  --   --   --  19*  --   --   --  24  --  22   BUN  --  33.1*  --   --   --  34.2*  --   --   --  22.3  --  16.9   CR  --  0.78  --   --   --  0.88  --   --   --  0.79  --  0.92   * 149* 142* 167*   < > 213*   < >  --    < > 110*   < > 123*    < > = values in this interval not displayed.     Liver Function Tests  Recent Labs   Lab 11/19/22  0321 11/19/22  0320 11/18/22  0241 11/17/22  1200 11/17/22  0639 11/17/22  0337 11/16/22  2202 11/16/22  1736   AST 79*  --  146*  --  172* 141*   < > 69*   ALT 11  --  20  --  27 24   < > 24   ALKPHOS 52  --  44  --  37 34*   < > 41   BILITOTAL 0.4  --  0.3  --  0.5 0.4   < > 1.0   ALBUMIN 2.5*  --  2.8*  --  3.2* 3.3*   < > 2.8*   INR  --  1.15 1.24* 1.35*  --   --   --  1.30*    < > = values in this interval not displayed.     Pancreatic  Enzymes  No lab results found in last 7 days.  Coagulation Profile  Recent Labs   Lab 11/19/22  0320 11/18/22  0927 11/18/22  0241 11/17/22  1200 11/16/22  1736 11/16/22  1630   INR 1.15  --  1.24* 1.35* 1.30* 1.36*   PTT  --  28  --  29 30 33         5. RADIOLOGY:   No results found for this or any previous visit (from the past 24 hour(s)).    =========================================

## 2022-11-20 NOTE — PLAN OF CARE
Major Shift Events:  to was a day of rest for pt per MDs. Pt off versed, restarted fent for comfort, propofol as needed. Lasix given x2. Levo and vaso off. Multiple loose BMs today. ART line positional and dampened, MD aware, using cuff pressures and will keep ART line for lab draws as of now.    Plan: get NG post pyloric, increase TF. Stop sedation and pressor support via vent tomorrow?  For vital signs and complete assessments, please see documentation flowsheets.

## 2022-11-20 NOTE — PLAN OF CARE
Major Shift Events:  pt vented on minimal settings - CMV 30%/12/5/300. CT output <10mL q2h. Arouses to voice and follows commands intermittently. Versed running at 4 mg/hr. PERRLA, afebrile. HR 60-80, NSR. Vaso turned off, levo running to keep MAP>65. CVP 17. UOP 10-30 mL/hr. TF running at goal. Insulin gtt titrated to maintain BG within ordered parameters. Phos replaced. Art line no longer accurate, measuring BP from cuff per CVTS. Morning SVO2 46, CVTS notified.     Plan: titrate down sedation and pressors, PST repeat today    For vital signs and complete assessments, please see documentation flowsheets.

## 2022-11-21 ENCOUNTER — APPOINTMENT (OUTPATIENT)
Dept: GENERAL RADIOLOGY | Facility: CLINIC | Age: 87
DRG: 266 | End: 2022-11-21
Attending: INTERNAL MEDICINE
Payer: COMMERCIAL

## 2022-11-21 ENCOUNTER — APPOINTMENT (OUTPATIENT)
Dept: GENERAL RADIOLOGY | Facility: CLINIC | Age: 87
DRG: 266 | End: 2022-11-21
Attending: THORACIC SURGERY (CARDIOTHORACIC VASCULAR SURGERY)
Payer: COMMERCIAL

## 2022-11-21 LAB
ANION GAP SERPL CALCULATED.3IONS-SCNC: 10 MMOL/L (ref 7–15)
ANION GAP SERPL CALCULATED.3IONS-SCNC: 13 MMOL/L (ref 7–15)
ATRIAL RATE - MUSE: 87 BPM
BACTERIA UR CULT: NO GROWTH
BASE EXCESS BLDV CALC-SCNC: 9 MMOL/L (ref -7.7–1.9)
BUN SERPL-MCNC: 34.1 MG/DL (ref 8–23)
BUN SERPL-MCNC: 35.3 MG/DL (ref 8–23)
CA-I BLD-MCNC: 4.4 MG/DL (ref 4.4–5.2)
CALCIUM SERPL-MCNC: 7.5 MG/DL (ref 8.8–10.2)
CALCIUM SERPL-MCNC: 8.1 MG/DL (ref 8.8–10.2)
CHLORIDE SERPL-SCNC: 104 MMOL/L (ref 98–107)
CHLORIDE SERPL-SCNC: 105 MMOL/L (ref 98–107)
CREAT SERPL-MCNC: 1 MG/DL (ref 0.51–0.95)
CREAT SERPL-MCNC: 1.02 MG/DL (ref 0.51–0.95)
DEPRECATED HCO3 PLAS-SCNC: 26 MMOL/L (ref 22–29)
DEPRECATED HCO3 PLAS-SCNC: 27 MMOL/L (ref 22–29)
DIASTOLIC BLOOD PRESSURE - MUSE: NORMAL MMHG
ERYTHROCYTE [DISTWIDTH] IN BLOOD BY AUTOMATED COUNT: 15.7 % (ref 10–15)
GFR SERPL CREATININE-BSD FRML MDRD: 54 ML/MIN/1.73M2
GFR SERPL CREATININE-BSD FRML MDRD: 55 ML/MIN/1.73M2
GLUCOSE BLDC GLUCOMTR-MCNC: 113 MG/DL (ref 70–99)
GLUCOSE BLDC GLUCOMTR-MCNC: 118 MG/DL (ref 70–99)
GLUCOSE BLDC GLUCOMTR-MCNC: 122 MG/DL (ref 70–99)
GLUCOSE BLDC GLUCOMTR-MCNC: 123 MG/DL (ref 70–99)
GLUCOSE BLDC GLUCOMTR-MCNC: 125 MG/DL (ref 70–99)
GLUCOSE BLDC GLUCOMTR-MCNC: 129 MG/DL (ref 70–99)
GLUCOSE BLDC GLUCOMTR-MCNC: 155 MG/DL (ref 70–99)
GLUCOSE BLDC GLUCOMTR-MCNC: 162 MG/DL (ref 70–99)
GLUCOSE BLDC GLUCOMTR-MCNC: 163 MG/DL (ref 70–99)
GLUCOSE BLDC GLUCOMTR-MCNC: 166 MG/DL (ref 70–99)
GLUCOSE BLDC GLUCOMTR-MCNC: 70 MG/DL (ref 70–99)
GLUCOSE BLDC GLUCOMTR-MCNC: 85 MG/DL (ref 70–99)
GLUCOSE SERPL-MCNC: 127 MG/DL (ref 70–99)
GLUCOSE SERPL-MCNC: 160 MG/DL (ref 70–99)
HCO3 BLDV-SCNC: 33 MMOL/L (ref 21–28)
HCT VFR BLD AUTO: 26.6 % (ref 35–47)
HGB BLD-MCNC: 8.7 G/DL (ref 11.7–15.7)
INTERPRETATION ECG - MUSE: NORMAL
LACTATE SERPL-SCNC: 1.2 MMOL/L (ref 0.7–2)
MAGNESIUM SERPL-MCNC: 1.8 MG/DL (ref 1.7–2.3)
MAGNESIUM SERPL-MCNC: 2.3 MG/DL (ref 1.7–2.3)
MCH RBC QN AUTO: 30.6 PG (ref 26.5–33)
MCHC RBC AUTO-ENTMCNC: 32.7 G/DL (ref 31.5–36.5)
MCV RBC AUTO: 94 FL (ref 78–100)
O2/TOTAL GAS SETTING VFR VENT: 30 %
P AXIS - MUSE: NORMAL DEGREES
PCO2 BLDV: 41 MM HG (ref 40–50)
PH BLDV: 7.51 [PH] (ref 7.32–7.43)
PHOSPHATE SERPL-MCNC: 2.7 MG/DL (ref 2.5–4.5)
PLATELET # BLD AUTO: 127 10E3/UL (ref 150–450)
PO2 BLDV: 31 MM HG (ref 25–47)
POTASSIUM SERPL-SCNC: 3 MMOL/L (ref 3.4–5.3)
POTASSIUM SERPL-SCNC: 3.2 MMOL/L (ref 3.4–5.3)
POTASSIUM SERPL-SCNC: 3.7 MMOL/L (ref 3.4–5.3)
PR INTERVAL - MUSE: NORMAL MS
QRS DURATION - MUSE: 132 MS
QT - MUSE: 414 MS
QTC - MUSE: 498 MS
R AXIS - MUSE: -19 DEGREES
RBC # BLD AUTO: 2.84 10E6/UL (ref 3.8–5.2)
SODIUM SERPL-SCNC: 142 MMOL/L (ref 136–145)
SODIUM SERPL-SCNC: 143 MMOL/L (ref 136–145)
SYSTOLIC BLOOD PRESSURE - MUSE: NORMAL MMHG
T AXIS - MUSE: 116 DEGREES
VENTRICULAR RATE- MUSE: 87 BPM
WBC # BLD AUTO: 8.5 10E3/UL (ref 4–11)

## 2022-11-21 PROCEDURE — 71045 X-RAY EXAM CHEST 1 VIEW: CPT | Mod: 26 | Performed by: RADIOLOGY

## 2022-11-21 PROCEDURE — 250N000011 HC RX IP 250 OP 636

## 2022-11-21 PROCEDURE — 82803 BLOOD GASES ANY COMBINATION: CPT | Performed by: STUDENT IN AN ORGANIZED HEALTH CARE EDUCATION/TRAINING PROGRAM

## 2022-11-21 PROCEDURE — 84132 ASSAY OF SERUM POTASSIUM: CPT

## 2022-11-21 PROCEDURE — 84132 ASSAY OF SERUM POTASSIUM: CPT | Performed by: THORACIC SURGERY (CARDIOTHORACIC VASCULAR SURGERY)

## 2022-11-21 PROCEDURE — 200N000002 HC R&B ICU UMMC

## 2022-11-21 PROCEDURE — 71045 X-RAY EXAM CHEST 1 VIEW: CPT | Mod: 77

## 2022-11-21 PROCEDURE — 250N000013 HC RX MED GY IP 250 OP 250 PS 637: Performed by: STUDENT IN AN ORGANIZED HEALTH CARE EDUCATION/TRAINING PROGRAM

## 2022-11-21 PROCEDURE — 83605 ASSAY OF LACTIC ACID: CPT

## 2022-11-21 PROCEDURE — 250N000009 HC RX 250

## 2022-11-21 PROCEDURE — 71045 X-RAY EXAM CHEST 1 VIEW: CPT

## 2022-11-21 PROCEDURE — 250N000013 HC RX MED GY IP 250 OP 250 PS 637: Performed by: THORACIC SURGERY (CARDIOTHORACIC VASCULAR SURGERY)

## 2022-11-21 PROCEDURE — 250N000011 HC RX IP 250 OP 636: Performed by: THORACIC SURGERY (CARDIOTHORACIC VASCULAR SURGERY)

## 2022-11-21 PROCEDURE — 999N000157 HC STATISTIC RCP TIME EA 10 MIN

## 2022-11-21 PROCEDURE — 250N000011 HC RX IP 250 OP 636: Performed by: STUDENT IN AN ORGANIZED HEALTH CARE EDUCATION/TRAINING PROGRAM

## 2022-11-21 PROCEDURE — 83735 ASSAY OF MAGNESIUM: CPT | Performed by: STUDENT IN AN ORGANIZED HEALTH CARE EDUCATION/TRAINING PROGRAM

## 2022-11-21 PROCEDURE — 250N000013 HC RX MED GY IP 250 OP 250 PS 637

## 2022-11-21 PROCEDURE — 258N000003 HC RX IP 258 OP 636

## 2022-11-21 PROCEDURE — 82330 ASSAY OF CALCIUM: CPT

## 2022-11-21 PROCEDURE — 82310 ASSAY OF CALCIUM: CPT | Performed by: STUDENT IN AN ORGANIZED HEALTH CARE EDUCATION/TRAINING PROGRAM

## 2022-11-21 PROCEDURE — 84100 ASSAY OF PHOSPHORUS: CPT | Performed by: STUDENT IN AN ORGANIZED HEALTH CARE EDUCATION/TRAINING PROGRAM

## 2022-11-21 PROCEDURE — 83735 ASSAY OF MAGNESIUM: CPT

## 2022-11-21 PROCEDURE — 85014 HEMATOCRIT: CPT | Performed by: STUDENT IN AN ORGANIZED HEALTH CARE EDUCATION/TRAINING PROGRAM

## 2022-11-21 PROCEDURE — 94003 VENT MGMT INPAT SUBQ DAY: CPT

## 2022-11-21 PROCEDURE — 99291 CRITICAL CARE FIRST HOUR: CPT | Mod: 24 | Performed by: ANESTHESIOLOGY

## 2022-11-21 PROCEDURE — 258N000003 HC RX IP 258 OP 636: Performed by: STUDENT IN AN ORGANIZED HEALTH CARE EDUCATION/TRAINING PROGRAM

## 2022-11-21 RX ORDER — METOPROLOL TARTRATE 1 MG/ML
5 INJECTION, SOLUTION INTRAVENOUS ONCE
Status: COMPLETED | OUTPATIENT
Start: 2022-11-21 | End: 2022-11-21

## 2022-11-21 RX ORDER — POTASSIUM CHLORIDE 20MEQ/15ML
40 LIQUID (ML) ORAL ONCE
Status: COMPLETED | OUTPATIENT
Start: 2022-11-21 | End: 2022-11-21

## 2022-11-21 RX ORDER — POTASSIUM CHLORIDE 1.5 G/1.58G
40 POWDER, FOR SOLUTION ORAL ONCE
Status: COMPLETED | OUTPATIENT
Start: 2022-11-21 | End: 2022-11-21

## 2022-11-21 RX ORDER — FUROSEMIDE 10 MG/ML
40 INJECTION INTRAMUSCULAR; INTRAVENOUS ONCE
Status: COMPLETED | OUTPATIENT
Start: 2022-11-21 | End: 2022-11-21

## 2022-11-21 RX ORDER — MAGNESIUM SULFATE HEPTAHYDRATE 40 MG/ML
2 INJECTION, SOLUTION INTRAVENOUS ONCE
Status: COMPLETED | OUTPATIENT
Start: 2022-11-21 | End: 2022-11-21

## 2022-11-21 RX ORDER — METOPROLOL TARTRATE 1 MG/ML
INJECTION, SOLUTION INTRAVENOUS
Status: COMPLETED
Start: 2022-11-21 | End: 2022-11-21

## 2022-11-21 RX ORDER — POLYETHYLENE GLYCOL 3350 17 G/17G
17 POWDER, FOR SOLUTION ORAL DAILY PRN
Status: DISCONTINUED | OUTPATIENT
Start: 2022-11-21 | End: 2022-12-01 | Stop reason: HOSPADM

## 2022-11-21 RX ORDER — POTASSIUM CHLORIDE 1.5 G/1.58G
20 POWDER, FOR SOLUTION ORAL ONCE
Status: COMPLETED | OUTPATIENT
Start: 2022-11-21 | End: 2022-11-21

## 2022-11-21 RX ORDER — AMIODARONE HYDROCHLORIDE 200 MG/1
400 TABLET ORAL 2 TIMES DAILY
Status: DISCONTINUED | OUTPATIENT
Start: 2022-11-21 | End: 2022-11-21

## 2022-11-21 RX ORDER — PIPERACILLIN SODIUM, TAZOBACTAM SODIUM 4; .5 G/20ML; G/20ML
4.5 INJECTION, POWDER, LYOPHILIZED, FOR SOLUTION INTRAVENOUS EVERY 6 HOURS
Status: DISCONTINUED | OUTPATIENT
Start: 2022-11-21 | End: 2022-11-22

## 2022-11-21 RX ORDER — POTASSIUM CHLORIDE 20MEQ/15ML
20 LIQUID (ML) ORAL ONCE
Status: COMPLETED | OUTPATIENT
Start: 2022-11-21 | End: 2022-11-21

## 2022-11-21 RX ORDER — DEXMEDETOMIDINE HYDROCHLORIDE 4 UG/ML
.1-1.2 INJECTION, SOLUTION INTRAVENOUS CONTINUOUS
Status: DISCONTINUED | OUTPATIENT
Start: 2022-11-21 | End: 2022-11-22

## 2022-11-21 RX ORDER — AMOXICILLIN 250 MG
1 CAPSULE ORAL 2 TIMES DAILY
Status: DISCONTINUED | OUTPATIENT
Start: 2022-11-21 | End: 2022-11-24

## 2022-11-21 RX ADMIN — AMIODARONE HYDROCHLORIDE 150 MG: 1.5 INJECTION, SOLUTION INTRAVENOUS at 18:04

## 2022-11-21 RX ADMIN — HEPARIN SODIUM 5000 UNITS: 5000 INJECTION, SOLUTION INTRAVENOUS; SUBCUTANEOUS at 20:15

## 2022-11-21 RX ADMIN — HEPARIN SODIUM 5000 UNITS: 5000 INJECTION, SOLUTION INTRAVENOUS; SUBCUTANEOUS at 13:30

## 2022-11-21 RX ADMIN — HYDRALAZINE HYDROCHLORIDE 10 MG: 20 INJECTION INTRAMUSCULAR; INTRAVENOUS at 15:39

## 2022-11-21 RX ADMIN — QUETIAPINE FUMARATE 25 MG: 25 TABLET ORAL at 05:38

## 2022-11-21 RX ADMIN — HEPARIN SODIUM 5000 UNITS: 5000 INJECTION, SOLUTION INTRAVENOUS; SUBCUTANEOUS at 03:22

## 2022-11-21 RX ADMIN — POTASSIUM CHLORIDE 40 MEQ: 40 SOLUTION ORAL at 04:43

## 2022-11-21 RX ADMIN — PIPERACILLIN SODIUM AND TAZOBACTAM SODIUM 4.5 G: 4; .5 INJECTION, POWDER, LYOPHILIZED, FOR SOLUTION INTRAVENOUS at 15:58

## 2022-11-21 RX ADMIN — PIPERACILLIN SODIUM AND TAZOBACTAM SODIUM 4.5 G: 4; .5 INJECTION, POWDER, LYOPHILIZED, FOR SOLUTION INTRAVENOUS at 21:48

## 2022-11-21 RX ADMIN — PIPERACILLIN SODIUM AND TAZOBACTAM SODIUM 4.5 G: 4; .5 INJECTION, POWDER, LYOPHILIZED, FOR SOLUTION INTRAVENOUS at 09:58

## 2022-11-21 RX ADMIN — HYDROCORTISONE SODIUM SUCCINATE 100 MG: 100 INJECTION, POWDER, FOR SOLUTION INTRAMUSCULAR; INTRAVENOUS at 18:02

## 2022-11-21 RX ADMIN — Medication 15 ML: at 09:58

## 2022-11-21 RX ADMIN — METOPROLOL TARTRATE 5 MG: 5 INJECTION INTRAVENOUS at 17:54

## 2022-11-21 RX ADMIN — Medication 40 MG: at 11:19

## 2022-11-21 RX ADMIN — PROPOFOL 10 MCG/KG/MIN: 10 INJECTION, EMULSION INTRAVENOUS at 18:00

## 2022-11-21 RX ADMIN — DEXMEDETOMIDINE HYDROCHLORIDE 0.2 MCG/KG/HR: 400 INJECTION INTRAVENOUS at 20:13

## 2022-11-21 RX ADMIN — QUETIAPINE FUMARATE 50 MG: 50 TABLET ORAL at 22:36

## 2022-11-21 RX ADMIN — HYDROCORTISONE SODIUM SUCCINATE 100 MG: 100 INJECTION, POWDER, FOR SOLUTION INTRAMUSCULAR; INTRAVENOUS at 01:28

## 2022-11-21 RX ADMIN — MAGNESIUM SULFATE HEPTAHYDRATE 2 G: 40 INJECTION, SOLUTION INTRAVENOUS at 04:43

## 2022-11-21 RX ADMIN — PIPERACILLIN SODIUM AND TAZOBACTAM SODIUM 4.5 G: 4; .5 INJECTION, POWDER, LYOPHILIZED, FOR SOLUTION INTRAVENOUS at 03:22

## 2022-11-21 RX ADMIN — AMIODARONE HYDROCHLORIDE 1 MG/MIN: 50 INJECTION, SOLUTION INTRAVENOUS at 17:57

## 2022-11-21 RX ADMIN — CALCIUM GLUCONATE 1 G: 20 INJECTION, SOLUTION INTRAVENOUS at 04:44

## 2022-11-21 RX ADMIN — POTASSIUM CHLORIDE 20 MEQ: 1.5 POWDER, FOR SOLUTION ORAL at 18:07

## 2022-11-21 RX ADMIN — METOPROLOL TARTRATE 5 MG: 1 INJECTION, SOLUTION INTRAVENOUS at 17:54

## 2022-11-21 RX ADMIN — POTASSIUM CHLORIDE 20 MEQ: 20 SOLUTION ORAL at 05:38

## 2022-11-21 RX ADMIN — POTASSIUM CHLORIDE 40 MEQ: 1.5 POWDER, FOR SOLUTION ORAL at 13:29

## 2022-11-21 RX ADMIN — HUMAN INSULIN 2 UNITS/HR: 100 INJECTION, SOLUTION SUBCUTANEOUS at 20:03

## 2022-11-21 RX ADMIN — ASPIRIN 81 MG CHEWABLE TABLET 81 MG: 81 TABLET CHEWABLE at 09:59

## 2022-11-21 RX ADMIN — HYDROCORTISONE SODIUM SUCCINATE 100 MG: 100 INJECTION, POWDER, FOR SOLUTION INTRAMUSCULAR; INTRAVENOUS at 09:58

## 2022-11-21 RX ADMIN — AMIODARONE HYDROCHLORIDE 0.5 MG/MIN: 50 INJECTION, SOLUTION INTRAVENOUS at 05:02

## 2022-11-21 RX ADMIN — FUROSEMIDE 40 MG: 10 INJECTION, SOLUTION INTRAVENOUS at 09:59

## 2022-11-21 RX ADMIN — Medication 1 PACKET: at 10:00

## 2022-11-21 ASSESSMENT — ACTIVITIES OF DAILY LIVING (ADL)
ADLS_ACUITY_SCORE: 36
ADLS_ACUITY_SCORE: 36
ADLS_ACUITY_SCORE: 34
ADLS_ACUITY_SCORE: 36
ADLS_ACUITY_SCORE: 30
ADLS_ACUITY_SCORE: 30
ADLS_ACUITY_SCORE: 34
ADLS_ACUITY_SCORE: 36
ADLS_ACUITY_SCORE: 30
ADLS_ACUITY_SCORE: 36
ADLS_ACUITY_SCORE: 36
ADLS_ACUITY_SCORE: 34

## 2022-11-21 NOTE — PROGRESS NOTES
CV ICU PROGRESS NOTE      CO-MORBIDITIES:   Severe aortic stenosis  (primary encounter diagnosis)    ASSESSMENT: Jade Walker is a 87 year old female with PMHx of severe aortic stenosis HTN, HLD, non-obstructive CAD, CKD stage III, IBS s/p TAVR by c/b LV perforation surgically repaired by Dr. Pulido 11/16. Intraop got 600 cellsaver, 2 plts, 1 ffp, 4 prbcs, and 1500 kcentra. Valve well seated.     Last 24 hours:  Restart fentanyl gtt  Stopped Versed gtt, consider propofol gtt for sedation  Seroquel 25/25/50  Stopped Vancomycin  Advancing TFs    Today's Progress:  Wean fentanyl today  PST with goal of extubation today  Hold TFs for possible extubation  Goal Net - 1 L  40 Lasix IV x1  End date on Zosyn  V wire and CT removal  Afib, re-started amio drip 0.5 (consider bolus if persistant)    PLAN:  Neuro/ pain/ sedation:  #Acute postoperative pain  #Sedation  Previously agitated while weaning sedation  - Monitor neurological status. Notify the MD for any acute changes in exam.  - Pain:    Wean off fentanyl gtt as able   Scheduled tylenol.    PRN tylenol, oxycodone, dilaudid.  - Sedation: switch from versed to propofol gtt while using PRN regimen  - seroquel 50 at bedtime  - avoid sedation at daytime to maximize alertness  - precedex gtt overnight    Pulmonary care:   - mechanical ventilation 12/300/5/30%  - Goal SpO2 > 92%  - Incentive spirometer Q15-30 minutes when awake.  - PST today  - plan for extubation tmrw    Cardiovascular:    # Severe aortic stenosis s/p TAVR  # Pericardial effusion s/p pericardial drain  # Hypertension  # Hyperlipidemia  # Non obstructive CAD  Increasing pressor needs 11/19, unclear for infectious etiology  - off pressors  - Monitor hemodynamic status.   - ASA  - TTE (11/16), then KULDIP (11/16) negative for significantpericardial effusion  - amio drip for Afib HR , Afib in 140s on 11/21 evening, metoprolol 5mg bolus and continue on amio drip at 0.5  - if pressor needs increases  further, consider stress dose steroids    GI care:   # Irritable Bowel Syndrome     - NPO except ice chips and medications, consider trickle feeds tonight  - Nutrition consulted, appreciate recommendations  - PPI for bowel prophylaxis  - Bowel regimen: MiraLAX bid, senna 2 tabs bid, milk of mag  - advance TF to 30cc/hr  - NG, will need NJ if no extubation tmrw    Renal/ Fluid Balance:    # Chronic Kidney Disease Stage 3  - ICU electrolyte replacement protocol   - Baseline Cr 0.9  - Strict I&Os  - monitor UOP  - fluid resuscitation as needed  - Goal net even  - lasix 40 iv    Endocrine:    #Stress hyperglycemia  - insulin gtt     ID/ Antibiotics:  #Postoperative prophylactic antibiotics  - Completing perioperative course of antibiotics: ancef  - no indication for further antibiotics  - follow Bcx and sputum cx  - started zosyn (7 day end date)  - MRSA nares negative 11/19, discontinue vanc    Heme:     # Acute blood loss anemia  # Thrombocytopenia  # Pericardial effusion s/p pericardiocentesis  2 pt Hgb drop overnight  - Hgb goal > 7.0  - Hemoglobin stable.  - 2U pRBC (11/17)  - SQH  - HIT test negative     Prophylaxis:    - VTE: SCD's, heparin 5000u sq  - Bowel regimen: PPI, MiraLAX, senna     Lines/ tubes/ drains:  - ETT  - Park, NG  - PICC    Disposition:  - CVICU     ICU Checklist  F - Feeding:   A - Analgesia:   S - Sedation:   T - Thromboembolic prophylaxis:      H - Head of bed elevated  U - Ulcer prophylaxis:  G - Glycemic control  S - SBT     B - Bowel regimen  I - Indwelling catheter  D - De-escalation of antibiotics     Patient seen, findings and plan discussed with CVICU staff.    Jesus Irizarry MD PGY2    ====================================    SUBJECTIVE:   Intubated.    OBJECTIVE:   1. VITAL SIGNS:   Temp:  [94.8  F (34.9  C)-99  F (37.2  C)] 96.8  F (36  C)  Pulse:  [62-86] 66  Resp:  [10-20] 12  BP: ()/(50-88) 106/54  MAP:  [62 mmHg-103 mmHg] 81 mmHg  Arterial Line BP: ()/()  85/76  FiO2 (%):  [30 %] 30 %  SpO2:  [93 %-100 %] 98 %  Vent Mode: CMV/AC  (Continuous Mandatory Ventilation/ Assist Control)  FiO2 (%): 30 %  Resp Rate (Set): 12 breaths/min  Tidal Volume (Set, mL): 300 mL  PEEP (cm H2O): 5 cmH2O  Resp: 12      2. INTAKE/ OUTPUT:   I/O last 3 completed shifts:  In: 2343.49 [I.V.:893.49; NG/GT:810]  Out: 3032 [Urine:2952; Chest Tube:80]    3. PHYSICAL EXAMINATION:   General: NAD  Neuro: A&Ox3  Resp: Breathing non-labored, intubated  CV: RRR  Abdomen: Soft, Non-distended, Non-tender  Incisions: c/d/i  Extremities: warm and well perfused    4. INVESTIGATIONS:   Arterial Blood Gases   Recent Labs   Lab 11/20/22  0327 11/19/22  1730 11/19/22  1145 11/19/22  0838   PH 7.49* 7.45 7.39 7.45   PCO2 33* 35 43 36   PO2 75* 76* 77* 100   HCO3 25 24 26 25     Complete Blood Count   Recent Labs   Lab 11/21/22  0309 11/20/22  0327 11/19/22  1729 11/19/22  0320   WBC 8.5 9.3 9.9 8.9   HGB 8.7* 8.5* 8.7* 8.5*   * 89* 85* 72*     Basic Metabolic Panel  Recent Labs   Lab 11/21/22  0521 11/21/22  0309 11/21/22  0308 11/21/22  0137 11/20/22  0645 11/20/22  0327 11/19/22  2114 11/19/22  1729 11/19/22  0942 11/19/22  0940 11/19/22  0432 11/19/22  0321   NA  --  142  --   --   --  135*  --  139  --   --   --  136   POTASSIUM  --  3.0*  --   --   --  3.9  --  4.3  --  3.9  --  3.7   CHLORIDE  --  105  --   --   --  106  --  108*  --   --   --  104   CO2  --  27  --   --   --  22  --  19*  --   --   --  24   BUN  --  34.1*  --   --   --  33.1*  --  34.2*  --   --   --  22.3   CR  --  1.00*  --   --   --  0.78  --  0.88  --   --   --  0.79   GLC 85 160* 155* 123*   < > 149*   < > 213*   < >  --    < > 110*    < > = values in this interval not displayed.     Liver Function Tests  Recent Labs   Lab 11/19/22  0321 11/19/22  0320 11/18/22  0241 11/17/22  1200 11/17/22  0639 11/17/22  0337 11/16/22  2202 11/16/22  1736   AST 79*  --  146*  --  172* 141*   < > 69*   ALT 11  --  20  --  27 24   < > 24    ALKPHOS 52  --  44  --  37 34*   < > 41   BILITOTAL 0.4  --  0.3  --  0.5 0.4   < > 1.0   ALBUMIN 2.5*  --  2.8*  --  3.2* 3.3*   < > 2.8*   INR  --  1.15 1.24* 1.35*  --   --   --  1.30*    < > = values in this interval not displayed.     Pancreatic Enzymes  No lab results found in last 7 days.  Coagulation Profile  Recent Labs   Lab 11/19/22  0320 11/18/22  0927 11/18/22  0241 11/17/22  1200 11/16/22  1736 11/16/22  1630   INR 1.15  --  1.24* 1.35* 1.30* 1.36*   PTT  --  28  --  29 30 33         5. RADIOLOGY:   No results found for this or any previous visit (from the past 24 hour(s)).    =========================================

## 2022-11-21 NOTE — PROCEDURES
Procedure Note: Chest tube removal    Pt was assessed at bedside. V pacer wires were removed. No increased CT output or change in CXR was noted. Chest tube area was cleaned, sutures were removed, atrium was detached from suction. Chest tubes were removed while on inspiratory hold. Site was sealed w vaseline gauze dressing. No significant bleeding was noted. Patient tolerated the procedure well. 6 hour post CXR was ordered.    Jesus Irizarry MD PGY2

## 2022-11-21 NOTE — PLAN OF CARE
Major Shift Events:  pt vented on minimal CMV settings. CT output 10mL q4h. Arouses to voice, follows commands intermittently. Fentanyl running for comfort. PERRLA, afebrile. HR 60-70, NSR. Amio at 0.5 for rhythm mgmt. All pressors off. CVP 15. UOP tapering down throughout the night, is now ~40 mL/hr. TF running at goal. Loose BM x1. Insulin gtt titrated to maintain BG within ordered parameters. Potassium, calcium, and mag replaced. Art line removed this morning per CVTS.    Plan: Retry PST, advance NG to post pyloric position, advance TF. Continue POC.    For vital signs and complete assessments, please see documentation flowsheets.

## 2022-11-22 ENCOUNTER — APPOINTMENT (OUTPATIENT)
Dept: GENERAL RADIOLOGY | Facility: CLINIC | Age: 87
DRG: 266 | End: 2022-11-22
Attending: INTERNAL MEDICINE
Payer: COMMERCIAL

## 2022-11-22 LAB
ANION GAP SERPL CALCULATED.3IONS-SCNC: 13 MMOL/L (ref 7–15)
ANION GAP SERPL CALCULATED.3IONS-SCNC: 14 MMOL/L (ref 7–15)
ATRIAL RATE - MUSE: 63 BPM
BACTERIA SPT CULT: ABNORMAL
BACTERIA SPT CULT: ABNORMAL
BASE EXCESS BLDV CALC-SCNC: 4.1 MMOL/L (ref -7.7–1.9)
BASE EXCESS BLDV CALC-SCNC: 9.4 MMOL/L (ref -7.7–1.9)
BASE EXCESS BLDV CALC-SCNC: 9.6 MMOL/L (ref -7.7–1.9)
BUN SERPL-MCNC: 33.7 MG/DL (ref 8–23)
BUN SERPL-MCNC: 35.1 MG/DL (ref 8–23)
CA-I BLD-MCNC: 4.4 MG/DL (ref 4.4–5.2)
CALCIUM SERPL-MCNC: 7.8 MG/DL (ref 8.8–10.2)
CALCIUM SERPL-MCNC: 8.1 MG/DL (ref 8.8–10.2)
CHLORIDE SERPL-SCNC: 105 MMOL/L (ref 98–107)
CHLORIDE SERPL-SCNC: 106 MMOL/L (ref 98–107)
CREAT SERPL-MCNC: 1.03 MG/DL (ref 0.51–0.95)
CREAT SERPL-MCNC: 1.07 MG/DL (ref 0.51–0.95)
DEPRECATED HCO3 PLAS-SCNC: 27 MMOL/L (ref 22–29)
DEPRECATED HCO3 PLAS-SCNC: 27 MMOL/L (ref 22–29)
DIASTOLIC BLOOD PRESSURE - MUSE: NORMAL MMHG
ERYTHROCYTE [DISTWIDTH] IN BLOOD BY AUTOMATED COUNT: 16 % (ref 10–15)
GFR SERPL CREATININE-BSD FRML MDRD: 51 ML/MIN/1.73M2
GFR SERPL CREATININE-BSD FRML MDRD: 53 ML/MIN/1.73M2
GLUCOSE BLDC GLUCOMTR-MCNC: 100 MG/DL (ref 70–99)
GLUCOSE BLDC GLUCOMTR-MCNC: 109 MG/DL (ref 70–99)
GLUCOSE BLDC GLUCOMTR-MCNC: 116 MG/DL (ref 70–99)
GLUCOSE BLDC GLUCOMTR-MCNC: 120 MG/DL (ref 70–99)
GLUCOSE BLDC GLUCOMTR-MCNC: 120 MG/DL (ref 70–99)
GLUCOSE BLDC GLUCOMTR-MCNC: 121 MG/DL (ref 70–99)
GLUCOSE BLDC GLUCOMTR-MCNC: 123 MG/DL (ref 70–99)
GLUCOSE BLDC GLUCOMTR-MCNC: 124 MG/DL (ref 70–99)
GLUCOSE BLDC GLUCOMTR-MCNC: 126 MG/DL (ref 70–99)
GLUCOSE BLDC GLUCOMTR-MCNC: 127 MG/DL (ref 70–99)
GLUCOSE BLDC GLUCOMTR-MCNC: 128 MG/DL (ref 70–99)
GLUCOSE BLDC GLUCOMTR-MCNC: 131 MG/DL (ref 70–99)
GLUCOSE BLDC GLUCOMTR-MCNC: 134 MG/DL (ref 70–99)
GLUCOSE BLDC GLUCOMTR-MCNC: 135 MG/DL (ref 70–99)
GLUCOSE BLDC GLUCOMTR-MCNC: 135 MG/DL (ref 70–99)
GLUCOSE BLDC GLUCOMTR-MCNC: 145 MG/DL (ref 70–99)
GLUCOSE BLDC GLUCOMTR-MCNC: 149 MG/DL (ref 70–99)
GLUCOSE BLDC GLUCOMTR-MCNC: 156 MG/DL (ref 70–99)
GLUCOSE BLDC GLUCOMTR-MCNC: 75 MG/DL (ref 70–99)
GLUCOSE BLDC GLUCOMTR-MCNC: 90 MG/DL (ref 70–99)
GLUCOSE SERPL-MCNC: 120 MG/DL (ref 70–99)
GLUCOSE SERPL-MCNC: 157 MG/DL (ref 70–99)
GRAM STAIN RESULT: ABNORMAL
GRAM STAIN RESULT: ABNORMAL
HCO3 BLDV-SCNC: 30 MMOL/L (ref 21–28)
HCO3 BLDV-SCNC: 34 MMOL/L (ref 21–28)
HCO3 BLDV-SCNC: 34 MMOL/L (ref 21–28)
HCT VFR BLD AUTO: 27 % (ref 35–47)
HGB BLD-MCNC: 8.7 G/DL (ref 11.7–15.7)
INTERPRETATION ECG - MUSE: NORMAL
LACTATE SERPL-SCNC: 1.9 MMOL/L (ref 0.7–2)
MAGNESIUM SERPL-MCNC: 1.9 MG/DL (ref 1.7–2.3)
MAGNESIUM SERPL-MCNC: 2.4 MG/DL (ref 1.7–2.3)
MCH RBC QN AUTO: 30.6 PG (ref 26.5–33)
MCHC RBC AUTO-ENTMCNC: 32.2 G/DL (ref 31.5–36.5)
MCV RBC AUTO: 95 FL (ref 78–100)
O2/TOTAL GAS SETTING VFR VENT: 40 %
OXYHGB MFR BLDV: 61 % (ref 70–75)
P AXIS - MUSE: 24 DEGREES
PCO2 BLDV: 44 MM HG (ref 40–50)
PCO2 BLDV: 45 MM HG (ref 40–50)
PCO2 BLDV: 49 MM HG (ref 40–50)
PH BLDV: 7.39 [PH] (ref 7.32–7.43)
PH BLDV: 7.49 [PH] (ref 7.32–7.43)
PH BLDV: 7.49 [PH] (ref 7.32–7.43)
PHOSPHATE SERPL-MCNC: 1.9 MG/DL (ref 2.5–4.5)
PHOSPHATE SERPL-MCNC: 2.6 MG/DL (ref 2.5–4.5)
PLATELET # BLD AUTO: 176 10E3/UL (ref 150–450)
PO2 BLDV: 34 MM HG (ref 25–47)
PO2 BLDV: 34 MM HG (ref 25–47)
PO2 BLDV: 36 MM HG (ref 25–47)
POTASSIUM SERPL-SCNC: 3 MMOL/L (ref 3.4–5.3)
POTASSIUM SERPL-SCNC: 3 MMOL/L (ref 3.4–5.3)
PR INTERVAL - MUSE: 144 MS
QRS DURATION - MUSE: 122 MS
QT - MUSE: 502 MS
QTC - MUSE: 513 MS
R AXIS - MUSE: -18 DEGREES
RBC # BLD AUTO: 2.84 10E6/UL (ref 3.8–5.2)
SODIUM SERPL-SCNC: 146 MMOL/L (ref 136–145)
SODIUM SERPL-SCNC: 146 MMOL/L (ref 136–145)
SYSTOLIC BLOOD PRESSURE - MUSE: NORMAL MMHG
T AXIS - MUSE: 65 DEGREES
VENTRICULAR RATE- MUSE: 63 BPM
WBC # BLD AUTO: 11.4 10E3/UL (ref 4–11)

## 2022-11-22 PROCEDURE — 999N000157 HC STATISTIC RCP TIME EA 10 MIN

## 2022-11-22 PROCEDURE — 200N000002 HC R&B ICU UMMC

## 2022-11-22 PROCEDURE — 82330 ASSAY OF CALCIUM: CPT

## 2022-11-22 PROCEDURE — 999N000253 HC STATISTIC WEANING TRIALS

## 2022-11-22 PROCEDURE — 71045 X-RAY EXAM CHEST 1 VIEW: CPT | Mod: 26 | Performed by: RADIOLOGY

## 2022-11-22 PROCEDURE — 258N000003 HC RX IP 258 OP 636: Performed by: STUDENT IN AN ORGANIZED HEALTH CARE EDUCATION/TRAINING PROGRAM

## 2022-11-22 PROCEDURE — 258N000003 HC RX IP 258 OP 636: Performed by: THORACIC SURGERY (CARDIOTHORACIC VASCULAR SURGERY)

## 2022-11-22 PROCEDURE — 94003 VENT MGMT INPAT SUBQ DAY: CPT

## 2022-11-22 PROCEDURE — 82805 BLOOD GASES W/O2 SATURATION: CPT | Performed by: ANESTHESIOLOGY

## 2022-11-22 PROCEDURE — 250N000011 HC RX IP 250 OP 636: Performed by: THORACIC SURGERY (CARDIOTHORACIC VASCULAR SURGERY)

## 2022-11-22 PROCEDURE — 250N000013 HC RX MED GY IP 250 OP 250 PS 637: Performed by: STUDENT IN AN ORGANIZED HEALTH CARE EDUCATION/TRAINING PROGRAM

## 2022-11-22 PROCEDURE — 82803 BLOOD GASES ANY COMBINATION: CPT | Performed by: ANESTHESIOLOGY

## 2022-11-22 PROCEDURE — 99291 CRITICAL CARE FIRST HOUR: CPT | Mod: 24 | Performed by: ANESTHESIOLOGY

## 2022-11-22 PROCEDURE — 84100 ASSAY OF PHOSPHORUS: CPT | Performed by: STUDENT IN AN ORGANIZED HEALTH CARE EDUCATION/TRAINING PROGRAM

## 2022-11-22 PROCEDURE — 250N000013 HC RX MED GY IP 250 OP 250 PS 637: Performed by: THORACIC SURGERY (CARDIOTHORACIC VASCULAR SURGERY)

## 2022-11-22 PROCEDURE — 250N000009 HC RX 250: Performed by: THORACIC SURGERY (CARDIOTHORACIC VASCULAR SURGERY)

## 2022-11-22 PROCEDURE — 250N000011 HC RX IP 250 OP 636: Performed by: STUDENT IN AN ORGANIZED HEALTH CARE EDUCATION/TRAINING PROGRAM

## 2022-11-22 PROCEDURE — 80048 BASIC METABOLIC PNL TOTAL CA: CPT

## 2022-11-22 PROCEDURE — 83605 ASSAY OF LACTIC ACID: CPT

## 2022-11-22 PROCEDURE — 83735 ASSAY OF MAGNESIUM: CPT | Performed by: STUDENT IN AN ORGANIZED HEALTH CARE EDUCATION/TRAINING PROGRAM

## 2022-11-22 PROCEDURE — 80048 BASIC METABOLIC PNL TOTAL CA: CPT | Performed by: STUDENT IN AN ORGANIZED HEALTH CARE EDUCATION/TRAINING PROGRAM

## 2022-11-22 PROCEDURE — 83735 ASSAY OF MAGNESIUM: CPT | Performed by: THORACIC SURGERY (CARDIOTHORACIC VASCULAR SURGERY)

## 2022-11-22 PROCEDURE — 85027 COMPLETE CBC AUTOMATED: CPT | Performed by: STUDENT IN AN ORGANIZED HEALTH CARE EDUCATION/TRAINING PROGRAM

## 2022-11-22 PROCEDURE — 84100 ASSAY OF PHOSPHORUS: CPT | Performed by: THORACIC SURGERY (CARDIOTHORACIC VASCULAR SURGERY)

## 2022-11-22 PROCEDURE — G0463 HOSPITAL OUTPT CLINIC VISIT: HCPCS

## 2022-11-22 PROCEDURE — 250N000011 HC RX IP 250 OP 636

## 2022-11-22 PROCEDURE — 71045 X-RAY EXAM CHEST 1 VIEW: CPT

## 2022-11-22 PROCEDURE — 250N000013 HC RX MED GY IP 250 OP 250 PS 637

## 2022-11-22 RX ORDER — POTASSIUM CHLORIDE 29.8 MG/ML
20 INJECTION INTRAVENOUS
Status: COMPLETED | OUTPATIENT
Start: 2022-11-22 | End: 2022-11-23

## 2022-11-22 RX ORDER — PIPERACILLIN SODIUM, TAZOBACTAM SODIUM 3; .375 G/15ML; G/15ML
3.38 INJECTION, POWDER, LYOPHILIZED, FOR SOLUTION INTRAVENOUS EVERY 6 HOURS
Status: DISCONTINUED | OUTPATIENT
Start: 2022-11-22 | End: 2022-11-23

## 2022-11-22 RX ORDER — POTASSIUM CHLORIDE 1.5 G/1.58G
20 POWDER, FOR SOLUTION ORAL ONCE
Status: COMPLETED | OUTPATIENT
Start: 2022-11-22 | End: 2022-11-22

## 2022-11-22 RX ORDER — FUROSEMIDE 10 MG/ML
20 INJECTION INTRAMUSCULAR; INTRAVENOUS ONCE
Status: COMPLETED | OUTPATIENT
Start: 2022-11-22 | End: 2022-11-22

## 2022-11-22 RX ORDER — FUROSEMIDE 10 MG/ML
40 INJECTION INTRAMUSCULAR; INTRAVENOUS ONCE
Status: COMPLETED | OUTPATIENT
Start: 2022-11-22 | End: 2022-11-22

## 2022-11-22 RX ORDER — QUETIAPINE FUMARATE 25 MG/1
25 TABLET, FILM COATED ORAL EVERY EVENING
Status: DISCONTINUED | OUTPATIENT
Start: 2022-11-22 | End: 2022-11-26

## 2022-11-22 RX ORDER — POTASSIUM CHLORIDE 1.5 G/1.58G
40 POWDER, FOR SOLUTION ORAL ONCE
Status: COMPLETED | OUTPATIENT
Start: 2022-11-22 | End: 2022-11-22

## 2022-11-22 RX ORDER — FUROSEMIDE 10 MG/ML
40 INJECTION INTRAMUSCULAR; INTRAVENOUS 2 TIMES DAILY
Status: DISCONTINUED | OUTPATIENT
Start: 2022-11-22 | End: 2022-11-22

## 2022-11-22 RX ADMIN — Medication 40 MG: at 09:00

## 2022-11-22 RX ADMIN — HYDROCORTISONE SODIUM SUCCINATE 100 MG: 100 INJECTION, POWDER, FOR SOLUTION INTRAMUSCULAR; INTRAVENOUS at 01:55

## 2022-11-22 RX ADMIN — PIPERACILLIN SODIUM AND TAZOBACTAM SODIUM 4.5 G: 4; .5 INJECTION, POWDER, LYOPHILIZED, FOR SOLUTION INTRAVENOUS at 09:05

## 2022-11-22 RX ADMIN — AMIODARONE HYDROCHLORIDE 0.5 MG/MIN: 50 INJECTION, SOLUTION INTRAVENOUS at 00:16

## 2022-11-22 RX ADMIN — HEPARIN SODIUM 5000 UNITS: 5000 INJECTION, SOLUTION INTRAVENOUS; SUBCUTANEOUS at 11:09

## 2022-11-22 RX ADMIN — HYDROCORTISONE SODIUM SUCCINATE 100 MG: 100 INJECTION, POWDER, FOR SOLUTION INTRAMUSCULAR; INTRAVENOUS at 09:05

## 2022-11-22 RX ADMIN — AMIODARONE HYDROCHLORIDE 0.5 MG/MIN: 50 INJECTION, SOLUTION INTRAVENOUS at 20:53

## 2022-11-22 RX ADMIN — PIPERACILLIN SODIUM AND TAZOBACTAM SODIUM 4.5 G: 4; .5 INJECTION, POWDER, LYOPHILIZED, FOR SOLUTION INTRAVENOUS at 15:23

## 2022-11-22 RX ADMIN — POTASSIUM CHLORIDE 20 MEQ: 29.8 INJECTION, SOLUTION INTRAVENOUS at 20:51

## 2022-11-22 RX ADMIN — PIPERACILLIN AND TAZOBACTAM 3.38 G: 3; .375 INJECTION, POWDER, LYOPHILIZED, FOR SOLUTION INTRAVENOUS at 20:52

## 2022-11-22 RX ADMIN — Medication 1 PACKET: at 09:06

## 2022-11-22 RX ADMIN — FUROSEMIDE 20 MG: 10 INJECTION, SOLUTION INTRAVENOUS at 20:34

## 2022-11-22 RX ADMIN — PIPERACILLIN SODIUM AND TAZOBACTAM SODIUM 4.5 G: 4; .5 INJECTION, POWDER, LYOPHILIZED, FOR SOLUTION INTRAVENOUS at 02:48

## 2022-11-22 RX ADMIN — POTASSIUM CHLORIDE 20 MEQ: 1.5 POWDER, FOR SOLUTION ORAL at 10:54

## 2022-11-22 RX ADMIN — Medication 15 ML: at 09:05

## 2022-11-22 RX ADMIN — POTASSIUM CHLORIDE 20 MEQ: 29.8 INJECTION, SOLUTION INTRAVENOUS at 23:29

## 2022-11-22 RX ADMIN — POTASSIUM CHLORIDE 20 MEQ: 29.8 INJECTION, SOLUTION INTRAVENOUS at 22:25

## 2022-11-22 RX ADMIN — FUROSEMIDE 40 MG: 10 INJECTION, SOLUTION INTRAVENOUS at 02:47

## 2022-11-22 RX ADMIN — SODIUM PHOSPHATE, MONOBASIC, MONOHYDRATE AND SODIUM PHOSPHATE, DIBASIC, ANHYDROUS 15 MMOL: 276; 142 INJECTION, SOLUTION INTRAVENOUS at 08:06

## 2022-11-22 RX ADMIN — ACETAMINOPHEN 650 MG: 325 TABLET, FILM COATED ORAL at 22:51

## 2022-11-22 RX ADMIN — POTASSIUM CHLORIDE 40 MEQ: 1.5 POWDER, FOR SOLUTION ORAL at 09:04

## 2022-11-22 RX ADMIN — HEPARIN SODIUM 5000 UNITS: 5000 INJECTION, SOLUTION INTRAVENOUS; SUBCUTANEOUS at 20:34

## 2022-11-22 RX ADMIN — HYDRALAZINE HYDROCHLORIDE 10 MG: 20 INJECTION INTRAMUSCULAR; INTRAVENOUS at 18:34

## 2022-11-22 RX ADMIN — HYDRALAZINE HYDROCHLORIDE 10 MG: 20 INJECTION INTRAMUSCULAR; INTRAVENOUS at 14:28

## 2022-11-22 RX ADMIN — ASPIRIN 81 MG CHEWABLE TABLET 81 MG: 81 TABLET CHEWABLE at 09:05

## 2022-11-22 RX ADMIN — FUROSEMIDE 40 MG: 10 INJECTION, SOLUTION INTRAVENOUS at 11:09

## 2022-11-22 RX ADMIN — QUETIAPINE FUMARATE 25 MG: 25 TABLET ORAL at 20:34

## 2022-11-22 RX ADMIN — HEPARIN SODIUM 5000 UNITS: 5000 INJECTION, SOLUTION INTRAVENOUS; SUBCUTANEOUS at 03:58

## 2022-11-22 ASSESSMENT — ACTIVITIES OF DAILY LIVING (ADL)
ADLS_ACUITY_SCORE: 32
ADLS_ACUITY_SCORE: 36
ADLS_ACUITY_SCORE: 32
ADLS_ACUITY_SCORE: 32
ADLS_ACUITY_SCORE: 36
ADLS_ACUITY_SCORE: 36
ADLS_ACUITY_SCORE: 32
ADLS_ACUITY_SCORE: 32
ADLS_ACUITY_SCORE: 36
ADLS_ACUITY_SCORE: 32
ADLS_ACUITY_SCORE: 36
ADLS_ACUITY_SCORE: 36

## 2022-11-22 NOTE — CONSULTS
[facility]  WO Nurse Inpatient Assessment     Consulted for: sacrum and Left groin    Patient History (according to provider note(s):      Jade Walker is a 87 year old female with PMHx of severe aortic stenosis HTN, HLD, non-obstructive CAD, CKD stage III, IBS s/p TAVR by c/b LV perforation surgically repaired by Dr. Pulido 11/16. Intraop got 600 cellsaver, 2 plts, 1 ffp, 4 prbcs, and 1500 kcentra. Valve well seated.       Areas Assessed:      Areas visualized during today's visit: Focused: sacrum and left groin  Wound location: sacrum    11/22  Last photo: 11/22  Wound due to: Incontinence Associated Dermatitis (IAD) and Moisture Associated Skin Damage (MASD)   Wound history/plan of care: Patient intubated in ICU incontinent of stool with watery BMs  Wound base: 100 % dermis     Palpation of the wound bed: normal      Drainage: scant     Description of drainage: serosanguinous     Measurements (length x width x depth, in cm): 3 x 0.2 x 0.1 cm   Periwound skin: Macerated      Color: normal and consistent with surrounding tissue      Temperature: normal   Odor: none  Pain: mild, tender  Pain interventions prior to dressing change: patient tolerated well  Treatment goal: Protection and Promote epidermal migration  STATUS: initial assessment  Supplies ordered: supplies stored on unit and discussed with RN    Wound location: Left groin    11/22  Last photo: 11/22  Wound due to: Intertrigo, Moisture Associated Skin Damage (MASD) and Surgical Wound  Wound history/plan of care: Small wound prior surgical wound, linear skin breakdown in groin due to intertrigo/ moisture associated skin damage. Patient is very edematous and left groin wound with heavy serous/ serosanguinous drainage.  Wound base: 100 % dermis- skin fold 100% non-granular tissue- surgical site.     Palpation of the wound bed: normal      Drainage: copious     Description of drainage: serous and  serosanguinous     Measurements (length x width x depth, in cm): 0.1 x 0.8 x esequiel cm and 0.3 x 6.2 x 0.1 cm  Periwound skin: Intact      Color: normal and consistent with surrounding tissue      Temperature: normal   Odor: none  Pain: moderate, tender  Pain interventions prior to dressing change: patient tolerated well  Treatment goal: Drainage control and Protection  STATUS: initial assessment  Supplies ordered: supplies stored on unit and discussed with RN       Treatment Plan:     Sacrum wound: Every 3 days     Cleanse the area with NS and pat dry.    Apply No sting film barrier to periwound skin.    Cover wound with Sacral Mepilex (#[id number]), apply upside down for improved coverage and to minimize soiling related to stool.    Change dressing Q 3 days.    Turn and reposition Q 2hrs side to side only.    FYI- If pt has constant incontinent loose stools needing dressing changes Q shift please discontinue the Mepilex dressing and apply criticaid barrier paste BID and PRN.    Left groin wound(s): BID and PRN Cleanse area with Maryam cleanse and protect and wipe dry. Apply No sting barrier film over dolly-wound skin and let dry. Apply thin layer of criticaid paste over wounds within skin folds. Cover small surgical wound with gauze and ABD and change as needed for saturation.     Orders: Written    RECOMMEND PRIMARY TEAM ORDER: None, at this time  Education provided: plan of care and Moisture management  Discussed plan of care with: Nurse  WOC nurse follow-up plan: weekly  Notify WOC if wound(s) deteriorate.  Nursing to notify the Provider(s) and re-consult the WOC Nurse if new skin concern.    DATA:     Current support surface: Standard  Low air loss (YG pump, Isolibrium, Pulsate, skin guard, etc)  Containment of urine/stool: Incontinent pad in bed, Indwelling catheter and External rectal pouch  BMI: Body mass index is 33.16 kg/m .   Active diet order: Orders Placed This Encounter      Advance Diet as Tolerated: Clear  Liquid Diet     Output: I/O last 3 completed shifts:  In: 1791.22 [I.V.:956.22; NG/GT:355]  Out: 3300 [Urine:3050; Stool:250]     Labs: Recent Labs   Lab 11/22/22  0426 11/19/22  1729 11/19/22  0321 11/19/22  0320   ALBUMIN  --   --  2.5*  --    HGB 8.7*   < >  --  8.5*   INR  --   --   --  1.15   WBC 11.4*   < >  --  8.9    < > = values in this interval not displayed.     Pressure injury risk assessment:   Sensory Perception: 1-->completely limited  Moisture: 3-->occasionally moist  Activity: 1-->bedfast  Mobility: 2-->very limited  Nutrition: 2-->probably inadequate  Friction and Shear: 2-->potential problem  Parmjit Score: 11    Bri Meade RN, CWOCN  Dept. Pager: [phone]  Dept. Office Number: [phone]

## 2022-11-22 NOTE — PROGRESS NOTES
[facility], Procedure Note          Extubation:       Jade Walker  MRN# [id number]   November 22, 2022, 1:22 PM         Patient extubated at: November 22, 2022, 1:22 PM   Supplemental Oxygen: Via high flow nasal cannula at 40 liters per minute   Cough: The cough is good   Secretion Mode: PRN suction with assistance   Secretion Amount: Small amount, moderately thin and clear in color   Respiratory Exam:: Breath sounds: equal and clear and good aeration     Location: all lobes and bilaterally   Skin Exam:: Patient color: natural   Patient Status: Currently appears comfortable   Arterial Blood Gasses: pH Arterial (no units)   Date Value   11/20/2022 7.49 (H)     pO2 Arterial (mm Hg)   Date Value   11/20/2022 75 (L)     pCO2 Arterial (mm Hg)   Date Value   11/20/2022 33 (L)     Bicarbonate Arterial (mmol/L)   Date Value   11/20/2022 25          Cuff leak test prior, along with suction via ETT 7 oral.   HFNC 40L, 40%. SpO2: 97%    Recorded by Felicity Del Rio, RT

## 2022-11-22 NOTE — PLAN OF CARE
Major Shift Events: PST-slow to respond to command and somnolent, afib RVR 140s - restarted amio gtt at 1/bolus/5 mg metoprolol, 40 lasix given (even overall), pulled V wires and chest tubes, replaced a total of 60 mEq of potassium  Plan: transition from prop to dex; PST tomorrow  For vital signs and complete assessments, please see documentation flowsheets.

## 2022-11-22 NOTE — PROGRESS NOTES
CV ICU PROGRESS NOTE      CO-MORBIDITIES:   Severe aortic stenosis  (primary encounter diagnosis)    ASSESSMENT: Jade Walker is a 87 year old female with PMHx of severe aortic stenosis HTN, HLD, non-obstructive CAD, CKD stage III, IBS s/p TAVR by c/b LV perforation surgically repaired by Dr. Pulido 11/16. Intraop got 600 cellsaver, 2 plts, 1 ffp, 4 prbcs, and 1500 kcentra. Valve well seated.     Last 24 hours:  Wean fentanyl today  PST with goal of extubation today  Hold TFs for possible extubation  Goal Net - 1 L  40 Lasix IV x1  End date on Zosyn  V wire and CT removal  Afib, re-started amio drip 0.5 (consider bolus if persistant)    Today's Progress:  Decreased Seroquel HS to 25  PST, goal of extubation today  Advance TFs to goal  Goal: net - 1L  Lasix 40mg BID    PLAN:  Neuro/ pain/ sedation:  #Acute postoperative pain  #Sedation  Previously agitated while weaning sedation  - Monitor neurological status. Notify the MD for any acute changes in exam.  - Pain:    Wean off fentanyl gtt as able   Scheduled tylenol.    PRN tylenol, oxycodone, dilaudid.  - Sedation: off after extubation  - seroquel 25 at bedtime  - avoid sedation at daytime to maximize alertness  - discontinue precedex    Pulmonary care:   - mechanical ventilation 12/300/5/30%  - Goal SpO2 > 92%  - Incentive spirometer Q15-30 minutes when awake.  - PST, extubated to [facility]    Cardiovascular:    # Severe aortic stenosis s/p TAVR  # Pericardial effusion s/p pericardial drain  # Hypertension  # Hyperlipidemia  # Non obstructive CAD  Increasing pressor needs 11/19, unclear for infectious etiology  - off pressors  - Monitor hemodynamic status.   - ASA  - TTE (11/16), then KULDIP (11/16) negative for significantpericardial effusion  - amio drip for Afib HR , Afib in 140s on 11/21 evening, metoprolol 5mg bolus and continue on amio drip at 0.5  - if pressor needs increases further, consider stress dose steroids    GI care:   # Irritable Bowel  Syndrome     - Nutrition consulted, appreciate recommendations  - PPI for bowel prophylaxis  - Bowel regimen: MiraLAX bid, senna 2 tabs bid, milk of mag  - advance TF to 30cc/hr  - NG, will need NJ if no extubation tmrw    Renal/ Fluid Balance:    # Chronic Kidney Disease Stage 3  - ICU electrolyte replacement protocol   - Baseline Cr 0.9  - Strict I&Os  - monitor UOP  - fluid resuscitation as needed  - Goal net even  - lasix 40 IV once    Endocrine:    #Stress hyperglycemia  - insulin gtt     ID/ Antibiotics:  #Postoperative prophylactic antibiotics  - Completing perioperative course of antibiotics: ancef  - no indication for further antibiotics  - follow Bcx and sputum cx  - started zosyn (7 day end date, end 11/26)  - MRSA nares negative 11/19, discontinue vanc    Heme:     # Acute blood loss anemia  # Thrombocytopenia  # Pericardial effusion s/p pericardiocentesis  2 pt Hgb drop overnight  - Hgb goal > 7.0  - Hemoglobin stable.  - 2U pRBC (11/17)  - SQH  - HIT test negative     Prophylaxis:    - VTE: SCD's, heparin 5000u sq  - Bowel regimen: PPI, MiraLAX, senna     Lines/ tubes/ drains:  - ETT  - [place], NG  - PICC    Disposition:  - CVICU     ICU Checklist  F - Feeding:   A - Analgesia:   S - Sedation:   T - Thromboembolic prophylaxis:      H - Head of bed elevated  U - Ulcer prophylaxis:  G - Glycemic control  S - SBT     B - Bowel regimen  I - Indwelling catheter  D - De-escalation of antibiotics     Patient seen, findings and plan discussed with CVICU staff.    Jesus Irizarry MD PGY2    ====================================    SUBJECTIVE:   Intubated.    OBJECTIVE:   1. VITAL SIGNS:   Temp:  [94.6  F (34.8  C)-100  F (37.8  C)] 98.4  F (36.9  C)  Pulse:  [] 65  Resp:  [10-35] 14  BP: ()/(46-97) 103/57  FiO2 (%):  [30 %-40 %] 40 %  SpO2:  [89 %-100 %] 100 %  Vent Mode: (S) CPAP/PS  (Continuous positive airway pressure with Pressure Support)  FiO2 (%): 40 %  Resp Rate (Set): 12 breaths/min  Tidal Volume  (Set, mL): 300 mL  PEEP (cm H2O): 5 cmH2O  Pressure Support (cm H2O): 5 cmH2O  Resp: 14      2. INTAKE/ OUTPUT:   I/O last 3 completed shifts:  In: 1567.15 [I.V.:717.15; NG/GT:280]  Out: 2300 [Urine:2280; Chest Tube:20]    3. PHYSICAL EXAMINATION:   General: NAD  Neuro: A&Ox3  Resp: Breathing non-labored, intubated  CV: RRR  Abdomen: Soft, Non-distended, Non-tender  Incisions: c/d/i  Extremities: warm and well perfused    4. INVESTIGATIONS:   Arterial Blood Gases   Recent Labs   Lab 11/20/22  0327 11/19/22  1730 11/19/22  1145 11/19/22  0838   PH 7.49* 7.45 7.39 7.45   PCO2 33* 35 43 36   PO2 75* 76* 77* 100   HCO3 25 24 26 25     Complete Blood Count   Recent Labs   Lab 11/22/22  0426 11/21/22  0309 11/20/22  0327 11/19/22  1729   WBC 11.4* 8.5 9.3 9.9   HGB 8.7* 8.7* 8.5* 8.7*    127* 89* 85*     Basic Metabolic Panel  Recent Labs   Lab 11/22/22  0722 11/22/22  0619 11/22/22  0526 11/22/22  0426 11/21/22  1810 11/21/22  1528 11/21/22  1351 11/21/22  1126 11/21/22  0521 11/21/22  0309 11/20/22  0645 11/20/22  0327   NA  --   --   --  146*  --  143  --   --   --  142  --  135*   POTASSIUM  --   --   --  3.0*  --  3.7  --  3.2*  --  3.0*  --  3.9   CHLORIDE  --   --   --  106  --  104  --   --   --  105  --  106   CO2  --   --   --  27  --  26  --   --   --  27 --  22   BUN  --   --   --  33.7*  --  35.3*  --   --   --  34.1*  --  33.1*   CR  --   --   --  1.07*  --  1.02*  --   --   --  1.00*  --  0.78   * 120* 131* 124*  120*   < > 125*  127*   < > 162*   < > 160*   < > 149*    < > = values in this interval not displayed.     Liver Function Tests  Recent Labs   Lab 11/19/22  0321 11/19/22  0320 11/18/22  0241 11/17/22  1200 11/17/22  0639 11/17/22  0337 11/16/22  2202 11/16/22  1736   AST 79*  --  146*  --  172* 141*   < > 69*   ALT 11  --  20  --  27 24   < > 24   ALKPHOS 52  --  44  --  37 34*   < > 41   BILITOTAL 0.4  --  0.3  --  0.5 0.4   < > 1.0   ALBUMIN 2.5*  --  2.8*  --  3.2* 3.3*   < >  2.8*   INR  --  1.15 1.24* 1.35*  --   --   --  1.30*    < > = values in this interval not displayed.     Pancreatic Enzymes  No lab results found in last 7 days.  Coagulation Profile  Recent Labs   Lab 11/19/22  0320 11/18/22  0927 11/18/22  0241 11/17/22  1200 11/16/22  1736 11/16/22  1630   INR 1.15  --  1.24* 1.35* 1.30* 1.36*   PTT  --  28  --  29 30 33         5. RADIOLOGY:   Recent Results (from the past 24 hour(s))   XR Chest Port 1 View    Narrative    Portable chest 11/21/2022 at 1552 hours    INDICATION: Post pacer wire removal    COMPARISON: 0054 hours earlier today    FINDINGS: Mediastinal drains. Median sternotomy. TAVR. Calcification  of aortic knob. Right extremity PICC line tip in upper SVC. Feeding  tube beyond the inferior margin of the image. Endotracheal tube tip  approximately 2.6 cm above the [place]. Left costophrenic angle  haziness silhouetting the left hemidiaphragm unchanged. Improved  aeration of the right lung base.      Impression    IMPRESSION: Improved aeration right lung base. Continued left pleural  effusion and retrocardiac atelectasis, recommend follow-up to clearing  to exclude infection. TAVR.    KHLOE STOUT MD         SYSTEM ID:  [id number]       =========================================

## 2022-11-22 NOTE — PLAN OF CARE
ICU End of Shift Summary. See flowsheets for vital signs and detailed assessment.    Changes this shift: Pt inconsistently follows commands, agitated at start of shift. Propofol gtt turned off, Precedex gtt and seroquel adequately sedated pt. Precedex gtt weaned off d/t soft pressures (MAP 57-64) and HR, pressures increased as pt roused. Pt UOP decreased through the shift, provider ordered 40 mg Furosemide. Pt had watery, loose BM x4. Pt has nonblanchable redness on sacrum, notified provider.    Plan: PST tomorrow, plan for extubation in the next couple of days.

## 2022-11-23 ENCOUNTER — APPOINTMENT (OUTPATIENT)
Dept: OCCUPATIONAL THERAPY | Facility: CLINIC | Age: 87
DRG: 266 | End: 2022-11-23
Attending: INTERNAL MEDICINE
Payer: COMMERCIAL

## 2022-11-23 ENCOUNTER — APPOINTMENT (OUTPATIENT)
Dept: GENERAL RADIOLOGY | Facility: CLINIC | Age: 87
DRG: 266 | End: 2022-11-23
Attending: THORACIC SURGERY (CARDIOTHORACIC VASCULAR SURGERY)
Payer: COMMERCIAL

## 2022-11-23 LAB
ABO/RH(D): NORMAL
ANION GAP SERPL CALCULATED.3IONS-SCNC: 11 MMOL/L (ref 7–15)
ANION GAP SERPL CALCULATED.3IONS-SCNC: 11 MMOL/L (ref 7–15)
ANTIBODY SCREEN: NEGATIVE
ATRIAL RATE - MUSE: 76 BPM
ATRIAL RATE - MUSE: 89 BPM
BUN SERPL-MCNC: 31.3 MG/DL (ref 8–23)
BUN SERPL-MCNC: 33.5 MG/DL (ref 8–23)
CA-I BLD-MCNC: 4.5 MG/DL (ref 4.4–5.2)
CALCIUM SERPL-MCNC: 7.9 MG/DL (ref 8.8–10.2)
CALCIUM SERPL-MCNC: 8.1 MG/DL (ref 8.8–10.2)
CHLORIDE SERPL-SCNC: 105 MMOL/L (ref 98–107)
CHLORIDE SERPL-SCNC: 107 MMOL/L (ref 98–107)
CREAT SERPL-MCNC: 0.96 MG/DL (ref 0.51–0.95)
CREAT SERPL-MCNC: 1.06 MG/DL (ref 0.51–0.95)
DEPRECATED HCO3 PLAS-SCNC: 27 MMOL/L (ref 22–29)
DEPRECATED HCO3 PLAS-SCNC: 29 MMOL/L (ref 22–29)
DIASTOLIC BLOOD PRESSURE - MUSE: NORMAL MMHG
DIASTOLIC BLOOD PRESSURE - MUSE: NORMAL MMHG
ERYTHROCYTE [DISTWIDTH] IN BLOOD BY AUTOMATED COUNT: 16.9 % (ref 10–15)
GFR SERPL CREATININE-BSD FRML MDRD: 51 ML/MIN/1.73M2
GFR SERPL CREATININE-BSD FRML MDRD: 58 ML/MIN/1.73M2
GLUCOSE BLDC GLUCOMTR-MCNC: 105 MG/DL (ref 70–99)
GLUCOSE BLDC GLUCOMTR-MCNC: 105 MG/DL (ref 70–99)
GLUCOSE BLDC GLUCOMTR-MCNC: 112 MG/DL (ref 70–99)
GLUCOSE BLDC GLUCOMTR-MCNC: 118 MG/DL (ref 70–99)
GLUCOSE BLDC GLUCOMTR-MCNC: 120 MG/DL (ref 70–99)
GLUCOSE BLDC GLUCOMTR-MCNC: 150 MG/DL (ref 70–99)
GLUCOSE BLDC GLUCOMTR-MCNC: 151 MG/DL (ref 70–99)
GLUCOSE SERPL-MCNC: 109 MG/DL (ref 70–99)
GLUCOSE SERPL-MCNC: 149 MG/DL (ref 70–99)
HCT VFR BLD AUTO: 26 % (ref 35–47)
HGB BLD-MCNC: 8.4 G/DL (ref 11.7–15.7)
INTERPRETATION ECG - MUSE: NORMAL
INTERPRETATION ECG - MUSE: NORMAL
LACTATE SERPL-SCNC: 1.9 MMOL/L (ref 0.7–2)
MAGNESIUM SERPL-MCNC: 1.9 MG/DL (ref 1.7–2.3)
MAGNESIUM SERPL-MCNC: 2.6 MG/DL (ref 1.7–2.3)
MCH RBC QN AUTO: 31.5 PG (ref 26.5–33)
MCHC RBC AUTO-ENTMCNC: 32.3 G/DL (ref 31.5–36.5)
MCV RBC AUTO: 97 FL (ref 78–100)
P AXIS - MUSE: 72 DEGREES
P AXIS - MUSE: NORMAL DEGREES
PHOSPHATE SERPL-MCNC: 2 MG/DL (ref 2.5–4.5)
PLATELET # BLD AUTO: 210 10E3/UL (ref 150–450)
POTASSIUM SERPL-SCNC: 3 MMOL/L (ref 3.4–5.3)
POTASSIUM SERPL-SCNC: 3.2 MMOL/L (ref 3.4–5.3)
POTASSIUM SERPL-SCNC: 3.3 MMOL/L (ref 3.4–5.3)
PR INTERVAL - MUSE: 166 MS
PR INTERVAL - MUSE: NORMAL MS
QRS DURATION - MUSE: 120 MS
QRS DURATION - MUSE: 130 MS
QT - MUSE: 362 MS
QT - MUSE: 420 MS
QTC - MUSE: 464 MS
QTC - MUSE: 472 MS
R AXIS - MUSE: -19 DEGREES
R AXIS - MUSE: -23 DEGREES
RBC # BLD AUTO: 2.67 10E6/UL (ref 3.8–5.2)
SODIUM SERPL-SCNC: 143 MMOL/L (ref 136–145)
SODIUM SERPL-SCNC: 147 MMOL/L (ref 136–145)
SPECIMEN EXPIRATION DATE: NORMAL
SYSTOLIC BLOOD PRESSURE - MUSE: NORMAL MMHG
SYSTOLIC BLOOD PRESSURE - MUSE: NORMAL MMHG
T AXIS - MUSE: 145 DEGREES
T AXIS - MUSE: 99 DEGREES
VENTRICULAR RATE- MUSE: 76 BPM
VENTRICULAR RATE- MUSE: 99 BPM
WBC # BLD AUTO: 13.5 10E3/UL (ref 4–11)

## 2022-11-23 PROCEDURE — 250N000011 HC RX IP 250 OP 636: Performed by: STUDENT IN AN ORGANIZED HEALTH CARE EDUCATION/TRAINING PROGRAM

## 2022-11-23 PROCEDURE — 250N000009 HC RX 250: Performed by: THORACIC SURGERY (CARDIOTHORACIC VASCULAR SURGERY)

## 2022-11-23 PROCEDURE — 83605 ASSAY OF LACTIC ACID: CPT

## 2022-11-23 PROCEDURE — 84132 ASSAY OF SERUM POTASSIUM: CPT | Performed by: THORACIC SURGERY (CARDIOTHORACIC VASCULAR SURGERY)

## 2022-11-23 PROCEDURE — 258N000003 HC RX IP 258 OP 636: Performed by: THORACIC SURGERY (CARDIOTHORACIC VASCULAR SURGERY)

## 2022-11-23 PROCEDURE — 36415 COLL VENOUS BLD VENIPUNCTURE: CPT | Performed by: THORACIC SURGERY (CARDIOTHORACIC VASCULAR SURGERY)

## 2022-11-23 PROCEDURE — 250N000013 HC RX MED GY IP 250 OP 250 PS 637: Performed by: SURGERY

## 2022-11-23 PROCEDURE — 86850 RBC ANTIBODY SCREEN: CPT | Performed by: THORACIC SURGERY (CARDIOTHORACIC VASCULAR SURGERY)

## 2022-11-23 PROCEDURE — 71045 X-RAY EXAM CHEST 1 VIEW: CPT

## 2022-11-23 PROCEDURE — 97535 SELF CARE MNGMENT TRAINING: CPT | Mod: GO

## 2022-11-23 PROCEDURE — 200N000002 HC R&B ICU UMMC

## 2022-11-23 PROCEDURE — 250N000012 HC RX MED GY IP 250 OP 636 PS 637

## 2022-11-23 PROCEDURE — 99233 SBSQ HOSP IP/OBS HIGH 50: CPT | Mod: 24 | Performed by: ANESTHESIOLOGY

## 2022-11-23 PROCEDURE — 83735 ASSAY OF MAGNESIUM: CPT | Performed by: STUDENT IN AN ORGANIZED HEALTH CARE EDUCATION/TRAINING PROGRAM

## 2022-11-23 PROCEDURE — 250N000013 HC RX MED GY IP 250 OP 250 PS 637

## 2022-11-23 PROCEDURE — 36415 COLL VENOUS BLD VENIPUNCTURE: CPT

## 2022-11-23 PROCEDURE — 84100 ASSAY OF PHOSPHORUS: CPT | Performed by: SURGERY

## 2022-11-23 PROCEDURE — 82330 ASSAY OF CALCIUM: CPT

## 2022-11-23 PROCEDURE — 258N000003 HC RX IP 258 OP 636: Performed by: STUDENT IN AN ORGANIZED HEALTH CARE EDUCATION/TRAINING PROGRAM

## 2022-11-23 PROCEDURE — 250N000011 HC RX IP 250 OP 636: Performed by: SURGERY

## 2022-11-23 PROCEDURE — 250N000011 HC RX IP 250 OP 636

## 2022-11-23 PROCEDURE — 250N000013 HC RX MED GY IP 250 OP 250 PS 637: Performed by: STUDENT IN AN ORGANIZED HEALTH CARE EDUCATION/TRAINING PROGRAM

## 2022-11-23 PROCEDURE — 97530 THERAPEUTIC ACTIVITIES: CPT | Mod: GO

## 2022-11-23 PROCEDURE — 250N000011 HC RX IP 250 OP 636: Performed by: THORACIC SURGERY (CARDIOTHORACIC VASCULAR SURGERY)

## 2022-11-23 PROCEDURE — 71045 X-RAY EXAM CHEST 1 VIEW: CPT | Mod: 26 | Performed by: RADIOLOGY

## 2022-11-23 PROCEDURE — 86901 BLOOD TYPING SEROLOGIC RH(D): CPT | Performed by: THORACIC SURGERY (CARDIOTHORACIC VASCULAR SURGERY)

## 2022-11-23 PROCEDURE — 84100 ASSAY OF PHOSPHORUS: CPT | Performed by: STUDENT IN AN ORGANIZED HEALTH CARE EDUCATION/TRAINING PROGRAM

## 2022-11-23 PROCEDURE — 85027 COMPLETE CBC AUTOMATED: CPT | Performed by: STUDENT IN AN ORGANIZED HEALTH CARE EDUCATION/TRAINING PROGRAM

## 2022-11-23 PROCEDURE — 250N000013 HC RX MED GY IP 250 OP 250 PS 637: Performed by: THORACIC SURGERY (CARDIOTHORACIC VASCULAR SURGERY)

## 2022-11-23 PROCEDURE — 84132 ASSAY OF SERUM POTASSIUM: CPT | Performed by: STUDENT IN AN ORGANIZED HEALTH CARE EDUCATION/TRAINING PROGRAM

## 2022-11-23 PROCEDURE — 83735 ASSAY OF MAGNESIUM: CPT | Performed by: SURGERY

## 2022-11-23 PROCEDURE — 93010 ELECTROCARDIOGRAM REPORT: CPT | Performed by: INTERNAL MEDICINE

## 2022-11-23 PROCEDURE — 97165 OT EVAL LOW COMPLEX 30 MIN: CPT | Mod: GO

## 2022-11-23 PROCEDURE — 93005 ELECTROCARDIOGRAM TRACING: CPT

## 2022-11-23 PROCEDURE — 84132 ASSAY OF SERUM POTASSIUM: CPT

## 2022-11-23 RX ORDER — DEXTROSE MONOHYDRATE 25 G/50ML
25-50 INJECTION, SOLUTION INTRAVENOUS
Status: DISCONTINUED | OUTPATIENT
Start: 2022-11-23 | End: 2022-11-23

## 2022-11-23 RX ORDER — LOPERAMIDE HCL 2 MG
2 CAPSULE ORAL
Status: COMPLETED | OUTPATIENT
Start: 2022-11-23 | End: 2022-11-23

## 2022-11-23 RX ORDER — CLOPIDOGREL BISULFATE 75 MG/1
75 TABLET ORAL DAILY
Status: DISCONTINUED | OUTPATIENT
Start: 2022-11-23 | End: 2022-11-29

## 2022-11-23 RX ORDER — FUROSEMIDE 10 MG/ML
20 INJECTION INTRAMUSCULAR; INTRAVENOUS ONCE
Status: COMPLETED | OUTPATIENT
Start: 2022-11-23 | End: 2022-11-23

## 2022-11-23 RX ORDER — CEFTRIAXONE 2 G/1
2 INJECTION, POWDER, FOR SOLUTION INTRAMUSCULAR; INTRAVENOUS EVERY 24 HOURS
Status: COMPLETED | OUTPATIENT
Start: 2022-11-23 | End: 2022-11-26

## 2022-11-23 RX ORDER — FUROSEMIDE 10 MG/ML
40 INJECTION INTRAMUSCULAR; INTRAVENOUS ONCE
Status: COMPLETED | OUTPATIENT
Start: 2022-11-23 | End: 2022-11-23

## 2022-11-23 RX ORDER — METHOCARBAMOL 500 MG/1
500 TABLET, FILM COATED ORAL 4 TIMES DAILY PRN
Status: DISCONTINUED | OUTPATIENT
Start: 2022-11-23 | End: 2022-12-01 | Stop reason: HOSPADM

## 2022-11-23 RX ORDER — POTASSIUM CHLORIDE 1.5 G/1.58G
40 POWDER, FOR SOLUTION ORAL ONCE
Status: COMPLETED | OUTPATIENT
Start: 2022-11-23 | End: 2022-11-23

## 2022-11-23 RX ORDER — ACETAMINOPHEN 325 MG/1
975 TABLET ORAL EVERY 8 HOURS
Status: COMPLETED | OUTPATIENT
Start: 2022-11-23 | End: 2022-11-25

## 2022-11-23 RX ORDER — NICOTINE POLACRILEX 4 MG
15-30 LOZENGE BUCCAL
Status: DISCONTINUED | OUTPATIENT
Start: 2022-11-23 | End: 2022-11-23

## 2022-11-23 RX ORDER — MAGNESIUM SULFATE HEPTAHYDRATE 40 MG/ML
2 INJECTION, SOLUTION INTRAVENOUS ONCE
Status: COMPLETED | OUTPATIENT
Start: 2022-11-23 | End: 2022-11-23

## 2022-11-23 RX ORDER — LOPERAMIDE HCL 1 MG/7.5ML
2 SUSPENSION ORAL 4 TIMES DAILY PRN
Status: DISCONTINUED | OUTPATIENT
Start: 2022-11-23 | End: 2022-12-01

## 2022-11-23 RX ORDER — POTASSIUM CHLORIDE 20MEQ/15ML
40 LIQUID (ML) ORAL ONCE
Status: COMPLETED | OUTPATIENT
Start: 2022-11-23 | End: 2022-11-23

## 2022-11-23 RX ADMIN — AMIODARONE HYDROCHLORIDE 0.5 MG/MIN: 50 INJECTION, SOLUTION INTRAVENOUS at 18:33

## 2022-11-23 RX ADMIN — Medication 1 PACKET: at 09:01

## 2022-11-23 RX ADMIN — INSULIN ASPART 1 UNITS: 100 INJECTION, SOLUTION INTRAVENOUS; SUBCUTANEOUS at 17:18

## 2022-11-23 RX ADMIN — MAGNESIUM SULFATE HEPTAHYDRATE 2 G: 40 INJECTION, SOLUTION INTRAVENOUS at 00:36

## 2022-11-23 RX ADMIN — HEPARIN SODIUM 5000 UNITS: 5000 INJECTION, SOLUTION INTRAVENOUS; SUBCUTANEOUS at 03:36

## 2022-11-23 RX ADMIN — QUETIAPINE FUMARATE 25 MG: 25 TABLET ORAL at 19:43

## 2022-11-23 RX ADMIN — PIPERACILLIN AND TAZOBACTAM 3.38 G: 3; .375 INJECTION, POWDER, LYOPHILIZED, FOR SOLUTION INTRAVENOUS at 03:36

## 2022-11-23 RX ADMIN — POTASSIUM PHOSPHATE, MONOBASIC AND POTASSIUM PHOSPHATE, DIBASIC 9 MMOL: 224; 236 INJECTION, SOLUTION, CONCENTRATE INTRAVENOUS at 11:09

## 2022-11-23 RX ADMIN — FUROSEMIDE 20 MG: 10 INJECTION, SOLUTION INTRAVENOUS at 18:41

## 2022-11-23 RX ADMIN — LOPERAMIDE HYDROCHLORIDE 2 MG: 2 CAPSULE ORAL at 18:41

## 2022-11-23 RX ADMIN — ASPIRIN 81 MG CHEWABLE TABLET 81 MG: 81 TABLET CHEWABLE at 09:00

## 2022-11-23 RX ADMIN — Medication 40 MG: at 09:00

## 2022-11-23 RX ADMIN — POTASSIUM CHLORIDE 40 MEQ: 1.5 POWDER, FOR SOLUTION ORAL at 18:42

## 2022-11-23 RX ADMIN — CLOPIDOGREL BISULFATE 75 MG: 75 TABLET ORAL at 13:00

## 2022-11-23 RX ADMIN — HEPARIN SODIUM 5000 UNITS: 5000 INJECTION, SOLUTION INTRAVENOUS; SUBCUTANEOUS at 19:43

## 2022-11-23 RX ADMIN — INSULIN ASPART 1 UNITS: 100 INJECTION, SOLUTION INTRAVENOUS; SUBCUTANEOUS at 13:00

## 2022-11-23 RX ADMIN — PIPERACILLIN AND TAZOBACTAM 3.38 G: 3; .375 INJECTION, POWDER, LYOPHILIZED, FOR SOLUTION INTRAVENOUS at 09:00

## 2022-11-23 RX ADMIN — FUROSEMIDE 40 MG: 10 INJECTION, SOLUTION INTRAVENOUS at 11:09

## 2022-11-23 RX ADMIN — ACETAMINOPHEN 975 MG: 325 TABLET, FILM COATED ORAL at 13:00

## 2022-11-23 RX ADMIN — CEFTRIAXONE SODIUM 2 G: 2 INJECTION, POWDER, FOR SOLUTION INTRAMUSCULAR; INTRAVENOUS at 17:18

## 2022-11-23 RX ADMIN — HEPARIN SODIUM 5000 UNITS: 5000 INJECTION, SOLUTION INTRAVENOUS; SUBCUTANEOUS at 13:00

## 2022-11-23 RX ADMIN — POTASSIUM CHLORIDE 40 MEQ: 40 SOLUTION ORAL at 19:43

## 2022-11-23 RX ADMIN — Medication 12.5 MG: at 17:18

## 2022-11-23 RX ADMIN — ACETAMINOPHEN 975 MG: 325 TABLET, FILM COATED ORAL at 19:43

## 2022-11-23 RX ADMIN — HYDRALAZINE HYDROCHLORIDE 10 MG: 20 INJECTION INTRAMUSCULAR; INTRAVENOUS at 23:27

## 2022-11-23 RX ADMIN — METHOCARBAMOL 500 MG: 500 TABLET ORAL at 09:00

## 2022-11-23 RX ADMIN — OXYCODONE HYDROCHLORIDE 5 MG: 5 TABLET ORAL at 01:33

## 2022-11-23 RX ADMIN — POTASSIUM CHLORIDE 40 MEQ: 1.5 POWDER, FOR SOLUTION ORAL at 06:38

## 2022-11-23 RX ADMIN — Medication 15 ML: at 09:00

## 2022-11-23 ASSESSMENT — ACTIVITIES OF DAILY LIVING (ADL)
ADLS_ACUITY_SCORE: 27
PREVIOUS_RESPONSIBILITIES: MEAL PREP;HOUSEKEEPING;DRIVING
ADLS_ACUITY_SCORE: 31
ADLS_ACUITY_SCORE: 32
ADLS_ACUITY_SCORE: 32
ADLS_ACUITY_SCORE: 27
ADLS_ACUITY_SCORE: 31
ADLS_ACUITY_SCORE: 32
ADLS_ACUITY_SCORE: 31
ADLS_ACUITY_SCORE: 32
ADLS_ACUITY_SCORE: 33

## 2022-11-23 NOTE — PLAN OF CARE
Goal Outcome Evaluation:  Major Shift Events:  Neuro intact, following commands, off all sedation and pressors. Extubated this afternoon to high flow, now on RA, satting mid 90s. Up to chair most of the afternoon. Insulin gtts between 1-3 units/hr. Phos replaced to 2.6, other electrolytes WNL.  visited during the day.  Plan: Monitor hemodynamics and respiratory status. Floor orders as appropriate.    For vital signs and complete assessments, please see documentation flowsheets.    Problem: Risk for Delirium  Goal: Optimal Coping  11/22/2022 1844 by Michelle Ibarra RN  Outcome: Progressing  11/22/2022 1842 by Michelle Ibarra RN  Outcome: Progressing  Intervention: Optimize Psychosocial Adjustment to Delirium  Recent Flowsheet Documentation  Taken 11/22/2022 1600 by Michelle Ibarra RN  Family/Support System Care:   caregiver stress acknowledged   involvement promoted   presence promoted   support provided  Taken 11/22/2022 1200 by Michelle Ibarra RN  Family/Support System Care:   caregiver stress acknowledged   involvement promoted   presence promoted   support provided     Problem: Plan of Care - These are the overarching goals to be used throughout the patient stay.    Goal: Readiness for Transition of Care  Outcome: Progressing     Problem: Cardiovascular Surgery  Goal: Improved Activity Tolerance  11/22/2022 1844 by Michelle Ibarra RN  Outcome: Progressing  11/22/2022 1842 by Michelle Ibarra RN  Outcome: Progressing  Goal: Optimal Coping with Heart Surgery  11/22/2022 1844 by Michelle Ibarra RN  Outcome: Progressing  11/22/2022 1842 by Michelle Ibarra RN  Outcome: Progressing  Intervention: Support Psychosocial Response to Surgery  Recent Flowsheet Documentation  Taken 11/22/2022 1600 by Michelle Ibarra RN  Family/Support System Care:   caregiver stress acknowledged   involvement promoted   presence promoted   support provided  Taken 11/22/2022 1200 by Michelle Ibarra RN  Family/Support  System Care:   caregiver stress acknowledged   involvement promoted   presence promoted   support provided  Goal: Fluid and Electrolyte Balance  Outcome: Progressing  Goal: Anesthesia/Sedation Recovery  Outcome: Progressing  Intervention: Optimize Anesthesia Recovery  Recent Flowsheet Documentation  Taken 11/22/2022 1600 by Michelle Ibarra RN  Safety Promotion/Fall Prevention:   clutter free environment maintained   increased rounding and observation   increase visualization of patient   lighting adjusted   safety round/check completed   patient and family education   room door open   room near nurse's station   room organization consistent  Taken 11/22/2022 1200 by Michelle Ibarra RN  Safety Promotion/Fall Prevention:   clutter free environment maintained   increased rounding and observation   increase visualization of patient   lighting adjusted   safety round/check completed   patient and family education   room door open   room near nurse's station   room organization consistent  Administration (IS): unable to perform  Taken 11/22/2022 0800 by Michelle Ibarra RN  Safety Promotion/Fall Prevention:   clutter free environment maintained   increased rounding and observation   increase visualization of patient   lighting adjusted   safety round/check completed   patient and family education   room door open   room near nurse's station   room organization consistent  Administration (IS): unable to perform     Problem: Restraint, Nonviolent  Goal: Absence of Harm or Injury  Outcome: Progressing  Intervention: Protect Dignity, Rights and Personal Wellbeing  Recent Flowsheet Documentation  Taken 11/22/2022 1600 by Michelle Ibarra RN  Trust Relationship/Rapport:   care explained   emotional support provided   reassurance provided  Taken 11/22/2022 1200 by Michelle Ibarra RN  Trust Relationship/Rapport:   care explained   emotional support provided   reassurance provided  Taken 11/22/2022 0800 by Michelle Ibarra  HADLEY ADEN  Trust Relationship/Rapport:   care explained   emotional support provided   reassurance provided  Intervention: Protect Skin and Joint Integrity  Recent Flowsheet Documentation  Taken 11/22/2022 1800 by Michelle Ibarra RN  Body Position:   left   turned   heels elevated  Taken 11/22/2022 1600 by Michelle Ibarra RN  Body Position:   heels elevated   upper extremity elevated  Taken 11/22/2022 1400 by Michelle Ibarra RN  Body Position: heels elevated  Taken 11/22/2022 1200 by Michlele Ibarra RN  Body Position:   turned   right   heels elevated   upper extremity elevated

## 2022-11-23 NOTE — PROGRESS NOTES
11/23/22 1130   Appointment Info   Signing Clinician's Name / Credentials (OT) Yoana Hickey OTR/L   Rehab Comments (OT) sternal precautions, PT orders released 11/23       Present no   Living Environment   People in Home spouse   Current Living Arrangements apartment;independent living facility;other (see comments)  (senior IND living facility)   Home Accessibility no concerns   Transportation Anticipated car, drives self;family or friend will provide   Living Environment Comments Pt reports living in apartment with spouse, recently moved to senior living facility, no stairs, walk-in shower with seated surface.   Self-Care   Usual Activity Tolerance good   Current Activity Tolerance fair   Regular Exercise Yes   Activity/Exercise Type walking   Exercise Amount/Frequency 3-5 times/wk;30 mins   Equipment Currently Used at Home none   Fall history within last six months no   Activity/Exercise/Self-Care Comment Pt reports ADL IND at baseline, typically does not utilize AD for functional mobility.   Instrumental Activities of Daily Living (IADL)   Previous Responsibilities meal prep;housekeeping;driving   IADL Comments Pt reports IADL IND at baseline, spouse available to assist prn, drives self, does not work. Active at baseline, enjoys walking.   General Information   Onset of Illness/Injury or Date of Surgery 11/16/22   Referring Physician Lucius Shirley MD   Patient/Family Therapy Goal Statement (OT) Return home and to PLOF   Additional Occupational Profile Info/Pertinent History of Current Problem Per EMR, Jade Walker is a 87 year old female with PMHx of severe aortic stenosis HTN, HLD, non-obstructive CAD, CKD stage III, IBS s/p TAVR by c/b LV perforation surgically repaired by Dr. Pulido 11/16. Intraop got 600 cellsaver, 2 plts, 1 ffp, 4 prbcs, and 1500 kcentra. Valve well seated.   Existing Precautions/Restrictions cardiac;fall;sternal   Limitations/Impairments safety/cognitive    Left Upper Extremity (Weight-bearing Status) partial weight-bearing (PWB)  (no > 10 lb)   Right Upper Extremity (Weight-bearing Status) partial weight-bearing (PWB)  (no > 10 lb)   Left Lower Extremity (Weight-bearing Status) full weight-bearing (FWB)   Right Lower Extremity (Weight-bearing Status) full weight-bearing (FWB)   General Observations and Info Activity: Ambulate with Assistance   Cognitive Status Examination   Orientation Status orientation to person, place and time   Cognitive Status Comments Cognition appears grossly intact, will continue to monitor   Visual Perception   Visual Impairment/Limitations WFL   Impact of Vision Impairment on Function (Vision) Denies acute vision changes   Sensory   Sensory Quick Adds sensation intact   Pain Assessment   Patient Currently in Pain Yes, see Vital Sign flowsheet   Posture   Posture forward head position   Posture Comments while seated in bedside chair   Range of Motion Comprehensive   General Range of Motion bilateral upper extremity ROM WFL   Strength Comprehensive (MMT)   Comment, General Manual Muscle Testing (MMT) Assessment B UE grossly deconditioned, formal MMT not assessed 2/2 post-surgical precautions   Muscle Tone Assessment   Muscle Tone Quick Adds No deficits were identified   Coordination   Upper Extremity Coordination No deficits were identified   Bed Mobility   Bed Mobility supine-sit   Comment (Bed Mobility) per clinical judgement, mod A   Transfers   Transfers sit-stand transfer;toilet transfer   Sit-Stand Transfer   Sit/Stand Transfer Comments min A x 2 STS from chair   Toilet Transfer   Type (Toilet Transfer) sit-stand   [place] Level (Toilet Transfer) minimum assist (75% patient effort);2 person assist;verbal cues   Toilet Transfer Comments per clinical judgement   Balance   Balance Assessment sitting balance: static   Balance Comments CGA-min A unsupported sitting balance in chair   Activities of Daily Living   BADL  Assessment/Intervention bathing;lower body dressing;toileting   Bathing Assessment/Intervention   [place] Level (Bathing) moderate assist (50% patient effort)   Comment, (Bathing) per clinical judgement   Lower Body Dressing Assessment/Training   Comment, (Lower Body Dressing) per clinical judgement   [place] Level (Lower Body Dressing) maximum assist (25% patient effort)   Toileting   Comment, (Toileting) per clinical judgement   [place] Level (Toileting) maximum assist (25% patient effort)   Clinical Impression   Criteria for Skilled Therapeutic Interventions Met (OT) Yes, treatment indicated   OT Diagnosis Decreased ADL IND/safety, functional mobility, overall activity tolerance, cardiovascular endurance   OT Problem List-Impairments impacting ADL problems related to;activity tolerance impaired;cognition;mobility;range of motion (ROM);strength;pain;post-surgical precautions;postural control   Assessment of Occupational Performance 5 or more Performance Deficits   Identified Performance Deficits FB dressing, bathing, toileting, standing g/h tasks, functional mobility, IADL tasks, driving, muscular strength/endurance   Planned Therapy Interventions (OT) ADL retraining;IADL retraining;cognition;ROM;strengthening;transfer training;progressive activity/exercise   Clinical Decision Making Complexity (OT) low complexity   Anticipated Equipment Needs Upon Discharge (OT) other (see comments)  (TBD)   Risk & Benefits of therapy have been explained evaluation/treatment results reviewed;care plan/treatment goals reviewed;participants included;patient   Clinical Impression Comments Pt below baseline function and will benefit from continued skilled OT/CR during IP stay to progress ADL IND/safety, functional mobility, and return to PLOF.   OT Total Evaluation Time   OT Eval, Low Complexity Minutes (44138) 6   OT Goals   Therapy Frequency (OT) 6 times/wk   OT Predicted Duration/Target Date for Goal Attainment  12/08/22   OT Goals Lower Body Bathing;Toilet Transfer/Toileting;Cognition;Cardiac Phase 1   OT: Lower Body Bathing Supervision/stand-by assist;with precautions   OT: Toilet Transfer/Toileting Supervision/stand-by assist;toilet transfer;cleaning and garment management;within precautions   OT: Cognitive Patient/caregiver will verbalize understanding of cognitive assessment results/recommendations as needed for safe discharge planning   OT: Understanding of cardiac education to maximize quality of life, condition management, and health outcomes Patient;Verbalize;Demonstrate   OT: Perform aerobic activity with stable cardiovascular response intermittent;10 minutes;15 minutes;ambulation;NuStep   Interventions   Interventions Quick Adds Therapeutic Activity   OT Discharge Planning   OT Plan OT: review precautions, functional transfers bed<>chair, standing tolerance with ADLs, LB dressing, progress endurance/CR   OT Discharge Recommendation (DC Rec) Transitional Care Facility;home with assist;home with home care occupational therapy   OT Rationale for DC Rec Pt below baseline function for ADL tasks, functional mobility, overall activity tolerance. Currently, pt requiring max A LB dressing and min A x 2 STS transfers from bedside chair. Would recommend continued rehab at TCU this date to progress ADL IND/safety, functional mobility, and return to PLOF. Pending on IP progress and LOS, pt may progress to home with assist and HH vs OP CR, will continue to monitor and update as appropriate.   OT Brief overview of current status OH lift bed>chair, min A x 2 STS transfers from chair, max A LB dressing   Total Session Time   Total Session Time (sum of timed and untimed services) 6

## 2022-11-23 NOTE — PLAN OF CARE
Goal Outcome Evaluation:      Plan of Care Reviewed With: patient    Overall Patient Progress: improvingOverall Patient Progress: improving    Outcome Evaluation: Will continue TFs at this time. If pt eats well 11/23, ok to discontinue EN

## 2022-11-23 NOTE — PLAN OF CARE
Major Shift Events:  Pt A&O x4 and c/o generalized pain managed well w/ PRN pain meds. Pt in and out of afib w/ HR 80s-104. Amio infusing at 0.5. K, Mag replaced however rechecked is still pending r/t machines down in lab.    Plan: Follow plan of care  For vital signs and complete assessments, please see documentation flowsheets.

## 2022-11-23 NOTE — PROGRESS NOTES
CLINICAL NUTRITION SERVICES - REASSESSMENT NOTE     Nutrition Prescription    RECOMMENDATIONS FOR MDs/PROVIDERS TO ORDER:  -Ok to stop EN and pull FT if pt eats >50% of all meals x 1 day (11/23)    Malnutrition Status:    Patient does not meet two of the established criteria necessary for diagnosing malnutrition    Recommendations already ordered by Registered Dietitian (RD):  -Continue TF as ordered: Osmolite 1.5 @ 30 ml/hr + 1 pkt Prosource TF20 provides 1160 kcal (21 kcal/kg) and 65 g protein (1.2 g/kg)  -Continue daily MVI    Future/Additional Recommendations:  -Continue to monitor TF tolerance/adequacy  -Continue to monitor labs, stool output, weight trends  -Monitor PO intake/adequacy/ability to discontinue EN     EVALUATION OF THE PROGRESS TOWARD GOALS   Diet: Clear Liquid  Nutrition Support: Pt has been tolerating TF at 30 ml/hr. EN never got advanced to goal rate due to inability to obtain post-pyloric access.     11/18-20: Osmolite 1.5 @ 20 ml/hr + 1 pkt Prosource TF20     11/20-current: Osmolite 1.5 @ 30 ml/hr + 1 pkt Prosource TF20 provides 1160 kcal (21 kcal/kg) and 65 g protein (1.2 g/kg). (MD ordered)     Intake: Pt received an average of 482 ml TF/d over the past 5 days + 1 pkt Prosource TF20 daily. This provided an average of 803 kcal/d (15 kcal/kg) and 50 g protein/d (0.9 g/kg). This met 59% estimated energy needs and 77% estimated protein needs.        NEW FINDINGS   Pt extubated 11/22.     GI: 500 ml loose stool out of rectal tube yesterday (11/22)    Labs: Reviewed - hypernatremia, hypermagnesia, elevated BUN/Cr, hypokalemia, hypophosphatemia (both replaced this AM)    Medications: Reviewed    Weights: No weight loss over the past week.   11/23/22 0536 76.3 kg (168 lb 3.4 oz)   11/22/22 0400 79.6 kg (175 lb 7.8 oz)   11/20/22 0415 81 kg (178 lb 9.2 oz)   11/19/22 0400 74.8 kg (164 lb 14.5 oz)   11/18/22 0000 78.8 kg (173 lb 11.6 oz)   11/17/22 0615 73.3 kg (161 lb 9.6 oz)   11/16/22 0819 64 kg  (141 lb 1.5 oz)     MALNUTRITION  % Intake: Decreased intake does not meet criteria  % Weight Loss: None noted  Subcutaneous Fat Loss: None observed  Muscle Loss: Temporal:  Mild  Fluid Accumulation/Edema: Does not meet criteria (trace)  Malnutrition Diagnosis: Patient does not meet two of the established criteria necessary for diagnosing malnutrition    Previous Goals   Total avg nutritional intake to meet a minimum of 25 kcal/kg and 1.2 g PRO/kg daily (per dosing wt 54 kg).  Evaluation: Not met    Previous Nutrition Diagnosis  Inadequate oral intake related to intubation as evidenced by NPO status   Evaluation: Improving    CURRENT NUTRITION DIAGNOSIS  Predicted inadequate oral intake related to poor appetite as evidenced by lengthy intubation.       INTERVENTIONS  Implementation  Enteral Nutrition - continue as ordered   Multivitamin/mineral supplement therapy    Goals  Patient to consume % of nutritionally adequate meal trays TID, or the equivalent with supplements/snacks OR  Total avg nutritional intake to meet a minimum of 25 kcal/kg and 1.2 g PRO/kg daily (per dosing wt 54 kg).    Monitoring/Evaluation  Progress toward goals will be monitored and evaluated per protocol.    Tania Hutchinson, MS, RD, LD  4E (CVICU) RD pager: [phone]  Ascom: [id number]  Weekend/Holiday RD pager: [phone]

## 2022-11-23 NOTE — PROGRESS NOTES
CV ICU PROGRESS NOTE      CO-MORBIDITIES:   Severe aortic stenosis  (primary encounter diagnosis)    ASSESSMENT: Jade Walker is a 87 year old female with PMHx of severe aortic stenosis HTN, HLD, non-obstructive CAD, CKD stage III, IBS s/p TAVR by c/b LV perforation surgically repaired by Dr. Pulido 11/16. Intraop got 600 cellsaver, 2 plts, 1 ffp, 4 prbcs, and 1500 kcentra. Valve well seated.     Last 24 hours:  Decreased Seroquel HS to 25  PST, goal of extubation today  Advance TFs to goal  Goal: net - 1L  Lasix 40mg BID    Today's Progress:  Keep amiodarone and ASA  Starting Plavix  Advancing diet as tolerated  Goal net -1 L  40 Lasix IV x1  Increased FWF to 60ml Q4H  Switched insulin gtt to Medium sliding scale insulin  Switch Zosyn to ceftriaxone    PLAN:  Neuro/ pain/ sedation:  #Acute postoperative pain  #Sedation  - Monitor neurological status. Notify the MD for any acute changes in exam.  - Pain:    Scheduled tylenol.    PRN tylenol, oxycodone, dilaudid.  - Sedation: off  - seroquel 25 at bedtime  - avoid sedation at daytime to maximize alertness    Pulmonary care:   - extubated 11/22, on RA  - Goal SpO2 > 92%  - Incentive spirometer Q15-30 minutes when awake.    Cardiovascular:    # Severe aortic stenosis s/p TAVR  # Pericardial effusion s/p pericardial drain  # Hypertension  # Hyperlipidemia  # Non obstructive CAD  Increasing pressor needs 11/19, unclear for infectious etiology  - off pressors  - Monitor hemodynamic status.   - ASA  - TTE (11/16), then KULDIP (11/16) negative for significantpericardial effusion  - amio drip for Afib HR , Afib in 140s on 11/21 evening, metoprolol 5mg bolus and continue on amio drip at 0.5.  - start PTA plavix  - start metop12.5 bid d/t tachycardia    GI care:   # Irritable Bowel Syndrome     - Nutrition consulted, appreciate recommendations  - ADAT  - PPI for bowel prophylaxis  - Bowel regimen: MiraLAX bid, senna 2 tabs bid, milk of mag  - advance TF to  30cc/hr  - keep NG, consider calorie count tmrw if not tolerating more than 50% of PO intake    Renal/ Fluid Balance:    # Chronic Kidney Disease Stage 3  - ICU electrolyte replacement protocol   - Baseline Cr 0.9  - Strict I&Os  - monitor UOP  - fluid resuscitation as needed  - Goal net -1L  - lasix 20 40  40IV once  - Na 147, increased FWF to 60 Q4, monitor    Endocrine:    #Stress hyperglycemia  - MDSSI    ID/ Antibiotics:  #Postoperative prophylactic antibiotics  - Completing perioperative course of antibiotics: ancef  - no indication for further antibiotics  - follow Bcx and sputum cx  - started zosyn, switch to ceftriaxone (7 day end date, end 11/26)  - MRSA nares negative 11/19, discontinue vanc    Heme:     # Acute blood loss anemia  # Thrombocytopenia  # Pericardial effusion s/p pericardiocentesis  2 pt Hgb drop overnight  - Hgb goal > 7.0  - Hemoglobin stable.  - 2U pRBC (11/17)  - SQH  - HIT test negative     Prophylaxis:    - VTE: SCD's, heparin 5000u sq  - Bowel regimen: PPI, MiraLAX, senna     Lines/ tubes/ drains:  - Park (out), NG  - PICC    Remove transducer    Disposition:  - Floor     ICU Checklist  F - Feeding:   A - Analgesia:   S - Sedation:   T - Thromboembolic prophylaxis:      H - Head of bed elevated  U - Ulcer prophylaxis:  G - Glycemic control  S - SBT     B - Bowel regimen  I - Indwelling catheter  D - De-escalation of antibiotics     Patient seen, findings and plan discussed with CVICU staff.    Jesus Irizarry MD PGY2    ====================================    SUBJECTIVE:   NAD    OBJECTIVE:   1. VITAL SIGNS:   Temp:  [98.2  F (36.8  C)-98.8  F (37.1  C)] 98.4  F (36.9  C)  Pulse:  [] 78  Resp:  [13-31] 20  BP: ()/() 136/76  FiO2 (%):  [30 %-40 %] 30 %  SpO2:  [92 %-100 %] 96 %  Vent Mode: CPAP/PS  (Continuous positive airway pressure with Pressure Support)  FiO2 (%): 30 %  Resp Rate (Set): 12 breaths/min  Tidal Volume (Set, mL): 300 mL  PEEP (cm H2O): 5 cmH2O  Pressure  Support (cm H2O): 5 cmH2O  Resp: 20      2. INTAKE/ OUTPUT:   I/O last 3 completed shifts:  In: 1996.15 [I.V.:1001.15; NG/GT:365]  Out: 3585 [Urine:2985; Stool:600]    3. PHYSICAL EXAMINATION:   General: NAD  Neuro: A&Ox3  Resp: Breathing non-labored, intubated  CV: RRR  Abdomen: Soft, Non-distended, Non-tender  Incisions: c/d/i  Extremities: warm and well perfused    4. INVESTIGATIONS:   Arterial Blood Gases   Recent Labs   Lab 11/20/22  0327 11/19/22  1730 11/19/22  1145 11/19/22  0838   PH 7.49* 7.45 7.39 7.45   PCO2 33* 35 43 36   PO2 75* 76* 77* 100   HCO3 25 24 26 25     Complete Blood Count   Recent Labs   Lab 11/23/22  0415 11/22/22  0426 11/21/22  0309 11/20/22  0327   WBC 13.5* 11.4* 8.5 9.3   HGB 8.4* 8.7* 8.7* 8.5*    176 127* 89*     Basic Metabolic Panel  Recent Labs   Lab 11/23/22  0602 11/23/22  0421 11/23/22  0148 11/23/22  0144 11/22/22  2301 11/22/22  1355 11/22/22  1351 11/22/22  0526 11/22/22  0426 11/21/22  1810 11/21/22  1528 11/21/22  0521 11/21/22  0309   NA  --   --   --   --   --   --  146*  --  146*  --  143  --  142   POTASSIUM  --   --   --  3.3*  --   --  3.0*  --  3.0*  --  3.7   < > 3.0*   CHLORIDE  --   --   --   --   --   --  105  --  106  --  104  --  105   CO2  --   --   --   --   --   --  27  --  27  --  26  --  27   BUN  --   --   --   --   --   --  35.1*  --  33.7*  --  35.3*  --  34.1*   CR  --   --   --   --   --   --  1.03*  --  1.07*  --  1.02*  --  1.00*   * 105* 118*  --  134*   < > 157*   < > 124*  120*   < > 125*  127*   < > 160*    < > = values in this interval not displayed.     Liver Function Tests  Recent Labs   Lab 11/19/22  0321 11/19/22  0320 11/18/22  0241 11/17/22  1200 11/17/22  0639 11/17/22  0337 11/16/22  2202 11/16/22  1736   AST 79*  --  146*  --  172* 141*   < > 69*   ALT 11  --  20  --  27 24   < > 24   ALKPHOS 52  --  44  --  37 34*   < > 41   BILITOTAL 0.4  --  0.3  --  0.5 0.4   < > 1.0   ALBUMIN 2.5*  --  2.8*  --  3.2* 3.3*   < >  2.8*   INR  --  1.15 1.24* 1.35*  --   --   --  1.30*    < > = values in this interval not displayed.     Pancreatic Enzymes  No lab results found in last 7 days.  Coagulation Profile  Recent Labs   Lab 11/19/22  0320 11/18/22  0927 11/18/22  0241 11/17/22  1200 11/16/22  1736 11/16/22  1630   INR 1.15  --  1.24* 1.35* 1.30* 1.36*   PTT  --  28  --  29 30 33         5. RADIOLOGY:   Recent Results (from the past 24 hour(s))   XR Chest Port 1 View    Narrative    EXAM: XR CHEST PORT 1 VIEW 11/22/2022 10:56 AM    HISTORY: 87 years Female interval eval;.     COMPARISON: 11/21/2022.    TECHNIQUE: Portal semiupright AP view of the chest.    FINDINGS:   Post surgical chest with intact median sternotomy wires. Endotracheal  tube tip projects 3.5 cm above the [place]. Feeding tube projects below  the diaphragm and passes below field of view. Right upper extremity  PICC terminates in the SVC. Intracardiac postsurgical hardware  associated with TAVR. Increased perihilar and bibasilar  opacifications. Loss of left hemidiaphragm silhouette.  Cardiomediastinal silhouette is stable. Aortic knob calcific  atherosclerosis. The upper abdomen is unremarkable. No acute osseous  or soft tissue abnormalities.      Impression    IMPRESSION:   1.  Increased bilateral perihilar and bibasilar opacification.  2.  Stable left pleural effusion.  3.  Postsurgical changes of cardiothoracic surgery with stable support  devices.    I have personally reviewed the examination and initial interpretation  and I agree with the findings.    MARIO OAKLEY MD         SYSTEM ID:  [id number]       =========================================

## 2022-11-23 NOTE — OP NOTE
Procedure Date: 2022    PREOPERATIVE DIAGNOSIS:  Severe aortic stenosis, status post TAVR with expanding pericardial hematoma with hemodynamic compromise with tamponade.    POSTOPERATIVE DIAGNOSIS:  Severe aortic stenosis, status post TAVR with expanding pericardial hematoma with hemodynamic compromise with tamponade.    PROCEDURE:  Emergency sternotomy, repair of laceration of the lateral wall of left ventricle.    SURGEON:  Dallin Bone MD    CO-SURGEON:  Eddi Peres MD. Note, a second attending surgeon was requested for the operation because of complexity of the operation.  Please refer to full details by Dr. Bone, primary surgeon.    DESCRIPTION OF PROCEDURE:  The patient was brought to the operating room in stable condition.  After deployment of TAVR valve, there was a large pericardial hematoma with persistent bleeding.  Decision was made to proceed with a median sternotomy. Cardiopulmonary bypass was initiated via aortic and right atrial cannulation.  Light bleeding was noted from lateral wall of the left ventricle.  This was repaired using pledgeted 3-0 Prolene sutures.  Hemostasis was achieved.  The patient was gradually weaned from CPB. Chest was closed. Note, as cosurgeon, I performed specific portions of the operation.    Ja Montague MD        D: 2022   T: 2022   MT: BHAVIN    Name:     KALA PEPPER  MRN:      [id number]        Account:        [id number]   :      1935           Procedure Date: 2022     Document: [id number]

## 2022-11-24 ENCOUNTER — APPOINTMENT (OUTPATIENT)
Dept: PHYSICAL THERAPY | Facility: CLINIC | Age: 87
DRG: 266 | End: 2022-11-24
Attending: INTERNAL MEDICINE
Payer: COMMERCIAL

## 2022-11-24 LAB
ANION GAP SERPL CALCULATED.3IONS-SCNC: 10 MMOL/L (ref 7–15)
ANION GAP SERPL CALCULATED.3IONS-SCNC: 8 MMOL/L (ref 7–15)
BUN SERPL-MCNC: 26.7 MG/DL (ref 8–23)
BUN SERPL-MCNC: 30 MG/DL (ref 8–23)
CALCIUM SERPL-MCNC: 8 MG/DL (ref 8.8–10.2)
CALCIUM SERPL-MCNC: 8.1 MG/DL (ref 8.8–10.2)
CHLORIDE SERPL-SCNC: 107 MMOL/L (ref 98–107)
CHLORIDE SERPL-SCNC: 107 MMOL/L (ref 98–107)
CREAT SERPL-MCNC: 0.88 MG/DL (ref 0.51–0.95)
CREAT SERPL-MCNC: 0.9 MG/DL (ref 0.51–0.95)
DEPRECATED HCO3 PLAS-SCNC: 27 MMOL/L (ref 22–29)
DEPRECATED HCO3 PLAS-SCNC: 28 MMOL/L (ref 22–29)
ERYTHROCYTE [DISTWIDTH] IN BLOOD BY AUTOMATED COUNT: 17.7 % (ref 10–15)
GFR SERPL CREATININE-BSD FRML MDRD: 62 ML/MIN/1.73M2
GFR SERPL CREATININE-BSD FRML MDRD: 64 ML/MIN/1.73M2
GLUCOSE BLDC GLUCOMTR-MCNC: 106 MG/DL (ref 70–99)
GLUCOSE BLDC GLUCOMTR-MCNC: 107 MG/DL (ref 70–99)
GLUCOSE BLDC GLUCOMTR-MCNC: 131 MG/DL (ref 70–99)
GLUCOSE BLDC GLUCOMTR-MCNC: 145 MG/DL (ref 70–99)
GLUCOSE BLDC GLUCOMTR-MCNC: 99 MG/DL (ref 70–99)
GLUCOSE SERPL-MCNC: 113 MG/DL (ref 70–99)
GLUCOSE SERPL-MCNC: 132 MG/DL (ref 70–99)
HCT VFR BLD AUTO: 26.8 % (ref 35–47)
HGB BLD-MCNC: 8.6 G/DL (ref 11.7–15.7)
LACTATE SERPL-SCNC: 1.7 MMOL/L (ref 0.7–2)
MAGNESIUM SERPL-MCNC: 2.1 MG/DL (ref 1.7–2.3)
MAGNESIUM SERPL-MCNC: 2.1 MG/DL (ref 1.7–2.3)
MAGNESIUM SERPL-MCNC: 2.2 MG/DL (ref 1.7–2.3)
MCH RBC QN AUTO: 31.4 PG (ref 26.5–33)
MCHC RBC AUTO-ENTMCNC: 32.1 G/DL (ref 31.5–36.5)
MCV RBC AUTO: 98 FL (ref 78–100)
PHOSPHATE SERPL-MCNC: 2.3 MG/DL (ref 2.5–4.5)
PHOSPHATE SERPL-MCNC: 2.6 MG/DL (ref 2.5–4.5)
PLATELET # BLD AUTO: 201 10E3/UL (ref 150–450)
POTASSIUM SERPL-SCNC: 3.7 MMOL/L (ref 3.4–5.3)
POTASSIUM SERPL-SCNC: 4 MMOL/L (ref 3.4–5.3)
POTASSIUM SERPL-SCNC: 4 MMOL/L (ref 3.4–5.3)
RBC # BLD AUTO: 2.74 10E6/UL (ref 3.8–5.2)
SODIUM SERPL-SCNC: 143 MMOL/L (ref 136–145)
SODIUM SERPL-SCNC: 144 MMOL/L (ref 136–145)
WBC # BLD AUTO: 11.3 10E3/UL (ref 4–11)

## 2022-11-24 PROCEDURE — 83605 ASSAY OF LACTIC ACID: CPT

## 2022-11-24 PROCEDURE — 83735 ASSAY OF MAGNESIUM: CPT | Performed by: SURGERY

## 2022-11-24 PROCEDURE — 258N000003 HC RX IP 258 OP 636: Performed by: STUDENT IN AN ORGANIZED HEALTH CARE EDUCATION/TRAINING PROGRAM

## 2022-11-24 PROCEDURE — 84100 ASSAY OF PHOSPHORUS: CPT | Performed by: STUDENT IN AN ORGANIZED HEALTH CARE EDUCATION/TRAINING PROGRAM

## 2022-11-24 PROCEDURE — 85027 COMPLETE CBC AUTOMATED: CPT | Performed by: STUDENT IN AN ORGANIZED HEALTH CARE EDUCATION/TRAINING PROGRAM

## 2022-11-24 PROCEDURE — 214N000001 HC R&B CCU UMMC

## 2022-11-24 PROCEDURE — 82310 ASSAY OF CALCIUM: CPT | Performed by: STUDENT IN AN ORGANIZED HEALTH CARE EDUCATION/TRAINING PROGRAM

## 2022-11-24 PROCEDURE — 250N000013 HC RX MED GY IP 250 OP 250 PS 637: Performed by: SURGERY

## 2022-11-24 PROCEDURE — 83735 ASSAY OF MAGNESIUM: CPT | Performed by: STUDENT IN AN ORGANIZED HEALTH CARE EDUCATION/TRAINING PROGRAM

## 2022-11-24 PROCEDURE — 250N000013 HC RX MED GY IP 250 OP 250 PS 637: Performed by: STUDENT IN AN ORGANIZED HEALTH CARE EDUCATION/TRAINING PROGRAM

## 2022-11-24 PROCEDURE — 250N000011 HC RX IP 250 OP 636: Performed by: THORACIC SURGERY (CARDIOTHORACIC VASCULAR SURGERY)

## 2022-11-24 PROCEDURE — 250N000011 HC RX IP 250 OP 636: Performed by: SURGERY

## 2022-11-24 PROCEDURE — 97162 PT EVAL MOD COMPLEX 30 MIN: CPT | Mod: GP

## 2022-11-24 PROCEDURE — 250N000013 HC RX MED GY IP 250 OP 250 PS 637

## 2022-11-24 PROCEDURE — 250N000013 HC RX MED GY IP 250 OP 250 PS 637: Performed by: THORACIC SURGERY (CARDIOTHORACIC VASCULAR SURGERY)

## 2022-11-24 PROCEDURE — 97530 THERAPEUTIC ACTIVITIES: CPT | Mod: GP

## 2022-11-24 PROCEDURE — 250N000011 HC RX IP 250 OP 636

## 2022-11-24 PROCEDURE — 999N000067 HC STATISTIC FAILED PERIPHERAL IV START

## 2022-11-24 PROCEDURE — 80048 BASIC METABOLIC PNL TOTAL CA: CPT | Performed by: SURGERY

## 2022-11-24 RX ORDER — POTASSIUM CHLORIDE 29.8 MG/ML
20 INJECTION INTRAVENOUS ONCE
Status: COMPLETED | OUTPATIENT
Start: 2022-11-24 | End: 2022-11-24

## 2022-11-24 RX ORDER — METOPROLOL TARTRATE 25 MG/1
25 TABLET, FILM COATED ORAL 2 TIMES DAILY
Status: DISCONTINUED | OUTPATIENT
Start: 2022-11-24 | End: 2022-12-01 | Stop reason: HOSPADM

## 2022-11-24 RX ORDER — AMIODARONE HYDROCHLORIDE 200 MG/1
200 TABLET ORAL DAILY
Status: DISCONTINUED | OUTPATIENT
Start: 2022-11-24 | End: 2022-11-25

## 2022-11-24 RX ORDER — AMOXICILLIN 250 MG
1 CAPSULE ORAL
Status: DISCONTINUED | OUTPATIENT
Start: 2022-11-24 | End: 2022-12-01 | Stop reason: HOSPADM

## 2022-11-24 RX ORDER — FUROSEMIDE 10 MG/ML
20 INJECTION INTRAMUSCULAR; INTRAVENOUS ONCE
Status: COMPLETED | OUTPATIENT
Start: 2022-11-24 | End: 2022-11-24

## 2022-11-24 RX ORDER — ASPIRIN 325 MG/1
325 TABLET, FILM COATED ORAL ONCE
Status: ON HOLD | COMMUNITY
End: 2022-11-24

## 2022-11-24 RX ADMIN — POTASSIUM CHLORIDE 20 MEQ: 29.8 INJECTION, SOLUTION INTRAVENOUS at 08:28

## 2022-11-24 RX ADMIN — Medication 40 MG: at 07:32

## 2022-11-24 RX ADMIN — HEPARIN SODIUM 5000 UNITS: 5000 INJECTION, SOLUTION INTRAVENOUS; SUBCUTANEOUS at 03:44

## 2022-11-24 RX ADMIN — ASPIRIN 81 MG CHEWABLE TABLET 81 MG: 81 TABLET CHEWABLE at 07:32

## 2022-11-24 RX ADMIN — AMIODARONE HYDROCHLORIDE 200 MG: 200 TABLET ORAL at 10:21

## 2022-11-24 RX ADMIN — QUETIAPINE FUMARATE 25 MG: 25 TABLET ORAL at 19:44

## 2022-11-24 RX ADMIN — CLOPIDOGREL BISULFATE 75 MG: 75 TABLET ORAL at 07:32

## 2022-11-24 RX ADMIN — HEPARIN SODIUM 5000 UNITS: 5000 INJECTION, SOLUTION INTRAVENOUS; SUBCUTANEOUS at 11:52

## 2022-11-24 RX ADMIN — CEFTRIAXONE SODIUM 2 G: 2 INJECTION, POWDER, FOR SOLUTION INTRAMUSCULAR; INTRAVENOUS at 15:02

## 2022-11-24 RX ADMIN — FUROSEMIDE 20 MG: 10 INJECTION, SOLUTION INTRAVENOUS at 09:11

## 2022-11-24 RX ADMIN — ACETAMINOPHEN 975 MG: 325 TABLET, FILM COATED ORAL at 03:44

## 2022-11-24 RX ADMIN — METOPROLOL TARTRATE 25 MG: 25 TABLET, FILM COATED ORAL at 19:44

## 2022-11-24 RX ADMIN — Medication 12.5 MG: at 10:21

## 2022-11-24 RX ADMIN — POTASSIUM & SODIUM PHOSPHATES POWDER PACK 280-160-250 MG 1 PACKET: 280-160-250 PACK at 06:43

## 2022-11-24 RX ADMIN — LOPERAMIDE HCL 2 MG: 1 SUSPENSION ORAL at 11:52

## 2022-11-24 RX ADMIN — Medication 15 ML: at 07:32

## 2022-11-24 RX ADMIN — ACETAMINOPHEN 975 MG: 325 TABLET, FILM COATED ORAL at 19:44

## 2022-11-24 RX ADMIN — Medication 12.5 MG: at 07:32

## 2022-11-24 RX ADMIN — POTASSIUM & SODIUM PHOSPHATES POWDER PACK 280-160-250 MG 1 PACKET: 280-160-250 PACK at 10:21

## 2022-11-24 RX ADMIN — POTASSIUM & SODIUM PHOSPHATES POWDER PACK 280-160-250 MG 1 PACKET: 280-160-250 PACK at 02:22

## 2022-11-24 RX ADMIN — HEPARIN SODIUM 5000 UNITS: 5000 INJECTION, SOLUTION INTRAVENOUS; SUBCUTANEOUS at 19:44

## 2022-11-24 RX ADMIN — INSULIN ASPART 1 UNITS: 100 INJECTION, SOLUTION INTRAVENOUS; SUBCUTANEOUS at 07:39

## 2022-11-24 RX ADMIN — SODIUM CHLORIDE, POTASSIUM CHLORIDE, SODIUM LACTATE AND CALCIUM CHLORIDE 500 ML: 600; 310; 30; 20 INJECTION, SOLUTION INTRAVENOUS at 20:23

## 2022-11-24 RX ADMIN — ACETAMINOPHEN 975 MG: 325 TABLET, FILM COATED ORAL at 11:52

## 2022-11-24 ASSESSMENT — ACTIVITIES OF DAILY LIVING (ADL)
ADLS_ACUITY_SCORE: 31
ADLS_ACUITY_SCORE: 30
ADLS_ACUITY_SCORE: 31
ADLS_ACUITY_SCORE: 30
ADLS_ACUITY_SCORE: 31

## 2022-11-24 NOTE — PLAN OF CARE
Neurology:  AO4, no neuro deficits noted.     Cardiovascular: NS, 60-80. No ectopy noted.   Earlier in the night had -150s. Hydralazine x1 given with good response     Pulmonary: on RA, denies SOB     Gastrointestinal, Nutrition:   TF 30ml, flush decreased to 30ml q4 per MD  Initially multiple episodes of diarrhea. Imodium PRN obtained. If diarrhea continues         Renal, Electrolytes:     Good UOP, did not need to straight cath.    Lytes:  Phos replaced    Infectious Disease:   No concerns. No fever      Hematology:   hgb 8.6      Endocrinology:   On sliding scale insulin, no correction needed      MSK/Skin:   meplix removed due to multiple diarrhea. Skin covered with skin barrier cream per WOC     Lines/Drains:  PICC and PIV     Plan: 6D orders              All questions from family and patient have been addressed  All safety checks complete  MD aware of all patient status changes and updated promptly  See chart for all other details

## 2022-11-24 NOTE — PROGRESS NOTES
11/24/22 0900   Appointment Info   Signing Clinician's Name / Credentials (PT) Connor Julien DPT   Rehab Comments (PT) sternal   Living Environment   People in Home spouse   Current Living Arrangements apartment;independent living facility;other (see comments)   Home Accessibility no concerns   Transportation Anticipated car, drives self;family or friend will provide   Living Environment Comments Pt reports living in apartment with spouse, recently moved to senior living facility, no stairs, walk-in shower with seated surface.   Self-Care   Usual Activity Tolerance good   Current Activity Tolerance fair   Regular Exercise Yes   Activity/Exercise Type walking   Exercise Amount/Frequency 3-5 times/wk;30 mins   Equipment Currently Used at Home none   Fall history within last six months no   Activity/Exercise/Self-Care Comment Reports IND with all upright mobility and recent deconditioning x 2 months 2/2 cardiac status.   General Information   Onset of Illness/Injury or Date of Surgery 11/16/22   Referring Physician Lucius Shirley MD   Patient/Family Therapy Goals Statement (PT) To get stronger and get moving   Pertinent History of Current Problem (include personal factors and/or comorbidities that impact the POC) Jade Walker is a 87 year old female with PMHx of severe aortic stenosis HTN, HLD, non-obstructive CAD, CKD stage III, IBS s/p TAVR by c/b LV perforation surgically repaired by Dr. Pulido 11/16. \   Existing Precautions/Restrictions fall;cardiac;sternal   Cognition   Affect/Mental Status (Cognition) WFL   Orientation Status (Cognition) oriented x 4   Follows Commands (Cognition) follows one-step commands;75-90% accuracy;follows two-step commands;50-74% accuracy   Pain Assessment   Patient Currently in Pain No   Integumentary/Edema   Integumentary/Edema other (describe)   Integumentary/Edema Comments sternal incision dry and intact   Posture    Posture Protracted shoulders;Kyphosis   Range of  Motion (ROM)   Range of Motion ROM is WFL   Strength (Manual Muscle Testing)   Strength (Manual Muscle Testing) Deficits observed during functional mobility   Strength Comments Generalized weakness in BLEs   Bed Mobility   Bed Mobility supine-sit;rolling left   Rolling Left [place] (Bed Mobility) minimum assist (75% patient effort)   Supine-Sit [place] (Bed Mobility) minimum assist (75% patient effort)   Comment, (Bed Mobility) Sup>sit clutching pillow and cuing for maintenance of sternal precautions, Wesley   Transfers   Transfers bed-chair transfer;sit-stand transfer   Comment, (Transfers) STS and bed>chair Wesley x 2 B HHA   Gait/Stairs (Locomotion)   Comment, (Gait/Stairs) N/a as pt requesting to have breakfast   Balance   Sitting Balance: Static good balance   Sitting Balance: Dynamic good balance   Standing Balance: Static fair balance   Standing Balance: Dynamic fair balance   Balance Quick Add Sitting balance: Static;Sitting balance: dynamic;Standing balance: static;Standing balance: dynamic   Sensory Examination   Sensory Perception patient reports no sensory changes   Coordination   Coordination no deficits were identified   Muscle Tone   Muscle Tone no deficits were identified   Clinical Impression   Criteria for Skilled Therapeutic Intervention Yes, treatment indicated   PT Diagnosis (PT) Impaired functional mobility   Influenced by the following impairments Generalized weakness, new sternal precautions, decreased safety awareness, impaired balance   Functional limitations due to impairments Impaired functional mobility   Clinical Presentation (PT Evaluation Complexity) Evolving/Changing   Clinical Presentation Rationale Clinical judgment   Clinical Decision Making (Complexity) moderate complexity   Planned Therapy Interventions (PT) balance training;bed mobility training;gait training;patient/family education;stair training;strengthening;neuromuscular re-education;home exercise program;transfer  training;progressive activity/exercise   Anticipated Equipment Needs at Discharge (PT) other (see comments)  (TBD - would benefit from walker today based on assessment)   Risk & Benefits of therapy have been explained evaluation/treatment results reviewed;care plan/treatment goals reviewed;participants included;patient   PT Total Evaluation Time   PT Eval, Moderate Complexity Minutes (81976) 8   Plan of Care Review   Plan of Care Reviewed With patient   Physical Therapy Goals   PT Frequency 5x/week   PT Predicted Duration/Target Date for Goal Attainment 12/30/22   PT Goals Bed Mobility;Transfers;Gait;Stairs   PT: Bed Mobility Independent;Supine to/from sit;Within precautions   PT: Transfers Modified independent;Assistive device;Within precautions;Sit to/from stand;Bed to/from chair   PT: Gait Modified independent;150 feet;Assistive device   Interventions   Interventions Quick Adds Therapeutic Activity   Therapeutic Activity   Therapeutic Activities: dynamic activities to improve functional performance Minutes (95860) 12   Symptoms Noted During/After Treatment Fatigue   Treatment Detail/Skilled Intervention Post evaluation facilitated additional STS transfer wtih B HHA and Wesley x 2. Pt initially demo posterior lean, VC for anterior lean and weight shifting. Pt guided through pre-gait weight shifting. Minimal toe clearance noted and downward gaze, pivot to bedside chair with increased time and effort. Increased time discussing sternal precautions as it pertains to bed mobility and transfers. Pt declined further activity desiring to have breakfast. Discussed activity recommendations including up to chair for all meals and seated exercise program consisting of leg kicks, marches, and pillow squeezes. Pt demo appropriate execution without further cuing needed.   PT Discharge Planning   PT Plan Gait with chair follow, standing tolerance, balance/strengthening   PT Discharge Recommendation (DC Rec) Transitional Care  Facility;home with assist;home with home care physical therapy   PT Rationale for DC Rec Pt currently below functional IND baseline, limited by activity tolerance and generalized weakness. Increased risk for falls and would benefit from ongoing PT to increased IND. Pending progression, pt may be able to return to home with assistance, but currently activity tolerance is limited. Will update recs each session.   PT Brief overview of current status A x 2 pivot to chair   Total Session Time   Timed Code Treatment Minutes 12   Total Session Time (sum of timed and untimed services) 20

## 2022-11-24 NOTE — PROGRESS NOTES
CVICU attending note    Jade Walker is a 87 year old female with PMHx of severe aortic stenosis HTN, HLD, non-obstructive CAD, CKD stage III, IBS s/p TAVR by c/b LV perforation surgically repaired by Dr. Pulido 11/16.      Active problems and current treatments include:     1. Status post TAVR complicated by biventricular perforation and cardiac tamponade.  Status post LV repair and pericardial drain. Paroxysmal A. Fib. History of hypertension and hyperlipidemia.  Plan: Continue amiodarone, aspirin, and  Plavix.      2. Hypoxemic respiratory failure improving likely secondary to fluid overload and PNA. Passed PS trial and extubated on11/22/22.  Plan: Continue diuretic.  Continue ceftriaxone for 7 days antibiotic course. Incentive spirometry.  Up in the chair     3. Intermittent delirium. Plan: Seroquel at night. Delirium precautions.      4. Anasarca.  Chronic kidney disease. Plan: Diuresis.  Monitor urine output and basic metabolic panel.      5. Anemia and thrombocytopenia surgical blood loss.  No current signs of bleeding.  Plan: Monitor.    6. Diarrhea despite fiber and no stool softeners.  Plan: Check C. difficile     6. Disposition: Transfer to regular floor.      I personally managed the ventilator, hemodynamics, sedation,  analgesia, metabolic abnormalities and nutritional status. The patient has risks of imminent respiratory or hemodynamic deterioration that requires my close attention and intervention.       The history and the 10 points review of systems are included in the note of Dr. Irizarry.      I agree with the resident assessment and plan. I spent 25 minutes of patient care time exclusive of the procedures time, evaluating and managing this patient, discussing with the consultants and the patient's family.     Yolanda Nguyễn MD  Anesthesiology Critical Care

## 2022-11-24 NOTE — PLAN OF CARE
Major Shift Events:  Neuros intact. Converted from a-fib to SR around 1745, rates 80-90s. BPs stable. On RA. Advanced to regular diet. Poor appetite. Plan to start calorie counts tomorrow. TF continue at 30mL/hr. FWF increased to 60mL q4h d/t high Na. Rectal pouch replaced x2. Frequent diarrhea, pt reports taking anti diarrheals at home to help with symptoms. Vivian removed this AM. Straight cath x1 for 750mL. Given prn robaxin for pain. No new skin deficits. Amio gtt continues at 0.5.     Waiting for K / BMP to result most of shift. One specific was lost in lab, second one sent was contaminated. Currently waiting on third sample sent. Last K resulted this AM around 0400. MD aware. Will plan to give second dose of lasix after K results.     Plan: Transfer to     For vital signs and complete assessments, please see documentation flowsheets.

## 2022-11-24 NOTE — PHARMACY-ADMISSION MEDICATION HISTORY
Admission Medication History Completed by Pharmacy    See [facility] Admission Navigator for allergy information, preferred outpatient pharmacy, prior to admission medications and immunization status.     Medication History Sources:     Patient report    Dispense report    Changes made to PTA medication list (reason):    Added:   o Aspirin 325 mg tab    Deleted:   o Aspirin 81 mg chewable tablet    Changed:   o Melatonin: 3mg tab, 1 tab at bedtime PRN --> 5 mg tab, 1/2 tab at bedtime PRN    Additional Information:    Patient was instructed to take single doses of the following medications for her surgery on 11/16/22:  o Aspirin 325 mg tab - 1 tab on the night before and 1 tab on the morning of surgery  o Diphenhydramine 25 mg cap - 1 cap on the morning of surgery  o Prednisone 10 mg tab - 6 tabs on the night before, and 3 tabs on the morning of surgery    Patient was instructed to HOLD the following medications before her surgery on 11/16/22:  o Vitamin D3  o Folic acid  o Loratadine  o Preservision Areds     Aspirin: Patient reported that she stopped taking aspirin 81mg for stomach side effects, so this medication strength was taken off the PTA list. However, patient was instructed to take 2 doses of aspirin 325 mg for her surgery, so this strength was added to the PTA list.     Prior to Admission medications    Medication Sig Last Dose Taking? Auth Provider Long Term End Date   aspirin - buffered (ASCRIPTIN) 325 MG TABS tablet Take 325 mg by mouth once Take 1 tablet the night before and the morning of surgery on 11/16/2022. 11/16/2022 at am Yes Unknown, Entered By History     atenoloL (TENORMIN) 100 MG tablet [ATENOLOL (TENORMIN) 100 MG TABLET] Take 100 mg by mouth daily. 11/16/2022 at 0600 Yes Provider, Historical Yes    atorvastatin (LIPITOR) 40 MG tablet [ATORVASTATIN (LIPITOR) 40 MG TABLET] Take 40 mg by mouth at bedtime. 11/15/2022 at 2100 Yes Provider, Historical Yes    B.ani/L.aci/L.sal/L.plan/L.Rey (PROBIOTIC  FORMULA ORAL) [B.ANI/L.ACI/L.SAL/L.PLAN/L.CHAPARRITA (PROBIOTIC FORMULA ORAL)] Take 1 capsule by mouth daily. Past Month at -- Yes Provider, Historical     cholecalciferol, vitamin D3, 1,000 unit (25 mcg) tablet [CHOLECALCIFEROL, VITAMIN D3, 1,000 UNIT (25 MCG) TABLET] Take 1,000 Units by mouth daily. Unknown at -- Yes Provider, Historical     diphenhydrAMINE (BANOPHEN) 25 MG capsule TAKE 1 CAPSULE (25) MG AT THE TIME OF TESTING WHEN YOU ARRIVE TO THE HOSPITAL 11/16/2022 at 0730 Yes Tone Hollins MD     famotidine (PEPCID) 20 MG tablet Take 20 mg by mouth daily 11/15/2022 at 0800 Yes Provider, Historical     folic acid (FOLVITE) 1 MG tablet [FOLIC ACID (FOLVITE) 1 MG TABLET] Take 1 mg by mouth daily. 11/15/2022 at 2200 Yes Provider, Historical     loratadine (CLARITIN) 10 MG tablet Take 10 mg by mouth daily Unknown at -- Yes Reported, Patient     melatonin 5 MG tablet Take 2.5 mg by mouth nightly as needed for sleep 11/15/2022 at 2200 Yes Reported, Patient     polyethylene glycol-propylene glycol (SYSTANE ULTRA) 0.4-0.3 % SOLN ophthalmic solution Place 1 drop into both eyes every hour as needed for dry eyes Unknown at -- Yes Reported, Patient     predniSONE (DELTASONE) 10 MG tablet TAKE 6 TABLETS (60 MG) NIGHT BEFORE AND 3 TABLETS (30 MG) THE MORNING OF CT 11/16/2022 at 0730 Yes Tone Hollins MD     vit A/vit C/vit E/zinc/copper (PRESERVISION AREDS ORAL) [VIT A/VIT C/VIT E/ZINC/COPPER (PRESERVISION AREDS ORAL)] Take 1 tablet by mouth 2 (two) times a day. 11/15/2022 Yes Provider, Historical         Date completed: 11/24/22    Medication history completed by: Hugh Grove, Pharmacy Intern

## 2022-11-24 NOTE — PROGRESS NOTES
CV ICU PROGRESS NOTE      CO-MORBIDITIES:   Severe aortic stenosis  (primary encounter diagnosis)    ASSESSMENT: Jade Walker is a 87 year old female with PMHx of severe aortic stenosis HTN, HLD, non-obstructive CAD, CKD stage III, IBS s/p TAVR by c/b LV perforation surgically repaired by Dr. Pulido 11/16. Intraop got 600 cellsaver, 2 plts, 1 ffp, 4 prbcs, and 1500 kcentra. Valve well seated.     Last 24 hours:  Keep amiodarone and ASA  Starting Plavix  Advancing diet as tolerated  Goal net -1 L  40 Lasix IV x1  Increased FWF to 60ml Q4H  Switched insulin gtt to Medium sliding scale insulin  Switch Zosyn to ceftriaxone    Today's Progress:  Increased metoprolol to 25 BID  Goal: I=O tp - 500  Lasix 20mg IV x2  Switch Amio gtt to Amio po 200 every day  Started cycle feeds, calorie count  C diff testing      PLAN:  Neuro/ pain/ sedation:  #Acute postoperative pain  #Sedation  - Monitor neurological status. Notify the MD for any acute changes in exam.  - Pain:    Scheduled tylenol.    PRN tylenol, oxycodone, dilaudid.  - Sedation: off  - seroquel 25 at bedtime  - avoid sedation at daytime to maximize alertness    Pulmonary care:   - extubated 11/22, on RA  - Goal SpO2 > 92%  - Incentive spirometer Q15-30 minutes when awake.    Cardiovascular:    # Severe aortic stenosis s/p TAVR  # Pericardial effusion s/p pericardial drain  # Hypertension  # Hyperlipidemia  # Non obstructive CAD  Increasing pressor needs 11/19, unclear for infectious etiology  - off pressors  - Monitor hemodynamic status.   - ASA  - TTE (11/16), then KULDIP (11/16) negative for significantpericardial effusion  - amio drip for Afib HR , Afib in 140s on 11/21 evening, metoprolol 5mg bolus and continue on amio drip at 0.5. drip stopped 11/24  - start PTA plavix  - metop12.5 > 25 bid d/t tachycardia  - start amio  11/24    GI care:   # Irritable Bowel Syndrome     - Nutrition consulted, appreciate recommendations  - ADAT  - PPI for bowel  prophylaxis  - Bowel regimen: MiraLAX bid, senna 2 tabs bid, milk of mag  - cyclic TF  - calorie count  - imodium PRN  - C Diff testing    Renal/ Fluid Balance:    # Chronic Kidney Disease Stage 3  - ICU electrolyte replacement protocol   - Baseline Cr 0.9  - Strict I&Os  - monitor UOP  - fluid resuscitation as needed  - Goal net -1L  - lasix 20 40  40IV once  - Na 147>143, increased FWF to 60 Q4, monitor    Endocrine:    #Stress hyperglycemia  - MDSSI    ID/ Antibiotics:  #Postoperative prophylactic antibiotics  - Completing perioperative course of antibiotics: ancef  - no indication for further antibiotics  - follow Bcx and sputum cx  - started zosyn, switch to ceftriaxone (7 day end date, end 11/26)  - MRSA nares negative 11/19, discontinue vanc    Heme:     # Acute blood loss anemia  # Thrombocytopenia  # Pericardial effusion s/p pericardiocentesis  2 pt Hgb drop overnight  - Hgb goal > 7.0  - Hemoglobin stable.  - 2U pRBC (11/17)  - SQH  - HIT test negative     Prophylaxis:    - VTE: SCD's, heparin 5000u sq  - Bowel regimen: PPI, MiraLAX, senna     Lines/ tubes/ drains:  - Park (out), NG  - PICC    Remove picc    Disposition:  - Floor     ICU Checklist  F - Feeding:   A - Analgesia:   S - Sedation:   T - Thromboembolic prophylaxis:      H - Head of bed elevated  U - Ulcer prophylaxis:  G - Glycemic control  S - SBT     B - Bowel regimen  I - Indwelling catheter  D - De-escalation of antibiotics     Patient seen, findings and plan discussed with CVICU staff.    Jesus Irizarry MD PGY2    ====================================    SUBJECTIVE:   NAD    OBJECTIVE:   1. VITAL SIGNS:   Temp:  [97.2  F (36.2  C)-98.8  F (37.1  C)] 97.2  F (36.2  C)  Pulse:  [] 100  Resp:  [15-30] 19  BP: (118-150)/(60-78) 146/71  SpO2:  [95 %-99 %] 97 %  Resp: 19      2. INTAKE/ OUTPUT:   I/O last 3 completed shifts:  In: 2572 [P.O.:1020; I.V.:612; NG/GT:490]  Out: 2105 [Urine:2105]    3. PHYSICAL EXAMINATION:   General: NAD  Neuro:  A&Ox3  Resp: Breathing non-labored, intubated  CV: RRR  Abdomen: Soft, Non-distended, Non-tender  Incisions: c/d/i  Extremities: warm and well perfused    4. INVESTIGATIONS:   Arterial Blood Gases   Recent Labs   Lab 11/20/22  0327 11/19/22  1730 11/19/22  1145 11/19/22  0838   PH 7.49* 7.45 7.39 7.45   PCO2 33* 35 43 36   PO2 75* 76* 77* 100   HCO3 25 24 26 25     Complete Blood Count   Recent Labs   Lab 11/24/22  0436 11/23/22  0415 11/22/22  0426 11/21/22  0309   WBC 11.3* 13.5* 11.4* 8.5   HGB 8.6* 8.4* 8.7* 8.7*    210 176 127*     Basic Metabolic Panel  Recent Labs   Lab 11/24/22  0737 11/24/22  0438 11/24/22  0436 11/23/22  2356 11/23/22  2232 11/23/22  1647 11/23/22  1646 11/23/22  0421 11/23/22  0415 11/22/22  1355 11/22/22  1351   NA  --   --  144  --   --   --  143  --  147*  --  146*   POTASSIUM  --   --  3.7 4.0  --   --  3.2*  --  3.0*   < > 3.0*   CHLORIDE  --   --  107  --   --   --  105  --  107  --  105   CO2  --   --  27  --   --   --  27  --  29  --  27   BUN  --   --  30.0*  --   --   --  33.5*  --  31.3*  --  35.1*   CR  --   --  0.90  --   --   --  0.96*  --  1.06*  --  1.03*   * 131* 132*  --  105*   < > 149*   < > 109*   < > 157*    < > = values in this interval not displayed.     Liver Function Tests  Recent Labs   Lab 11/19/22 0321 11/19/22  0320 11/18/22  0241 11/17/22  1200   AST 79*  --  146*  --    ALT 11  --  20  --    ALKPHOS 52  --  44  --    BILITOTAL 0.4  --  0.3  --    ALBUMIN 2.5*  --  2.8*  --    INR  --  1.15 1.24* 1.35*     Pancreatic Enzymes  No lab results found in last 7 days.  Coagulation Profile  Recent Labs   Lab 11/19/22  0320 11/18/22  0927 11/18/22  0241 11/17/22  1200   INR 1.15  --  1.24* 1.35*   PTT  --  28  --  29         5. RADIOLOGY:   No results found for this or any previous visit (from the past 24 hour(s)).    =========================================

## 2022-11-25 ENCOUNTER — APPOINTMENT (OUTPATIENT)
Dept: PHYSICAL THERAPY | Facility: CLINIC | Age: 87
DRG: 266 | End: 2022-11-25
Attending: INTERNAL MEDICINE
Payer: COMMERCIAL

## 2022-11-25 ENCOUNTER — APPOINTMENT (OUTPATIENT)
Dept: OCCUPATIONAL THERAPY | Facility: CLINIC | Age: 87
DRG: 266 | End: 2022-11-25
Attending: INTERNAL MEDICINE
Payer: COMMERCIAL

## 2022-11-25 LAB
ANION GAP SERPL CALCULATED.3IONS-SCNC: 13 MMOL/L (ref 7–15)
BACTERIA BLD CULT: NO GROWTH
BACTERIA BLD CULT: NO GROWTH
BUN SERPL-MCNC: 25 MG/DL (ref 8–23)
C DIFF TOX B STL QL: NEGATIVE
CALCIUM SERPL-MCNC: 8.3 MG/DL (ref 8.8–10.2)
CHLORIDE SERPL-SCNC: 107 MMOL/L (ref 98–107)
CREAT SERPL-MCNC: 0.88 MG/DL (ref 0.51–0.95)
DEPRECATED HCO3 PLAS-SCNC: 23 MMOL/L (ref 22–29)
ERYTHROCYTE [DISTWIDTH] IN BLOOD BY AUTOMATED COUNT: 18.2 % (ref 10–15)
GFR SERPL CREATININE-BSD FRML MDRD: 64 ML/MIN/1.73M2
GLUCOSE BLDC GLUCOMTR-MCNC: 106 MG/DL (ref 70–99)
GLUCOSE BLDC GLUCOMTR-MCNC: 113 MG/DL (ref 70–99)
GLUCOSE BLDC GLUCOMTR-MCNC: 157 MG/DL (ref 70–99)
GLUCOSE SERPL-MCNC: 122 MG/DL (ref 70–99)
HCT VFR BLD AUTO: 26.8 % (ref 35–47)
HGB BLD-MCNC: 8.7 G/DL (ref 11.7–15.7)
INR PPP: 1.05 (ref 0.85–1.15)
LACTATE SERPL-SCNC: 1.6 MMOL/L (ref 0.7–2)
MAGNESIUM SERPL-MCNC: 2 MG/DL (ref 1.7–2.3)
MCH RBC QN AUTO: 32.7 PG (ref 26.5–33)
MCHC RBC AUTO-ENTMCNC: 32.5 G/DL (ref 31.5–36.5)
MCV RBC AUTO: 101 FL (ref 78–100)
PHOSPHATE SERPL-MCNC: 3 MG/DL (ref 2.5–4.5)
PLATELET # BLD AUTO: 199 10E3/UL (ref 150–450)
POTASSIUM SERPL-SCNC: 4.1 MMOL/L (ref 3.4–5.3)
RBC # BLD AUTO: 2.66 10E6/UL (ref 3.8–5.2)
SODIUM SERPL-SCNC: 143 MMOL/L (ref 136–145)
WBC # BLD AUTO: 13.5 10E3/UL (ref 4–11)

## 2022-11-25 PROCEDURE — 36415 COLL VENOUS BLD VENIPUNCTURE: CPT | Performed by: THORACIC SURGERY (CARDIOTHORACIC VASCULAR SURGERY)

## 2022-11-25 PROCEDURE — 80048 BASIC METABOLIC PNL TOTAL CA: CPT | Performed by: STUDENT IN AN ORGANIZED HEALTH CARE EDUCATION/TRAINING PROGRAM

## 2022-11-25 PROCEDURE — 83735 ASSAY OF MAGNESIUM: CPT | Performed by: STUDENT IN AN ORGANIZED HEALTH CARE EDUCATION/TRAINING PROGRAM

## 2022-11-25 PROCEDURE — 250N000013 HC RX MED GY IP 250 OP 250 PS 637: Performed by: SURGERY

## 2022-11-25 PROCEDURE — 97530 THERAPEUTIC ACTIVITIES: CPT | Mod: GP | Performed by: PHYSICAL THERAPIST

## 2022-11-25 PROCEDURE — 84100 ASSAY OF PHOSPHORUS: CPT | Performed by: STUDENT IN AN ORGANIZED HEALTH CARE EDUCATION/TRAINING PROGRAM

## 2022-11-25 PROCEDURE — 250N000011 HC RX IP 250 OP 636: Performed by: THORACIC SURGERY (CARDIOTHORACIC VASCULAR SURGERY)

## 2022-11-25 PROCEDURE — 250N000013 HC RX MED GY IP 250 OP 250 PS 637

## 2022-11-25 PROCEDURE — 250N000013 HC RX MED GY IP 250 OP 250 PS 637: Performed by: PHYSICIAN ASSISTANT

## 2022-11-25 PROCEDURE — 87493 C DIFF AMPLIFIED PROBE: CPT

## 2022-11-25 PROCEDURE — 85610 PROTHROMBIN TIME: CPT | Performed by: THORACIC SURGERY (CARDIOTHORACIC VASCULAR SURGERY)

## 2022-11-25 PROCEDURE — 97535 SELF CARE MNGMENT TRAINING: CPT | Mod: GO

## 2022-11-25 PROCEDURE — 214N000001 HC R&B CCU UMMC

## 2022-11-25 PROCEDURE — 36415 COLL VENOUS BLD VENIPUNCTURE: CPT

## 2022-11-25 PROCEDURE — 83605 ASSAY OF LACTIC ACID: CPT

## 2022-11-25 PROCEDURE — 250N000011 HC RX IP 250 OP 636: Performed by: PHYSICIAN ASSISTANT

## 2022-11-25 PROCEDURE — 97530 THERAPEUTIC ACTIVITIES: CPT | Mod: GO

## 2022-11-25 PROCEDURE — 85027 COMPLETE CBC AUTOMATED: CPT | Performed by: STUDENT IN AN ORGANIZED HEALTH CARE EDUCATION/TRAINING PROGRAM

## 2022-11-25 PROCEDURE — 250N000011 HC RX IP 250 OP 636

## 2022-11-25 PROCEDURE — 250N000013 HC RX MED GY IP 250 OP 250 PS 637: Performed by: THORACIC SURGERY (CARDIOTHORACIC VASCULAR SURGERY)

## 2022-11-25 PROCEDURE — 97116 GAIT TRAINING THERAPY: CPT | Mod: GP | Performed by: PHYSICAL THERAPIST

## 2022-11-25 PROCEDURE — 250N000013 HC RX MED GY IP 250 OP 250 PS 637: Performed by: STUDENT IN AN ORGANIZED HEALTH CARE EDUCATION/TRAINING PROGRAM

## 2022-11-25 RX ORDER — AMIODARONE HYDROCHLORIDE 200 MG/1
200 TABLET ORAL ONCE
Status: COMPLETED | OUTPATIENT
Start: 2022-11-25 | End: 2022-11-25

## 2022-11-25 RX ORDER — ATORVASTATIN CALCIUM 40 MG/1
40 TABLET, FILM COATED ORAL AT BEDTIME
Status: DISCONTINUED | OUTPATIENT
Start: 2022-11-25 | End: 2022-12-01 | Stop reason: HOSPADM

## 2022-11-25 RX ORDER — WARFARIN SODIUM 3 MG/1
3 TABLET ORAL ONCE
Status: COMPLETED | OUTPATIENT
Start: 2022-11-25 | End: 2022-11-25

## 2022-11-25 RX ORDER — AMIODARONE HYDROCHLORIDE 200 MG/1
400 TABLET ORAL 2 TIMES DAILY
Status: DISCONTINUED | OUTPATIENT
Start: 2022-11-25 | End: 2022-11-26

## 2022-11-25 RX ORDER — MAGNESIUM SULFATE HEPTAHYDRATE 40 MG/ML
2 INJECTION, SOLUTION INTRAVENOUS ONCE
Status: COMPLETED | OUTPATIENT
Start: 2022-11-25 | End: 2022-11-25

## 2022-11-25 RX ORDER — FUROSEMIDE 10 MG/ML
40 INJECTION INTRAMUSCULAR; INTRAVENOUS 2 TIMES DAILY
Status: DISCONTINUED | OUTPATIENT
Start: 2022-11-25 | End: 2022-11-28

## 2022-11-25 RX ADMIN — METOPROLOL TARTRATE 25 MG: 25 TABLET, FILM COATED ORAL at 19:57

## 2022-11-25 RX ADMIN — AMIODARONE HYDROCHLORIDE 200 MG: 200 TABLET ORAL at 10:01

## 2022-11-25 RX ADMIN — FUROSEMIDE 40 MG: 10 INJECTION, SOLUTION INTRAVENOUS at 10:02

## 2022-11-25 RX ADMIN — QUETIAPINE FUMARATE 25 MG: 25 TABLET ORAL at 19:57

## 2022-11-25 RX ADMIN — CEFTRIAXONE SODIUM 2 G: 2 INJECTION, POWDER, FOR SOLUTION INTRAMUSCULAR; INTRAVENOUS at 15:01

## 2022-11-25 RX ADMIN — METOPROLOL TARTRATE 25 MG: 25 TABLET, FILM COATED ORAL at 07:47

## 2022-11-25 RX ADMIN — Medication 15 ML: at 10:03

## 2022-11-25 RX ADMIN — HEPARIN SODIUM 5000 UNITS: 5000 INJECTION, SOLUTION INTRAVENOUS; SUBCUTANEOUS at 03:57

## 2022-11-25 RX ADMIN — WARFARIN SODIUM 3 MG: 3 TABLET ORAL at 16:44

## 2022-11-25 RX ADMIN — Medication 40 MG: at 16:44

## 2022-11-25 RX ADMIN — ACETAMINOPHEN 975 MG: 325 TABLET, FILM COATED ORAL at 03:57

## 2022-11-25 RX ADMIN — HEPARIN SODIUM 5000 UNITS: 5000 INJECTION, SOLUTION INTRAVENOUS; SUBCUTANEOUS at 13:27

## 2022-11-25 RX ADMIN — AMIODARONE HYDROCHLORIDE 400 MG: 200 TABLET ORAL at 19:57

## 2022-11-25 RX ADMIN — ASPIRIN 81 MG CHEWABLE TABLET 81 MG: 81 TABLET CHEWABLE at 07:47

## 2022-11-25 RX ADMIN — AMIODARONE HYDROCHLORIDE 200 MG: 200 TABLET ORAL at 07:47

## 2022-11-25 RX ADMIN — HEPARIN SODIUM 5000 UNITS: 5000 INJECTION, SOLUTION INTRAVENOUS; SUBCUTANEOUS at 19:57

## 2022-11-25 RX ADMIN — FUROSEMIDE 40 MG: 10 INJECTION, SOLUTION INTRAVENOUS at 16:44

## 2022-11-25 RX ADMIN — ATORVASTATIN CALCIUM 40 MG: 40 TABLET, FILM COATED ORAL at 22:56

## 2022-11-25 RX ADMIN — Medication 1 PACKET: at 10:02

## 2022-11-25 RX ADMIN — CLOPIDOGREL BISULFATE 75 MG: 75 TABLET ORAL at 07:47

## 2022-11-25 RX ADMIN — MAGNESIUM SULFATE IN WATER 2 G: 40 INJECTION, SOLUTION INTRAVENOUS at 13:29

## 2022-11-25 RX ADMIN — LOPERAMIDE HCL 2 MG: 1 SUSPENSION ORAL at 22:56

## 2022-11-25 ASSESSMENT — ACTIVITIES OF DAILY LIVING (ADL)
ADLS_ACUITY_SCORE: 30
ADLS_ACUITY_SCORE: 28
ADLS_ACUITY_SCORE: 30
ADLS_ACUITY_SCORE: 28
ADLS_ACUITY_SCORE: 30
ADLS_ACUITY_SCORE: 30
ADLS_ACUITY_SCORE: 28
ADLS_ACUITY_SCORE: 30

## 2022-11-25 NOTE — PROGRESS NOTES
Transfer    Transferred from:   Via: Bed  Reason for transfer: Patient inappropriate for unit  Family: Aware of transfer  Belongings: Sent with pt  Chart: Sent with patient  Medications: Meds from bin sent with patient  Report called from: Tessa RN  2 RN skin check: completed with HADLEY Wooten

## 2022-11-25 NOTE — PROGRESS NOTES
Care Management Follow Up    Length of Stay (days): 9    Expected Discharge Date: 11/29/2022     Concerns to be Addressed: discharge planning      Patient plan of care discussed at interdisciplinary rounds: Yes    Anticipated Discharge Disposition: TCU     Anticipated Discharge Services:  TCU therapy services  Anticipated Discharge DME:   n/a    Patient/family educated on Medicare website which has current facility and service quality ratings:  Yes. PAL gave the pt the TCU list at bedside with her  Trevon (Phone: [phone]) present as well. SW told the pt and her  to review the list, select 5-7 choices to start the referral process, and SW told them both that the weekend SW will stop by to grab choices. SW asked if the pt and her  had any questions and they both said no.   Education Provided on the Discharge Plan:  Yes. PAL explained to the pt the concept of a TCU and what a pt will typically do at one which the pt was understanding of.   Patient/Family in Agreement with the Plan:  yes    Referrals Placed by CM/SW:  PAL gave pt the Medicare.gov list of facilties based [place] [address]  Private pay costs discussed: Not applicable    Additional Information:  PAL is following for discharge planning.    PAL met with the pt and her  Trevon (Phone: [phone]) at bedside. SW introduced themselves and their role in discharge planning at the hospital.     See above for updates.    ___________________    RICHARD Wallace, CORNELLSW  6C   [facility]  Phone: [phone]  Pager: [phone]

## 2022-11-25 NOTE — PHARMACY-ANTICOAGULATION SERVICE
Clinical Pharmacy - Warfarin Dosing Consult     Pharmacy has been consulted to manage this patient s warfarin therapy.  Indication: Atrial Fibrillation  Therapy Goal: INR 2-3  Warfarin Prior to Admission: No  Significant drug interactions: Amiodarone     INR   Date Value Ref Range Status   11/25/2022 1.05 0.85 - 1.15 Final   11/19/2022 1.15 0.85 - 1.15 Final       Recommend warfarin 3 mg today (initial dosing reduce due to amiodarone interaction).  Pharmacy will monitor Jade Walker daily and order warfarin doses to achieve specified goal.      Please contact pharmacy as soon as possible if the warfarin needs to be held for a procedure or if the warfarin goals change.    Rolan Ahumada, PharmD, BCPS  [phone]

## 2022-11-25 NOTE — PHARMACY-CONSULT NOTE
Pharmacy Tube Feeding Consult    Medication reviewed for administration by feeding tube and for potential food/drug interactions.    Assessment:  - warfarin may interact with proteins in tube feeding formula to reduce absorption.     Recommendation:   - will aim to administer warfarin an hour before overnight tube feeds start to reduce risk of protein binding.         Pharmacy will continue to follow as new medications are ordered.

## 2022-11-25 NOTE — PROGRESS NOTES
Patient transferred to  6411 @ 2200 via monitor with RN and NST.   Report called to Norma BUTCHER.    Trveon contacted and updated of transfer.    Assumed care at 1900. Converted to afib around 1800 rate controlled 100-120s. New hypotension after Given 25mg metoprolol and BP dropped to 70s-80/50s. MAP 55-60s. Patient asymptomatic, A/O x4, c/o tiredness but no dizziness or chest discomfort. During day shift -140s with activity.   500cc LR bolus given with improvement in /60.   CVTS and charge at bedside during events and followed up- ok for transfer.

## 2022-11-25 NOTE — PROGRESS NOTES
Calorie Count  Intake recorded for: 11//24  Total Kcals: 120 Total Protein: 1g  Kcals from Hospital Food: 120   Protein: 1g  Kcals from Outside Food (average):0 Protein: 0g  # Meals Ordered from Kitchen: 1 meal ordered  # Meals Recorded: 1 meal recorded (100% 8oz OJ, few bites of scrambled eggs)  # Supplements Recorded: 0 supplements recorded

## 2022-11-25 NOTE — PROGRESS NOTES
"[id number]    NEURO: A&O x4; calls appropriately, able to make needs known   RESPIRATORY: Room air; sats >94% mild JOHN  CARDIAC: AFIB rates 100-120s, up to 130s with activity  GI/: Voiding adequately to bedside commode; no BM this shift; C.Diff rule out sample still needs to be collected, label printed and in room.    SKIN: Midsternal incision EUSEBIO; R&L groin sites; R groin site with small drainage, Primapore in place; L groin site with gauze and tape; L radial site covered with gauze and tegaderm   PAIN: Denies   TESTS/PROCEDURES: None  MOBILITY: +2 assist to pivot to bedside commode; gate belt, walker for ambulation    DIET: Regular diet; calorie counts; cycled TF @ 30ml/hr (goal) from 2618-9501; TF was shut off from 2145 to 2315 during transfer from  to ; keep TF running until 0730.    LDAs: PICC x3 lumen SL; PIV x1 SL      PLAN: Continue POC; notify the primary team with any concerns/updates.     I/O this shift:  In: 570 [P.O.:360; NG/GT:30]  Out: 350 [Urine:350]     /73 (BP Location: Left arm)   Pulse 109   Temp 97.8  F (36.6  C) (Oral)   Resp 20   Ht 1.549 m (5' 1\")   Wt 75.1 kg (165 lb 8 oz)   SpO2 96%   BMI 31.27 kg/m          "

## 2022-11-25 NOTE — PLAN OF CARE
Major Shift Events: pt is alert and oriented x4. On RA, MAP >65, SR HR 90's, amiodarone gtt stopped switched to PO. IV lasix 20 mg given once, frequent loose stools, imodium given. Needs stool sample for c diff. Regular diet with poor appetite, cycled TF 6pm-6am @ 30 ml/hr, FWF 60 ml q4hr.  Plan: Transfer to floor when bed available.  For vital signs and complete assessments, please see documentation flowsheets.

## 2022-11-25 NOTE — PROGRESS NOTES
Cardiovascular Surgery Progress Note  11/25/2022         Assessment and Plan:     Jade Walker is a 87 year old female with PMHx of severe aortic stenosis HTN, HLD, non-obstructive CAD, CKD stage III, IBS who underwent s/p TAVR  c/b LV perforation s/p emergent sternotomy, repair of LV lateral wall laceration by Dr. Pulido 11/16. TAVR Valve well seated.    Cardiovascular:   Hx of severe AORTIC STENOSIS, s/p TAVR 11/16  Pericardial effusion due to LV perforation, s/p LV laceration repair 11/16  HTN  HLD  Non obstructive CAD  Post-op AF  Remains in AF rates 80-110s, HD stable  TTE 11/17 showed LV EF 60-65%, TAVR valve well seated, no leak.   - amio drip for Afib 11/21 continued through 11/24. Transitioned to PO Amiodarone 200 daily on 11/24, increased to 400 mg BID on 11/25 due to elevated HRs  - post- TAVR plavix continues  - metop 25 bid   - ASA 81 mg daily, to stop today 11/25  - coumadin starting for PAF 11/25  - Statin resumed  - Diuresis as below    Chest tubes and PW removed in the ICU    Pulmonary:  Extubated POD 5 to 2 lpm via NC. Now saturating well on RA.   - Supplemental O2 PRN to keep sats > 92%. Wean off as tolerated.  - Pulm hygiene, IS, activity and deep breathing    Neurology / MSK:  Acute post-operative pain   Pain well controlled with current regimen:  - Acetaminophen, PO oxycodone PRN, IV dilaudid PRN, methocarbamol     / Renal / Fluid / Electrolytes:  CKD 3  BL creat ~ 0.9, most recent creatinine 0.88; adequate UOP.   FLUID STATUS: Pre-op weight 141, weight today 162; I/O past 24 hours: inaccurate due to multiple unmeasured voids  - Diuresis with 40 IV lasix BID  - Replete lytes per protocol  - Strict I/O, daily weights  - Avoid/limit nephrotoxins as able    GI / Nutrition:   Irritable Bowel Syndrome    - Nutrition consulted, appreciate recommendations  - PPI for bowel prophylaxis  - Bowel regimen: MiraLAX bid, senna 2 tabs bid, milk of mag- held due to loose stools  - cyclic TF  -  calorie count  - Immodium PRN  - C Diff testing  - Tolerating regular diet, but not much of an appetite. Encouraged supplements with ensure    Endocrine:  Stress induced hyperglycemia   - Initially managed on insulin drip postop, transitioned to sliding scale; goal BG <180 for optimal healing    Infectious Disease:  Stress induced leukocytosis  Serratia pneumonia  - started zosyn, switch to ceftriaxone (7 day end date, end 11/26)  WBC 13.5 up from 11.3, remains afebrile, no signs or symptoms of infection  - Completed perioperative antibiotics  - Continue to monitor fever curve, CBC    Hematology:   Acute blood loss anemia and thrombocytopenia  Pericardial effusion s/p pericardiocentesis  Hgb 8.7; Plt 199, no signs or symptoms of active bleeding  - CBC daily    Anticoagulation:   - ASA 81 mg daily, stopped 11/25  - Coumadin for AF starting 11/25, INR goal 2-3.   - plavix for TAVR    MSK/Skin:  Sternotomy  Surgical incision  - Sternal precautions  - Incisional cares per protocol    Prophylaxis:   - Stress ulcer prophylaxis: Pantoprazole 40 mg daily for 30 days  - DVT prophylaxis: Subcutaneous heparin, SCD    Disposition:   - Transferred to  on 11/24  - Therapies recommending discharge to TCU    Discussed with Dr. Smitha Cardoza PAMADAI   Cardiothoracic Surgery   November 25, 2022 at 8:29 AM          Interval History:     No overnight events. Patient transferred out of the ICU yesterday evening. Slept well.  States pain is well managed on current regimen  Tolerating diet and tube feeds, is passing flatus, + BM. No nausea or vomiting.  Breathing well without complaints.   Working with therapies and ambulating in halls with assistance.   Denies chest pain, palpitations, dizziness, syncopal symptoms, fevers, chills, myalgias, or sternal popping/clicking.         Physical Exam:     Gen: A&Ox4, NAD  Neuro: no focal deficits   CV: RRR, normal S1 S2, no murmurs, rubs or gallops  Pulm: CTA, no wheezing or rhonchi,  normal breathing on RA  Abd: nondistended, normal BS, soft, nontender  Ext: 2+ peripheral edema, + pitting  Incision: clean, dry, intact, no erythema, sternum stable  Tubes/drain sites: dressing clean and dry.          Data:    Imaging:  reviewed recent imaging, no acute concerns    No results found for this or any previous visit (from the past 24 hour(s)).     Labs:  Recent Labs   Lab 11/24/22  2324 11/24/22  1644 11/24/22  1637 11/24/22  0438 11/24/22  0436 11/23/22  2356 11/23/22  1647 11/23/22  1646 11/23/22  0421 11/23/22  0415 11/22/22  0526 11/22/22  0426 11/19/22  0432 11/19/22  0321 11/19/22  0320   WBC  --   --   --   --  11.3*  --   --   --   --  13.5*  --  11.4*   < >  --  8.9   HGB  --   --   --   --  8.6*  --   --   --   --  8.4*  --  8.7*   < >  --  8.5*   MCV  --   --   --   --  98  --   --   --   --  97  --  95   < >  --  92   PLT  --   --   --   --  201  --   --   --   --  210  --  176   < >  --  72*   INR  --   --   --   --   --   --   --   --   --   --   --   --   --   --  1.15   NA  --  143  --   --  144  --   --  143  --  147*   < > 146*   < > 136  --    POTASSIUM  --  4.0  --   --  3.7 4.0  --  3.2*  --  3.0*   < > 3.0*   < > 3.7  --    CHLORIDE  --  107  --   --  107  --   --  105  --  107   < > 106   < > 104  --    CO2  --  28  --   --  27  --   --  27  --  29   < > 27   < > 24  --    BUN  --  26.7*  --   --  30.0*  --   --  33.5*  --  31.3*   < > 33.7*   < > 22.3  --    CR  --  0.88  --   --  0.90  --   --  0.96*  --  1.06*   < > 1.07*   < > 0.79  --    ANIONGAP  --  8  --   --  10  --   --  11  --  11   < > 13   < > 8  --    SHAHEEN  --  8.1*  --   --  8.0*  --   --  8.1*  --  7.9*   < > 7.8*   < > 8.1*  --    GLC 99 113* 107*   < > 132*  --    < > 149*   < > 109*   < > 124*  120*   < > 110*  --    ALBUMIN  --   --   --   --   --   --   --   --   --   --   --   --   --  2.5*  --    PROTTOTAL  --   --   --   --   --   --   --   --   --   --   --   --   --  4.4*  --    BILITOTAL  --   --   --    --   --   --   --   --   --   --   --   --   --  0.4  --    ALKPHOS  --   --   --   --   --   --   --   --   --   --   --   --   --  52  --    ALT  --   --   --   --   --   --   --   --   --   --   --   --   --  11  --    AST  --   --   --   --   --   --   --   --   --   --   --   --   --  79*  --     < > = values in this interval not displayed.      GLUCOSE:   Recent Labs   Lab 11/24/22  2324 11/24/22  1644 11/24/22  1637 11/24/22  1205 11/24/22  0737 11/24/22  0438   GLC 99 113* 107* 106* 145* 131*

## 2022-11-25 NOTE — PROGRESS NOTES
D- Afib RVR. Stable pressures. Oral Amio increased. IV lasix BID. Multiple loose stools. No appetite; on bria-counts. Mag 2.0   I- C-diff sent. Worked with PT/OT. Replaced Mag per protocol   A- Ambulating in hallways. Needs gait belt, A1-2 to get out of chair   P- Resume TF 1800. Monitor Afib. Imodium PRN if cdiff negative

## 2022-11-26 ENCOUNTER — APPOINTMENT (OUTPATIENT)
Dept: GENERAL RADIOLOGY | Facility: CLINIC | Age: 87
DRG: 266 | End: 2022-11-26
Attending: PHYSICIAN ASSISTANT
Payer: COMMERCIAL

## 2022-11-26 LAB
ANION GAP SERPL CALCULATED.3IONS-SCNC: 10 MMOL/L (ref 7–15)
BUN SERPL-MCNC: 26.7 MG/DL (ref 8–23)
CALCIUM SERPL-MCNC: 7.7 MG/DL (ref 8.8–10.2)
CHLORIDE SERPL-SCNC: 102 MMOL/L (ref 98–107)
CREAT SERPL-MCNC: 0.92 MG/DL (ref 0.51–0.95)
DEPRECATED HCO3 PLAS-SCNC: 26 MMOL/L (ref 22–29)
ERYTHROCYTE [DISTWIDTH] IN BLOOD BY AUTOMATED COUNT: 18.6 % (ref 10–15)
GFR SERPL CREATININE-BSD FRML MDRD: 61 ML/MIN/1.73M2
GLUCOSE BLDC GLUCOMTR-MCNC: 100 MG/DL (ref 70–99)
GLUCOSE BLDC GLUCOMTR-MCNC: 105 MG/DL (ref 70–99)
GLUCOSE BLDC GLUCOMTR-MCNC: 117 MG/DL (ref 70–99)
GLUCOSE BLDC GLUCOMTR-MCNC: 129 MG/DL (ref 70–99)
GLUCOSE SERPL-MCNC: 124 MG/DL (ref 70–99)
HCT VFR BLD AUTO: 26 % (ref 35–47)
HGB BLD-MCNC: 8.1 G/DL (ref 11.7–15.7)
INR PPP: 1.11 (ref 0.85–1.15)
LACTATE SERPL-SCNC: 1.3 MMOL/L (ref 0.7–2)
MAGNESIUM SERPL-MCNC: 2.2 MG/DL (ref 1.7–2.3)
MCH RBC QN AUTO: 31.5 PG (ref 26.5–33)
MCHC RBC AUTO-ENTMCNC: 31.2 G/DL (ref 31.5–36.5)
MCV RBC AUTO: 101 FL (ref 78–100)
PHOSPHATE SERPL-MCNC: 2.8 MG/DL (ref 2.5–4.5)
PLATELET # BLD AUTO: 185 10E3/UL (ref 150–450)
POTASSIUM SERPL-SCNC: 3.2 MMOL/L (ref 3.4–5.3)
POTASSIUM SERPL-SCNC: 4.3 MMOL/L (ref 3.4–5.3)
RBC # BLD AUTO: 2.57 10E6/UL (ref 3.8–5.2)
SODIUM SERPL-SCNC: 138 MMOL/L (ref 136–145)
WBC # BLD AUTO: 12.6 10E3/UL (ref 4–11)

## 2022-11-26 PROCEDURE — 85027 COMPLETE CBC AUTOMATED: CPT | Performed by: STUDENT IN AN ORGANIZED HEALTH CARE EDUCATION/TRAINING PROGRAM

## 2022-11-26 PROCEDURE — 250N000011 HC RX IP 250 OP 636: Performed by: PHYSICIAN ASSISTANT

## 2022-11-26 PROCEDURE — 250N000013 HC RX MED GY IP 250 OP 250 PS 637: Performed by: THORACIC SURGERY (CARDIOTHORACIC VASCULAR SURGERY)

## 2022-11-26 PROCEDURE — 36592 COLLECT BLOOD FROM PICC: CPT | Performed by: STUDENT IN AN ORGANIZED HEALTH CARE EDUCATION/TRAINING PROGRAM

## 2022-11-26 PROCEDURE — 250N000011 HC RX IP 250 OP 636

## 2022-11-26 PROCEDURE — 83605 ASSAY OF LACTIC ACID: CPT

## 2022-11-26 PROCEDURE — 80048 BASIC METABOLIC PNL TOTAL CA: CPT | Performed by: STUDENT IN AN ORGANIZED HEALTH CARE EDUCATION/TRAINING PROGRAM

## 2022-11-26 PROCEDURE — 250N000013 HC RX MED GY IP 250 OP 250 PS 637: Performed by: STUDENT IN AN ORGANIZED HEALTH CARE EDUCATION/TRAINING PROGRAM

## 2022-11-26 PROCEDURE — 85610 PROTHROMBIN TIME: CPT | Performed by: PHYSICIAN ASSISTANT

## 2022-11-26 PROCEDURE — 84132 ASSAY OF SERUM POTASSIUM: CPT | Performed by: THORACIC SURGERY (CARDIOTHORACIC VASCULAR SURGERY)

## 2022-11-26 PROCEDURE — 250N000013 HC RX MED GY IP 250 OP 250 PS 637: Performed by: PHYSICIAN ASSISTANT

## 2022-11-26 PROCEDURE — 250N000013 HC RX MED GY IP 250 OP 250 PS 637

## 2022-11-26 PROCEDURE — 71046 X-RAY EXAM CHEST 2 VIEWS: CPT | Mod: 26 | Performed by: RADIOLOGY

## 2022-11-26 PROCEDURE — 84100 ASSAY OF PHOSPHORUS: CPT | Performed by: STUDENT IN AN ORGANIZED HEALTH CARE EDUCATION/TRAINING PROGRAM

## 2022-11-26 PROCEDURE — 214N000001 HC R&B CCU UMMC

## 2022-11-26 PROCEDURE — 83735 ASSAY OF MAGNESIUM: CPT | Performed by: STUDENT IN AN ORGANIZED HEALTH CARE EDUCATION/TRAINING PROGRAM

## 2022-11-26 PROCEDURE — 36592 COLLECT BLOOD FROM PICC: CPT | Performed by: THORACIC SURGERY (CARDIOTHORACIC VASCULAR SURGERY)

## 2022-11-26 PROCEDURE — 71046 X-RAY EXAM CHEST 2 VIEWS: CPT

## 2022-11-26 RX ORDER — PANTOPRAZOLE SODIUM 40 MG/1
40 TABLET, DELAYED RELEASE ORAL
Status: DISCONTINUED | OUTPATIENT
Start: 2022-11-26 | End: 2022-12-01 | Stop reason: HOSPADM

## 2022-11-26 RX ORDER — WARFARIN SODIUM 3 MG/1
3 TABLET ORAL
Status: COMPLETED | OUTPATIENT
Start: 2022-11-26 | End: 2022-11-26

## 2022-11-26 RX ORDER — MULTIPLE VITAMINS W/ MINERALS TAB 9MG-400MCG
1 TAB ORAL DAILY
Status: DISCONTINUED | OUTPATIENT
Start: 2022-11-26 | End: 2022-12-01 | Stop reason: HOSPADM

## 2022-11-26 RX ORDER — HEPARIN SODIUM 5000 [USP'U]/.5ML
5000 INJECTION, SOLUTION INTRAVENOUS; SUBCUTANEOUS EVERY 8 HOURS
Status: COMPLETED | OUTPATIENT
Start: 2022-11-26 | End: 2022-11-26

## 2022-11-26 RX ORDER — AMIODARONE HYDROCHLORIDE 200 MG/1
200 TABLET ORAL 2 TIMES DAILY
Status: DISCONTINUED | OUTPATIENT
Start: 2022-11-30 | End: 2022-12-01 | Stop reason: HOSPADM

## 2022-11-26 RX ORDER — QUETIAPINE FUMARATE 25 MG/1
25 TABLET, FILM COATED ORAL EVERY EVENING
Status: DISCONTINUED | OUTPATIENT
Start: 2022-11-26 | End: 2022-11-28

## 2022-11-26 RX ORDER — POTASSIUM CHLORIDE 20MEQ/15ML
20 LIQUID (ML) ORAL 2 TIMES DAILY
Status: DISCONTINUED | OUTPATIENT
Start: 2022-11-26 | End: 2022-12-01 | Stop reason: HOSPADM

## 2022-11-26 RX ORDER — POTASSIUM CHLORIDE 1.5 G/1.58G
40 POWDER, FOR SOLUTION ORAL ONCE
Status: COMPLETED | OUTPATIENT
Start: 2022-11-26 | End: 2022-11-26

## 2022-11-26 RX ORDER — AMIODARONE HYDROCHLORIDE 200 MG/1
400 TABLET ORAL 2 TIMES DAILY
Status: COMPLETED | OUTPATIENT
Start: 2022-11-26 | End: 2022-11-29

## 2022-11-26 RX ADMIN — PANTOPRAZOLE SODIUM 40 MG: 40 TABLET, DELAYED RELEASE ORAL at 08:06

## 2022-11-26 RX ADMIN — ATORVASTATIN CALCIUM 40 MG: 40 TABLET, FILM COATED ORAL at 19:32

## 2022-11-26 RX ADMIN — HEPARIN SODIUM 5000 UNITS: 5000 INJECTION, SOLUTION INTRAVENOUS; SUBCUTANEOUS at 19:29

## 2022-11-26 RX ADMIN — POTASSIUM CHLORIDE 20 MEQ: 20 SOLUTION ORAL at 19:32

## 2022-11-26 RX ADMIN — AMIODARONE HYDROCHLORIDE 400 MG: 200 TABLET ORAL at 08:06

## 2022-11-26 RX ADMIN — METOPROLOL TARTRATE 25 MG: 25 TABLET, FILM COATED ORAL at 19:33

## 2022-11-26 RX ADMIN — Medication 1 TABLET: at 08:06

## 2022-11-26 RX ADMIN — QUETIAPINE FUMARATE 25 MG: 25 TABLET ORAL at 19:28

## 2022-11-26 RX ADMIN — METOPROLOL TARTRATE 25 MG: 25 TABLET, FILM COATED ORAL at 08:06

## 2022-11-26 RX ADMIN — HEPARIN SODIUM 5000 UNITS: 5000 INJECTION, SOLUTION INTRAVENOUS; SUBCUTANEOUS at 11:21

## 2022-11-26 RX ADMIN — POTASSIUM CHLORIDE 40 MEQ: 1.5 POWDER, FOR SOLUTION ORAL at 09:25

## 2022-11-26 RX ADMIN — AMIODARONE HYDROCHLORIDE 400 MG: 200 TABLET ORAL at 19:29

## 2022-11-26 RX ADMIN — FUROSEMIDE 40 MG: 10 INJECTION, SOLUTION INTRAVENOUS at 08:06

## 2022-11-26 RX ADMIN — Medication 1 PACKET: at 08:09

## 2022-11-26 RX ADMIN — CEFTRIAXONE SODIUM 2 G: 2 INJECTION, POWDER, FOR SOLUTION INTRAMUSCULAR; INTRAVENOUS at 15:31

## 2022-11-26 RX ADMIN — WARFARIN SODIUM 3 MG: 3 TABLET ORAL at 18:17

## 2022-11-26 RX ADMIN — CLOPIDOGREL BISULFATE 75 MG: 75 TABLET ORAL at 08:06

## 2022-11-26 RX ADMIN — ACETAMINOPHEN 650 MG: 325 TABLET, FILM COATED ORAL at 08:05

## 2022-11-26 RX ADMIN — FUROSEMIDE 40 MG: 10 INJECTION, SOLUTION INTRAVENOUS at 16:07

## 2022-11-26 RX ADMIN — POTASSIUM CHLORIDE 20 MEQ: 20 SOLUTION ORAL at 11:21

## 2022-11-26 RX ADMIN — HEPARIN SODIUM 5000 UNITS: 5000 INJECTION, SOLUTION INTRAVENOUS; SUBCUTANEOUS at 04:19

## 2022-11-26 ASSESSMENT — ACTIVITIES OF DAILY LIVING (ADL)
ADLS_ACUITY_SCORE: 28
ADLS_ACUITY_SCORE: 27
ADLS_ACUITY_SCORE: 28
ADLS_ACUITY_SCORE: 27
ADLS_ACUITY_SCORE: 28
ADLS_ACUITY_SCORE: 28
ADLS_ACUITY_SCORE: 27
ADLS_ACUITY_SCORE: 27

## 2022-11-26 NOTE — PROGRESS NOTES
Calorie Count  Intake recorded for: 11/25  Total Kcals: 926 Total Protein: 49g  Kcals from Hospital Food: 926   Protein: 49g  Kcals from Outside Food (average):0 Protein: 0g  # Meals Ordered from Kitchen: 1 meal  # Meals Recorded: 1 meal (100% 8oz apple juice, greek yogurt, decaf coffee w/ cream)  # Supplements Recorded: 100% 2 Ensure Enlive

## 2022-11-26 NOTE — PROGRESS NOTES
Care Management Follow Up    Length of Stay (days): 10    Expected Discharge Date: 11/29/2022     Concerns to be Addressed:     Discharge planning  Patient plan of care discussed at interdisciplinary rounds: Yes    Anticipated Discharge Disposition: TCU     Anticipated Discharge Services:  TCU therapy services  Anticipated Discharge DME:  n/a    Patient/family educated on Medicare website which has current facility and service quality ratings: Yes.       Education Provided on the Discharge Plan:  Pt and  Trevon were not understanding of the concept of a TCU. They were under the impression that the pt would discharge home and Trevon would be driving her to and from the therapy appointments. Sw provided clarification of what a TCU stay looks like post discharge as well as anticipated timelines. Pt and  Trevon had no further questions or concerns about this and were thankful for the information.     Patient/Family in Agreement with the Plan:  Yes.     Referrals Placed by CM/SW:  Pt and  Trevon have not chosen their 5-7 TCU preferences based on the list provided by the weekday . Trevon plans to look into these further on the Medicare.gov web site.     Private pay costs discussed: Not applicable    Additional Information:  Sw to follow up on Sunday 11/27 to obtain preferences and begin referral process.      RICHARD Frank MSW    11/26/2022       Social Work and Care Management Department       SEARCHABLE in [employer] - search SOCIAL WORK       [facility] (0800 - 1630) Saturday and Sunday     Units: 4A, 4C, & 4E Pager: [phone]     Units: 5A & 5B Pager: [phone]     Units: 6A & 6B   Pager: [phone]     Units: 6C & 6D Pager: [phone]     Units: 7A &7B  Pager: [phone]     Units: 7C, 7D, & 5C Pager: [phone]     Unit: [facility] ED Pager: [phone]      [facility] ([phone]) Saturday and Sunday      Units: 5 Ortho, 5 Med/Surg & WB ED   Pager:[phone]     Units: 6 Med/Surg, 8A, & 10A ICU  Pager: [phone]        After hours (1630 - 0000) Saturday & Sunday; (0204-4752) Mon-Fri; (3889-1008) [facility] Recognized Holidays     Units: ALL  Pager: [phone]

## 2022-11-26 NOTE — PROGRESS NOTES
Cardiovascular Surgery Progress Note  11/26/2022         Assessment and Plan:     Jade Walker is a 87 year old female with PMHx of severe aortic stenosis HTN, HLD, non-obstructive CAD, CKD stage III, IBS who underwent s/p TAVR  c/b LV perforation s/p emergent sternotomy, repair of LV lateral wall laceration by Dr. Bone on 11/16/22. TAVR valve well seated.     Cardiovascular:   Hx of severe AORTIC STENOSIS, s/p TAVR 11/16  Pericardial effusion due to LV perforation, s/p LV laceration repair 11/16  HTN  HLD  Non obstructive CAD  Post-op AF   Remains in AF rates 80-110s, HD stable  TTE 11/17 showed LV EF 60-65%, TAVR valve well seated, no leak.   - Amio drip for Afib 11/21 - 11/24. Transitioned to PO Amiodarone 400 mg BID on 11/25.  - Post-TAVR plavix continues.  - Metoprolol 25 mg BID  - ASA 81 mg daily, to stop today 11/25.  - coumadin starting for PAF 11/25.  - Statin resumed  - Diuresis as below     Chest tubes and PW removed in the ICU     Pulmonary:  Extubated POD 5 to 2 lpm via NC. Now saturating well on RA.   - Supplemental O2 PRN to keep sats > 92%. Wean off as tolerated.  - Pulm hygiene, IS, activity and deep breathing     Neurology / MSK:  Acute post-operative pain   Pain well controlled with current regimen:  - Acetaminophen, PO oxycodone PRN, IV dilaudid PRN, methocarbamol      / Renal / Fluid / Electrolytes:  CKD 3  BL creat ~ 0.9, most recent creatinine WNL; adequate UOP.   - Pre-op weight 141 lb   - Diuresis with Lasix 40 mg IV BID and potassium BID.  - Replete lytes per protocol.  Continue strict I/O, daily weights.   - Avoid/limit nephrotoxins as able     GI / Nutrition:   Irritable Bowel Syndrome    - Nutrition consulted, appreciate recommendations  - PPI for bowel prophylaxis.   - Bowel regimen: MiraLAX bid, senna 2 tabs bid, milk of mag- held due to loose stools  - Cyclic TF (6 pm-6 am) and continue Calorie counts   - Immodium PRN   - C Diff negative 11/22   - Tolerating regular diet,  but poor appetite. Encouraged supplements (Ensure) with meals.     Endocrine:  Stress induced hyperglycemia   - Initially managed on insulin drip postop, transitioned to sliding scale; goal BG <180 for optimal healing     Infectious Disease:  Stress induced leukocytosis  Serratia pneumonia  - started zosyn, switched to ceftriaxone (7 day end date, end 11/26)  - WBC 12.6, remains afebrile, no signs or symptoms of infection. C diff negative 11/25.  - Completed perioperative antibiotics.   - Continue to monitor fever curve, CBC      Hematology:   Acute blood loss anemia and thrombocytopenia  Pericardial effusion s/p pericardiocentesis  Hgb 8.1; Plt 185, no signs or symptoms of active bleeding.     Anticoagulation:   - ASA 81 mg daily, stopped 11/25.  - Coumadin for paroxysmal A-Fib starting 11/25, INR goal 2-3 for 3 months.   - Plavix for TAVR.      MSK/Skin:  Sternotomy  Surgical incision  - Sternal precautions.  - Incisional cares per protocol.     Prophylaxis:   - Stress ulcer prophylaxis: Pantoprazole 40 mg daily for 30 days  - DVT prophylaxis: Subcutaneous heparin, SCD.     Disposition:   - Transferred to  on 11/24  - Therapies recommending discharge to TCU. IV ABx end 11/26.  - Medically ready for discharge on 11/27, can drift INR up to 2-3 without heparin infusion.    Discussed with Dr Bone through written and verbal communication.      Eliel Yu PA-C  Cardiothoracic Surgery  Pager [phone]    7:37 AM   November 26, 2022        Interval History:     No overnight events. Feels good overall.   States pain is well managed on current regimen. Slept well overnight.  Tolerating diet and tube feeds, is passing flatus, + BM. No nausea or vomiting.  Breathing well without complaints.   Working with therapies and ambulating in halls with assistance.   Denies chest pain, palpitations, dizziness, syncopal symptoms, fevers, chills, myalgias, or sternal popping/clicking.         Physical Exam:   Blood pressure  "122/65, pulse 103, temperature 98.1  F (36.7  C), temperature source Oral, resp. rate 18, height 1.549 m (5' 1\"), weight 74.2 kg (163 lb 8 oz), SpO2 97 %.  Vitals:    11/23/22 0536 11/25/22 0355 11/26/22 0623   Weight: 76.3 kg (168 lb 3.4 oz) 75.1 kg (165 lb 8 oz) 74.2 kg (163 lb 8 oz)      Weight; + 22 lbs since admit and trending down.   24 hr Fluid status; net loss 857 mL. UOP 2.4 L  MAPs: 80 - 113     Gen: A&Ox4, NAD  Neuro: no focal deficits   CV: RRR, normal S1 S2, no murmurs, rubs or gallops.   Pulm: CTA, no wheezing or rhonchi, normal breathing on RA  Abd: nondistended, normal BS, soft, nontender  Ext: moderate peripheral edema, 2+ pitting  Incision: clean, dry, intact, no erythema, sternum stable  Tubes/drain sites: dressing clean and dry         Data:    Imaging:  reviewed recent imaging, no acute concerns    Labs:  BMP  Recent Labs   Lab 11/26/22  0742 11/26/22  0613 11/25/22  2325 11/25/22  1650 11/25/22  1208 11/25/22  0707 11/24/22  2324 11/24/22  1644 11/24/22  0438 11/24/22  0436   NA  --  138  --   --   --  143  --  143  --  144   POTASSIUM  --  3.2*  --   --   --  4.1  --  4.0  --  3.7   CHLORIDE  --  102  --   --   --  107  --  107  --  107   SHAHEEN  --  7.7*  --   --   --  8.3*  --  8.1*  --  8.0*   CO2  --  26  --   --   --  23  --  28  --  27   BUN  --  26.7*  --   --   --  25.0*  --  26.7*  --  30.0*   CR  --  0.92  --   --   --  0.88  --  0.88  --  0.90   * 124* 157* 113*   < > 122*   < > 113*   < > 132*    < > = values in this interval not displayed.     CBC  Recent Labs   Lab 11/26/22  0613 11/25/22  0707 11/24/22  0436 11/23/22  0415   WBC 12.6* 13.5* 11.3* 13.5*   RBC 2.57* 2.66* 2.74* 2.67*   HGB 8.1* 8.7* 8.6* 8.4*   HCT 26.0* 26.8* 26.8* 26.0*   * 101* 98 97   MCH 31.5 32.7 31.4 31.5   MCHC 31.2* 32.5 32.1 32.3   RDW 18.6* 18.2* 17.7* 16.9*    199 201 210     INR  Recent Labs   Lab 11/26/22  0613 11/25/22  1354   INR 1.11 1.05      Hepatic Panel  No lab results found " in last 7 days.  GLUCOSE:   Recent Labs   Lab 11/26/22  0742 11/26/22  0613 11/25/22  2325 11/25/22  1650 11/25/22  1208 11/25/22  0707   * 124* 157* 113* 106* 122*

## 2022-11-26 NOTE — PLAN OF CARE
D: Jade Walker is a 87 year old female with PMHx of severe aortic stenosis HTN, HLD, non-obstructive CAD, CKD stage III, IBS s/p TAVR by c/b LV perforation surgically repaired by Dr. Pulido 11/16.    PRN:   Tele: SR/ST  O2: RA  Mobility: x1     A: Neuro: A/O x4.  Call light appropriate.  Able to make needs known.  Respiratory:  On room air.  Lung sounds clear.  Denies shortness of breath at rest, little JOHN.  Cardiac: VSS. SR/ST.   GI: Last BM 11/26. Loose stools, usually continent.  No report of nausea or vomiting.  : Voiding adequate clear, yellow urine.  Endo:  Blood sugars ACHS.  Skin: Sternal incision EUSEBIO - WNL. R&L groin site WNL.  LDA:  TL PICC - SL  Pain: Denies  Diet: Regular. TF at 30mL/hr (goal rate) from 2509-2292.     P: Plan for TCU placement.onitor Pt status and report changes to CVTS.      Jose Brito RN

## 2022-11-27 ENCOUNTER — APPOINTMENT (OUTPATIENT)
Dept: OCCUPATIONAL THERAPY | Facility: CLINIC | Age: 87
DRG: 266 | End: 2022-11-27
Attending: PHYSICIAN ASSISTANT
Payer: COMMERCIAL

## 2022-11-27 LAB
ANION GAP SERPL CALCULATED.3IONS-SCNC: 9 MMOL/L (ref 7–15)
BUN SERPL-MCNC: 27.4 MG/DL (ref 8–23)
CALCIUM SERPL-MCNC: 8 MG/DL (ref 8.8–10.2)
CHLORIDE SERPL-SCNC: 105 MMOL/L (ref 98–107)
CREAT SERPL-MCNC: 0.93 MG/DL (ref 0.51–0.95)
CRP SERPL-MCNC: 37.5 MG/L
DEPRECATED HCO3 PLAS-SCNC: 26 MMOL/L (ref 22–29)
ERYTHROCYTE [DISTWIDTH] IN BLOOD BY AUTOMATED COUNT: 19.6 % (ref 10–15)
GFR SERPL CREATININE-BSD FRML MDRD: 60 ML/MIN/1.73M2
GLUCOSE BLDC GLUCOMTR-MCNC: 110 MG/DL (ref 70–99)
GLUCOSE BLDC GLUCOMTR-MCNC: 123 MG/DL (ref 70–99)
GLUCOSE SERPL-MCNC: 122 MG/DL (ref 70–99)
HCT VFR BLD AUTO: 25.6 % (ref 35–47)
HGB BLD-MCNC: 8.1 G/DL (ref 11.7–15.7)
INR PPP: 1.28 (ref 0.85–1.15)
MAGNESIUM SERPL-MCNC: 2.1 MG/DL (ref 1.7–2.3)
MCH RBC QN AUTO: 32.1 PG (ref 26.5–33)
MCHC RBC AUTO-ENTMCNC: 31.6 G/DL (ref 31.5–36.5)
MCV RBC AUTO: 102 FL (ref 78–100)
PHOSPHATE SERPL-MCNC: 2.9 MG/DL (ref 2.5–4.5)
PLATELET # BLD AUTO: 162 10E3/UL (ref 150–450)
POTASSIUM SERPL-SCNC: 4.4 MMOL/L (ref 3.4–5.3)
PROCALCITONIN SERPL IA-MCNC: 0.59 NG/ML
RBC # BLD AUTO: 2.52 10E6/UL (ref 3.8–5.2)
SODIUM SERPL-SCNC: 140 MMOL/L (ref 136–145)
WBC # BLD AUTO: 13.4 10E3/UL (ref 4–11)

## 2022-11-27 PROCEDURE — 85610 PROTHROMBIN TIME: CPT | Performed by: PHYSICIAN ASSISTANT

## 2022-11-27 PROCEDURE — 97140 MANUAL THERAPY 1/> REGIONS: CPT | Mod: GO

## 2022-11-27 PROCEDURE — 250N000011 HC RX IP 250 OP 636: Performed by: PHYSICIAN ASSISTANT

## 2022-11-27 PROCEDURE — 99231 SBSQ HOSP IP/OBS SF/LOW 25: CPT | Performed by: PEDIATRICS

## 2022-11-27 PROCEDURE — 999N000044 HC STATISTIC CVC DRESSING CHANGE

## 2022-11-27 PROCEDURE — 86140 C-REACTIVE PROTEIN: CPT | Performed by: PHYSICIAN ASSISTANT

## 2022-11-27 PROCEDURE — 84145 PROCALCITONIN (PCT): CPT | Performed by: PHYSICIAN ASSISTANT

## 2022-11-27 PROCEDURE — 83735 ASSAY OF MAGNESIUM: CPT | Performed by: STUDENT IN AN ORGANIZED HEALTH CARE EDUCATION/TRAINING PROGRAM

## 2022-11-27 PROCEDURE — 250N000013 HC RX MED GY IP 250 OP 250 PS 637: Performed by: PHYSICIAN ASSISTANT

## 2022-11-27 PROCEDURE — 214N000001 HC R&B CCU UMMC

## 2022-11-27 PROCEDURE — 85027 COMPLETE CBC AUTOMATED: CPT | Performed by: STUDENT IN AN ORGANIZED HEALTH CARE EDUCATION/TRAINING PROGRAM

## 2022-11-27 PROCEDURE — 80048 BASIC METABOLIC PNL TOTAL CA: CPT | Performed by: STUDENT IN AN ORGANIZED HEALTH CARE EDUCATION/TRAINING PROGRAM

## 2022-11-27 PROCEDURE — 250N000013 HC RX MED GY IP 250 OP 250 PS 637: Performed by: THORACIC SURGERY (CARDIOTHORACIC VASCULAR SURGERY)

## 2022-11-27 PROCEDURE — 250N000013 HC RX MED GY IP 250 OP 250 PS 637

## 2022-11-27 PROCEDURE — 84100 ASSAY OF PHOSPHORUS: CPT | Performed by: THORACIC SURGERY (CARDIOTHORACIC VASCULAR SURGERY)

## 2022-11-27 PROCEDURE — 36592 COLLECT BLOOD FROM PICC: CPT | Performed by: PHYSICIAN ASSISTANT

## 2022-11-27 PROCEDURE — 250N000013 HC RX MED GY IP 250 OP 250 PS 637: Performed by: STUDENT IN AN ORGANIZED HEALTH CARE EDUCATION/TRAINING PROGRAM

## 2022-11-27 RX ORDER — WARFARIN SODIUM 3 MG/1
3 TABLET ORAL
Status: COMPLETED | OUTPATIENT
Start: 2022-11-27 | End: 2022-11-27

## 2022-11-27 RX ADMIN — METOPROLOL TARTRATE 25 MG: 25 TABLET, FILM COATED ORAL at 19:44

## 2022-11-27 RX ADMIN — PANTOPRAZOLE SODIUM 40 MG: 40 TABLET, DELAYED RELEASE ORAL at 08:11

## 2022-11-27 RX ADMIN — POTASSIUM CHLORIDE 20 MEQ: 20 SOLUTION ORAL at 08:10

## 2022-11-27 RX ADMIN — AMIODARONE HYDROCHLORIDE 400 MG: 200 TABLET ORAL at 08:10

## 2022-11-27 RX ADMIN — CLOPIDOGREL BISULFATE 75 MG: 75 TABLET ORAL at 08:10

## 2022-11-27 RX ADMIN — FUROSEMIDE 40 MG: 10 INJECTION, SOLUTION INTRAVENOUS at 08:10

## 2022-11-27 RX ADMIN — POTASSIUM CHLORIDE 20 MEQ: 20 SOLUTION ORAL at 19:44

## 2022-11-27 RX ADMIN — Medication 1 TABLET: at 08:10

## 2022-11-27 RX ADMIN — FUROSEMIDE 40 MG: 10 INJECTION, SOLUTION INTRAVENOUS at 15:25

## 2022-11-27 RX ADMIN — Medication 1 PACKET: at 08:11

## 2022-11-27 RX ADMIN — QUETIAPINE FUMARATE 25 MG: 25 TABLET ORAL at 19:44

## 2022-11-27 RX ADMIN — WARFARIN SODIUM 3 MG: 3 TABLET ORAL at 17:54

## 2022-11-27 RX ADMIN — AMIODARONE HYDROCHLORIDE 400 MG: 200 TABLET ORAL at 19:43

## 2022-11-27 RX ADMIN — ATORVASTATIN CALCIUM 40 MG: 40 TABLET, FILM COATED ORAL at 19:44

## 2022-11-27 RX ADMIN — METOPROLOL TARTRATE 25 MG: 25 TABLET, FILM COATED ORAL at 08:10

## 2022-11-27 ASSESSMENT — ACTIVITIES OF DAILY LIVING (ADL)
ADLS_ACUITY_SCORE: 22
ADLS_ACUITY_SCORE: 22
ADLS_ACUITY_SCORE: 27
ADLS_ACUITY_SCORE: 22
ADLS_ACUITY_SCORE: 27
ADLS_ACUITY_SCORE: 22
ADLS_ACUITY_SCORE: 27
ADLS_ACUITY_SCORE: 27

## 2022-11-27 NOTE — PLAN OF CARE
D:  s/p TAVR  c/b LV perforation s/p emergent sternotomy, repair of LV lateral wall laceration by Dr. Bone on 11/16   PMHx of severe aortic stenosis HTN, HLD, non-obstructive CAD, CKD stage III, IBS      I: Monitored vitals and assessed pt status.   Changed: ROSALINDA stockings applied to BLE  Running: TL PICC SL'd; TF @ 30ml/hr from 6p-6A  PRN: none    A: A0x4. VSS, on RA. Monitor AFib 100-120. Denies c/o pain. Last BM 11/26. Adeq UO. Blood sugars WNL. Appeared to sleep well overnight.      I/O this shift:  In: 240 [NG/GT:60]  Out: -     Temp:  [97.5  F (36.4  C)-98.2  F (36.8  C)] 97.6  F (36.4  C)  Pulse:  [] 103  Resp:  [16-18] 18  BP: (104-123)/(54-73) 123/63  SpO2:  [95 %-97 %] 96 %      P: Continue to monitor Pt status and report changes to treatment team.

## 2022-11-27 NOTE — PLAN OF CARE
"Pt is A/O x 4.   SBA with walker.   VSS, RA. Denies pain.   Tele NSR.   Lung sounds diminished in bases. C/O minimal JOHN.   Sternal incision is CDI and EUSEBIO.   Old CT site dressing changed. CDI. L groin primapore changed, minimal scant, serous output noted. R groin EUSEBIO.   BS active x4. Adequate urine output via ambulation to BR.   BG checks ACHS. Regular diet, tolerating well. Alejandro counts today, patient stated not big appetite but \"trying\" TF running at 30ml of goal rate from 6pm-6am.     Plans for eventual discharge to TCU.   Patient currently resting in bed with call light in reach.                           "

## 2022-11-27 NOTE — PROCEDURES
[facility]  CAPS NOTE  Date of Admission:  11/16/2022  Consult Requested by: Cardiology  Reason for Consult: diagnostic evaluation of pleural effusion and management of symptomatic pleural effusion    POC US GUIDE FOR THORACENTESIS     Impression  Limited Bedside Lung Ultrasound, performed and interpreted by me.  Indication: Dyspnea and Pleural Effusion    With the patient positioned upright, bilateral posterior and lateral lung fields were examined for evidence of thoracic free fluid, pulmonary consolidation, and pulmonary edema.    Findings:  Right Sapphire: no B-lines, no  pleural fluid pocket, normal lung sliding  Right Mid: no B-lines, no  pleural fluid pocket, normal lung sliding  Right Lower: no B-lines, small  pleural fluid pocket, normal lung sliding, mobile diaphragm  Right Flank: no B-lines, no  pleural fluid pocket, ormal lung sliding, mobile diaphragm  __________________________________________________________________  Left Sapphire: no B-lines, no  pleural fluid pocket, normal lung sliding  Left Mid: no B-lines, no  pleural fluid pocket, normal lung sliding  Left Lower: no B-lines, small  pleural fluid pocket, normal lung sliding, mobile diaphragm  Left Flank: no B-lines, small  pleural fluid pocket, normal lung sliding, mobile diaphragm    IMPRESSION:  Notable BILATERAL LUNGS with no B-lines, small pleural fluid pockets, largest pocket near medial spine.    Milton Hlal MD  11/27/2022      History of Present Illness:   SOB today, worse w/any activity  No chest pain no FCS no NV  Says has been this way since surgery, stable not wrosening    Review of Systems:  No FCS no CP no new or worsening SOB    Physical Exam:  Vital Signs: Temp: 98.1  F (36.7  C) Temp src: Oral BP: 123/68 Pulse: 85   Resp: 16 SpO2: 95 % O2 Device: None (Room air)    Weight: 161 lbs 1.6 oz  General Appearance: Frail appearing woman sitting up in bed  Respiratory: no stridor, no  retractions  Neuro:  Awake alert and conversing easily, giving detailed hx of recent events as inpt, following commands, sitting up with ease    Assessment and Plan:  :: Requested procedure was not performed.  Insufficient fluid for safe bedside Thoracentesis.  :: if concern for fluid build up or worsening effusion, please do not hesitate to consult CAPS team again, we are happy to reassess.    Milton Hall MD  [facility]  Securely message with the Vocera Web Console (learn more here)  Text page via [facility] Paging/Directory   Please see signed in provider for up to date coverage information

## 2022-11-27 NOTE — PROVIDER NOTIFICATION
CVTS crosscover notified that pt converted to AFib 100-120s. MT noted that it had happened @ 1703. Pt asymptomatic, BP stable. Received scheduled doses of PO Metoprolol and Amiodarone @ 2000. No new orders received

## 2022-11-27 NOTE — PROGRESS NOTES
Care Management Follow Up    Length of Stay (days): 11    Expected Discharge Date: 11/29/2022     Concerns to be Addressed:     Discharge planning  Patient plan of care discussed at interdisciplinary rounds: Yes    Anticipated Discharge Disposition: Transitional Care Unit     Anticipated Discharge Services:  TCU therapy services  Anticipated Discharge DME:  N/A    Patient/family educated on Medicare website which has current facility and service quality ratings:  Yes  Education Provided on the Discharge Plan:  Yes  Patient/Family in Agreement with the Plan:  Yes    Referrals Placed by CM/SW: Sw faxed referrals to the following transitional care facilities. They are listed in order of pt's preference:     1. [facility]  [address] [place] [address]  [phone]    2. [facility]  [address] [place] [address]  [phone]    3. [facility]  [address] [place] [address]  [phone]    4. [facility] [address] [place] [address]  [phone]    5. [facility]  [address] [place] [address]  [phone]    6. [facility]  [address] [place] [address]  [phone]      Private pay costs discussed: Not applicable    Additional Information:  Weekday sw to follow up on referrals and assist with discharge planning.     RICHARD Frank   11/27/2022       Social Work and Care Management Department       SEARCHABLE in [facility] - search SOCIAL WORK       [place] ([phone]) Saturday and Sunday     Units: 4A, 4C, & 4E Pager: [phone]     Units: 5A & 5B Pager: [phone]     Units: 6A & 6B   Pager: [phone]     Units: 6C & 6D Pager: [phone]     Units: 7A &7B  Pager: [phone]     Units: 7C, 7D, & 5C Pager: [phone]     Unit: [place] ED Pager: [phone]       [facility] ([phone]) Saturday and Sunday      Units: 5 Ortho, 5 Med/Surg & WB ED  Pager:[phone]     Units: 6 Med/Surg, 8A, & 10A ICU  Pager: [phone]        After hours (1630 - 0000) Saturday & Sunday; ([phone]) Mon-Fri; (8466-6509) [facility] Recognized Holidays     Units: ALL  Pager: [phone]

## 2022-11-27 NOTE — PROGRESS NOTES
Calorie Count  Intake recorded for: 11/26  Total Kcals: 395 Total Protein: 20g  Kcals from Hospital Food: 395   Protein: 20g  Kcals from Outside Food (average):0 Protein: 0g  # Meals Ordered from Kitchen: 2  # Meals Recorded: 1 (First - 100% decaf coffee w/ cream, 50% apple)  # Supplements Recorded: 100% 1 Ensure Enlive

## 2022-11-27 NOTE — PROGRESS NOTES
"   11/27/22 1507   Appointment Info   Signing Clinician's Name / Credentials (OT) Opal Ramos, OTR/L  (Edema)   General Information   Onset of Illness/Injury or Date of Surgery 11/16/22   Referring Physician Eliel Yu PA   Patient/Family Therapy Goal Statement (OT) To reduce fluid in BLEs   Additional Occupational Profile Info/Pertinent History of Current Problem Per EMR, \"Jade Walker is a 87 year old female with PMHx of severe aortic stenosis HTN, HLD, non-obstructive CAD, CKD stage III, IBS who underwent s/p TAVR  c/b LV perforation s/p emergent sternotomy, repair of LV lateral wall laceration by Dr. Bone on 11/16/22. TAVR valve well seated.\"   Existing Precautions/Restrictions cardiac;fall;sternal   Edema General Information   Onset of Edema   (acute 1-2 weeks)   Affected Body Part(s) Left LE;Right LE;Left UE   Etiology Comments post-surgery, reduced mobility   General Comments/Previous Edema Treatment/Edema Equipment Pt reports no prior hx of edema outside of this admit.   Edema Examination/Assessment   Skin Condition Pitting;Dryness   Skin Condition Comments shiny, soft, slightly pillowy   Skin Integrity Intact, no redness noted   Pitting Assessment 3+ jeffrey feet to knee crease   Edema Assessment Comments PT appropriate for GCB wraps lightly applied with 50% overlap and 25% stretch   Clinical Impression   Criteria for Skilled Therapeutic Interventions Met (OT) Yes, treatment indicated   OT Diagnosis Decreased functional mobility and ADL IND with increased fluid in BLEs   Edema: Patient Presentation Edema   Edema: Planned Interventions Gradient compression bandaging;Fit for compression garment;Edema exercises;Precautions to prevent infection/exacerbation;Education;Manual therapy   Clinical Decision Making Complexity (OT) low complexity   Risk & Benefits of therapy have been explained evaluation/treatment results reviewed;care plan/treatment goals reviewed;risks/benefits " reviewed;current/potential barriers reviewed;participants voiced agreement with care plan;participants included;patient   Clinical Impression Comments Pt may benefit from IP lymph treatment to reduce fluid in BLEs and promote increased functional mobility   OT Total Evaluation Time   OT Valentín, Low Complexity Minutes (04054) 8   OT Goals   OT Goals Edema   OT: Edema education to increase ability to manage edema after discharge from the hospital Patient;[place];Skin care routine;signs/symptoms of intolerance;wear schedule;limb positioning;garrnet/bandage care;Caregiver;Demonstrate   OT: Management of edema bandages Patient;Caregiver;Demonstrate;[place];garment(s)   OT: Functional edema exercise program to reduce limb volume, increase activity tolerance and improve independence with ADL Patient;[place];HEP   Total Session Time   Total Session Time (sum of timed and untimed services) 8

## 2022-11-27 NOTE — PROGRESS NOTES
Cardiovascular Surgery Progress Note  11/27/2022         Assessment and Plan:     Jade Walker is a 87 year old female with PMHx of severe aortic stenosis HTN, HLD, non-obstructive CAD, CKD stage III, IBS who underwent s/p TAVR  c/b LV perforation s/p emergent sternotomy, repair of LV lateral wall laceration by Dr. Bone on 11/16/22. TAVR valve well seated.     Cardiovascular:   Hx of severe AORTIC STENOSIS, s/p TAVR 11/16  Pericardial effusion due to LV perforation, s/p LV laceration repair 11/16  HTN  HLD  Non obstructive CAD  Post-op AF  Remains in AF rates 80-110s, HD stable. Started on coumadin 11/25. Amio drip for Afib 11/21 - 11/24. Transitioned to PO Amiodarone 400 mg BID on 11/25.  TTE 11/17 showed LV EF 60-65%, TAVR valve well seated, no leak.   - Post-TAVR; Plavix continues.  - Metoprolol 25 mg BID  - ASA 81 mg daily, to stop (on warfarin and plavix)  - Statin resumed  - Diuresis as below     Chest tubes and PW removed in the ICU     Pulmonary:  - Extubated POD 5 to 2 lpm via NC. Now saturating well on RA.   - Pulm hygiene, IS, activity and deep breathing  Left Pleural Effusion  - Pt still with SOB and Left sided chest discomfort today  - Consult CAPs team 11/27 for possible left thoracentesis today     Neurology / MSK:  Acute post-operative pain   Pain well controlled with current regimen:  - Acetaminophen, PO oxycodone PRN, IV dilaudid PRN, methocarbamol      / Renal / Fluid / Electrolytes:  CKD 3  BL creat ~ 0.9, most recent creatinine WNL; adequate UOP.   - Pre-op weight 141 lb (she thinks this is accurate)  - Diuresis with Lasix 40 mg IV BID and potassium BID.  - Replete lytes per protocol.  Continue strict I/O, daily weights.   - Avoid/limit nephrotoxins as able     GI / Nutrition:   Irritable Bowel Syndrome    - Nutrition consulted, appreciate recommendations  - PPI for bowel prophylaxis.   - Bowel regimen: MiraLAX bid, senna 2 tabs bid, milk of mag- held due to loose stools  - Cyclic  TF (6 pm-6 am) and continue Calorie counts.  - Immodium PRN   - C Diff negative 11/22   - Tolerating regular diet, but poor appetite. Encouraged supplements (Ensure) with meals.     Endocrine:  Stress induced hyperglycemia   - Initially managed on insulin drip postop, transitioned to sliding scale; goal BG <180 for optimal healing     Infectious Disease:  Stress induced leukocytosis  Serratia pneumonia  - started zosyn, switched to ceftriaxone (7 days, ended 11/26)  - WBC 13.4, remains afebrile, no signs or symptoms of infection. C diff negative 11/25.  - Completed perioperative antibiotics.   - Continue to monitor fever curve, CBC. 11/27: CRP 37.5. Procalcitonin pending.      Hematology:   Acute blood loss anemia and thrombocytopenia  Pericardial effusion s/p pericardiocentesis  Hgb 8.1; Plt 162, no signs or symptoms of active bleeding.     Anticoagulation:   - ASA 81 mg daily, stopped 11/25.  - Coumadin for paroxysmal A-Fib starting 11/25, INR goal 2-3 for 3 months. INR 1.28.  - Plavix for TAVR.      MSK/Skin:  Sternotomy  Surgical incision  - Sternal precautions.  - Incisional cares per protocol.     Prophylaxis:   - Stress ulcer prophylaxis: Pantoprazole 40 mg daily for 30 days  - DVT prophylaxis: Subcutaneous heparin, SCD.     Disposition:   - Transferred to  on 11/24  - Therapies recommending discharge to TCU. IV ABx end 11/26.  - Medically ready for discharge on 11/27, can drift INR up to 2-3 without heparin infusion.    Discussed with Dr Bone through written and verbal communication.      Eliel Yu PA-C  Cardiothoracic Surgery  Pager [phone]    7:42 AM   November 27, 2022        Interval History:     Overnight events- rate controlled A-fib, then some 's at times.     States pain is well managed on current regimen. Slept well overnight.  Tolerating diet but poor intake, tolerates tube feeds, is passing flatus, + BM. No nausea or vomiting.  Breathing well at rest without complaints, gets  "SOB with activity.  Working with therapies and ambulating in halls with assistance.   Denies chest pain, palpitations, dizziness, syncopal symptoms, fevers, chills, myalgias, or sternal popping/clicking.         Physical Exam:   Blood pressure 123/72, pulse 105, temperature 97.9  F (36.6  C), temperature source Oral, resp. rate 16, height 1.549 m (5' 1\"), weight 73.1 kg (161 lb 1.6 oz), SpO2 98 %.  Vitals:    11/25/22 0355 11/26/22 0623 11/27/22 0616   Weight: 75.1 kg (165 lb 8 oz) 74.2 kg (163 lb 8 oz) 73.1 kg (161 lb 1.6 oz)      Weight; + 20 lbs since admit and trending down.   24 hr Fluid status; net loss 350 mL. UOP 1.3 L and 2 unrecorded.  MAPs: 70 - 86     Gen: A&Ox4, NAD  Neuro: no focal deficits   CV: irregularly irregular and tachy, normal S1 S2, no murmurs, rubs or gallops.   Pulm: CTA, no wheezing or rhonchi, normal breathing on RA. Productive cough.  Abd: nondistended, normal BS, soft, nontender  Ext: peripheral edema, 1+ pitting, compression stockings in place  Incision: clean, dry, intact, no erythema, sternum stable  Tubes/drain sites: dressing clean and dry         Data:    Imaging:  reviewed recent imaging, no acute concerns but has increasing left pleural effusion    Labs:  BMP  Recent Labs   Lab 11/27/22  0809 11/27/22  0804 11/27/22  0243 11/26/22  2216 11/26/22  1612 11/26/22  1346 11/26/22  0742 11/26/22  0613 11/25/22  1208 11/25/22  0707 11/24/22  2324 11/24/22  1644   NA  --  140  --   --   --   --   --  138  --  143  --  143   POTASSIUM  --  4.4  --   --   --  4.3  --  3.2*  --  4.1  --  4.0   CHLORIDE  --  105  --   --   --   --   --  102  --  107  --  107   SHAHEEN  --  8.0*  --   --   --   --   --  7.7*  --  8.3*  --  8.1*   CO2  --  26  --   --   --   --   --  26  --  23  --  28   BUN  --  27.4*  --   --   --   --   --  26.7*  --  25.0*  --  26.7*   CR  --  0.93  --   --   --   --   --  0.92  --  0.88  --  0.88   * 122* 123* 129*   < >  --    < > 124*   < > 122*   < > 113*    < > = " values in this interval not displayed.     CBC  Recent Labs   Lab 11/27/22  0804 11/26/22  0613 11/25/22  0707 11/24/22  0436   WBC 13.4* 12.6* 13.5* 11.3*   RBC 2.52* 2.57* 2.66* 2.74*   HGB 8.1* 8.1* 8.7* 8.6*   HCT 25.6* 26.0* 26.8* 26.8*   * 101* 101* 98   MCH 32.1 31.5 32.7 31.4   MCHC 31.6 31.2* 32.5 32.1   RDW 19.6* 18.6* 18.2* 17.7*    185 199 201     INR  Recent Labs   Lab 11/27/22  0804 11/26/22  0613 11/25/22  1354   INR 1.28* 1.11 1.05      Hepatic Panel  No lab results found in last 7 days.  GLUCOSE:   Recent Labs   Lab 11/27/22  0809 11/27/22  0804 11/27/22  0243 11/26/22  2216 11/26/22  1612 11/26/22  1125   * 122* 123* 129* 100* 105*

## 2022-11-28 ENCOUNTER — APPOINTMENT (OUTPATIENT)
Dept: OCCUPATIONAL THERAPY | Facility: CLINIC | Age: 87
DRG: 266 | End: 2022-11-28
Attending: INTERNAL MEDICINE
Payer: COMMERCIAL

## 2022-11-28 LAB
ANION GAP SERPL CALCULATED.3IONS-SCNC: 10 MMOL/L (ref 7–15)
ANION GAP SERPL CALCULATED.3IONS-SCNC: 13 MMOL/L (ref 7–15)
BUN SERPL-MCNC: 27.9 MG/DL (ref 8–23)
BUN SERPL-MCNC: 28.9 MG/DL (ref 8–23)
CALCIUM SERPL-MCNC: 8 MG/DL (ref 8.8–10.2)
CALCIUM SERPL-MCNC: 8.6 MG/DL (ref 8.8–10.2)
CHLORIDE SERPL-SCNC: 100 MMOL/L (ref 98–107)
CHLORIDE SERPL-SCNC: 104 MMOL/L (ref 98–107)
CREAT SERPL-MCNC: 0.96 MG/DL (ref 0.51–0.95)
CREAT SERPL-MCNC: 0.99 MG/DL (ref 0.51–0.95)
DEPRECATED HCO3 PLAS-SCNC: 25 MMOL/L (ref 22–29)
DEPRECATED HCO3 PLAS-SCNC: 25 MMOL/L (ref 22–29)
ERYTHROCYTE [DISTWIDTH] IN BLOOD BY AUTOMATED COUNT: 20.6 % (ref 10–15)
GFR SERPL CREATININE-BSD FRML MDRD: 56 ML/MIN/1.73M2
GFR SERPL CREATININE-BSD FRML MDRD: 58 ML/MIN/1.73M2
GLUCOSE BLDC GLUCOMTR-MCNC: 117 MG/DL (ref 70–99)
GLUCOSE SERPL-MCNC: 111 MG/DL (ref 70–99)
GLUCOSE SERPL-MCNC: 145 MG/DL (ref 70–99)
HCT VFR BLD AUTO: 25.3 % (ref 35–47)
HGB BLD-MCNC: 7.9 G/DL (ref 11.7–15.7)
INR PPP: 1.56 (ref 0.85–1.15)
MAGNESIUM SERPL-MCNC: 2.1 MG/DL (ref 1.7–2.3)
MAGNESIUM SERPL-MCNC: 2.1 MG/DL (ref 1.7–2.3)
MCH RBC QN AUTO: 32.2 PG (ref 26.5–33)
MCHC RBC AUTO-ENTMCNC: 31.2 G/DL (ref 31.5–36.5)
MCV RBC AUTO: 103 FL (ref 78–100)
PHOSPHATE SERPL-MCNC: 3.2 MG/DL (ref 2.5–4.5)
PLATELET # BLD AUTO: 158 10E3/UL (ref 150–450)
POTASSIUM SERPL-SCNC: 3.9 MMOL/L (ref 3.4–5.3)
POTASSIUM SERPL-SCNC: 4 MMOL/L (ref 3.4–5.3)
RBC # BLD AUTO: 2.45 10E6/UL (ref 3.8–5.2)
SODIUM SERPL-SCNC: 138 MMOL/L (ref 136–145)
SODIUM SERPL-SCNC: 139 MMOL/L (ref 136–145)
WBC # BLD AUTO: 8.6 10E3/UL (ref 4–11)

## 2022-11-28 PROCEDURE — 250N000013 HC RX MED GY IP 250 OP 250 PS 637: Performed by: PHYSICIAN ASSISTANT

## 2022-11-28 PROCEDURE — 85027 COMPLETE CBC AUTOMATED: CPT | Performed by: STUDENT IN AN ORGANIZED HEALTH CARE EDUCATION/TRAINING PROGRAM

## 2022-11-28 PROCEDURE — 250N000013 HC RX MED GY IP 250 OP 250 PS 637: Performed by: THORACIC SURGERY (CARDIOTHORACIC VASCULAR SURGERY)

## 2022-11-28 PROCEDURE — 36592 COLLECT BLOOD FROM PICC: CPT | Performed by: PHYSICIAN ASSISTANT

## 2022-11-28 PROCEDURE — 214N000001 HC R&B CCU UMMC

## 2022-11-28 PROCEDURE — 97535 SELF CARE MNGMENT TRAINING: CPT | Mod: GO | Performed by: OCCUPATIONAL THERAPIST

## 2022-11-28 PROCEDURE — 83735 ASSAY OF MAGNESIUM: CPT | Performed by: PHYSICIAN ASSISTANT

## 2022-11-28 PROCEDURE — 250N000013 HC RX MED GY IP 250 OP 250 PS 637

## 2022-11-28 PROCEDURE — 85610 PROTHROMBIN TIME: CPT | Performed by: PHYSICIAN ASSISTANT

## 2022-11-28 PROCEDURE — 250N000011 HC RX IP 250 OP 636: Performed by: PHYSICIAN ASSISTANT

## 2022-11-28 PROCEDURE — 250N000013 HC RX MED GY IP 250 OP 250 PS 637: Performed by: STUDENT IN AN ORGANIZED HEALTH CARE EDUCATION/TRAINING PROGRAM

## 2022-11-28 PROCEDURE — 80048 BASIC METABOLIC PNL TOTAL CA: CPT | Performed by: PHYSICIAN ASSISTANT

## 2022-11-28 PROCEDURE — 84100 ASSAY OF PHOSPHORUS: CPT | Performed by: THORACIC SURGERY (CARDIOTHORACIC VASCULAR SURGERY)

## 2022-11-28 PROCEDURE — 80048 BASIC METABOLIC PNL TOTAL CA: CPT | Performed by: STUDENT IN AN ORGANIZED HEALTH CARE EDUCATION/TRAINING PROGRAM

## 2022-11-28 PROCEDURE — 83735 ASSAY OF MAGNESIUM: CPT | Performed by: STUDENT IN AN ORGANIZED HEALTH CARE EDUCATION/TRAINING PROGRAM

## 2022-11-28 PROCEDURE — 97140 MANUAL THERAPY 1/> REGIONS: CPT | Mod: GO

## 2022-11-28 RX ORDER — MAGNESIUM OXIDE 400 MG/1
400 TABLET ORAL DAILY
Status: DISCONTINUED | OUTPATIENT
Start: 2022-11-28 | End: 2022-12-01 | Stop reason: HOSPADM

## 2022-11-28 RX ORDER — BUMETANIDE 2 MG/1
2 TABLET ORAL
Status: DISCONTINUED | OUTPATIENT
Start: 2022-11-28 | End: 2022-12-01 | Stop reason: HOSPADM

## 2022-11-28 RX ORDER — WARFARIN SODIUM 4 MG/1
4 TABLET ORAL
Status: COMPLETED | OUTPATIENT
Start: 2022-11-28 | End: 2022-11-28

## 2022-11-28 RX ORDER — BUMETANIDE 1 MG/1
1 TABLET ORAL ONCE
Status: COMPLETED | OUTPATIENT
Start: 2022-11-28 | End: 2022-11-28

## 2022-11-28 RX ADMIN — AMIODARONE HYDROCHLORIDE 400 MG: 200 TABLET ORAL at 08:21

## 2022-11-28 RX ADMIN — PANTOPRAZOLE SODIUM 40 MG: 40 TABLET, DELAYED RELEASE ORAL at 08:21

## 2022-11-28 RX ADMIN — Medication 1 PACKET: at 08:22

## 2022-11-28 RX ADMIN — POTASSIUM CHLORIDE 20 MEQ: 20 SOLUTION ORAL at 08:22

## 2022-11-28 RX ADMIN — FUROSEMIDE 40 MG: 10 INJECTION, SOLUTION INTRAVENOUS at 08:21

## 2022-11-28 RX ADMIN — AMIODARONE HYDROCHLORIDE 400 MG: 200 TABLET ORAL at 20:30

## 2022-11-28 RX ADMIN — POTASSIUM CHLORIDE 20 MEQ: 20 SOLUTION ORAL at 21:42

## 2022-11-28 RX ADMIN — BUMETANIDE 2 MG: 2 TABLET ORAL at 17:50

## 2022-11-28 RX ADMIN — CLOPIDOGREL BISULFATE 75 MG: 75 TABLET ORAL at 08:21

## 2022-11-28 RX ADMIN — METOPROLOL TARTRATE 25 MG: 25 TABLET, FILM COATED ORAL at 20:30

## 2022-11-28 RX ADMIN — Medication 1 TABLET: at 08:21

## 2022-11-28 RX ADMIN — Medication 400 MG: at 11:49

## 2022-11-28 RX ADMIN — BUMETANIDE 1 MG: 1 TABLET ORAL at 11:49

## 2022-11-28 RX ADMIN — WARFARIN SODIUM 4 MG: 4 TABLET ORAL at 17:50

## 2022-11-28 RX ADMIN — METOPROLOL TARTRATE 25 MG: 25 TABLET, FILM COATED ORAL at 08:21

## 2022-11-28 RX ADMIN — ATORVASTATIN CALCIUM 40 MG: 40 TABLET, FILM COATED ORAL at 21:41

## 2022-11-28 ASSESSMENT — ACTIVITIES OF DAILY LIVING (ADL)
ADLS_ACUITY_SCORE: 24
ADLS_ACUITY_SCORE: 24
ADLS_ACUITY_SCORE: 25
ADLS_ACUITY_SCORE: 26
ADLS_ACUITY_SCORE: 22
ADLS_ACUITY_SCORE: 26
ADLS_ACUITY_SCORE: 25
ADLS_ACUITY_SCORE: 26
ADLS_ACUITY_SCORE: 24
ADLS_ACUITY_SCORE: 22

## 2022-11-28 NOTE — PLAN OF CARE
D: Pt s/p TAVR complicated by LV perforation with emergent sternotomy and repair of LV lateral wall laceration on 11/16.    I: Monitored vitals and assessed pt status. Performed care and interventions as documented.    A: No acute changes today. Pt's rhythm fluctuates between sinus and Afib. Afib rate 100-120 max. Pt asymptomatic when in Afib.      Pt continues to report mild SOBOE. None at rest. Remains on R/A. Using incentive spirometry.     Lasix IV changed to Bumex po BID. Adequate urine output.     Pt has little appetite t/o day. Drinking ensure. Tube feeds continue from 18-06.      at bedside for part of day - updated on pt status.       P: Continue to monitor pt status and report changes to care team.      Natalie Rohfritsch, RN on 11/28/2022 at 6:33 PM

## 2022-11-28 NOTE — PLAN OF CARE
Temp: 97.7  F (36.5  C) Temp src: Oral BP: 114/63 Pulse: 91   Resp: 16 SpO2: 96 % O2 Device: None (Room air)       Shift: [id number]  D: S/p TAVR c/b LV perforation s/p emergent sternotomy, repair LV lateral wall laceration on 11/16. PMHx severe aortic stenosis, HTN, HLD, non-obstructive CAD, CKD stage III, and IBS.     Neuro: A&O x4. Able to make needs known, calls appropriately. Denies numbness, tingling, headache.  Cardiac: Tele in place, SR. Afebrile. HR 90-100s. Denies chest pain. +2 edema BLE.   Resp: VSS on RA sating > 96%. JOHN. Lung sounds diminished.  GI/: Adequate urine output. No BM during shift. Per pt report, she's been having loose stools. Cyclic TF via NG running at 30 mL/hr from 4578-8821. Regular diet.  Skin: No new deficits noted. Lymph wraps on BLE.  LDA's: Triple lumen PICC in place SL. NG for tube feeds.  Pain: Denies pain, but has discomfort from sleeping on back and being in bed.    Plan: Continue to monitor and follow POC. Future plans to discharge to TCU when medically stable. Notify CVTS with any changes.

## 2022-11-28 NOTE — PLAN OF CARE
Pt is A/O x 4.   SBA with walker.   VSS, RA. Denies pain.   Tele Afib at start of shift, and converted back into NSR.   Lung sounds coarse/diminished in bases. JOHN and minimal SOB at rest per pt report. Continue to encourage use of IS. Lymph wraps on.  Sternal incision is CDI and EUSEBIO. CT sites covered and dressing changed. R groin EUSEBIO. L groin still leaking serous drainage, primapore changed.   BS active x4. Still having loose stools. Adequate urine output via ambulation to BR.   Regular diet, minimal appetite per pt report. Alejandro counts end today. TF started at 6pm @30ml/hr, which is goal rate.  Plans for eventual discharge to TCU when medically stable.   Patient currently resting in bed with call light in reach.

## 2022-11-28 NOTE — PROGRESS NOTES
Calorie Count  Intake recorded for: 11/27  Total Kcals: 1116 Total Protein: 46g  Kcals from Hospital Food: 1116   Protein: 46g  Kcals from Outside Food (average):0 Protein: 0g  # Meals Ordered from Kitchen: 2 meals  # Meals Recorded: 2 meals (100% chicken caesar salad w/ dressing and croutons)     (50% blueberry muffin)  # Supplements Recorded: 100% 1 Ensure Enlive

## 2022-11-28 NOTE — PROGRESS NOTES
Cardiovascular Surgery Progress Note  11/28/2022         Assessment and Plan:     Jade Walker is a 87 year old female with PMHx of severe aortic stenosis HTN, HLD, non-obstructive CAD, CKD stage III, IBS who underwent s/p TAVR  c/b LV perforation s/p emergent sternotomy, repair of LV lateral wall laceration by Dr. Bone on 11/16/22. TAVR valve well seated.     Cardiovascular:   Hx of severe AORTIC STENOSIS, s/p TAVR 11/16  Pericardial effusion due to LV perforation, s/p LV laceration repair 11/16  HTN  HLD  Non obstructive CAD  Post-op AF  Remains in AF rates 80-110s, HD stable. Started on coumadin 11/25. Amio drip for Afib 11/21 - 11/24. Transitioned to PO Amiodarone 400 mg BID on 11/25.  TTE 11/17 showed LV EF 60-65%, TAVR valve well seated, no leak.   - Post-TAVR; Plavix continues.  - Metoprolol 25 mg BID  - ASA 81 mg daily, hold while on warfarin and plavix.  - Statin resumed  - Diuresis as below     Chest tubes and PW removed in the ICU     Pulmonary:  - Extubated POD 5 to 2 lpm via NC. Now saturating well on RA.   - Pulm hygiene, IS, activity and deep breathing  Left Pleural Effusion  - Pt still with SOB and Left sided chest discomfort today  - Consult CAPs team 11/27 for possible left thoracentesis today. Unable to find safe window to tap.     Neurology / MSK:  Acute post-operative pain   Pain well controlled with current regimen:  - Acetaminophen, PO oxycodone PRN, IV dilaudid PRN, methocarbamol      / Renal / Fluid / Electrolytes:  CKD 3  BL creat ~ 0.9, most recent creatinine WNL; adequate UOP.   - Pre-op weight 141 lb (she thinks this is accurate)  - Diuresis with Bumex 2 mg PO BID and potassium BID.  - Replete lytes per protocol.  Continue strict I/O, daily weights.   - Avoid/limit nephrotoxins as able     GI / Nutrition:   Irritable Bowel Syndrome    - Nutrition consulted, appreciate recommendations  - PPI for bowel prophylaxis.   - Bowel regimen: MiraLAX bid, senna 2 tabs bid, milk of mag-  held due to loose stools  - Cyclic TF (6 pm-6 am) and continue Calorie counts.  - Immodium PRN   - C Diff negative 11/22   - Tolerating regular diet, improving appetite. Encouraged supplements (Ensure) with meals.     Endocrine:  Stress induced hyperglycemia   - Initially managed on insulin drip postop, transitioned to sliding scale; goal BG <180 for optimal healing     Infectious Disease:  Stress induced leukocytosis  Serratia pneumonia  - started zosyn, switched to ceftriaxone (7 total days, ended 11/26).  - WBC WNL, remains afebrile, no signs or symptoms of infection. C diff negative 11/25.  - Completed perioperative antibiotics.   - Continue to monitor fever curve, CBC. Last CRP 37.5, Procalcitonin 0.59.      Hematology:   Acute blood loss anemia and thrombocytopenia  Pericardial effusion s/p pericardiocentesis  Hgb 7.9; Plt 158, no signs or symptoms of active bleeding.     Anticoagulation:   - ASA 81 mg daily, stopped 11/25.  - Coumadin for paroxysmal A-Fib starting 11/25, INR goal 2-3 for 3 months. INR 1.56.  - Plavix for TAVR, duration per Cardiology.     MSK/Skin:  Sternotomy  Surgical incision  - Sternal precautions.  - Incisional cares per protocol.     Prophylaxis:   - Stress ulcer prophylaxis: Pantoprazole 40 mg daily for 30 days  - DVT prophylaxis: Subcutaneous heparin, SCD.     Disposition:   - Transferred to  on 11/24  - Therapies recommending discharge to TCU. IV ABx end 11/26.  - Medically ready for discharge on 11/28, can drift INR up to 2-3 without heparin infusion.     Discussed with Dr Bone through written communication.      Eliel Yu PA-C  Cardiothoracic Surgery  Pager [phone]    11:19 AM   November 28, 2022        Interval History:     No overnight events.  States pain is well managed on current regimen. Slept well overnight.  Tolerating diet and tube feeds, is passing flatus, + BM. No nausea or vomiting.  Breathing well without complaints, less coughing.  Working with  "therapies and ambulating in halls without assistance.   Denies chest pain, palpitations, dizziness, syncopal symptoms, fevers, chills, myalgias, or sternal popping/clicking.         Physical Exam:   Blood pressure 130/61, pulse 90, temperature 97.2  F (36.2  C), temperature source Oral, resp. rate 16, height 1.549 m (5' 1\"), weight 73.1 kg (161 lb 1.6 oz), SpO2 92 %.  Vitals:    11/25/22 0355 11/26/22 0623 11/27/22 0616   Weight: 75.1 kg (165 lb 8 oz) 74.2 kg (163 lb 8 oz) 73.1 kg (161 lb 1.6 oz)      Weight; + 20 lbs since admit and trending down.   24 hr Fluid status; net loss 1 L. UOP 1.3 L  MAPs: 84 - 97     Gen: A&Ox4, NAD  Neuro: no focal deficits   CV: RRR, normal S1 S2, no murmurs, rubs or gallops.   Pulm: CTA, no wheezing or rhonchi, normal breathing on RA  Abd: nondistended, normal BS, soft, nontender  Ext: moderate peripheral edema, 2+ pitting, lymphedema wraps  Incision: clean, dry, intact, no erythema, sternum stable  Tubes/drain sites: dressing clean and dry         Data:    Imaging:  reviewed recent imaging, no acute concerns    Labs:  [facility]  Recent Labs   Lab 11/28/22  0629 11/27/22  0809 11/27/22  0804 11/27/22  0243 11/26/22  1612 11/26/22  1346 11/26/22  0742 11/26/22  0613 11/25/22  1208 11/25/22  0707     --  140  --   --   --   --  138  --  143   POTASSIUM 4.0  --  4.4  --   --  4.3  --  3.2*  --  4.1   CHLORIDE 104  --  105  --   --   --   --  102  --  107   SHAHEEN 8.0*  --  8.0*  --   --   --   --  7.7*  --  8.3*   CO2 25  --  26  --   --   --   --  26  --  23   BUN 27.9*  --  27.4*  --   --   --   --  26.7*  --  25.0*   CR 0.96*  --  0.93  --   --   --   --  0.92  --  0.88   * 110* 122* 123*   < >  --    < > 124*   < > 122*    < > = values in this interval not displayed.     CBC  Recent Labs   Lab 11/28/22  0629 11/27/22  0804 11/26/22  0613 11/25/22  0707   WBC 8.6 13.4* 12.6* 13.5*   RBC 2.45* 2.52* 2.57* 2.66*   HGB 7.9* 8.1* 8.1* 8.7*   HCT 25.3* 25.6* 26.0* 26.8*   * 102* " 101* 101*   MCH 32.2 32.1 31.5 32.7   MCHC 31.2* 31.6 31.2* 32.5   RDW 20.6* 19.6* 18.6* 18.2*    162 185 199     INR  Recent Labs   Lab 11/28/22  0629 11/27/22  0804 11/26/22  0613 11/25/22  1354   INR 1.56* 1.28* 1.11 1.05      Hepatic Panel  No lab results found in last 7 days.  GLUCOSE:   Recent Labs   Lab 11/28/22  0629 11/27/22  0809 11/27/22  0804 11/27/22  0243 11/26/22  2216 11/26/22  1612   * 110* 122* 123* 129* 100*

## 2022-11-28 NOTE — PROGRESS NOTES
Care Management Follow Up    Length of Stay (days): 12    Expected Discharge Date: 11/30/2022     Concerns to be Addressed:  discharge planning  Patient plan of care discussed at interdisciplinary rounds: Yes    Anticipated Discharge Disposition: TCU      Anticipated Discharge Services:  TCU therapy services  Anticipated Discharge DME:  n/a    Patient/family educated on Medicare website which has current facility and service quality ratings:  yes  Education Provided on the Discharge Plan:  yes  Patient/Family in Agreement with the Plan:  yes    Referrals Placed by CM/SW:    [facility]   [facility] (11/28: per admissions, they don't take pts with NG tubes at all)    PENDING (facilties are listed in order of preference)  1. [address] [address]  Phone: [phone]  - 11/28: SW left a VM with admissions at 2:24pm    3. [facility]  [address] [place] [address]  Phone: [phone]  - 11/28: Per admissions, they haven't fully reviewed the referral but they told the SW that they'd give a call in the morning tomorrow with an update. For a private room, there is a daily $65 fee. The facility is also coming out of a COVID outbreak.     4. [facility] [address], [place] [address]  Phone: [phone]  - 11/28: admissions told the SW they'd give a call back once they have an update.      5. [address] [address]  Phone: [phone]  - 11/28: SW left a VM with admissions at 2:45pm     6. [facility] [place]  [address] [place] [address]  Admissions: [phone]  - 11/28: SW left a VM with admissions at 2:49pm  Private pay costs discussed: Not applicable    Additional Information:  SW is following for discharge planning.    Pt is med ready for discharge and is awaiting TCU  placement.    Pt has a NG for cyclic TFs 30mL/hr from 1800 to 0600 + is on a regular diet.    See above in the referrals section for updates.    ___________________    RICHARD Wallace, LGSW  6C   [facility]  Phone: [phone]  Pager: [phone]

## 2022-11-29 ENCOUNTER — APPOINTMENT (OUTPATIENT)
Dept: GENERAL RADIOLOGY | Facility: CLINIC | Age: 87
DRG: 266 | End: 2022-11-29
Attending: PHYSICIAN ASSISTANT
Payer: COMMERCIAL

## 2022-11-29 ENCOUNTER — APPOINTMENT (OUTPATIENT)
Dept: OCCUPATIONAL THERAPY | Facility: CLINIC | Age: 87
DRG: 266 | End: 2022-11-29
Attending: INTERNAL MEDICINE
Payer: COMMERCIAL

## 2022-11-29 LAB
ALBUMIN UR-MCNC: NEGATIVE MG/DL
ANION GAP SERPL CALCULATED.3IONS-SCNC: 11 MMOL/L (ref 7–15)
APPEARANCE UR: CLEAR
BILIRUB UR QL STRIP: NEGATIVE
BUN SERPL-MCNC: 25.5 MG/DL (ref 8–23)
CALCIUM SERPL-MCNC: 8.1 MG/DL (ref 8.8–10.2)
CHLORIDE SERPL-SCNC: 99 MMOL/L (ref 98–107)
COLOR UR AUTO: NORMAL
CREAT SERPL-MCNC: 0.94 MG/DL (ref 0.51–0.95)
CRP SERPL-MCNC: 36.6 MG/L
DEPRECATED HCO3 PLAS-SCNC: 25 MMOL/L (ref 22–29)
ERYTHROCYTE [DISTWIDTH] IN BLOOD BY AUTOMATED COUNT: 21.6 % (ref 10–15)
GFR SERPL CREATININE-BSD FRML MDRD: 59 ML/MIN/1.73M2
GLUCOSE BLDC GLUCOMTR-MCNC: 122 MG/DL (ref 70–99)
GLUCOSE SERPL-MCNC: 152 MG/DL (ref 70–99)
GLUCOSE UR STRIP-MCNC: NEGATIVE MG/DL
HCT VFR BLD AUTO: 29.3 % (ref 35–47)
HGB BLD-MCNC: 9.1 G/DL (ref 11.7–15.7)
HGB UR QL STRIP: NEGATIVE
INR PPP: 2.15 (ref 0.85–1.15)
KETONES UR STRIP-MCNC: NEGATIVE MG/DL
LEUKOCYTE ESTERASE UR QL STRIP: NEGATIVE
MAGNESIUM SERPL-MCNC: 1.9 MG/DL (ref 1.7–2.3)
MCH RBC QN AUTO: 32.5 PG (ref 26.5–33)
MCHC RBC AUTO-ENTMCNC: 31.1 G/DL (ref 31.5–36.5)
MCV RBC AUTO: 105 FL (ref 78–100)
NITRATE UR QL: NEGATIVE
PH UR STRIP: 7 [PH] (ref 5–7)
PHOSPHATE SERPL-MCNC: 2.9 MG/DL (ref 2.5–4.5)
PLATELET # BLD AUTO: 200 10E3/UL (ref 150–450)
POTASSIUM SERPL-SCNC: 4.2 MMOL/L (ref 3.4–5.3)
PROCALCITONIN SERPL IA-MCNC: 0.4 NG/ML
RBC # BLD AUTO: 2.8 10E6/UL (ref 3.8–5.2)
SODIUM SERPL-SCNC: 135 MMOL/L (ref 136–145)
SP GR UR STRIP: 1.01 (ref 1–1.03)
UROBILINOGEN UR STRIP-MCNC: NORMAL MG/DL
WBC # BLD AUTO: 13.9 10E3/UL (ref 4–11)

## 2022-11-29 PROCEDURE — 250N000013 HC RX MED GY IP 250 OP 250 PS 637

## 2022-11-29 PROCEDURE — 250N000013 HC RX MED GY IP 250 OP 250 PS 637: Performed by: PHYSICIAN ASSISTANT

## 2022-11-29 PROCEDURE — 85027 COMPLETE CBC AUTOMATED: CPT | Performed by: STUDENT IN AN ORGANIZED HEALTH CARE EDUCATION/TRAINING PROGRAM

## 2022-11-29 PROCEDURE — 97535 SELF CARE MNGMENT TRAINING: CPT | Mod: GO | Performed by: OCCUPATIONAL THERAPIST

## 2022-11-29 PROCEDURE — 214N000001 HC R&B CCU UMMC

## 2022-11-29 PROCEDURE — 80048 BASIC METABOLIC PNL TOTAL CA: CPT | Performed by: STUDENT IN AN ORGANIZED HEALTH CARE EDUCATION/TRAINING PROGRAM

## 2022-11-29 PROCEDURE — 81003 URINALYSIS AUTO W/O SCOPE: CPT | Performed by: PHYSICIAN ASSISTANT

## 2022-11-29 PROCEDURE — 86140 C-REACTIVE PROTEIN: CPT | Performed by: PHYSICIAN ASSISTANT

## 2022-11-29 PROCEDURE — 250N000013 HC RX MED GY IP 250 OP 250 PS 637: Performed by: STUDENT IN AN ORGANIZED HEALTH CARE EDUCATION/TRAINING PROGRAM

## 2022-11-29 PROCEDURE — 84100 ASSAY OF PHOSPHORUS: CPT | Performed by: THORACIC SURGERY (CARDIOTHORACIC VASCULAR SURGERY)

## 2022-11-29 PROCEDURE — 36415 COLL VENOUS BLD VENIPUNCTURE: CPT | Performed by: STUDENT IN AN ORGANIZED HEALTH CARE EDUCATION/TRAINING PROGRAM

## 2022-11-29 PROCEDURE — 71046 X-RAY EXAM CHEST 2 VIEWS: CPT

## 2022-11-29 PROCEDURE — 84145 PROCALCITONIN (PCT): CPT | Performed by: PHYSICIAN ASSISTANT

## 2022-11-29 PROCEDURE — 85610 PROTHROMBIN TIME: CPT | Performed by: PHYSICIAN ASSISTANT

## 2022-11-29 PROCEDURE — 97535 SELF CARE MNGMENT TRAINING: CPT | Mod: GO

## 2022-11-29 PROCEDURE — 97530 THERAPEUTIC ACTIVITIES: CPT | Mod: GO | Performed by: OCCUPATIONAL THERAPIST

## 2022-11-29 PROCEDURE — 83735 ASSAY OF MAGNESIUM: CPT | Performed by: STUDENT IN AN ORGANIZED HEALTH CARE EDUCATION/TRAINING PROGRAM

## 2022-11-29 PROCEDURE — 71046 X-RAY EXAM CHEST 2 VIEWS: CPT | Mod: 26 | Performed by: RADIOLOGY

## 2022-11-29 PROCEDURE — 250N000013 HC RX MED GY IP 250 OP 250 PS 637: Performed by: THORACIC SURGERY (CARDIOTHORACIC VASCULAR SURGERY)

## 2022-11-29 RX ORDER — MAGNESIUM OXIDE 400 MG/1
400 TABLET ORAL EVERY 4 HOURS
Status: COMPLETED | OUTPATIENT
Start: 2022-11-29 | End: 2022-11-29

## 2022-11-29 RX ORDER — WARFARIN SODIUM 3 MG/1
3 TABLET ORAL
Status: COMPLETED | OUTPATIENT
Start: 2022-11-29 | End: 2022-11-29

## 2022-11-29 RX ORDER — ASPIRIN 81 MG/1
81 TABLET ORAL DAILY
Status: DISCONTINUED | OUTPATIENT
Start: 2022-11-30 | End: 2022-12-01 | Stop reason: HOSPADM

## 2022-11-29 RX ADMIN — WARFARIN SODIUM 3 MG: 3 TABLET ORAL at 18:17

## 2022-11-29 RX ADMIN — AMIODARONE HYDROCHLORIDE 400 MG: 200 TABLET ORAL at 20:28

## 2022-11-29 RX ADMIN — CLOPIDOGREL BISULFATE 75 MG: 75 TABLET ORAL at 08:28

## 2022-11-29 RX ADMIN — BUMETANIDE 2 MG: 2 TABLET ORAL at 15:33

## 2022-11-29 RX ADMIN — MAGNESIUM OXIDE TAB 400 MG (241.3 MG ELEMENTAL MG) 400 MG: 400 (241.3 MG) TAB at 11:25

## 2022-11-29 RX ADMIN — METOPROLOL TARTRATE 25 MG: 25 TABLET, FILM COATED ORAL at 08:28

## 2022-11-29 RX ADMIN — Medication 400 MG: at 08:27

## 2022-11-29 RX ADMIN — METOPROLOL TARTRATE 25 MG: 25 TABLET, FILM COATED ORAL at 20:28

## 2022-11-29 RX ADMIN — AMIODARONE HYDROCHLORIDE 400 MG: 200 TABLET ORAL at 08:28

## 2022-11-29 RX ADMIN — ACETAMINOPHEN 650 MG: 325 TABLET, FILM COATED ORAL at 20:42

## 2022-11-29 RX ADMIN — ATORVASTATIN CALCIUM 40 MG: 40 TABLET, FILM COATED ORAL at 20:42

## 2022-11-29 RX ADMIN — POTASSIUM CHLORIDE 20 MEQ: 20 SOLUTION ORAL at 20:29

## 2022-11-29 RX ADMIN — Medication 1 PACKET: at 08:28

## 2022-11-29 RX ADMIN — MAGNESIUM OXIDE TAB 400 MG (241.3 MG ELEMENTAL MG) 400 MG: 400 (241.3 MG) TAB at 15:35

## 2022-11-29 RX ADMIN — POTASSIUM CHLORIDE 20 MEQ: 20 SOLUTION ORAL at 08:27

## 2022-11-29 RX ADMIN — BUMETANIDE 2 MG: 2 TABLET ORAL at 08:28

## 2022-11-29 RX ADMIN — Medication 1 TABLET: at 08:27

## 2022-11-29 RX ADMIN — PANTOPRAZOLE SODIUM 40 MG: 40 TABLET, DELAYED RELEASE ORAL at 08:28

## 2022-11-29 ASSESSMENT — ACTIVITIES OF DAILY LIVING (ADL)
ADLS_ACUITY_SCORE: 25
ADLS_ACUITY_SCORE: 26
ADLS_ACUITY_SCORE: 23
ADLS_ACUITY_SCORE: 26
ADLS_ACUITY_SCORE: 25
ADLS_ACUITY_SCORE: 26
ADLS_ACUITY_SCORE: 26

## 2022-11-29 NOTE — PLAN OF CARE
D: s/p TAVR c/b LV perforation s/p emergent sternotomy, repair of LV lateral wall laceration by on 11/16/22.     I: Monitored vitals and assessed patient status.      A: A&Ox4. On room air. SR 80s-90s, VSS, afebrile. Sternal incision, old chest tube sites, right groin site open to air. Dressing to left groin site. Lymph wraps in place. NG in place for cyclic tube feeds from 6pm-6am. Urinating adequately. Having loose BMs, none overnight. SBA with walker.     P: Continue to monitor.

## 2022-11-29 NOTE — PLAN OF CARE
D: Pt s/p TAVR complicated by LV perforation with emergent sternotomy and repair of LV lateral wall laceration on 11/16.    I: Monitored vitals and assessed pt status. Performed care and interventions as documented.     A: Pt tired today - states she had fragmented sleep overnight. No other pt complaints.     Tele - sinus rhythm. Remains on R/A. Reports mild SOBOE and left sided chest discomfort with coughing only.     CXR, urine spec ordered. Calorie count initiated x 3 days. Magnesium replaced per protocol.     P: Continue to monitor pt status and report changes to care team.     Natalie Rohfritsch, RN on 11/29/2022 at 6:35 PM

## 2022-11-29 NOTE — PROGRESS NOTES
Care Management Follow Up    Length of Stay (days): 13    Expected Discharge Date: 11/30/2022     Concerns to be Addressed: discharge planning      Patient plan of care discussed at interdisciplinary rounds: Yes    Anticipated Discharge Disposition: TCU     Anticipated Discharge Services:  TCU therapy services  Anticipated Discharge DME:  n/a    Patient/family educated on Medicare website which has current facility and service quality ratings: yes   Education Provided on the Discharge Plan: yes   Patient/Family in Agreement with the Plan:  yes    Referrals Placed by CM/SW:    DENIALS   [facility] (11/28: per admissions, they don't take pts with NG tubes at all)  [facility] (11/29: no TCU beds, only taking referral within their own [facility])     PENDING (facilties are listed in order of preference)  3. [facility]  [address] [place], [place] [id number]  Phone: [phone]  - 11/28: Per admissions, they haven't fully reviewed the referral but they told the SW that they'd give a call in the morning tomorrow with an update. For a private room, there is a daily $65 fee. The facility is also coming out of a COVID outbreak.      4. [facility] [address], [place] [address]  Phone: [phone]  - 11/29: SW called admissions at 10:16am and per them, they want to still review with the central admissions team and will give the SW a call back.     5. [address] [address]  Phone: [phone]  - 11/29: SW called admissions at 10:18am and per them, they will give the SW a call back with an update.     6. [facility]  [address] [place] [address]  Admissions: [phone]  - 11/29: SW called admissions at 10:20am and left a VM     Private pay costs discussed: Not applicable    Additional  Information:  SW is following for discharge planning.    Pt is med ready for discharge and is awaiting TCU placement.     Pt has a NG for cyclic TFs 30mL/hr from 1800 to 0600 + is on a regular diet.     See above in the referrals section for updates. Once PAL gets calls back from the facilities, PAL will make an addendum to this note.      ___________________     RICHARD Wallace, LGSW  6C   [facility]  Phone: [phone]  Pager: [phone]

## 2022-11-29 NOTE — PROGRESS NOTES
Cardiovascular Surgery Progress Note  11/29/2022         Assessment and Plan:     Jade Walker is a 87 year old female with PMHx of severe aortic stenosis HTN, HLD, non-obstructive CAD, CKD stage III, IBS who underwent s/p TAVR  c/b LV perforation s/p emergent sternotomy, repair of LV lateral wall laceration by Dr. Bone on 11/16/22. TAVR valve well seated.     Cardiovascular:   Hx of severe AORTIC STENOSIS, s/p TAVR 11/16  Pericardial effusion due to LV perforation, s/p LV laceration repair 11/16  HTN  HLD  Non obstructive CAD  Post-op AF  Remains in AF rates 80-110s, HD stable. Started on coumadin 11/25. Amio drip for Afib 11/21 - 11/24. Transitioned to PO Amiodarone 400 mg BID on 11/25.  TTE 11/17 showed LV EF 60-65%, TAVR valve well seated, no leak.   - Post-TAVR; Plavix continues - Change to ASA per cardiology (plavix no longer part of post-tavr care).  - Metoprolol 25 mg BID  - ASA 81 mg daily, hold while on warfarin and plavix.  - Statin resumed  - Diuresis as below     Chest tubes and PW removed in the ICU     Pulmonary:  - Extubated POD 5 to 2 lpm via NC. Now saturating well on RA.   - Pulm hygiene, IS, activity and deep breathing  Left Pleural Effusion  - Pt still with SOB and Left sided chest discomfort today  - Consult CAPs team 11/27 for possible left thoracentesis. Unable to find safe window to tap.     Neurology / MSK:  Acute post-operative pain   Pain well controlled with current regimen:  - Acetaminophen, PO oxycodone PRN, IV dilaudid PRN, methocarbamol      / Renal / Fluid / Electrolytes:  CKD 3  BL creat ~ 0.9, most recent creatinine WNL; adequate UOP.   - Pre-op weight 141 lb (she thinks this is accurate)  - Diuresis with Bumex 2 mg PO BID starting 11/28 and potassium BID.  - Replete lytes per protocol. Continue strict I/O, daily weights.   - Avoid/limit nephrotoxins as able     GI / Nutrition:   Irritable Bowel Syndrome    - Nutrition consulted, appreciate recommendations  - PPI for  bowel prophylaxis.   - Bowel regimen: MiraLAX bid, senna 2 tabs bid, milk of mag- held due to loose stools  - Cyclic TF (6 pm-6 am) and continue Calorie counts.  - Immodium PRN   - C Diff negative 11/22   - Tolerating regular diet, improving appetite. Encouraged supplements (Ensure) with meals.     Endocrine:  Stress induced hyperglycemia   - Initially managed on insulin drip postop, transitioned to sliding scale; goal BG <180 for optimal healing     Infectious Disease:  Stress induced leukocytosis  Serratia pneumonia  - started zosyn, switched to ceftriaxone (7 total days, ended 11/26).  - WBC 13, stable from 2 days ago, remains afebrile, no signs or symptoms of infection. C diff negative 11/25.  - Completed perioperative antibiotics.   - Continue to monitor fever curve, CBC. Last CRP 37.5, Procalcitonin 0.59 from 11/27, recheck today. Check CXR and UA     Hematology:   Acute blood loss anemia and thrombocytopenia  Pericardial effusion s/p pericardiocentesis  Hgb stable; Plt WNL, no signs or symptoms of active bleeding.     Anticoagulation:   - ASA 81 mg daily, stopped 11/25.  - Coumadin for paroxysmal A-Fib starting 11/25, INR goal 2-3 for 3 months. INR therapeutic.  - Plavix for TAVR, duration per Cardiology.     MSK/Skin:  Sternotomy  Surgical incision  - Sternal precautions.  - Incisional cares per protocol.     Prophylaxis:   - Stress ulcer prophylaxis: Pantoprazole 40 mg daily for 30 days  - DVT prophylaxis: Subcutaneous heparin, SCD.     Disposition:   - Transferred to  on 11/24  - Therapies recommending discharge to TCU. IV ABx end 11/26.  - Medically ready for discharge on 11/28, can drift INR up to 2-3 without heparin infusion.     Discussed with Dr Bone through written communication.      Robin Colby PA-C  Cardiothoracic Surgery  p: [phone]        Interval History:     No overnight events.  States pain is well managed on current regimen. Slept well overnight.  Tolerating diet and tube feeds,  "is passing flatus, + BM. No nausea or vomiting.  Breathing well without complaints, less coughing.  Working with therapies and ambulating in halls without assistance.   Denies chest pain, palpitations, dizziness, syncopal symptoms, fevers, chills, myalgias, or sternal popping/clicking.         Physical Exam:   Blood pressure 127/87, pulse 104, temperature 97.8  F (36.6  C), temperature source Oral, resp. rate 18, height 1.549 m (5' 1\"), weight 69.1 kg (152 lb 6.4 oz), SpO2 95 %.  Vitals:    11/26/22 0623 11/27/22 0616 11/29/22 0426   Weight: 74.2 kg (163 lb 8 oz) 73.1 kg (161 lb 1.6 oz) 69.1 kg (152 lb 6.4 oz)      Gen: A&Ox4, NAD  Neuro: no focal deficits   CV: RRR, normal S1 S2, no murmurs, rubs or gallops.   Pulm: CTA, no wheezing or rhonchi, normal breathing on RA  Abd: nondistended, normal BS, soft, nontender  Ext: moderate peripheral edema, 2+ pitting, lymphedema wraps  Incision: clean, dry, intact, no erythema, sternum stable  Tubes/drain sites: dressing clean and dry         Data:    Imaging:  reviewed recent imaging, no acute concerns    Labs:  BMP  Recent Labs   Lab 11/29/22  1007 11/29/22  0411 11/28/22  2259 11/28/22  1701 11/28/22  0629 11/27/22  0809 11/27/22  0804   *  --   --  138 139  --  140   POTASSIUM 4.2  --   --  3.9 4.0  --  4.4   CHLORIDE 99  --   --  100 104  --  105   SHAHEEN 8.1*  --   --  8.6* 8.0*  --  8.0*   CO2 25  --   --  25 25  --  26   BUN 25.5*  --   --  28.9* 27.9*  --  27.4*   CR 0.94  --   --  0.99* 0.96*  --  0.93   * 122* 117* 111* 145*   < > 122*    < > = values in this interval not displayed.     CBC  Recent Labs   Lab 11/29/22  1007 11/28/22  0629 11/27/22  0804 11/26/22  0613   WBC 13.9* 8.6 13.4* 12.6*   RBC 2.80* 2.45* 2.52* 2.57*   HGB 9.1* 7.9* 8.1* 8.1*   HCT 29.3* 25.3* 25.6* 26.0*   * 103* 102* 101*   MCH 32.5 32.2 32.1 31.5   MCHC 31.1* 31.2* 31.6 31.2*   RDW 21.6* 20.6* 19.6* 18.6*    158 162 185     INR  Recent Labs   Lab 11/29/22  1007 " 11/28/22  0629 11/27/22  0804 11/26/22  0613   INR 2.15* 1.56* 1.28* 1.11      Hepatic Panel  No lab results found in last 7 days.  GLUCOSE:   Recent Labs   Lab 11/29/22  1007 11/29/22  0411 11/28/22  2259 11/28/22  1701 11/28/22  0629 11/27/22  0809   * 122* 117* 111* 145* 110*

## 2022-11-29 NOTE — PROGRESS NOTES
CLINICAL NUTRITION SERVICES     Nutrition Prescription    RECOMMENDATIONS FOR MDs/PROVIDERS TO ORDER:  If pt is consuming greater than 1000 kcals and 50 g protein daily consistently on average, then discontinue TFs.    Malnutrition Status:    See prior RD notes.    Recommendations already ordered by Registered Dietitian (RD):  None additionally     Future/Additional Recommendations:  See prior RD notes.     Diet: Regular since 11/26. Ordered to receive Ensure Enlive with meals.   Intake: Attempted to see this afternoon, pt was resting. Fair appetite this morning per EHR, had coffee and Ensure.    Kcal counts:   11/25     Total Kcals: 926       Total Protein: 49g. # Meals Ordered from Kitchen: 1 meal  # Meals Recorded: 1 meal. # Supplements Recorded: 100% 2 Ensure Enlive   11/26     Total Kcals: 395       Total Protein: 20g. # Meals Ordered from Kitchen: 2. # Meals Recorded: 1. # Supplements Recorded: 100% 1 Ensure Enlive   11/27     Total Kcals: 1116     Total Protein: 46g. # Meals Ordered from Kitchen: 2 meals  # Meals Recorded: 2 meals. # Supplements Recorded: 100% 1 Ensure Enlive   (11/28: Pt ordered two meals via room service which totaled 576 kcals and 49 g protein. Pt also ordered to receive oral supplements).    11/29-12/1  Pending   * Pt consumed a three-day average of 812 kcals and 38 g protein daily. Not meeting estimated needs of 5417-9273 kcals/day (25 - 30 kcals/kg) and 65-80 grams protein/day (1.2 - 1.5 grams of pro/kg).     TFs: Osmolite 1.5 Alejandro @ goal of 30ml/hr x 12 hrs (360 mL TF) + 1 pkt Prosource TF20 provides 620 and 53 g protein daily. TFs are cycled and supplemental.     INTERVENTIONS:  Implementation:  None additionally at this time.     Follow up/Monitoring:  Will continue to follow pt.    Shabana Christianson, MS, RD, LD, [facility]   6C Pgr: [phone]

## 2022-11-30 ENCOUNTER — APPOINTMENT (OUTPATIENT)
Dept: OCCUPATIONAL THERAPY | Facility: CLINIC | Age: 87
DRG: 266 | End: 2022-11-30
Attending: INTERNAL MEDICINE
Payer: COMMERCIAL

## 2022-11-30 PROBLEM — Z98.890 MIDLINE STERNOTOMY SCAR: Status: ACTIVE | Noted: 2022-11-30

## 2022-11-30 PROBLEM — Z95.2 S/P TAVR (TRANSCATHETER AORTIC VALVE REPLACEMENT): Status: ACTIVE | Noted: 2022-11-30

## 2022-11-30 PROBLEM — S26.92XA: Status: ACTIVE | Noted: 2022-11-30

## 2022-11-30 PROBLEM — L90.5 MIDLINE STERNOTOMY SCAR: Status: ACTIVE | Noted: 2022-11-30

## 2022-11-30 LAB
ANION GAP SERPL CALCULATED.3IONS-SCNC: 9 MMOL/L (ref 7–15)
BUN SERPL-MCNC: 25.7 MG/DL (ref 8–23)
CALCIUM SERPL-MCNC: 8.5 MG/DL (ref 8.8–10.2)
CHLORIDE SERPL-SCNC: 96 MMOL/L (ref 98–107)
CREAT SERPL-MCNC: 1.02 MG/DL (ref 0.51–0.95)
DEPRECATED HCO3 PLAS-SCNC: 28 MMOL/L (ref 22–29)
ERYTHROCYTE [DISTWIDTH] IN BLOOD BY AUTOMATED COUNT: 22.5 % (ref 10–15)
GFR SERPL CREATININE-BSD FRML MDRD: 54 ML/MIN/1.73M2
GLUCOSE BLDC GLUCOMTR-MCNC: 125 MG/DL (ref 70–99)
GLUCOSE SERPL-MCNC: 126 MG/DL (ref 70–99)
HCT VFR BLD AUTO: 27.7 % (ref 35–47)
HGB BLD-MCNC: 8.7 G/DL (ref 11.7–15.7)
INR PPP: 3.04 (ref 0.85–1.15)
MAGNESIUM SERPL-MCNC: 2.1 MG/DL (ref 1.7–2.3)
MCH RBC QN AUTO: 32.3 PG (ref 26.5–33)
MCHC RBC AUTO-ENTMCNC: 31.4 G/DL (ref 31.5–36.5)
MCV RBC AUTO: 103 FL (ref 78–100)
PHOSPHATE SERPL-MCNC: 3.3 MG/DL (ref 2.5–4.5)
PLATELET # BLD AUTO: 205 10E3/UL (ref 150–450)
POTASSIUM SERPL-SCNC: 3.7 MMOL/L (ref 3.4–5.3)
RBC # BLD AUTO: 2.69 10E6/UL (ref 3.8–5.2)
SODIUM SERPL-SCNC: 133 MMOL/L (ref 136–145)
WBC # BLD AUTO: 9.4 10E3/UL (ref 4–11)

## 2022-11-30 PROCEDURE — 250N000013 HC RX MED GY IP 250 OP 250 PS 637: Performed by: THORACIC SURGERY (CARDIOTHORACIC VASCULAR SURGERY)

## 2022-11-30 PROCEDURE — 85610 PROTHROMBIN TIME: CPT | Performed by: PHYSICIAN ASSISTANT

## 2022-11-30 PROCEDURE — 214N000001 HC R&B CCU UMMC

## 2022-11-30 PROCEDURE — 36592 COLLECT BLOOD FROM PICC: CPT | Performed by: PHYSICIAN ASSISTANT

## 2022-11-30 PROCEDURE — 97535 SELF CARE MNGMENT TRAINING: CPT | Mod: GO

## 2022-11-30 PROCEDURE — 84100 ASSAY OF PHOSPHORUS: CPT | Performed by: THORACIC SURGERY (CARDIOTHORACIC VASCULAR SURGERY)

## 2022-11-30 PROCEDURE — 80048 BASIC METABOLIC PNL TOTAL CA: CPT | Performed by: PHYSICIAN ASSISTANT

## 2022-11-30 PROCEDURE — 85014 HEMATOCRIT: CPT | Performed by: PHYSICIAN ASSISTANT

## 2022-11-30 PROCEDURE — 250N000013 HC RX MED GY IP 250 OP 250 PS 637: Performed by: PHYSICIAN ASSISTANT

## 2022-11-30 PROCEDURE — 250N000013 HC RX MED GY IP 250 OP 250 PS 637: Performed by: STUDENT IN AN ORGANIZED HEALTH CARE EDUCATION/TRAINING PROGRAM

## 2022-11-30 PROCEDURE — 97110 THERAPEUTIC EXERCISES: CPT | Mod: GO | Performed by: OCCUPATIONAL THERAPIST

## 2022-11-30 PROCEDURE — 83735 ASSAY OF MAGNESIUM: CPT | Performed by: THORACIC SURGERY (CARDIOTHORACIC VASCULAR SURGERY)

## 2022-11-30 PROCEDURE — 250N000013 HC RX MED GY IP 250 OP 250 PS 637

## 2022-11-30 PROCEDURE — 97535 SELF CARE MNGMENT TRAINING: CPT | Mod: GO | Performed by: OCCUPATIONAL THERAPIST

## 2022-11-30 PROCEDURE — G0463 HOSPITAL OUTPT CLINIC VISIT: HCPCS

## 2022-11-30 RX ORDER — POTASSIUM CHLORIDE 20MEQ/15ML
20 LIQUID (ML) ORAL ONCE
Status: COMPLETED | OUTPATIENT
Start: 2022-11-30 | End: 2022-11-30

## 2022-11-30 RX ORDER — LOPERAMIDE HCL 1 MG/7.5ML
2 SUSPENSION ORAL 3 TIMES DAILY PRN
Status: CANCELLED | DISCHARGE
Start: 2022-11-30

## 2022-11-30 RX ADMIN — POTASSIUM CHLORIDE 20 MEQ: 20 SOLUTION ORAL at 09:39

## 2022-11-30 RX ADMIN — Medication 400 MG: at 08:49

## 2022-11-30 RX ADMIN — ATORVASTATIN CALCIUM 40 MG: 40 TABLET, FILM COATED ORAL at 20:10

## 2022-11-30 RX ADMIN — METOPROLOL TARTRATE 25 MG: 25 TABLET, FILM COATED ORAL at 08:48

## 2022-11-30 RX ADMIN — METOPROLOL TARTRATE 25 MG: 25 TABLET, FILM COATED ORAL at 20:10

## 2022-11-30 RX ADMIN — ACETAMINOPHEN 650 MG: 325 TABLET, FILM COATED ORAL at 09:39

## 2022-11-30 RX ADMIN — BUMETANIDE 2 MG: 2 TABLET ORAL at 08:48

## 2022-11-30 RX ADMIN — Medication 1 TABLET: at 08:48

## 2022-11-30 RX ADMIN — AMIODARONE HYDROCHLORIDE 200 MG: 200 TABLET ORAL at 20:10

## 2022-11-30 RX ADMIN — AMIODARONE HYDROCHLORIDE 200 MG: 200 TABLET ORAL at 08:49

## 2022-11-30 RX ADMIN — ACETAMINOPHEN 650 MG: 325 TABLET, FILM COATED ORAL at 20:11

## 2022-11-30 RX ADMIN — BUMETANIDE 2 MG: 2 TABLET ORAL at 15:59

## 2022-11-30 RX ADMIN — Medication 1 PACKET: at 18:58

## 2022-11-30 RX ADMIN — ASPIRIN 81 MG: 81 TABLET ORAL at 08:48

## 2022-11-30 RX ADMIN — POTASSIUM CHLORIDE 20 MEQ: 20 SOLUTION ORAL at 08:48

## 2022-11-30 RX ADMIN — PANTOPRAZOLE SODIUM 40 MG: 40 TABLET, DELAYED RELEASE ORAL at 08:48

## 2022-11-30 RX ADMIN — POTASSIUM CHLORIDE 20 MEQ: 20 SOLUTION ORAL at 20:10

## 2022-11-30 ASSESSMENT — ACTIVITIES OF DAILY LIVING (ADL)
ADLS_ACUITY_SCORE: 25

## 2022-11-30 NOTE — PROGRESS NOTES
[facility]  WO Nurse Inpatient Assessment     Consulted for: sacrum and Left groin    Patient History (according to provider note(s):      Jade Walker is a 87 year old female with PMHx of severe aortic stenosis HTN, HLD, non-obstructive CAD, CKD stage III, IBS s/p TAVR by c/b LV perforation surgically repaired by Dr. Pulido 11/16. Intraop got 600 cellsaver, 2 plts, 1 ffp, 4 prbcs, and 1500 kcentra. Valve well seated.       Areas Assessed:      Areas visualized during today's visit: Focused: sacrum and left groin  Wound location: sacrum    11/22 11/30  Last photo: 11/22  Wound due to: Incontinence Associated Dermatitis (IAD) and Moisture Associated Skin Damage (MASD)   Wound history/plan of care: Patient intubated in ICU incontinent of stool with watery BMs  11/30- no longer intubated, but patient reports she has continued to have diarrhea and only recently had a formed BM.   Wound base: 100 % dermis- initial wound healed, but new IAD with likely fungal component to buttock and bilateral inner thighs/ groin     Palpation of the wound bed: normal      Drainage: scant     Description of drainage: serosanguinous     Measurements (length x width x depth, in cm): see photo- scattered small openings extending to dermis- some are likely MARSI from rectal pouch removal.  Periwound skin: Macerated      Color: normal and consistent with surrounding tissue      Temperature: normal   Odor: none  Pain: mild, tender  Pain interventions prior to dressing change: patient tolerated well  Treatment goal: Protection and Promote epidermal migration  STATUS: altered  Supplies ordered: discussed with patient davina HARDWICK    Wound location: Left groin    11/22  Last photo: 11/22  Wound due to: Intertrigo, Moisture Associated Skin Damage (MASD) and Surgical Wound  Wound history/plan of care: Small wound prior surgical wound, linear skin breakdown in groin due to intertrigo/ moisture  associated skin damage. Patient is very edematous and left groin wound with heavy serous/ serosanguinous drainage.  11/30- skin fold erythema appears healed, incisional wound no longer draining significant amount. Only a primapore in place at time of assessment.  Wound base:  100% non-granular tissue- surgical site.     Palpation of the wound bed: normal      Drainage: scant     Description of drainage: serous     Measurements (length x width x depth, in cm): 0.1 x 0.8 x 0.1cm  Periwound skin: Intact      Color: normal and consistent with surrounding tissue      Temperature: normal   Odor: none  Pain: moderate, tender  Pain interventions prior to dressing change: patient tolerated well  Treatment goal: Protection and Simplify plan of care  STATUS: improving  Supplies ordered: supplies stored on unit       Treatment Plan:     Bilateral groin/ perineum/ perianal wounds: BID    Cleanse the area with Maryam cleanse and protect, very gently with soft cloth.    Apply thin layer of Maryam antifungal paste, per provider order, on top of reddened areas.     With repeat application, do not scrub the paste, only remove soiled paste and reapply.    If complete removal of paste is necessary use baby oil/mineral oil (#201671) and soft wash cloth.    Use only one Covidien pad in between mattress and pt. No brief while in bed.    Left groin lap/surgical site: Daily and PRN for drainage  Cleanse with wound cleanser and pat dry.  Cover site with gauze and tape or primapore and adjust/ change as needed for drainage.     Orders: Reviewed, Written and Updated    RECOMMEND PRIMARY TEAM ORDER: Maryam antifungal to groin, perineum, and perianal regions  Education provided: plan of care and Moisture management  Discussed plan of care with: Patient and Physician  WOC nurse follow-up plan: weekly  Notify WOC if wound(s) deteriorate.  Nursing to notify the Provider(s) and re-consult the WOC Nurse if new skin concern.    DATA:     Current support  surface: Standard  Standard gel/foam mattress (IsoFlex, Atmos air, etc)  Containment of urine/stool: Diaper and Incontinent pad in bed  BMI: Body mass index is 27.83 kg/m .   Active diet order: Orders Placed This Encounter      Regular Diet Adult     Output: I/O last 3 completed shifts:  In: 1300 [P.O.:860; I.V.:20; NG/GT:90]  Out: 2000 [Urine:2000]     Labs:   Recent Labs   Lab 11/30/22  0600 11/29/22  1007   HGB 8.7* 9.1*   INR 3.04* 2.15*   WBC 9.4 13.9*   CRP  --  36.60*     Pressure injury risk assessment:   Sensory Perception: 4-->no impairment  Moisture: 4-->rarely moist  Activity: 3-->walks occasionally  Mobility: 3-->slightly limited  Nutrition: 3-->adequate  Friction and Shear: 3-->no apparent problem  Parmjit Score: 20    Bri Meade RN, CWOCN  Dept. Pager: [phone]  Dept. Office Number: [phone]

## 2022-11-30 NOTE — PHARMACY-ANTICOAGULATION SERVICE
Warfarin Therapy Hold Note  This patient is currently receiving warfarin for Atrial fibrillation.    Goal INR:  2-3.    Anticoagulation Dose History     Recent Dosing and Labs Latest Ref Rng & Units 11/19/2022 11/25/2022 11/26/2022 11/27/2022 11/28/2022 11/29/2022 11/30/2022    Warfarin 3 mg - - 3 mg 3 mg 3 mg - 3 mg -    Warfarin 4 mg - - - - - 4 mg - -    INR 0.85 - 1.15 1.15 1.05 1.11 1.28(H) 1.56(H) 2.15(H) 3.04(H)            Bleeding Signs/Symptoms:  None    Assessment:  Current INR level is supratherapeutic. This is most likely due to: drug interaction with amiodarone.     Plan:  1) HOLD today s warfarin dose.  An order has been placed in EPIC for  Warfarin- No Dose Today    2) Do not recommend reversal with vitamin K or FFP at this time.      3) Recheck next INR tomorrow with AM labs.    The primary team has been contacted about the above plan/primary team is aware of need to hold dose.      Gris Mohan, Student Pharmacist

## 2022-11-30 NOTE — PROGRESS NOTES
Care Management Follow Up    Length of Stay (days): 14    Expected Discharge Date: 12/02/2022     Concerns to be Addressed:     discharge planning  Patient plan of care discussed at interdisciplinary rounds: Yes    Anticipated Discharge Disposition: TCU     Anticipated Discharge Services:  TCU therapy services  Anticipated Discharge DME:  n/a    Patient/family educated on Medicare website which has current facility and service quality ratings:  Yes    Education Provided on the Discharge Plan:  Yes. SW met with the pt at bedside while she was ordering her lunch and gave her the update on TCU referrals and why facilities denied. Pt was slightly frustrated but understanding. SW gave the pt a new printed list of facilities based [place] [address] with a brief note on reason for denials under the TCUs that did. Pt told the SW she wants to stay near [place] so that her  [place] (Phone: [phone]) doesn't have to drive super far. SW told the pt she'd stop by later today to get choices and send referrals which the pt was in agreement with.     Patient/Family in Agreement with the Plan:  yes    Referrals Placed by CM/SW:    DENIALS   [facility] (11/28: per admissions, they don't take pts with NG tubes at all)  [facility] (11/29: no TCU beds, only taking referral within their own [facility])  [facility] (11/30: no beds available)  [facility] (11/30: PAL called at 1:34pm for admissions, mailbox is full so PAL wasn't able to leave a message. Since PAL has left VMs 2x with no response and no option now to leave one, PAL will cease to follow this referral)     PENDING (facilties are listed in order of preference)  4. [facility] [address], [place] [address]  Phone: [phone]  - 11/30: PAL called admissions at 1:23pm and per them, they will give the SW a call  back.     ACCEPT[place] [address]  Phone: [phone]  Admissions: Reyna Barraza, [phone]  Fax for sending discharge orders: [phone]  Nurse to nurse report: [phone]  - 11/30: PAL received a call at 1:55pm from Reyna in admissions and gave her an update from this week on the pt. PAL told Reyna the feeding tube formula and Reyna asked if the pt can be switched to Isosource 1.5 in the case when she checks, the facility doesn't have it in the building. PAL paged the 6C dietician Shabana at 2:08pm with this question. Shabana called back at 2:13pm and per Shabana, the hospital doesn't have Isosource 1.5 but since Osmolite and Isosource are comparable, this pt can be switched if need be. PAL called Reyna back at 2:29pm and per Reyna, as long as the orders say either Osmolite or facility standard, pt can admit tomorrow 12/1 (12 to 5pm as the latest time).      PAL called [facility] (Phone: [phone]) at 2:53pm and spoke with Alysia on the phone. PAL was able to secure a wheelchair ride for 12:30pm tomorrow December 1st. PAL met with the pt at bedside and gave her this update and she was super happy about it and thanked the SW.     PAL paged Sandie from [facility] at 3:10pm with the discharge update for tomorrow and if she can put in orders by 10:30am (2 hours before scheduled discharge ride) so PAL can fax it over and if need be, send scripts/any changes.     PAS confirmation code: [id number]  Private pay costs discussed: Not applicable    ___________________    RICHARD Wallace, LGSW  6C   [facility]  Phone: [phone]  Pager: [phone]

## 2022-11-30 NOTE — PLAN OF CARE
D: s/p TAVR c/b LV perforation s/p emergent sternotomy with repair of LV lateral wall laceration on 11/16/22.     I: Monitored vitals and assessed patient status.   PRN: tylenol x1     A: A&Ox4. On room air, endorses JOHN. SR 80s-90s, VSS, afebrile. Sternal incision, old chest tube sites, right groin site open to air. Dressing to left groin site. Compression socks in place. NG in place for cyclic tube feeds from 6pm-6am. Urinating adequately. Loose BM x1 overnight. SBA with walker.     P: Continue to monitor. Awaiting TCU placement.

## 2022-11-30 NOTE — PROGRESS NOTES
CLINICAL NUTRITION SERVICES     Received page from social work regarding TF formula. Rehab facility wondering if pt is able to switch to Isosource 1.5 TFs    INTERVENTIONS:  Implementation:  Collaboration with other providers: Discussed pt with social work. No Isosource 1.5 TF available in the hospital but Isosource 1.5 is the equivalent to Osmolite 1.5. Updated TF order set to send Osmolite 1.5 TFs while in the hospital and to use Isosource 1.5 or facility preference once discharged.     Follow up/Monitoring:  Will continue to follow pt.    Shabana Christianson, MS, RD, LD, [facility]   6C Pgr: [phone]

## 2022-11-30 NOTE — PROGRESS NOTES
Calorie Count  Intake recorded for: 11/29  Total Kcals: 690 Total Protein: 38g  Kcals from Hospital Food: 690   Protein: 38g  Kcals from Outside Food (average):0 Protein: 0g  # Meals Ordered from Kitchen: 2 small meals   # Meals Recorded: 1 meal - 50% turkey burger w/ lettuce & tomato  # Supplements Recorded: 100% 1 Ensure Enlive

## 2022-11-30 NOTE — DISCHARGE INSTRUCTIONS
AFTER YOU GO HOME FROM YOUR HEART SURGERY  (TAVR converted to open sternotomy and left ventricle repair by Dr. Bone on 11/16)    You had a sternotomy, avoid lifting anything greater than ten pounds for 4-6 weeks after surgery and then less than 20 pounds for an additional 6 weeks.   Do not reach backwards or use arms to push out of chair.   Do not let people pull on your arms to assist with standing.   Avoid twisting or reaching too far across your body.    Avoid strenuous activities such as bowling, vacuuming, raking, shoveling, golf or tennis for 12 weeks after your surgery.   It is okay to resume sex if you feel comfortable in doing so. You may have to try different positions with your partner.    Splint your chest incision by hugging a pillow or bringing your arms across your chest when coughing or sneezing.     No driving for 4 weeks after surgery or while on pain medication.    Shower or wash your incisions daily with soap and water (or as instructed), pat dry.   Keep wound clean and dry, showers are okay after discharge, but don't let spray hit directly on incision.   No baths or swimming for 1 month.   Cover chest tube sites with dry gauze until they stop draining, then leave open to air. It is not abnormal for chest tube sites to drain yellowish/clear fluid for up to 2-3 weeks after surgery.   Watch for signs of infection: increased redness, tenderness, warmth or any drainage that appears infected (pus like) or is persistent.  Also a temperature > 100.5 F or chills. Call your surgeon or primary care provider's office immediately.   Remove any skin glue left on incisions after 10-14 days. This will not affect your incision and can speed up healing.    Exercise is very important in your recovery. Please follow the guidelines set up for you in your cardiac rehab classes at the hospital. If outpatient cardiac rehab was ordered for you, we highly recommend you participate. If you have problems arranging  "your cardiac rehab, please call [phone] for all locations, with the exception of [place], please call [phone] and [place], please call [phone].    Avoid sitting for prolonged periods of time, try to walk every hour during the day. If you have a leg incision, elevate your leg often when you are not walking.    Check your weight when you get home from the hospital and continue to check it daily through your recovery for at least a month. If you notice a weight gain of 2-3 pounds in a week, notify your primary care physician, cardiologist or surgeon.    Bowel activity may be slow after surgery. If necessary, you may take an over the counter laxative such as Milk of Magnesia or Miralax. You may have stool softeners prescribed (docusate sodium, Senokot). We recommend using stool softeners while using narcotics for pain (oxycodone/percocet, hydrocodone/vicodin, hydromorphone/dilaudid).      Wean OFF of narcotics (oxycodone, dilaudid, hydrocodone) as soon as possible. You should continue taking acetaminophen as long as you have any surgical pain as the first choice for pain control and add narcotics as necessary for pain to be tolerable.      DENTAL VISITS AFTER SURGERY  If you have had your heart valve repaired or replaced, we do not recommend having any dental work done for 6 months and you will need to take an antibiotic prior to dental visits from now on.  Please notify your dentist before any procedure for the proper treatment needed. The antibiotic is taken by mouth one hour prior to visit. This includes routine cleanings.  You can sometimes hear a mechanical valve \"clicking,\" this is normal and not a sign of something wrong.    DO NOT SMOKE.  IF YOU NEED HELP QUITTING, PLEASE TALK WITH YOUR CARDIOLOGIST OR PRIMARY DOCTOR.    You are on a blood thinner, follow the instructions you were given in the hospital and DO NOT SKIP this medication. Try and take it the same time everyday. Your primary care " physician or coumadin clinic will manage the dosing. INR goal is 2-3.    REGARDING PRESCRIPTION REFILLS.  If you need a refill on your pain medication contact us to discuss your pain and a possible one time refill.   All other medications will be adjusted, discontinued and re-filled by your primary care physician and/or your cardiologist as they were prior to your surgery. We have given you enough for one to three month with possibly one refill.    POST-OPERATIVE CLINIC VISITS  You have a follow up visit with [facility] Surgery Advance Care Practitioners at the [facility].  You will then return to the care of your primary provider and your cardiologist. Future medication refills should come from your PCP or Cardiologist.   You should see your primary care provider in 2-4 weeks after discharge.   It is important to see your cardiologist about 4-6 weeks after discharge.    If you do not hear from a  in 7 days, please call [phone] (choose option 1) and request to be seen with a general cardiologist or someone that you have seen in the past.   If there is a need to return to see CT Surgery please call our  at [phone].    SURGICAL QUESTIONS  Please call John Ley, Kisha Renteria, Natalie Treadwell or Zenaida Perkins with surgical recovery and medication questions, their phone numbers are listed below.  They will assist you with your needs and contact other surgery care team members as indicated.    On weekends or after hours, please call [phone] and ask the  to page the Cardiothoracic Surgery fellow on call.      Thank you,    Your Cardiothoracic Surgery Team   John Ley RN Care Coordinator - [phone]  Kisha Renteria RN Care Coordinator - [phone]   Zenaida Perkins, RN Care Coordinator - [phone]   Natalie Treadwell, HADLEY Care Coordinator - [phone]

## 2022-11-30 NOTE — PROGRESS NOTES
Cardiovascular Surgery Progress Note  11/30/2022         Assessment and Plan:     Jade Walker is a 87 year old female with PMHx of severe aortic stenosis HTN, HLD, non-obstructive CAD, CKD stage III, IBS who underwent s/p TAVR c/b LV perforation s/p emergent sternotomy, repair of LV lateral wall laceration by Dr. Bone on 11/16/22. TAVR valve well seated.     Cardiovascular:   Hx of severe AORTIC STENOSIS, s/p TAVR 11/16  Pericardial effusion due to LV perforation, s/p LV laceration repair 11/16  HTN  HLD  Non obstructive CAD  Post-op AF  Remains in AF rates 80-110s, HD stable. Started on coumadin 11/25. Amio drip for Afib 11/21 - 11/24. Transitioned to PO Amiodarone 200 mg BID  TTE 11/17 showed LV EF 60-65%, TAVR valve well seated, no leak.   - Post-TAVR- plavix started initially. Change to ASA per cardiology (plavix no longer part of post-tavr care).  - Metoprolol 25 mg BID  - ASA 81 mg daily  - Atorvastatin 40 mg daily  - Diuresis as below     Chest tubes and PW removed in the ICU     Pulmonary:  - Extubated POD 5 to 2 lpm via NC. Now saturating well on RA.   - Pulm hygiene, IS, activity and deep breathing  Left Pleural Effusion  - Consult CAPs team 11/27 for possible left thoracentesis. Unable to find safe window to tap.  - Pleural effusions small on CXR 1126     Neurology / MSK:  Acute post-operative pain   Pain well controlled with current regimen:  - Acetaminophen, PO oxycodone PRN, IV dilaudid PRN, methocarbamol      / Renal / Fluid / Electrolytes:  CKD 3  BL creat ~ 0.9, most recent creatinine WNL; adequate UOP.   - Pre-op weight 141 lb (she thinks this is accurate)  - Diuresis with Bumex 2 mg PO BID starting 11/28 and potassium BID.  - Replete lytes per protocol. Continue strict I/O, daily weights.   - Avoid/limit nephrotoxins as able     GI / Nutrition:   Irritable Bowel Syndrome    - Nutrition consulted, appreciate recommendations  - PPI for bowel prophylaxis.   - Bowel regimen: MiraLAX bid,  senna 2 tabs bid, milk of mag- held due to loose stools  - Cyclic TF (6 pm-6 am) and continue Calorie counts.  - Immodium PRN   - C Diff negative 11/22   - Tolerating regular diet, improving appetite. Encouraged supplements (Ensure) with meals.     Endocrine:  Stress induced hyperglycemia   - Initially managed on insulin drip postop, transitioned to sliding scale; goal BG <180 for optimal healing     Infectious Disease:  Stress induced leukocytosis  Serratia pneumonia  - started zosyn, switched to ceftriaxone (7 total days, ended 11/26).  - WBC down to 9.4, remains afebrile, no signs or symptoms of infection. C diff negative 11/25.  - Completed perioperative antibiotics.      Hematology:   Acute blood loss anemia and thrombocytopenia  Pericardial effusion s/p pericardiocentesis  Hgb stable; Plt WNL, no signs or symptoms of active bleeding.     Anticoagulation:   - ASA 81 mg daily  - Coumadin for paroxysmal A-Fib starting 11/25, INR goal 2-3 for 3 months. INR therapeutic.     MSK/Skin:  Sternotomy  Surgical incision  - Sternal precautions.  - Incisional cares per protocol.     Prophylaxis:   - Stress ulcer prophylaxis: Pantoprazole 40 mg daily for 30 days  - DVT prophylaxis: Subcutaneous heparin, SCD.     Disposition:   - Transferred to  on 11/24  - Therapies recommending discharge to TCU. IV ABx end 11/26.  - Medically ready for discharge on 11/28, can drift INR up to 2-3 without heparin infusion.     Discussed with Dr Smitha Cardoza PA-C   Cardiothoracic Surgery   November 30, 2022 at 7:59 AM          Interval History:     No overnight events.  States pain is well managed on current regimen. Slept well overnight.  Tolerating diet and tube feeds, is passing flatus, + BM. No nausea or vomiting.  Breathing well without complaints, less coughing.  Working with therapies and ambulating in halls without assistance.   Denies chest pain, palpitations, dizziness, syncopal symptoms, fevers, chills, myalgias, or  "sternal popping/clicking.         Physical Exam:   Blood pressure 107/80, pulse 90, temperature 98  F (36.7  C), temperature source Oral, resp. rate 20, height 1.549 m (5' 1\"), weight 66.8 kg (147 lb 4.8 oz), SpO2 96 %.  Vitals:    11/27/22 0616 11/29/22 0426 11/30/22 0536   Weight: 73.1 kg (161 lb 1.6 oz) 69.1 kg (152 lb 6.4 oz) 66.8 kg (147 lb 4.8 oz)      Gen: A&Ox4, NAD  Neuro: no focal deficits   CV: RRR, normal S1 S2, no murmurs, rubs or gallops.   Pulm: CTA, no wheezing or rhonchi, normal breathing on RA  Abd: nondistended, normal BS, soft, nontender  Ext: moderate peripheral edema, 1+ pitting, lymphedema wraps  Incision: clean, dry, intact, no erythema, sternum stable  Tubes/drain sites: dressing clean and dry         Data:    Imaging:  reviewed recent imaging, no acute concerns    Labs:  BMP  Recent Labs   Lab 11/30/22  0600 11/30/22  0006 11/29/22  1007 11/29/22  0411 11/28/22  2259 11/28/22  1701 11/28/22  0629   *  --  135*  --   --  138 139   POTASSIUM 3.7  --  4.2  --   --  3.9 4.0   CHLORIDE 96*  --  99  --   --  100 104   SHAHEEN 8.5*  --  8.1*  --   --  8.6* 8.0*   CO2 28  --  25  --   --  25 25   BUN 25.7*  --  25.5*  --   --  28.9* 27.9*   CR 1.02*  --  0.94  --   --  0.99* 0.96*   * 125* 152* 122*   < > 111* 145*    < > = values in this interval not displayed.     CBC  Recent Labs   Lab 11/30/22  0600 11/29/22  1007 11/28/22  0629 11/27/22  0804   WBC 9.4 13.9* 8.6 13.4*   RBC 2.69* 2.80* 2.45* 2.52*   HGB 8.7* 9.1* 7.9* 8.1*   HCT 27.7* 29.3* 25.3* 25.6*   * 105* 103* 102*   MCH 32.3 32.5 32.2 32.1   MCHC 31.4* 31.1* 31.2* 31.6   RDW 22.5* 21.6* 20.6* 19.6*    200 158 162     INR  Recent Labs   Lab 11/30/22  0600 11/29/22  1007 11/28/22  0629 11/27/22  0804   INR 3.04* 2.15* 1.56* 1.28*      Hepatic Panel  No lab results found in last 7 days.  GLUCOSE:   Recent Labs   Lab 11/30/22  0600 11/30/22  0006 11/29/22  1007 11/29/22  0411 11/28/22  2259 11/28/22  1701   * " 125* 152* 122* 117* 111*

## 2022-11-30 NOTE — PLAN OF CARE
D: Pt is s/p TAVR (11/16/220, c/b LV perforation s/p emergent sternotomy and repair of LV lateral wall laceration     I: Monitored and assessed patient status; nursing cares provided.    A:   Today's Changes:    WOC RN saw patient today; new orders placed for twice daily cares to perineum and once daily dressings to left groin    Placement found for TCU; discharge ride set up for tomorrow at 12:30 pm.    Neuro: A&Ox4, pleasant. Denies numbness/tingling in extremities.  Cardiac: Went back into A-fib/A-flutter at 7:40 am with HR ; on oral amiodarone and coumadin. At 12:30, pt converted back to Sinus Rhythm. Diuresing with oral bumex 2 mg twice daily; weight down >4 lbs from yesterday.   Resp: Lungs clear, diminished bases, IS reviewed with pt and return demonstration done correctly.  GI/: Pt continues to have loose stools. Voiding adequate amounts.  Skin: Sternal incision, old CT sites, and right groin site are open to air, no drainage. Left groin site cleansed and new dressing applied.  Diet: Regular diet, on calorie counts through tomorrow. Cyclic TF's from 6 pm-6 am at 30 mL/hour via NG. Pt motivated to eat more in hopes of getting NG removed soon.  LDA's: TL PICC-SL'd  Pain: Tylenol x1 for back pain with relief.  Activity: Up with 1 and a walker; ambulated in halls today.       Plan: Pt to discharge tomorrow to TCU; wheelchair ride scheduled for 12:30 pm. Continue with current plan of care and contact CVTS for any questions/concerns.    Zenaida Fountain, HADLEY  Cardiology

## 2022-11-30 NOTE — DISCHARGE SUMMARY
[facility]   Cardiothoracic Surgery Hospital Discharge Summary     Jade Walker MRN# [id number]   Age: 87 year old YOB: 1935     Admitting Physician:  Dallin Bone MD  Discharge Physician:  Sandie Cardoza PA-C  Primary Care Physician:        Dave Merritt     DATE OF ADMISSION: 11/16/2022      DATE OF DISCHARGE: 12/1/22     Admit Wt: 141 lbs  Discharge Wt: 147 lbs          Primary Diagnoses:     Severe aortic stenosis s/p TAVR    Expanding pericardial hematoma    Hemodynmic compromise from pericardial tamponade          Secondary Diagnoses:     HTN  HLD  Non-obstructive CAD  Post-op AF    Acute postoperative pain    CKD3    IBS    Stress induced hyperglycemia    Stress induced leukocytosis    Serratia pneumonia    Acute blood loss anemia and thrombocytopenia    PROCEDURES PERFORMED:   Date: 11/16/22.  Surgeon: Dr. Bone and Dr. Montague  PROCEDURE:  1. Emergency sternotomy  2. Cardiopulmonary bypass  3. Repair of laceration of lateral wall of LV    INTRAOPERATIVE FINDINGS:    1. Longitudinal laceration of LV adjacent to ramus intermedius artery  2. Large volume of pericardial tamponade    CULTURE RESULTS:    11/19/2022 1427 11/22/2022 0823 Respiratory Aerobic Bacterial Culture with Gram Stain [[id number]]    (Abnormal)   Sputum from Endotracheal    Final result Component Value   Culture 3+ Normal carolyn    2+ Serratia odorifera Abnormal    Gram Stain Result >25 PMNs/low power field    3+ Mixed carolyn       Susceptibility     Serratia odorifera     SHANIQUA     Cefepime <=1 ug/mL Susceptible     Ceftazidime <=1 ug/mL Susceptible     Ceftriaxone <=1 ug/mL Susceptible     Ciprofloxacin <=0.25 ug/mL Susceptible     Gentamicin <=1 ug/mL Susceptible     Levofloxacin <=0.12 ug/mL Susceptible     Meropenem <=0.25 ug/mL Susceptible     Piperacillin/Tazobactam <=4 ug/mL Susceptible     Tobramycin <=1 ug/mL Susceptible     Trimethoprim/Sulfamethoxazole <=1/19  ug/mL Susceptible                CONSULTS:      PT/OT    PAL    [facility] nurse    BRIEF HISTORY OF ILLNESS:  Jade Walker is a 87 year old female with PMHx of severe aortic stenosis HTN, HLD, non-obstructive CAD, CKD stage III, IBS who underwent s/p TAVR c/b LV perforation s/p emergent sternotomy, repair of LV lateral wall laceration by Dr. Bone on 11/16/22. TAVR valve well seated.    HOSPITAL COURSE:   Postoperatively was admitted to the CVICU.  Patient was extubated on POD #5.  Blood pressure and cardiac index were managed with vasopressors and inotropic agents which were continuously weaned until no longer needed.  Patient was subsequently  transferred to the surgical telemetry floor.    While on the surgical unit, the patient continued to progress well. Chest tubes and temporary pacemaker wires were removed when deemed appropriate.     Patient was fluid overloaded and treated with diuretics. She remains up about 6lbs from preoperative weight and will discharge with 7 days of diuretic therapy; will re-evaluate need in clinic follow-up.     She developed paroxysmal afib in the post-operative therapy and was treated with Amiodarone. She will remain on oral amiodarone for at least 30 days. She was started on Coumadin for stoke prevention and will remain on for 3 months with a goal INR of 2-3    She has low appetites following surgery and was on tube feeds at night to supplement diet. Prior to discharge she was eating enough calories and Tf were stopped and NG was removed.     Patient was transiently hyperglycemic and treated with insulin infusion then transitioned to sliding scale insulin per protocol. Blood sugars remained stable. No further glycemic control agents needed at this time.     Prior to discharge, her pain was controlled well, she was working well with therapies, able to perform most ADLs, ambulate without difficulty, and had full return of bowel and bladder function.  On December 1, 2022, she was  "discharged to a TCU in stable condition. Follow up with cardiology and cardiac surgery have been arranged. Pt encouraged to follow up with PCP and cardiac rehab upon discharge.    Patient discharged on aspirin:  Yes 81 mg  Patient discharged on beta blocker: yes Metoprolol 25 mg bid   Patient discharged on ACE Inhibitor/ARB: no  Not indicated    Patient discharged on statin: yes          Discharge Disposition:     Discharged to TCU            Condition on Discharge:     Discharge condition: Stable   Discharge vitals: Blood pressure 127/70, pulse 86, temperature 97.9  F (36.6  C), temperature source Oral, resp. rate 14, height 1.549 m (5' 1\"), weight 66.8 kg (147 lb 4.8 oz), SpO2 97 %.     Code status on discharge: Full Code     Vitals:    11/27/22 0616 11/29/22 0426 11/30/22 0536   Weight: 73.1 kg (161 lb 1.6 oz) 69.1 kg (152 lb 6.4 oz) 66.8 kg (147 lb 4.8 oz)       DAY OF DISCHARGE PHYSICAL EXAM:    Gen: A&Ox4, NAD  Neuro: no focal deficits   CV: RRR, normal S1 S2, no murmurs, rubs or gallops  Pulm: CTA, no wheezing or rhonchi, normal breathing on RA  Abd: nondistended, normal BS, soft, nontender  Ext: mild peripheral edema  Incision: clean, dry, intact, no erythema, sternum stable  Tubes/drain sites: dressing clean and dry      Most Recent 3 CBC's:  Recent Labs   Lab Test 12/01/22  0632 11/30/22  0600 11/29/22  1007   WBC 6.5 9.4 13.9*   HGB 8.3* 8.7* 9.1*   * 103* 105*    205 200      Most Recent 3 BMP's:  Recent Labs   Lab Test 12/01/22  0632 11/30/22  0600 11/30/22  0006 11/29/22  1007    133*  --  135*   POTASSIUM 3.9 3.7  --  4.2   CHLORIDE 99 96*  --  99   CO2 30* 28  --  25   BUN 26.6* 25.7*  --  25.5*   CR 1.03* 1.02*  --  0.94   ANIONGAP 8 9  --  11   SHAHEEN 8.5* 8.5*  --  8.1*   * 126* 125* 152*     Most Recent 2 LFT's:  Recent Labs   Lab Test 11/19/22  0321 11/18/22  0241   AST 79* 146*   ALT 11 20   ALKPHOS 52 44   BILITOTAL 0.4 0.3     Most Recent INR's and Anticoagulation " Dosing History:  Anticoagulation Dose History     Recent Dosing and Labs Latest Ref Rng & Units 11/19/2022 11/25/2022 11/26/2022 11/27/2022 11/28/2022 11/29/2022 11/30/2022    Warfarin 3 mg - - 3 mg 3 mg 3 mg - 3 mg -    Warfarin 4 mg - - - - - 4 mg - -    INR 0.85 - 1.15 1.15 1.05 1.11 1.28(H) 1.56(H) 2.15(H) 3.04(H)        Most Recent 3 Troponin's:No lab results found.  Most Recent Cholesterol Panel:  Recent Labs   Lab Test 08/09/22  1013   CHOL 148   LDL 58   HDL 77   TRIG 65     Most Recent 6 Bacteria Isolates From Any Culture (See EPIC Reports for Culture Details):No lab results found.  Most Recent TSH, T4 and A1c Labs:  Recent Labs   Lab Test 04/17/19  1010   TSH 1.01      Recent Labs   Lab 12/01/22  0632 11/30/22  0600 11/30/22  0006 11/29/22  1007 11/29/22  0411 11/28/22  2259   * 126* 125* 152* 122* 117*       Imaging:  EXAM: XR CHEST 2 VIEWS 11/29/2022 1:31 PM     HISTORY: Interval.      COMPARISON: Chest radiograph 11/26/2022.     TECHNIQUE: Frontal and lateral views of the chest.     FINDINGS:   Enteric tube courses below the field of view. Median sternotomy wires  appear intact without lateral displacement. TAVR. Aortic arch  calcification. Right upper extremity PICC tip is at the mid to low  superior vena cava. Minimally changed small left greater than right  costophrenic angle blunting. No pneumothorax. Midline trachea. Stable  cardiomediastinal silhouette with obscuration of the inferior left  heart border. Linear bibasilar pulmonary opacities, left greater than  right, similar to prior. Poor inspiratory effort.                                                                      IMPRESSION: Newly change left greater than right small pleural  effusions with adjacent bibasilar atelectasis versus consolidation.    TTE Interpretation Summary  Small posterior and lateral pericardial effusion with no evidence of  tamponade. There is also small amount of mediastinal fluid, likely hematoma.     The  visual ejection fraction is 60-65%.  The left atrial appendage is normal. It is free of spontaneous echo contrast  and thrombus.  There is a 23mm Contreras Danilo bioprostetic valve in place by history. The  valve is well seated with no evidence of perivalvular leak.  This study was compared with the study from 11/17/2022 .  There has been no change.  ______________________________________________________________________________  Procedure  Transesophageal Echocardiogram with color and spectral Doppler performed.  Procedure location Patient Floor. The procedure was performed on Patient  Floor. Informed consent for Transesophegeal echo obtained. KULDIP Probe #63 was  used during the procedure. Sedation, endotracheal intubation, and mechanical  ventilation were initiated prior to the KULDIP and were monitored by the ICU  team. I determined this patient to be an appropriate candidate for the planned  sedation and procedure and have reassessed the patient immediately prior to  sedation and procedure. Total sedation time: 46 minutes of continuous bedside  1:1 monitoring. The Transducer was inserted with some difficulty . The patient  tolerated the procedure well. Complications None. ECG shows normal sinus  rhythm. Good quality two-dimensional was performed and interpreted.     Left Ventricle  Moderate to severe concentric wall thickening consistent with left ventricular  hypertrophy is present. The visual ejection fraction is 60-65%. No regional  wall motion abnormalities are seen.     Right Ventricle  The right ventricle is normal size. Global right ventricular function is  normal.     Atria  The left atrial appendage is normal. It is free of spontaneous echo contrast  and thrombus. The atrial septum is intact as assessed by color Doppler .     Mitral Valve  Mild mitral annular calcification is present. Trace to mild mitral  insufficiency is present.     Aortic Valve  A bioprosthetic aortic valve is present. Transcutaneous  aortic valve  replacement is present. There is a 23mm Contreras Danilo bioprostetic valve in  place by history. The valve is well seated with no evidence of perivalvular  leak.     Tricuspid Valve  The tricuspid valve is normal.     Pulmonic Valve  The pulmonic valve is normal.     Pericardium  Small posterior and lateral pericardial effusion with no evidence of  tamponade. There is also small amount of mediastinal fluid, likely hematoma.  Chamber compression is not present; there is no evidence for tamponade.     Compared to Previous Study  This study was compared with the study from 11/17/2022 . There has been no  change.    PRE-ADMISSION MEDICATIONS:  Medications Prior to Admission   Medication Sig Dispense Refill Last Dose     atorvastatin (LIPITOR) 40 MG tablet [ATORVASTATIN (LIPITOR) 40 MG TABLET] Take 40 mg by mouth at bedtime.   11/15/2022 at 2100     B.ani/L.aci/L.sal/L.plan/L.Rey (PROBIOTIC FORMULA ORAL) [B.ANI/L.ACI/L.SAL/L.PLAN/L.REY (PROBIOTIC FORMULA ORAL)] Take 1 capsule by mouth daily.   Past Month at --     cholecalciferol, vitamin D3, 1,000 unit (25 mcg) tablet [CHOLECALCIFEROL, VITAMIN D3, 1,000 UNIT (25 MCG) TABLET] Take 1,000 Units by mouth daily.   Unknown at --     melatonin 5 MG tablet Take 2.5 mg by mouth nightly as needed for sleep   11/15/2022 at 2200     vit A/vit C/vit E/zinc/copper (PRESERVISION AREDS ORAL) [VIT A/VIT C/VIT E/ZINC/COPPER (PRESERVISION AREDS ORAL)] Take 1 tablet by mouth 2 (two) times a day.   11/15/2022     [DISCONTINUED] aspirin (ASA) 325 MG EC tablet Take 325 mg by mouth once Take 1 tablet the night before and 1 tab on the morning of surgery on 11/16/2022.   11/16/2022 at am     [DISCONTINUED] atenoloL (TENORMIN) 100 MG tablet [ATENOLOL (TENORMIN) 100 MG TABLET] Take 100 mg by mouth daily.   11/16/2022 at 0600     [DISCONTINUED] diphenhydrAMINE (BANOPHEN) 25 MG capsule TAKE 1 CAPSULE (25) MG AT THE TIME OF TESTING WHEN YOU ARRIVE TO THE HOSPITAL 1 capsule 1 11/16/2022  at 0730     [DISCONTINUED] famotidine (PEPCID) 20 MG tablet Take 20 mg by mouth daily   11/15/2022 at 0800     [DISCONTINUED] folic acid (FOLVITE) 1 MG tablet [FOLIC ACID (FOLVITE) 1 MG TABLET] Take 1 mg by mouth daily.   11/15/2022 at 2200     [DISCONTINUED] loratadine (CLARITIN) 10 MG tablet Take 10 mg by mouth daily   Unknown at --     [DISCONTINUED] polyethylene glycol-propylene glycol (SYSTANE ULTRA) 0.4-0.3 % SOLN ophthalmic solution Place 1 drop into both eyes every hour as needed for dry eyes   Unknown at --     [DISCONTINUED] predniSONE (DELTASONE) 10 MG tablet TAKE 6 TABLETS (60 MG) NIGHT BEFORE AND 3 TABLETS (30 MG) THE MORNING OF CT 9 tablet 1 11/16/2022 at 0730       DISCHARGE MEDICATIONS:   Current Discharge Medication List      START taking these medications    Details   acetaminophen (TYLENOL) 325 MG tablet 2 tablets (650 mg) by Oral or Feeding Tube route every 4 hours as needed for other (For optimal non-opioid multimodal pain management to improve pain control.)    Associated Diagnoses: Severe aortic stenosis      amiodarone (PACERONE) 200 MG tablet Take 1 tablet (200 mg) by mouth 2 times daily for 30 days  Qty: 60 tablet, Refills: 0    Associated Diagnoses: Severe aortic stenosis      bumetanide (BUMEX) 1 MG tablet Take 1 tablet (1 mg) by mouth 2 times daily    Associated Diagnoses: Severe aortic stenosis      magnesium oxide (MAG-OX) 400 MG tablet Take 1 tablet (400 mg) by mouth daily    Associated Diagnoses: Severe aortic stenosis      methocarbamol (ROBAXIN) 500 MG tablet Take 1 tablet (500 mg) by mouth 4 times daily as needed for muscle spasms    Associated Diagnoses: Severe aortic stenosis      metoprolol tartrate (LOPRESSOR) 25 MG tablet Take 1 tablet (25 mg) by mouth 2 times daily    Associated Diagnoses: Severe aortic stenosis      miconazole with skin protectant (ANGELA ANTIFUNGAL) 2 % CREA cream Apply topically 2 times daily TO GROIN, PERINEUM, AND PERIANAL REGIONS    Associated Diagnoses:  Severe aortic stenosis; Paroxysmal atrial fibrillation (H)      pantoprazole (PROTONIX) 40 MG EC tablet Take 1 tablet (40 mg) by mouth every morning (before breakfast) for 30 days    Associated Diagnoses: Severe aortic stenosis      potassium chloride (KAYCIEL) 20 MEQ/15ML (10%) solution Take 15 mLs (20 mEq) by mouth 2 times daily    Associated Diagnoses: Severe aortic stenosis      senna-docusate (SENOKOT-S/PERICOLACE) 8.6-50 MG tablet 1 tablet by Oral or Feeding Tube route nightly as needed for constipation    Associated Diagnoses: Severe aortic stenosis      warfarin ANTICOAGULANT (COUMADIN) 1 MG tablet Take 3 tablets (3 mg) by mouth daily    Associated Diagnoses: Severe aortic stenosis; Paroxysmal atrial fibrillation (H)         CONTINUE these medications which have CHANGED    Details   aspirin (ASA) 81 MG EC tablet Take 1 tablet (81 mg) by mouth daily    Associated Diagnoses: Severe aortic stenosis         CONTINUE these medications which have NOT CHANGED    Details   atorvastatin (LIPITOR) 40 MG tablet [ATORVASTATIN (LIPITOR) 40 MG TABLET] Take 40 mg by mouth at bedtime.      B.ani/L.aci/L.sal/L.plan/L.Rey (PROBIOTIC FORMULA ORAL) [B.ANI/L.ACI/L.SAL/L.PLAN/L.REY (PROBIOTIC FORMULA ORAL)] Take 1 capsule by mouth daily.      cholecalciferol, vitamin D3, 1,000 unit (25 mcg) tablet [CHOLECALCIFEROL, VITAMIN D3, 1,000 UNIT (25 MCG) TABLET] Take 1,000 Units by mouth daily.      melatonin 5 MG tablet Take 2.5 mg by mouth nightly as needed for sleep      vit A/vit C/vit E/zinc/copper (PRESERVISION AREDS ORAL) [VIT A/VIT C/VIT E/ZINC/COPPER (PRESERVISION AREDS ORAL)] Take 1 tablet by mouth 2 (two) times a day.         STOP taking these medications       atenoloL (TENORMIN) 100 MG tablet Comments:   Reason for Stopping:         diphenhydrAMINE (BANOPHEN) 25 MG capsule Comments:   Reason for Stopping:         famotidine (PEPCID) 20 MG tablet Comments:   Reason for Stopping:         folic acid (FOLVITE) 1 MG tablet  Comments:   Reason for Stopping:         loratadine (CLARITIN) 10 MG tablet Comments:   Reason for Stopping:         polyethylene glycol-propylene glycol (SYSTANE ULTRA) 0.4-0.3 % SOLN ophthalmic solution Comments:   Reason for Stopping:         predniSONE (DELTASONE) 10 MG tablet Comments:   Reason for Stopping:                CC:Dave Jensen      [facility]   Cardiothoracic Surgery  Office phone: [phone]  Office fax: [phone]

## 2022-12-01 ENCOUNTER — APPOINTMENT (OUTPATIENT)
Dept: OCCUPATIONAL THERAPY | Facility: CLINIC | Age: 87
DRG: 266 | End: 2022-12-01
Attending: INTERNAL MEDICINE
Payer: COMMERCIAL

## 2022-12-01 VITALS
WEIGHT: 147.3 LBS | HEART RATE: 86 BPM | RESPIRATION RATE: 14 BRPM | OXYGEN SATURATION: 97 % | SYSTOLIC BLOOD PRESSURE: 127 MMHG | BODY MASS INDEX: 27.81 KG/M2 | TEMPERATURE: 97.9 F | DIASTOLIC BLOOD PRESSURE: 70 MMHG | HEIGHT: 61 IN

## 2022-12-01 LAB
ANION GAP SERPL CALCULATED.3IONS-SCNC: 8 MMOL/L (ref 7–15)
BUN SERPL-MCNC: 26.6 MG/DL (ref 8–23)
CALCIUM SERPL-MCNC: 8.5 MG/DL (ref 8.8–10.2)
CHLORIDE SERPL-SCNC: 99 MMOL/L (ref 98–107)
CREAT SERPL-MCNC: 1.03 MG/DL (ref 0.51–0.95)
DEPRECATED HCO3 PLAS-SCNC: 30 MMOL/L (ref 22–29)
ERYTHROCYTE [DISTWIDTH] IN BLOOD BY AUTOMATED COUNT: 22.8 % (ref 10–15)
GFR SERPL CREATININE-BSD FRML MDRD: 53 ML/MIN/1.73M2
GLUCOSE SERPL-MCNC: 127 MG/DL (ref 70–99)
HCT VFR BLD AUTO: 26.6 % (ref 35–47)
HGB BLD-MCNC: 8.3 G/DL (ref 11.7–15.7)
INR PPP: 2.87 (ref 0.85–1.15)
MAGNESIUM SERPL-MCNC: 2.1 MG/DL (ref 1.7–2.3)
MCH RBC QN AUTO: 32.3 PG (ref 26.5–33)
MCHC RBC AUTO-ENTMCNC: 31.2 G/DL (ref 31.5–36.5)
MCV RBC AUTO: 104 FL (ref 78–100)
PHOSPHATE SERPL-MCNC: 3.6 MG/DL (ref 2.5–4.5)
PLATELET # BLD AUTO: 213 10E3/UL (ref 150–450)
POTASSIUM SERPL-SCNC: 3.9 MMOL/L (ref 3.4–5.3)
RBC # BLD AUTO: 2.57 10E6/UL (ref 3.8–5.2)
SODIUM SERPL-SCNC: 137 MMOL/L (ref 136–145)
WBC # BLD AUTO: 6.5 10E3/UL (ref 4–11)

## 2022-12-01 PROCEDURE — 97535 SELF CARE MNGMENT TRAINING: CPT | Mod: GO | Performed by: OCCUPATIONAL THERAPIST

## 2022-12-01 PROCEDURE — 97110 THERAPEUTIC EXERCISES: CPT | Mod: GO | Performed by: OCCUPATIONAL THERAPIST

## 2022-12-01 PROCEDURE — 250N000013 HC RX MED GY IP 250 OP 250 PS 637: Performed by: PHYSICIAN ASSISTANT

## 2022-12-01 PROCEDURE — 84100 ASSAY OF PHOSPHORUS: CPT | Performed by: THORACIC SURGERY (CARDIOTHORACIC VASCULAR SURGERY)

## 2022-12-01 PROCEDURE — 85610 PROTHROMBIN TIME: CPT | Performed by: PHYSICIAN ASSISTANT

## 2022-12-01 PROCEDURE — 83735 ASSAY OF MAGNESIUM: CPT | Performed by: PHYSICIAN ASSISTANT

## 2022-12-01 PROCEDURE — 82310 ASSAY OF CALCIUM: CPT | Performed by: PHYSICIAN ASSISTANT

## 2022-12-01 PROCEDURE — 36592 COLLECT BLOOD FROM PICC: CPT | Performed by: PHYSICIAN ASSISTANT

## 2022-12-01 PROCEDURE — 250N000013 HC RX MED GY IP 250 OP 250 PS 637

## 2022-12-01 PROCEDURE — 250N000013 HC RX MED GY IP 250 OP 250 PS 637: Performed by: STUDENT IN AN ORGANIZED HEALTH CARE EDUCATION/TRAINING PROGRAM

## 2022-12-01 PROCEDURE — 85014 HEMATOCRIT: CPT | Performed by: PHYSICIAN ASSISTANT

## 2022-12-01 RX ORDER — AMIODARONE HYDROCHLORIDE 200 MG/1
200 TABLET ORAL 2 TIMES DAILY
Qty: 60 TABLET | Refills: 0 | DISCHARGE
Start: 2022-12-01 | End: 2022-12-17

## 2022-12-01 RX ORDER — ACETAMINOPHEN 325 MG/1
650 TABLET ORAL EVERY 4 HOURS PRN
COMMUNITY
Start: 2022-12-01

## 2022-12-01 RX ORDER — WARFARIN SODIUM 1 MG/1
3 TABLET ORAL DAILY
DISCHARGE
Start: 2022-12-01 | End: 2023-03-09

## 2022-12-01 RX ORDER — MICONAZOLE NITRATE 20 MG/G
CREAM TOPICAL 2 TIMES DAILY
DISCHARGE
Start: 2022-12-01

## 2022-12-01 RX ORDER — PANTOPRAZOLE SODIUM 40 MG/1
40 TABLET, DELAYED RELEASE ORAL
DISCHARGE
Start: 2022-12-01 | End: 2022-12-20

## 2022-12-01 RX ORDER — AMOXICILLIN 250 MG
1 CAPSULE ORAL
DISCHARGE
Start: 2022-12-01

## 2022-12-01 RX ORDER — METHOCARBAMOL 500 MG/1
500 TABLET, FILM COATED ORAL 4 TIMES DAILY PRN
DISCHARGE
Start: 2022-12-01 | End: 2022-12-06

## 2022-12-01 RX ORDER — MAGNESIUM OXIDE 400 MG/1
400 TABLET ORAL DAILY
DISCHARGE
Start: 2022-12-01

## 2022-12-01 RX ORDER — METOPROLOL TARTRATE 25 MG/1
25 TABLET, FILM COATED ORAL 2 TIMES DAILY
DISCHARGE
Start: 2022-12-01 | End: 2023-05-08 | Stop reason: ALTCHOICE

## 2022-12-01 RX ORDER — BUMETANIDE 1 MG/1
1 TABLET ORAL
DISCHARGE
Start: 2022-12-01 | End: 2022-12-20

## 2022-12-01 RX ORDER — POTASSIUM CHLORIDE 20MEQ/15ML
20 LIQUID (ML) ORAL 2 TIMES DAILY
DISCHARGE
Start: 2022-12-01 | End: 2022-12-20

## 2022-12-01 RX ADMIN — Medication 1 TABLET: at 08:01

## 2022-12-01 RX ADMIN — BUMETANIDE 2 MG: 2 TABLET ORAL at 08:01

## 2022-12-01 RX ADMIN — AMIODARONE HYDROCHLORIDE 200 MG: 200 TABLET ORAL at 08:02

## 2022-12-01 RX ADMIN — POTASSIUM CHLORIDE 20 MEQ: 20 SOLUTION ORAL at 08:04

## 2022-12-01 RX ADMIN — Medication 400 MG: at 08:02

## 2022-12-01 RX ADMIN — PANTOPRAZOLE SODIUM 40 MG: 40 TABLET, DELAYED RELEASE ORAL at 08:01

## 2022-12-01 RX ADMIN — ASPIRIN 81 MG: 81 TABLET ORAL at 08:01

## 2022-12-01 RX ADMIN — Medication 1 PACKET: at 08:02

## 2022-12-01 RX ADMIN — METOPROLOL TARTRATE 25 MG: 25 TABLET, FILM COATED ORAL at 08:01

## 2022-12-01 ASSESSMENT — ACTIVITIES OF DAILY LIVING (ADL)
ADLS_ACUITY_SCORE: 25
ADLS_ACUITY_SCORE: 25
ADLS_ACUITY_SCORE: 23
ADLS_ACUITY_SCORE: 25

## 2022-12-01 NOTE — PLAN OF CARE
Called accepting TCU, [facility], at 11:45 am and left message requesting call back in order to provide Nurse to Nurse report. No return phone call was received prior to pt's discharge at 12:45 pm, thus, no report was given to the TCU nurse.     Zenaida Fountain, RN    Cardiology

## 2022-12-01 NOTE — PROGRESS NOTES
Care Management Discharge Note    Discharge Date: 12/01/2022     Discharge Disposition: TCU    Discharge Services:  PT/OT    Discharge DME: n/a     Discharge Transportation: Wheelchair transport    Private pay costs discussed: Not applicable (not today)    PAS Confirmation Code: [id number]    Patient/family educated on Medicare website which has current facility and service quality ratings: Not at this time     Education Provided on the Discharge Plan: Yes     Persons Notified of Discharge Plans: Peg    Patient/Family in Agreement with the Plan: Yes     Handoff Referral Completed:  Yes    Additional Information: Met with pt and gave her the IMM to sign. SW gave pt information on her ride time and discharge location. Peg signed the IMM and requested a copy. PAL made a copy and gave it to her.    PAL listened to VM from Karla at [facility] (P: [phone]). There is a question about if pt meets SNF level of care criteria. PAL called Karla back and gave her the requested information on pt's cognition, transferring, toileting, and self-preservation status. Karla shared that based on the updates provided it appears that pt meets the level of care required in a SNF. Karla reported she would fax the updated PAS information to the facility.    PAL [facility]ed [facility] to [facility]. PAL spoke with bedside nurse who reported she already reached out to the facility to do the nurse-to-nurse report.    ABILIO Collins   6C Social Work Phone: [phone]

## 2022-12-01 NOTE — PLAN OF CARE
Temp: 97.7  F (36.5  C) Temp src: Oral BP: 127/70 Pulse: 89   Resp: 16 SpO2: 98 % O2 Device: None (Room air)       Shift: 5067-4430  D: S/p TAVR c/v LV perforation s/p emergent sternotomy and repair of LV lateral wall laceration on 11/16/2022.      Neuro: A&O x4. Denies numbness/tingling, headache, or dizziness.  Cardiac: Tele in place, pt converted to Afib at 9:40 pm - 11:00 pm; and has now converted back into sinus rhythm. Afebrile. HR 80-90's. Denies chest pain.  Resp: VSS on RA sating > 98%. Lung sounds clear. Improved JOHN.  GI/: Adequate urine output. No BM during shift. Regular diet, Alejandro counts, Cyclic TF from 6pm-6am at 30 mL/hr via NG.   Skin: No new deficits noted  LDA's: TL PICC in place SL.   Pain: Denies pain at this time. Slight back discomfort, but states its from laying in bed.     Plan: Continue to monitor and follow POC. Discharge to TCU today 12/1 at 12:30 pm. Notify CVTS with any changes.

## 2022-12-01 NOTE — PLAN OF CARE
DISCHARGE                         12/1/2022 12:52 PM  ----------------------------------------------------------------------------  Discharged to: TCU, [facility]  Via: Wheelchair ride  Accompanied by: EMS Staff  Discharge Instructions: Reviewed diet, activity restrictions, medications, follow up appointments and specific aftercare instructions related to post-op course. Also discussed symptoms to be aware of (increasing SOB, palpitations, chest pain, etc.) and to notify staff at TCU or contact provider if at home.  Prescriptions: To be filled at TCU. Medication list reviewed & sent with pt.  Belongings: All sent with pt  IV: PICC removed per order  Telemetry: off    Pt exhibits understanding of above discharge instructions; all questions answered.    Discharge Paperwork: Signed, copied, and sent with patient to TCU.     Zenaida Fountain, RN  Cardiology

## 2022-12-01 NOTE — PHARMACY-ANTICOAGULATION SERVICE
Clinical Pharmacy- Warfarin Discharge Note  This patient is currently on warfarin for the treatment of Atrial fibrillation.  INR Goal= 2-3  Expected length of therapy lifetime.    Anticoagulation Dose History     Recent Dosing and Labs Latest Ref Rng & Units 11/25/2022 11/26/2022 11/27/2022 11/28/2022 11/29/2022 11/30/2022 12/1/2022    Warfarin 3 mg - 3 mg 3 mg 3 mg - 3 mg - -    Warfarin 4 mg - - - - 4 mg - - -    INR 0.85 - 1.15 1.05 1.11 1.28(H) 1.56(H) 2.15(H) 3.04(H) 2.87(H)        Recommend discharging the patient on a warfarin regimen of 2 mg tonight. Patient will likely require a daily dose between 2-3 mg daily. If INR drops below 2, patient does not require bridging with alternative anticoagulation.    The patient should have an INR checked tomorrow, 12/2/22 at [facility].    Katie Chong, PharmD  Pharmacy Resident

## 2022-12-01 NOTE — PLAN OF CARE
Physical Therapy Discharge Summary    Reason for therapy discharge:    Discharged to transitional care facility.    Progress towards therapy goal(s). See goals on Care Plan in [facility] electronic health record for goal details.  Goals partially met.  Barriers to achieving goals:   discharge from facility.    Therapy recommendation(s):    Continued therapy is recommended.  Rationale/Recommendations:  for continued strengthening and mobility training.

## 2022-12-01 NOTE — PROGRESS NOTES
Calorie Count  Intake recorded for: 11/30  Total Kcals: 577 Total Protein: 28g  Kcals from Hospital Food: 577  Protein: 28g  Kcals from Outside Food (average):0 Protein: 0g  # Meals Ordered from Kitchen: 2 meals   # Meals Recorded: 2 meals (First - 50% penne pasta w/ meat sauce)      (Second - 100% 1 packet amandeep crackers)   # Supplements Recorded: 100% 1 Ensure Enlive

## 2022-12-01 NOTE — PROGRESS NOTES
Occupational Therapy Discharge Summary    Reason for therapy discharge:    Discharged to transitional care facility.    Progress towards therapy goal(s). See goals on Care Plan in [facility] electronic health record for goal details.  Goals partially met.  Barriers to achieving goals:   discharge from facility.    Therapy recommendation(s):    Continued therapy is recommended.  Rationale/Recommendations:  Pt below functional baseline for activity tolerance and ADLs. Pt would benefit from TCU to increase functional endurance and ADL IND. If pt were to d/c home, would require assist for all ADLs and transfers..

## 2022-12-02 ENCOUNTER — PATIENT OUTREACH (OUTPATIENT)
Dept: CARE COORDINATION | Facility: CLINIC | Age: 87
End: 2022-12-02

## 2022-12-02 ENCOUNTER — TRANSITIONAL CARE UNIT VISIT (OUTPATIENT)
Dept: GERIATRICS | Facility: CLINIC | Age: 87
End: 2022-12-02
Payer: COMMERCIAL

## 2022-12-02 VITALS
HEART RATE: 95 BPM | TEMPERATURE: 97.7 F | BODY MASS INDEX: 27.79 KG/M2 | WEIGHT: 147.2 LBS | HEIGHT: 61 IN | DIASTOLIC BLOOD PRESSURE: 62 MMHG | SYSTOLIC BLOOD PRESSURE: 104 MMHG | OXYGEN SATURATION: 98 % | RESPIRATION RATE: 18 BRPM

## 2022-12-02 DIAGNOSIS — L90.5 MIDLINE STERNOTOMY SCAR: ICD-10-CM

## 2022-12-02 DIAGNOSIS — R60.0 BILATERAL LEG EDEMA: ICD-10-CM

## 2022-12-02 DIAGNOSIS — I48.91 ATRIAL FIBRILLATION, UNSPECIFIED TYPE (H): ICD-10-CM

## 2022-12-02 DIAGNOSIS — Z95.2 S/P TAVR (TRANSCATHETER AORTIC VALVE REPLACEMENT): Primary | ICD-10-CM

## 2022-12-02 DIAGNOSIS — S26.92XD: ICD-10-CM

## 2022-12-02 DIAGNOSIS — I10 ESSENTIAL HYPERTENSION: ICD-10-CM

## 2022-12-02 DIAGNOSIS — I35.0 SEVERE AORTIC STENOSIS: Primary | ICD-10-CM

## 2022-12-02 DIAGNOSIS — Z98.890 MIDLINE STERNOTOMY SCAR: ICD-10-CM

## 2022-12-02 DIAGNOSIS — R19.5 LOOSE STOOLS: ICD-10-CM

## 2022-12-02 DIAGNOSIS — Z95.2 S/P TAVR (TRANSCATHETER AORTIC VALVE REPLACEMENT): ICD-10-CM

## 2022-12-02 PROCEDURE — 99310 SBSQ NF CARE HIGH MDM 45: CPT | Performed by: NURSE PRACTITIONER

## 2022-12-02 RX ORDER — LOPERAMIDE HYDROCHLORIDE 2 MG/1
2 TABLET ORAL 3 TIMES DAILY PRN
Qty: 1 TABLET | Refills: 0
Start: 2022-12-02

## 2022-12-02 NOTE — LETTER
12/2/2022        RE: Jade Walker  [address]  [facility] [address]        [facility] GERIATRICS    PRIMARY CARE PROVIDER AND CLINIC:  Dave Merritt MD, [address] / [facility] [address]  Chief Complaint   Patient presents with     Hospital F/U      [place] Medical Record Number:  [id number]  Place of Service where encounter took place:  [facility] AT [facility] ([facility]) [[id number]]    Jade Walker  is a 87 year old  (1935), admitted to the above facility from  [facility]. Hospital stay 11/16/22 through 12/1/22..   HPI:    Jade Walker is a 87 year old female with PMHx of severe aortic stenosis HTN, HLD, non-obstructive CAD, CKD stage III, IBS who underwent s/p TAVR c/b LV perforation s/p emergent sternotomy on 11/16, TAVR valve well seated, extubated POD#5, fluid overloaded and treated with diuretics, discharge with bumex, developed paroxysmal afib in the post-operative therapy and started amiodarone, (She was started on Coumadin for stoke prevention and will remain on for 3 months with a goal INR of 2-3), NG tube removed with improved appetite, pain controlled, ambulatory, discharge to TCU.      Patient seen today with spouse present, mild cognitive limitations, mediocre historian, using walker to ambulate, bilateral groin insertion sites healing, groin rash dry pink skin, chest incision approximated and glued, no s/s infection, mild pain with cough only, VS WNL, mild edema, reports having gained 'water weight' while in hospital, having some loose stool, only request is to restart eye vitamins, appears healthy.    CODE STATUS/ADVANCE DIRECTIVES DISCUSSION:  Prior  On file  ALLERGIES:   Allergies   Allergen Reactions     Azithromycin Hives     Iodinated Contrast Media [Diagnostic X-[employer]] Hives and Rash      PAST MEDICAL HISTORY:   Past Medical History:   Diagnosis Date     Aortic stenosis      Aortic stenosis, moderate       Chronic kidney disease, stage III (moderate) (H)      Colon polyp      Hyperlipidemia      Hypertension      Irritable bowel syndrome      Obesity (BMI 35.0-39.9 without comorbidity)       PAST SURGICAL HISTORY:   has a past surgical history that includes Eye surgery (06/01/2015); other surgical history (Bilateral); Colonoscopy w/wo Brush **Performed** (N/A, 12/11/2020); Coronary Angiogram (N/A, 10/20/2022); Right Heart Catheterization (N/A, 10/20/2022); Left Heart Catheterization (N/A, 10/20/2022); Picc Insertion - Triple Lumen (Right, 11/17/2022); Transcatheter Aortic Valve Replacement-Femoral Approach (N/A, 11/16/2022); and Or Transcatheter Aortic Valve Replacement, Other Percutaneous Approach (Standby) (N/A, 11/16/2022).  FAMILY HISTORY: family history is not on file.  SOCIAL HISTORY:   reports that she has never smoked. She has never used smokeless tobacco. She reports current alcohol use. She reports that she does not use drugs.  Patient's living condition: lives with spouse    Post Discharge Medication Reconciliation Status:   MED REC REQUIRED  Post Medication Reconciliation Status: discharge medications reconciled and changed, per note/orders       Current Outpatient Medications   Medication Sig     acetaminophen (TYLENOL) 325 MG tablet 2 tablets (650 mg) by Oral or Feeding Tube route every 4 hours as needed for other (For optimal non-opioid multimodal pain management to improve pain control.)     amiodarone (PACERONE) 200 MG tablet Take 1 tablet (200 mg) by mouth 2 times daily for 30 days     aspirin (ASA) 81 MG EC tablet Take 1 tablet (81 mg) by mouth daily     atorvastatin (LIPITOR) 40 MG tablet [ATORVASTATIN (LIPITOR) 40 MG TABLET] Take 40 mg by mouth at bedtime.     B.ani/L.aci/L.sal/L.plan/L.Rey (PROBIOTIC FORMULA ORAL) [B.ANI/L.ACI/L.SAL/L.PLAN/L.REY (PROBIOTIC FORMULA ORAL)] Take 1 capsule by mouth daily.     bumetanide (BUMEX) 1 MG tablet Take 1 tablet (1 mg) by mouth 2 times daily      "cholecalciferol, vitamin D3, 1,000 unit (25 mcg) tablet [CHOLECALCIFEROL, VITAMIN D3, 1,000 UNIT (25 MCG) TABLET] Take 1,000 Units by mouth daily.     magnesium oxide (MAG-OX) 400 MG tablet Take 1 tablet (400 mg) by mouth daily     melatonin 5 MG tablet Take 2.5 mg by mouth nightly as needed for sleep     methocarbamol (ROBAXIN) 500 MG tablet Take 1 tablet (500 mg) by mouth 4 times daily as needed for muscle spasms     metoprolol tartrate (LOPRESSOR) 25 MG tablet Take 1 tablet (25 mg) by mouth 2 times daily     miconazole with skin protectant (ANGELA ANTIFUNGAL) 2 % CREA cream Apply topically 2 times daily TO GROIN, PERINEUM, AND PERIANAL REGIONS     pantoprazole (PROTONIX) 40 MG EC tablet Take 1 tablet (40 mg) by mouth every morning (before breakfast) for 30 days     potassium chloride (KAYCIEL) 20 MEQ/15ML (10%) solution Take 15 mLs (20 mEq) by mouth 2 times daily     senna-docusate (SENOKOT-S/PERICOLACE) 8.6-50 MG tablet 1 tablet by Oral or Feeding Tube route nightly as needed for constipation     vit A/vit C/vit E/zinc/copper (PRESERVISION AREDS ORAL) [VIT A/VIT C/VIT E/ZINC/COPPER (PRESERVISION AREDS ORAL)] Take 1 tablet by mouth 2 (two) times a day.     warfarin ANTICOAGULANT (COUMADIN) 1 MG tablet Take 3 tablets (3 mg) by mouth daily     No current facility-administered medications for this visit.       ROS:  4 point ROS including Respiratory, CV, GI and , other than that noted in the HPI,  is negative    Vitals:  /62   Pulse 95   Temp 97.7  F (36.5  C)   Resp 18   Ht 1.549 m (5' 1\")   Wt 66.8 kg (147 lb 3.2 oz)   SpO2 98%   BMI 27.81 kg/m    Exam:  GENERAL APPEARANCE:  in no distress, appears healthy  ENT:  Mouth and posterior oropharynx normal, moist mucous membranes  RESP:  lungs clear to auscultation   CV:  peripheral edema 1+ in lower legs  ABDOMEN:  bowel sounds normal  M/S:   Gait and station abnormal using walker  SKIN:  Inspection of skin and subcutaneous tissue baseline  NEURO:   " Examination of sensation by touch normal  PSYCH:  affect and mood normal    Lab/Diagnostic data:  Recent labs in [facility] reviewed by me today.     ASSESSMENT/PLAN:    (I35.0) Severe aortic stenosis  (primary encounter diagnosis)  (Z95.2) S/P TAVR (transcatheter aortic valve replacement)  (S26.92XD) Laceration of heart with penetration into cardiac chamber, subsequent encounter  (L90.5,  Z98.890) Midline sternotomy scar  Comment: tolerated procedure well, incision approximated  Plan:   -PT/OT eval and treat  -start AREDs2 on request  -CBC, BMP on 12/5  -loperamide for loose stool  -APAP PRN for pain control  -continue ASA and statin  -staff to contact cardiology RE: notes state to start warfarin but not on facility discharge med list  -follow up cardiology 12/12    (I48.91) Atrial fibrillation, unspecified type (H)  Comment: today RRR  Plan: has ASA81 as only anticoagulant  -continue amodarone (new), metoprolol    (I10) Hypertension  Comment: SBP's in the 110 range  Plan: staff to check VS daily  -continue metoprolol    (R60.0) Bilateral leg edema  Comment: 1+ skin intact  Plan: continue tubigrip compression  -continue bumex 1mg BID and K 20mEq  -BMP on 12/5  -goal is weight reduction back to 140lbs    (R19.5) Loose stools  Comment: new complaint, intermittent  Plan: start loperamide TID PRN          Electronically signed by:  MAX Nathan CNP                     Sincerely,        MAX Nathan CNP

## 2022-12-02 NOTE — PROGRESS NOTES
[facility] GERIATRICS    PRIMARY CARE PROVIDER AND CLINIC:  Dave Merritt MD, [address] [address] / [facility] [address]  Chief Complaint   Patient presents with     Hospital F/U      [place] Medical Record Number:  [id number]  Place of Service where encounter took place:  [facility] ([facility]) [[id number]]    Jade Walker  is a 87 year old  (1935), admitted to the above facility from  [facility]. Hospital stay 11/16/22 through 12/1/22..   HPI:    Jade Walker is a 87 year old female with PMHx of severe aortic stenosis HTN, HLD, non-obstructive CAD, CKD stage III, IBS who underwent s/p TAVR c/b LV perforation s/p emergent sternotomy on 11/16, TAVR valve well seated, extubated POD#5, fluid overloaded and treated with diuretics, discharge with bumex, developed paroxysmal afib in the post-operative therapy and started amiodarone, (She was started on Coumadin for stoke prevention and will remain on for 3 months with a goal INR of 2-3), NG tube removed with improved appetite, pain controlled, ambulatory, discharge to TCU.      Patient seen today with spouse present, mild cognitive limitations, mediocre historian, using walker to ambulate, bilateral groin insertion sites healing, groin rash dry pink skin, chest incision approximated and glued, no s/s infection, mild pain with cough only, VS WNL, mild edema, reports having gained 'water weight' while in hospital, having some loose stool, only request is to restart eye vitamins, appears healthy.    CODE STATUS/ADVANCE DIRECTIVES DISCUSSION:  Prior  On file  ALLERGIES:   Allergies   Allergen Reactions     Azithromycin Hives     Iodinated Contrast Media [Diagnostic X-Ray Materials] Hives and Rash      PAST MEDICAL HISTORY:   Past Medical History:   Diagnosis Date     Aortic stenosis      Aortic stenosis, moderate      Chronic kidney disease, stage III (moderate) (H)      Colon polyp      Hyperlipidemia       Hypertension      Irritable bowel syndrome      Obesity (BMI 35.0-39.9 without comorbidity)       PAST SURGICAL HISTORY:   has a past surgical history that includes Eye surgery (06/01/2015); other surgical history (Bilateral); Colonoscopy w/wo Brush **Performed** (N/A, 12/11/2020); Coronary Angiogram (N/A, 10/20/2022); Right Heart Catheterization (N/A, 10/20/2022); Left Heart Catheterization (N/A, 10/20/2022); Picc Insertion - Triple Lumen (Right, 11/17/2022); Transcatheter Aortic Valve Replacement-Femoral Approach (N/A, 11/16/2022); and Or Transcatheter Aortic Valve Replacement, Other Percutaneous Approach (Standby) (N/A, 11/16/2022).  FAMILY HISTORY: family history is not on file.  SOCIAL HISTORY:   reports that she has never smoked. She has never used smokeless tobacco. She reports current alcohol use. She reports that she does not use drugs.  Patient's living condition: lives with spouse    Post Discharge Medication Reconciliation Status:   MED REC REQUIRED  Post Medication Reconciliation Status: discharge medications reconciled and changed, per note/orders       Current Outpatient Medications   Medication Sig     acetaminophen (TYLENOL) 325 MG tablet 2 tablets (650 mg) by Oral or Feeding Tube route every 4 hours as needed for other (For optimal non-opioid multimodal pain management to improve pain control.)     amiodarone (PACERONE) 200 MG tablet Take 1 tablet (200 mg) by mouth 2 times daily for 30 days     aspirin (ASA) 81 MG EC tablet Take 1 tablet (81 mg) by mouth daily     atorvastatin (LIPITOR) 40 MG tablet [ATORVASTATIN (LIPITOR) 40 MG TABLET] Take 40 mg by mouth at bedtime.     B.ani/L.aci/L.sal/L.plan/L.Rey (PROBIOTIC FORMULA ORAL) [B.ANI/L.ACI/L.SAL/L.PLAN/L.REY (PROBIOTIC FORMULA ORAL)] Take 1 capsule by mouth daily.     bumetanide (BUMEX) 1 MG tablet Take 1 tablet (1 mg) by mouth 2 times daily     cholecalciferol, vitamin D3, 1,000 unit (25 mcg) tablet [CHOLECALCIFEROL, VITAMIN D3, 1,000 UNIT (25  "MCG) TABLET] Take 1,000 Units by mouth daily.     magnesium oxide (MAG-OX) 400 MG tablet Take 1 tablet (400 mg) by mouth daily     melatonin 5 MG tablet Take 2.5 mg by mouth nightly as needed for sleep     methocarbamol (ROBAXIN) 500 MG tablet Take 1 tablet (500 mg) by mouth 4 times daily as needed for muscle spasms     metoprolol tartrate (LOPRESSOR) 25 MG tablet Take 1 tablet (25 mg) by mouth 2 times daily     miconazole with skin protectant (ANGELA ANTIFUNGAL) 2 % CREA cream Apply topically 2 times daily TO GROIN, PERINEUM, AND PERIANAL REGIONS     pantoprazole (PROTONIX) 40 MG EC tablet Take 1 tablet (40 mg) by mouth every morning (before breakfast) for 30 days     potassium chloride (KAYCIEL) 20 MEQ/15ML (10%) solution Take 15 mLs (20 mEq) by mouth 2 times daily     senna-docusate (SENOKOT-S/PERICOLACE) 8.6-50 MG tablet 1 tablet by Oral or Feeding Tube route nightly as needed for constipation     vit A/vit C/vit E/zinc/copper (PRESERVISION AREDS ORAL) [VIT A/VIT C/VIT E/ZINC/COPPER (PRESERVISION AREDS ORAL)] Take 1 tablet by mouth 2 (two) times a day.     warfarin ANTICOAGULANT (COUMADIN) 1 MG tablet Take 3 tablets (3 mg) by mouth daily     No current facility-administered medications for this visit.       ROS:  4 point ROS including Respiratory, CV, GI and , other than that noted in the HPI,  is negative    Vitals:  /62   Pulse 95   Temp 97.7  F (36.5  C)   Resp 18   Ht 1.549 m (5' 1\")   Wt 66.8 kg (147 lb 3.2 oz)   SpO2 98%   BMI 27.81 kg/m    Exam:  GENERAL APPEARANCE:  in no distress, appears healthy  ENT:  Mouth and posterior oropharynx normal, moist mucous membranes  RESP:  lungs clear to auscultation   CV:  peripheral edema 1+ in lower legs  ABDOMEN:  bowel sounds normal  M/S:   Gait and station abnormal using walker  SKIN:  Inspection of skin and subcutaneous tissue baseline  NEURO:   Examination of sensation by touch normal  PSYCH:  affect and mood normal    Lab/Diagnostic data:  Recent " labs in EPIC reviewed by me today.     ASSESSMENT/PLAN:    (I35.0) Severe aortic stenosis  (primary encounter diagnosis)  (Z95.2) S/P TAVR (transcatheter aortic valve replacement)  (S26.92XD) Laceration of heart with penetration into cardiac chamber, subsequent encounter  (L90.5,  Z98.890) Midline sternotomy scar  Comment: tolerated procedure well, incision approximated  Plan:   -PT/OT eval and treat  -start AREDs2 on request  -CBC, BMP on 12/5  -loperamide for loose stool  -APAP PRN for pain control  -continue ASA and statin  -staff to contact cardiology RE: notes state to start warfarin but not on facility discharge med list  -follow up cardiology 12/12    ADDEND: cards contacted, to start warfarin x30 days;  Start warfarin 1mg daily, goal 2-3, stop 1/2/23  Recheck INR on 12/5  Continue ASA81 until INR 2-3 then hold, restart 1/2/23    (I48.91) Atrial fibrillation, unspecified type (H)  Comment: today RRR  Plan: has ASA81 as only anticoagulant  -continue amodarone (new), metoprolol    (I10) Hypertension  Comment: SBP's in the 110 range  Plan: staff to check VS daily  -continue metoprolol    (R60.0) Bilateral leg edema  Comment: 1+ skin intact  Plan: continue tubigrip compression  -continue bumex 1mg BID and K 20mEq  -BMP on 12/5  -goal is weight reduction back to 140lbs    (R19.5) Loose stools  Comment: new complaint, intermittent  Plan: start loperamide TID PRN          Electronically signed by:  MAX Nathan CNP

## 2022-12-03 NOTE — PROGRESS NOTES
Clinic Care Coordination Contact  Care Team Conversations    Patient identified for care management outreach, however [facility] RN staff has already followed up with patient to ensure they are following up with PCP and have needs and resources met. Clinic RN will refer back to PAL BARKLEY if needed/appropriate.    Antonieta Mathew, [facility]  Social Work Care Coordinator - [facility]  Care Coordination  [email or web address]  Cell Phone: [phone]  Gender pronouns: she/her  Employed by [employer]

## 2022-12-05 ENCOUNTER — LAB REQUISITION (OUTPATIENT)
Dept: LAB | Facility: CLINIC | Age: 87
End: 2022-12-05

## 2022-12-05 ENCOUNTER — TELEPHONE (OUTPATIENT)
Dept: GERIATRICS | Facility: CLINIC | Age: 87
End: 2022-12-05

## 2022-12-05 DIAGNOSIS — Z95.2 S/P TAVR (TRANSCATHETER AORTIC VALVE REPLACEMENT): Primary | ICD-10-CM

## 2022-12-05 DIAGNOSIS — N18.9 CHRONIC KIDNEY DISEASE, UNSPECIFIED: ICD-10-CM

## 2022-12-05 LAB
ANION GAP SERPL CALCULATED.3IONS-SCNC: 17 MMOL/L (ref 7–15)
BUN SERPL-MCNC: 19.8 MG/DL (ref 8–23)
CALCIUM SERPL-MCNC: 8.5 MG/DL (ref 8.8–10.2)
CHLORIDE SERPL-SCNC: 100 MMOL/L (ref 98–107)
CREAT SERPL-MCNC: 1.03 MG/DL (ref 0.51–0.95)
DEPRECATED HCO3 PLAS-SCNC: 21 MMOL/L (ref 22–29)
ERYTHROCYTE [DISTWIDTH] IN BLOOD BY AUTOMATED COUNT: 22 % (ref 10–15)
GFR SERPL CREATININE-BSD FRML MDRD: 53 ML/MIN/1.73M2
GLUCOSE SERPL-MCNC: 92 MG/DL (ref 70–99)
HCT VFR BLD AUTO: 32.9 % (ref 35–47)
HGB BLD-MCNC: 10.2 G/DL (ref 11.7–15.7)
MCH RBC QN AUTO: 32.4 PG (ref 26.5–33)
MCHC RBC AUTO-ENTMCNC: 31 G/DL (ref 31.5–36.5)
MCV RBC AUTO: 104 FL (ref 78–100)
PLATELET # BLD AUTO: 332 10E3/UL (ref 150–450)
POTASSIUM SERPL-SCNC: 3.9 MMOL/L (ref 3.4–5.3)
RBC # BLD AUTO: 3.15 10E6/UL (ref 3.8–5.2)
SODIUM SERPL-SCNC: 138 MMOL/L (ref 136–145)
WBC # BLD AUTO: 5.5 10E3/UL (ref 4–11)

## 2022-12-05 PROCEDURE — 85027 COMPLETE CBC AUTOMATED: CPT | Performed by: NURSE PRACTITIONER

## 2022-12-05 PROCEDURE — 80048 BASIC METABOLIC PNL TOTAL CA: CPT | Performed by: NURSE PRACTITIONER

## 2022-12-05 NOTE — TELEPHONE ENCOUNTER
[facility] Geriatrics Triage Nurse INR     Provider: MAX Eldridge CNP   Facility: [facility]  Facility Type:  TCU    Caller: Yolanda  Call Back Number: [phone]  Reason for call: INR  Diagnosis/Goal: AVR goal 2-3     Todays INR: 2.4  Last INR 12/1 2.87  1mg every day     Heparin/Lovenox:  No  Currently on ABX?: No  Other interacting medication:  None  Missed or refused doses: No    Verbal Order/Direction given by Provider:   Warfarin 1mg Mon 12/5   Recheck INR on 12/6 (fingerstick)      Provider Giving Order:  MAX Eldridge CNP     Verbal Order given to: Yolanda Patton RN

## 2022-12-06 ENCOUNTER — PATIENT OUTREACH (OUTPATIENT)
Dept: CARE COORDINATION | Facility: CLINIC | Age: 87
End: 2022-12-06

## 2022-12-06 ENCOUNTER — TRANSITIONAL CARE UNIT VISIT (OUTPATIENT)
Dept: GERIATRICS | Facility: CLINIC | Age: 87
End: 2022-12-06
Payer: COMMERCIAL

## 2022-12-06 VITALS
HEART RATE: 66 BPM | HEIGHT: 61 IN | DIASTOLIC BLOOD PRESSURE: 71 MMHG | OXYGEN SATURATION: 98 % | TEMPERATURE: 96 F | RESPIRATION RATE: 18 BRPM | SYSTOLIC BLOOD PRESSURE: 123 MMHG | BODY MASS INDEX: 26.22 KG/M2 | WEIGHT: 138.9 LBS

## 2022-12-06 DIAGNOSIS — N18.30 STAGE 3 CHRONIC KIDNEY DISEASE, UNSPECIFIED WHETHER STAGE 3A OR 3B CKD (H): ICD-10-CM

## 2022-12-06 DIAGNOSIS — I48.0 PAROXYSMAL ATRIAL FIBRILLATION (H): ICD-10-CM

## 2022-12-06 DIAGNOSIS — I35.0 SEVERE AORTIC STENOSIS: Primary | ICD-10-CM

## 2022-12-06 DIAGNOSIS — Z71.89 ACP (ADVANCE CARE PLANNING): ICD-10-CM

## 2022-12-06 DIAGNOSIS — Z95.2 S/P TAVR (TRANSCATHETER AORTIC VALVE REPLACEMENT): ICD-10-CM

## 2022-12-06 PROCEDURE — 99497 ADVNCD CARE PLAN 30 MIN: CPT | Performed by: NURSE PRACTITIONER

## 2022-12-06 PROCEDURE — 99310 SBSQ NF CARE HIGH MDM 45: CPT | Performed by: NURSE PRACTITIONER

## 2022-12-06 NOTE — LETTER
12/6/2022        RE: Jade Walker  [address] [address]        [facility] GERIATRICS    Chief Complaint   Patient presents with     RECHECK     HPI:  Jade Walker is a 87 year old  (1935), who is being seen today for an episodic care visit at: [facility] AT [facility] ([facility]) [[id number]]. Today's concern is:   1. Severe aortic stenosis    2. S/P TAVR (transcatheter aortic valve replacement)    3. Stage 3 chronic kidney disease, unspecified whether stage 3a or 3b CKD (H)    4. Paroxysmal atrial fibrillation (H)    5. ACP (advance care planning)      Patient seen for follow up, chest incision glued and approximated, mild pain with cough only, WBC 5.5, creat 1.03, GFR 53, INR 2.5, no s/s bruising nor bleeding, overall appears healthy, reports feeling better post valve and less activity intolerance, states wanting to return home ASAP.    Advance Care Planning Goals of Care Discussion 12/6/2022  Goals of care discussed with Jade Walker on 12/6. Present at discussion: patient. Questions discussed and patient response:  What is your understanding now of where you are with your illness?: good. How much information about what is likely to be ahead with your illness would you like to have?: all. As the clinician I communicated the following to the patient regarding their prognosis: good. If your health situation worsens, what are your most important goals?: get home. What are your biggest fears and worries about the future with your health?: heart issues. Unacceptable function : NA. What abilities are so critical to your life that you cannot imagine living without them?: being independent. Pt does NOT want to: die yet. If you become sicker, how much are you willing to go through for the possibility of gaining more time?: maybe. Would this change if these were permanent states, if they did not get better?: maybe. How much does your agent and/or family know about your priorities  "and wishes?: hope everything. Added by MAX Nathan CNP        Allergies, and PMH/PSH reviewed in EPIC today.  REVIEW OF SYSTEMS:  4 point ROS including Respiratory, CV, GI and , other than that noted in the HPI,  is negative    Objective:   /71   Pulse 66   Temp (!) 96  F (35.6  C)   Resp 18   Ht 1.549 m (5' 1\")   Wt 63 kg (138 lb 14.4 oz)   SpO2 98%   BMI 26.24 kg/m    GENERAL APPEARANCE:  in no distress, appears healthy  ENT:  Mouth and posterior oropharynx normal, moist mucous membranes  RESP:  lungs clear to auscultation   CV:  no edema  ABDOMEN:  bowel sounds normal  M/S:   Gait and station abnormal unsteady gait  SKIN:  Inspection of skin and subcutaneous tissue baseline  NEURO:   Examination of sensation by touch normal  PSYCH:  affect and mood normal    [facility]. Please refer to them using [employer]/Care Everywhere.    Assessment/Plan:  (I35.0) Severe aortic stenosis  (primary encounter diagnosis)  (Z95.2) S/P TAVR (transcatheter aortic valve replacement)  Comment: post valve, also small laceration/penetration and required sternotomy   Plan: incision healing  -discontinue methocarbamol  -follow up cardiology on 12/12    (N18.30) Stage 3 chronic kidney disease, unspecified whether stage 3a or 3b CKD (H)  Comment: labs 12/5 creat 1.03, GFR 3  Plan: dose medications renally as possible  -repeat BMP with PCP    (I48.0) Paroxysmal atrial fibrillation (H)  Comment: INR 2.5  Plan: warfarin 1mg daily  -recheck INR on 12/9    (Z71.89) ACP (advance care planning)  Comment: POLST confirmed full code  Plan: [place] ADVANCE CARE PLANNING FIRST 30 MINS          I spent 17 minutes f2f with patient in addition to todays visit reviewing and completing a POLST.    MED REC REQUIRED  Post Medication Reconciliation Status: medication reconcilation previously completed during another office visit      Electronically signed by: MAX Nathan CNP "           Sincerely,        MAX Nathan CNP

## 2022-12-06 NOTE — PROGRESS NOTES
[facility] GERIATRICS    Chief Complaint   Patient presents with     RECHECK     HPI:  Jade Walker is a 87 year old  (1935), who is being seen today for an episodic care visit at: [facility] AT [facility] ([facility]) [[id number]]. Today's concern is:   1. Severe aortic stenosis    2. S/P TAVR (transcatheter aortic valve replacement)    3. Stage 3 chronic kidney disease, unspecified whether stage 3a or 3b CKD (H)    4. Paroxysmal atrial fibrillation (H)    5. ACP (advance care planning)      Patient seen for follow up, chest incision glued and approximated, mild pain with cough only, WBC 5.5, creat 1.03, GFR 53, INR 2.5, no s/s bruising nor bleeding, overall appears healthy, reports feeling better post valve and less activity intolerance, states wanting to return home ASAP.    Advance Care Planning Goals of Care Discussion 12/6/2022  Goals of care discussed with Jade Walker on 12/6. Present at discussion: patient. Questions discussed and patient response:  What is your understanding now of where you are with your illness?: good. How much information about what is likely to be ahead with your illness would you like to have?: all. As the clinician I communicated the following to the patient regarding their prognosis: good. If your health situation worsens, what are your most important goals?: get home. What are your biggest fears and worries about the future with your health?: heart issues. Unacceptable function : NA. What abilities are so critical to your life that you cannot imagine living without them?: being independent. Pt does NOT want to: die yet. If you become sicker, how much are you willing to go through for the possibility of gaining more time?: maybe. Would this change if these were permanent states, if they did not get better?: maybe. How much does your agent and/or family know about your priorities and wishes?: hope everything. Added by Quirino Suazo, MAX CNP        Allergies,  "and PMH/PSH reviewed in EPIC today.  REVIEW OF SYSTEMS:  4 point ROS including Respiratory, CV, GI and , other than that noted in the HPI,  is negative    Objective:   /71   Pulse 66   Temp (!) 96  F (35.6  C)   Resp 18   Ht 1.549 m (5' 1\")   Wt 63 kg (138 lb 14.4 oz)   SpO2 98%   BMI 26.24 kg/m    GENERAL APPEARANCE:  in no distress, appears healthy  ENT:  Mouth and posterior oropharynx normal, moist mucous membranes  RESP:  lungs clear to auscultation   CV:  no edema  ABDOMEN:  bowel sounds normal  M/S:   Gait and station abnormal unsteady gait  SKIN:  Inspection of skin and subcutaneous tissue baseline  NEURO:   Examination of sensation by touch normal  PSYCH:  affect and mood normal    Labs done in SNF are in [facility]. Please refer to them using [employer]/Care Everywhere.    Assessment/Plan:  (I35.0) Severe aortic stenosis  (primary encounter diagnosis)  (Z95.2) S/P TAVR (transcatheter aortic valve replacement)  Comment: post valve, also small laceration/penetration and required sternotomy   Plan: incision healing  -discontinue methocarbamol  -follow up cardiology on 12/12    (N18.30) Stage 3 chronic kidney disease, unspecified whether stage 3a or 3b CKD (H)  Comment: labs 12/5 creat 1.03, GFR 3  Plan: dose medications renally as possible  -repeat BMP with PCP    (I48.0) Paroxysmal atrial fibrillation (H)  Comment: INR 2.5  Plan: warfarin 1mg daily  -recheck INR on 12/9    (Z71.89) ACP (advance care planning)  Comment: POLST confirmed full code  Plan: [place] ADVANCE CARE PLANNING FIRST 30 MINS          I spent 17 minutes f2f with patient in addition to todays visit reviewing and completing a POLST.    MED REC REQUIRED  Post Medication Reconciliation Status: medication reconcilation previously completed during another office visit      Electronically signed by: MAX Nathan CNP     "

## 2022-12-09 ENCOUNTER — TRANSITIONAL CARE UNIT VISIT (OUTPATIENT)
Dept: GERIATRICS | Facility: CLINIC | Age: 87
End: 2022-12-09
Payer: COMMERCIAL

## 2022-12-09 VITALS
HEART RATE: 81 BPM | TEMPERATURE: 97.5 F | SYSTOLIC BLOOD PRESSURE: 130 MMHG | OXYGEN SATURATION: 98 % | DIASTOLIC BLOOD PRESSURE: 75 MMHG | HEIGHT: 61 IN | RESPIRATION RATE: 18 BRPM | WEIGHT: 137.9 LBS | BODY MASS INDEX: 26.04 KG/M2

## 2022-12-09 DIAGNOSIS — Z95.2 S/P TAVR (TRANSCATHETER AORTIC VALVE REPLACEMENT): ICD-10-CM

## 2022-12-09 DIAGNOSIS — R19.5 LOOSE STOOLS: ICD-10-CM

## 2022-12-09 DIAGNOSIS — L90.5 MIDLINE STERNOTOMY SCAR: ICD-10-CM

## 2022-12-09 DIAGNOSIS — Z98.890 MIDLINE STERNOTOMY SCAR: ICD-10-CM

## 2022-12-09 DIAGNOSIS — R60.0 BILATERAL LEG EDEMA: ICD-10-CM

## 2022-12-09 DIAGNOSIS — S26.92XD: ICD-10-CM

## 2022-12-09 DIAGNOSIS — I35.0 SEVERE AORTIC STENOSIS: Primary | ICD-10-CM

## 2022-12-09 PROCEDURE — 99316 NF DSCHRG MGMT 30 MIN+: CPT | Performed by: NURSE PRACTITIONER

## 2022-12-09 NOTE — LETTER
12/9/2022        RE: Jade Walker  [address]  [place] [address]        [facility] GERIATRICS DISCHARGE SUMMARY  PATIENT'S NAME: Jade Walker  YOB: 1935  MEDICAL RECORD NUMBER:  [id number]  Place of Service where encounter took place:  [facility]  AT [facility] ([facility]) [[id number]]    PRIMARY CARE PROVIDER AND CLINIC RESPONSIBLE AFTER TRANSFER:   Dave Merritt MD, [address] / [facility] [address]    Non-FMG Provider     Transferring providers: MAX Eldridge CNP; Abhilash Mejía MD  Recent Hospitalization/ED:  [facility] stay 11/16/22 to 12/1/22.  Date of SNF Admission: December 01, 2022  Date of SNF (anticipated) Discharge: December 09, 2022  Discharged to: previous independent home  Cognitive Scores: NA  Physical Function: Pivot transfers  DME: Walker    CODE STATUS/ADVANCE DIRECTIVES DISCUSSION:  Prior   ALLERGIES: Azithromycin and Iodinated contrast media [diagnostic x-ray materials]    NURSING FACILITY COURSE   Medication Changes/Rationale:     See notes    Summary of nursing facility stay:      Severe aortic stenosis  S/P TAVR (transcatheter aortic valve replacement)  Laceration of heart with penetration into cardiac chamber, subsequent encounter  Midline sternotomy scar  Bilateral leg edema  Loose stools     (I35.0) Severe aortic stenosis  (primary encounter diagnosis)  (Z95.2) S/P TAVR (transcatheter aortic valve replacement)  (S26.92XD) Laceration of heart with penetration into cardiac chamber, subsequent encounter  (L90.5,  Z98.890) Midline sternotomy scar  Comment: tolerated procedure well, incision approximated  Plan:   -PT/OT eval and treat  -start AREDs2 on request  -CBC, BMP on 12/5  -loperamide for loose stool  -discontinue methocarbamol  -APAP PRN for pain control  -continue ASA and statin  -warfarin x30 days, end 1/2/23 then restart ASA81 on 1/2/23  -INR today 3.2  -warfarin 0.5mg  daily; recheck on 12/12 or 12/13  -follow up cardiology 12/12     (I48.91) Atrial fibrillation, unspecified type (H)  Comment: today RRR  Plan: has ASA81 as only anticoagulant  -continue amodarone (new), metoprolol     (I10) Hypertension  Comment: SBP's in the 110 range  Plan: staff to check VS daily  -continue metoprolol     (R60.0) Bilateral leg edema  Comment: 1+ skin intact  Plan: continue tubigrip compression  -continue bumex 1mg BID and K 20mEq  -BMP on 12/5 in EPIC  -goal is weight reduction back to 140lbs     (R19.5) Loose stools  Comment: new complaint, intermittent  Plan: start loperamide TID PRN    Discharge Medications:  MED REC REQUIRED  Post Medication Reconciliation Status: medication reconcilation previously completed during another office visit       Current Outpatient Medications   Medication Sig Dispense Refill     acetaminophen (TYLENOL) 325 MG tablet 2 tablets (650 mg) by Oral or Feeding Tube route every 4 hours as needed for other (For optimal non-opioid multimodal pain management to improve pain control.)       amiodarone (PACERONE) 200 MG tablet Take 1 tablet (200 mg) by mouth 2 times daily for 30 days 60 tablet 0     aspirin (ASA) 81 MG EC tablet Take 1 tablet (81 mg) by mouth daily       atorvastatin (LIPITOR) 40 MG tablet [ATORVASTATIN (LIPITOR) 40 MG TABLET] Take 40 mg by mouth at bedtime.       B.ani/L.aci/L.sal/L.plan/L.Rey (PROBIOTIC FORMULA ORAL) [B.ANI/L.ACI/L.SAL/L.PLAN/L.REY (PROBIOTIC FORMULA ORAL)] Take 1 capsule by mouth daily.       bumetanide (BUMEX) 1 MG tablet Take 1 tablet (1 mg) by mouth 2 times daily       cholecalciferol, vitamin D3, 1,000 unit (25 mcg) tablet [CHOLECALCIFEROL, VITAMIN D3, 1,000 UNIT (25 MCG) TABLET] Take 1,000 Units by mouth daily.       loperamide (IMODIUM A-D) 2 MG tablet Take 1 tablet (2 mg) by mouth 3 times daily as needed for diarrhea 1 tablet 0     magnesium oxide (MAG-OX) 400 MG tablet Take 1 tablet (400 mg) by mouth daily       melatonin 5 MG  "tablet Take 2.5 mg by mouth nightly as needed for sleep       metoprolol tartrate (LOPRESSOR) 25 MG tablet Take 1 tablet (25 mg) by mouth 2 times daily       miconazole with skin protectant (ANGELA ANTIFUNGAL) 2 % CREA cream Apply topically 2 times daily TO GROIN, PERINEUM, AND PERIANAL REGIONS       pantoprazole (PROTONIX) 40 MG EC tablet Take 1 tablet (40 mg) by mouth every morning (before breakfast) for 30 days       potassium chloride (KAYCIEL) 20 MEQ/15ML (10%) solution Take 15 mLs (20 mEq) by mouth 2 times daily       senna-docusate (SENOKOT-S/PERICOLACE) 8.6-50 MG tablet 1 tablet by Oral or Feeding Tube route nightly as needed for constipation       vit A/vit C/vit E/zinc/copper (PRESERVISION AREDS ORAL) [VIT A/VIT C/VIT E/ZINC/COPPER (PRESERVISION AREDS ORAL)] Take 1 tablet by mouth 2 (two) times a day.       warfarin ANTICOAGULANT (COUMADIN) 1 MG tablet Take 3 tablets (3 mg) by mouth daily          Controlled medications:   not applicable/none     Past Medical History:   Past Medical History:   Diagnosis Date     Aortic stenosis      Aortic stenosis, moderate      Chronic kidney disease, stage III (moderate) (H)      Colon polyp      Hyperlipidemia      Hypertension      Irritable bowel syndrome      Obesity (BMI 35.0-39.9 without comorbidity)      Physical Exam:   Vitals: /75   Pulse 81   Temp 97.5  F (36.4  C)   Resp 18   Ht 1.549 m (5' 1\")   Wt 62.6 kg (137 lb 14.4 oz)   SpO2 98%   BMI 26.06 kg/m    BMI: Body mass index is 26.06 kg/m .  GENERAL APPEARANCE:  in no distress, appears healthy  ENT:  Mouth and posterior oropharynx normal, moist mucous membranes  RESP:  lungs clear to auscultation   CV:  peripheral edema mild+ in lower legs  ABDOMEN:  bowel sounds normal  M/S:   Gait and station abnormal unsteady gait  SKIN:  Inspection of skin and subcutaneous tissue baseline  NEURO:   Examination of sensation by touch normal  PSYCH:  affect and mood normal     SNF labs: Labs done in SNF are in " [facility]. Please refer to them using [employer]/Care Everywhere.    DISCHARGE PLAN:    Follow up labs: INR due 12/12 or 13 to be drawn by home care with results to PCP    Medical Follow Up:      Follow up with primary care provider in 1 weeks    Current [facility] scheduled appointments:   NA    Discharge Services: Home Care:  Occupational Therapy, Physical Therapy, Registered Nurse and Home Health Aide    Discharge Instructions Verbalized to Patient at Discharge:     None    TOTAL DISCHARGE TIME:   Greater than 30 minutes  Electronically signed by:  MAX Nathan CNP         Documentation of Face-to-Face and Certification for Home Health Services     Patient: Jade Walker   YOB: 1935  MR Number: [id number]  Today's Date: 12/9/2022    I certify that patient: Jade Walker is under my care and that I, or a nurse practitioner or physician's assistant working with me, had a face-to-face encounter that meets the physician face-to-face encounter requirements with this patient on: 12/9/2022.    This encounter with the patient was in whole, or in part, for the following medical condition, which is the primary reason for home health care:   1. Severe aortic stenosis    2. S/P TAVR (transcatheter aortic valve replacement)    3. Laceration of heart with penetration into cardiac chamber, subsequent encounter    4. Midline sternotomy scar    5. Bilateral leg edema    6. Loose stools          I certify that, based on my findings, the following services are medically necessary home health services: Nursing, Occupational Therapy, Physical Therapy and HHA.    My clinical findings support the need for the above services because: Nurse is needed: To provide caregiver training to assist with: INR's.., Occupational Therapy Services are needed to assess and treat cognitive ability and address ADL safety due to impairment in transfers., Physical Therapy Services are needed to assess and treat the  following functional impairments: gait instability. and HHA for bathing    Further, I certify that my clinical findings support that this patient is homebound (i.e. absences from home require considerable and taxing effort and are for medical reasons or [employer] services or infrequently or of short duration when for other reasons) because: Requires assistance of another person or specialized equipment to access medical services because patient: Is unable to operate assistive equipment on their own...    Based on the above findings. I certify that this patient is confined to the home and needs intermittent skilled nursing care, physical therapy and/or speech therapy.  The patient is under my care, and I have initiated the establishment of the plan of care.  This patient will be followed by a physician who will periodically review the plan of care.  Physician/Provider to provide follow up care: Dave Merritt    Responsible Medicare certified PECOS Physician: Quirino Suazo NP  Electronic Physician Signature  Date: 12/9/2022                        Sincerely,        MAX Nathan CNP

## 2022-12-09 NOTE — PROGRESS NOTES
[facility] GERIATRICS DISCHARGE SUMMARY  PATIENT'S NAME: Jade Walker  YOB: 1935  MEDICAL RECORD NUMBER:  [id number]  Place of Service where encounter took place:  [facility]  AT [facility] ([facility] [[id number]]    PRIMARY CARE PROVIDER AND CLINIC RESPONSIBLE AFTER TRANSFER:   [facility] [address]    Non-FMG Provider     Transferring providers: MAX Eldridge CNP; Abhilash Mejía MD  Recent Hospitalization/ED:  [facility] stay 11/16/22 to 12/1/22.  Date of SNF Admission: December 01, 2022  Date of SNF (anticipated) Discharge: December 09, 2022  Discharged to: previous independent home  Cognitive Scores: NA  Physical Function: Pivot transfers  DME: Walker    CODE STATUS/ADVANCE DIRECTIVES DISCUSSION:  Prior   ALLERGIES: Azithromycin and Iodinated contrast media [diagnostic x-ray materials]    NURSING FACILITY COURSE   Medication Changes/Rationale:     See notes    Summary of nursing facility stay:      Severe aortic stenosis  S/P TAVR (transcatheter aortic valve replacement)  Laceration of heart with penetration into cardiac chamber, subsequent encounter  Midline sternotomy scar  Bilateral leg edema  Loose stools     (I35.0) Severe aortic stenosis  (primary encounter diagnosis)  (Z95.2) S/P TAVR (transcatheter aortic valve replacement)  (S26.92XD) Laceration of heart with penetration into cardiac chamber, subsequent encounter  (L90.5,  Z98.890) Midline sternotomy scar  Comment: tolerated procedure well, incision approximated  Plan:   -PT/OT eval and treat  -start AREDs2 on request  -CBC, BMP on 12/5  -loperamide for loose stool  -discontinue methocarbamol  -APAP PRN for pain control  -continue ASA and statin  -warfarin x30 days, end 1/2/23 then restart ASA81 on 1/2/23  -INR today 3.2  -warfarin 0.5mg daily; recheck on 12/12 or 12/13  -follow up cardiology 12/12     (I48.91) Atrial fibrillation,  unspecified type (H)  Comment: today RRR  Plan: has ASA81 as only anticoagulant  -continue amodarone (new), metoprolol     (I10) Hypertension  Comment: SBP's in the 110 range  Plan: staff to check VS daily  -continue metoprolol     (R60.0) Bilateral leg edema  Comment: 1+ skin intact  Plan: continue tubigrip compression  -continue bumex 1mg BID and K 20mEq  -BMP on 12/5 in EPIC  -goal is weight reduction back to 140lbs     (R19.5) Loose stools  Comment: new complaint, intermittent  Plan: start loperamide TID PRN    Discharge Medications:  MED REC REQUIRED  Post Medication Reconciliation Status: medication reconcilation previously completed during another office visit       Current Outpatient Medications   Medication Sig Dispense Refill     acetaminophen (TYLENOL) 325 MG tablet 2 tablets (650 mg) by Oral or Feeding Tube route every 4 hours as needed for other (For optimal non-opioid multimodal pain management to improve pain control.)       amiodarone (PACERONE) 200 MG tablet Take 1 tablet (200 mg) by mouth 2 times daily for 30 days 60 tablet 0     aspirin (ASA) 81 MG EC tablet Take 1 tablet (81 mg) by mouth daily       atorvastatin (LIPITOR) 40 MG tablet [ATORVASTATIN (LIPITOR) 40 MG TABLET] Take 40 mg by mouth at bedtime.       B.ani/L.aci/L.sal/L.plan/L.Rey (PROBIOTIC FORMULA ORAL) [B.ANI/L.ACI/L.SAL/L.PLAN/L.REY (PROBIOTIC FORMULA ORAL)] Take 1 capsule by mouth daily.       bumetanide (BUMEX) 1 MG tablet Take 1 tablet (1 mg) by mouth 2 times daily       cholecalciferol, vitamin D3, 1,000 unit (25 mcg) tablet [CHOLECALCIFEROL, VITAMIN D3, 1,000 UNIT (25 MCG) TABLET] Take 1,000 Units by mouth daily.       loperamide (IMODIUM A-D) 2 MG tablet Take 1 tablet (2 mg) by mouth 3 times daily as needed for diarrhea 1 tablet 0     magnesium oxide (MAG-OX) 400 MG tablet Take 1 tablet (400 mg) by mouth daily       melatonin 5 MG tablet Take 2.5 mg by mouth nightly as needed for sleep       metoprolol tartrate (LOPRESSOR) 25  "MG tablet Take 1 tablet (25 mg) by mouth 2 times daily       miconazole with skin protectant (ANGELA ANTIFUNGAL) 2 % CREA cream Apply topically 2 times daily TO GROIN, PERINEUM, AND PERIANAL REGIONS       pantoprazole (PROTONIX) 40 MG EC tablet Take 1 tablet (40 mg) by mouth every morning (before breakfast) for 30 days       potassium chloride (KAYCIEL) 20 MEQ/15ML (10%) solution Take 15 mLs (20 mEq) by mouth 2 times daily       senna-docusate (SENOKOT-S/PERICOLACE) 8.6-50 MG tablet 1 tablet by Oral or Feeding Tube route nightly as needed for constipation       vit A/vit C/vit E/zinc/copper (PRESERVISION AREDS ORAL) [VIT A/VIT C/VIT E/ZINC/COPPER (PRESERVISION AREDS ORAL)] Take 1 tablet by mouth 2 (two) times a day.       warfarin ANTICOAGULANT (COUMADIN) 1 MG tablet Take 3 tablets (3 mg) by mouth daily          Controlled medications:   not applicable/none     Past Medical History:   Past Medical History:   Diagnosis Date     Aortic stenosis      Aortic stenosis, moderate      Chronic kidney disease, stage III (moderate) (H)      Colon polyp      Hyperlipidemia      Hypertension      Irritable bowel syndrome      Obesity (BMI 35.0-39.9 without comorbidity)      Physical Exam:   Vitals: /75   Pulse 81   Temp 97.5  F (36.4  C)   Resp 18   Ht 1.549 m (5' 1\")   Wt 62.6 kg (137 lb 14.4 oz)   SpO2 98%   BMI 26.06 kg/m    BMI: Body mass index is 26.06 kg/m .  GENERAL APPEARANCE:  in no distress, appears healthy  ENT:  Mouth and posterior oropharynx normal, moist mucous membranes  RESP:  lungs clear to auscultation   CV:  peripheral edema mild+ in lower legs  ABDOMEN:  bowel sounds normal  M/S:   Gait and station abnormal unsteady gait  SKIN:  Inspection of skin and subcutaneous tissue baseline  NEURO:   Examination of sensation by touch normal  PSYCH:  affect and mood normal     SNF labs: Labs done in SNF are in [place] EPIC. Please refer to them using EPIC/Care Everywhere.    DISCHARGE PLAN:    Follow up " labs: INR due 12/12 or 13 to be drawn by home care with results to PCP    Medical Follow Up:      Follow up with primary care provider in 1 weeks    [facility] scheduled appointments:   NA    Discharge Services: Home Care:  Occupational Therapy, Physical Therapy, Registered Nurse and Home Health Aide    Discharge Instructions Verbalized to Patient at Discharge:     None    TOTAL DISCHARGE TIME:   Greater than 30 minutes  Electronically signed by:  MAX Nathan CNP         Documentation of Face-to-Face and Certification for Home Health Services     Patient: Jade Walker   YOB: 1935  MR Number: [id number]  Today's Date: 12/9/2022    I certify that patient: Jade Walker is under my care and that I, or a nurse practitioner or physician's assistant working with me, had a face-to-face encounter that meets the physician face-to-face encounter requirements with this patient on: 12/9/2022.    This encounter with the patient was in whole, or in part, for the following medical condition, which is the primary reason for home health care:   1. Severe aortic stenosis    2. S/P TAVR (transcatheter aortic valve replacement)    3. Laceration of heart with penetration into cardiac chamber, subsequent encounter    4. Midline sternotomy scar    5. Bilateral leg edema    6. Loose stools          I certify that, based on my findings, the following services are medically necessary home health services: Nursing, Occupational Therapy, Physical Therapy and HHA.    My clinical findings support the need for the above services because: Nurse is needed: To provide caregiver training to assist with: INR's.., Occupational Therapy Services are needed to assess and treat cognitive ability and address ADL safety due to impairment in transfers., Physical Therapy Services are needed to assess and treat the following functional impairments: gait instability. and HHA for bathing    Further, I certify  that my clinical findings support that this patient is homebound (i.e. absences from home require considerable and taxing effort and are for medical reasons or [employer] services or infrequently or of short duration when for other reasons) because: Requires assistance of another person or specialized equipment to access medical services because patient: Is unable to operate assistive equipment on their own...    Based on the above findings. I certify that this patient is confined to the home and needs intermittent skilled nursing care, physical therapy and/or speech therapy.  The patient is under my care, and I have initiated the establishment of the plan of care.  This patient will be followed by a physician who will periodically review the plan of care.  Physician/Provider to provide follow up care: Dave Merritt    Responsible Medicare certified PECOS Physician: Quirino Suazo NP  Electronic Physician Signature  Date: 12/9/2022

## 2022-12-17 DIAGNOSIS — I35.0 SEVERE AORTIC STENOSIS: ICD-10-CM

## 2022-12-17 RX ORDER — AMIODARONE HYDROCHLORIDE 200 MG/1
200 TABLET ORAL 2 TIMES DAILY
Qty: 14 TABLET | Refills: 0 | Status: SHIPPED | OUTPATIENT
Start: 2022-12-17 | End: 2023-01-27

## 2022-12-20 ENCOUNTER — OFFICE VISIT (OUTPATIENT)
Dept: CARDIOLOGY | Facility: CLINIC | Age: 87
End: 2022-12-20
Attending: THORACIC SURGERY (CARDIOTHORACIC VASCULAR SURGERY)
Payer: COMMERCIAL

## 2022-12-20 VITALS
HEART RATE: 90 BPM | SYSTOLIC BLOOD PRESSURE: 121 MMHG | WEIGHT: 131.6 LBS | DIASTOLIC BLOOD PRESSURE: 76 MMHG | OXYGEN SATURATION: 99 % | HEIGHT: 62 IN | BODY MASS INDEX: 24.22 KG/M2

## 2022-12-20 DIAGNOSIS — I48.0 PAROXYSMAL ATRIAL FIBRILLATION (H): Primary | ICD-10-CM

## 2022-12-20 PROCEDURE — 99024 POSTOP FOLLOW-UP VISIT: CPT | Performed by: PHYSICIAN ASSISTANT

## 2022-12-20 PROCEDURE — G0463 HOSPITAL OUTPT CLINIC VISIT: HCPCS

## 2022-12-20 RX ORDER — AMIODARONE HYDROCHLORIDE 200 MG/1
200 TABLET ORAL DAILY
Qty: 30 TABLET | Refills: 0 | Status: SHIPPED | OUTPATIENT
Start: 2022-12-20 | End: 2023-01-27

## 2022-12-20 ASSESSMENT — PAIN SCALES - GENERAL: PAINLEVEL: NO PAIN (0)

## 2022-12-20 NOTE — PROGRESS NOTES
CARDIOTHORACIC SURGERY FOLLOW-UP VISIT     Jade Walker   1935   [id number]      Reason for visit: Post-Op from Repair of laceration of lateral wall of LV during TAVR procedure with Dr. Bone on 11/16/22    HPI: Jade Walker is a 87 year old female seen in clinic for a routine follow-up appointment after surgery. Patient has past medical history of severe aortic stenosis HTN, HLD, non-obstructive CAD, CKD stage III, IBS  Hospital course was initially complicated by LV perforation during TAVR procedure requiring sternotomy and patching of LV. Patient was discharged to inpatient rehab on 12/1/22.    Patient has been doing well since discharge and now returns to clinic for postop visit.  Today in clinic patient sounds to be in a regular rhythm with HR <100 bpm.   Discharge weight 147 lbs; weight today 131 lbs.   Appears euvolemic upon examinination with no appreciable JVD, BLE edema, abdominal bloating. LS are CTA.  Discussed the importance of nutrition in healing and staying adequately hydrated.   Midline sternal incision healing well with no noted erythema or drainage or signs of infection. Increasing activity and strength overall.  Patient denies any fever, chills, chest pain, palpitations, edema, SOB, lightheadedness, nausea and vomiting.    Patient is having normal bowel movements and voiding without problems.  Has been attending cardiac rehabilitation and that is going well to start soon.    Patient is on Coumadin and INR is subtherapeutic, recent increase in coumadin dosing per PCP.     PAST MEDICAL HISTORY:  Past Medical History:   Diagnosis Date     Aortic stenosis      Aortic stenosis, moderate      Chronic kidney disease, stage III (moderate) (H)      Colon polyp      Hyperlipidemia      Hypertension      Irritable bowel syndrome      Obesity (BMI 35.0-39.9 without comorbidity)        PAST SURGICAL HISTORY:  Past Surgical History:   Procedure Laterality Date     CV CORONARY ANGIOGRAM N/A  10/20/2022    Procedure: Coronary Angiogram [[id number]];  Surgeon: Tone Hollins MD;  Location:  HEART CARDIAC CATH LAB     CV LEFT HEART CATH N/A 10/20/2022    Procedure: Left Heart Cath;  Surgeon: Tone Hollins MD;  Location:  HEART CARDIAC CATH LAB     CV RIGHT HEART CATH MEASUREMENTS RECORDED N/A 10/20/2022    Procedure: Right Heart Cath;  Surgeon: Tone Hollins MD;  Location:  HEART CARDIAC CATH LAB     CV TRANSCATHETER AORTIC VALVE REPLACEMENT-FEMORAL APPROACH N/A 11/16/2022    Procedure: Transfemoral (Contreras) Transcatheter Aortic Valve Replacement;  Surgeon: Tone Hollins MD;  Location:  OR     EYE SURGERY  06/01/2015     OR TRANSCATHETER AORTIC VALVE REPLACEMENT, OTHER PERCUTANEOUS APPROACH (STANDBY) N/A 11/16/2022    Procedure: Median Sternotomy, repair of aortic VALVE,  cardiopulmonary bypass PUMP, INTRAOPERATIVE TRANSESOPHAGEAL ECHOCARDIOGRAM PER ANESTHESIA;  Surgeon: Dallin Bone MD;  Location:  OR     OTHER SURGICAL HISTORY Bilateral     cataracts     PICC INSERTION - TRIPLE LUMEN Right 11/17/2022    right basilic 5 fr tl picc 39 cm     ZZHC COLONOSCOPY W/WO BRUSH/WASH N/A 12/11/2020    Procedure: COLONOSCOPY;  Surgeon: Stan Porter MD;  Location: [facility] [place];  Service: Gastroenterology       CURRENT MEDICATIONS:   Current Outpatient Medications   Medication     amiodarone (PACERONE) 200 MG tablet     atorvastatin (LIPITOR) 40 MG tablet     B.ani/L.aci/L.sal/L.plan/L.Rey (PROBIOTIC FORMULA ORAL)     cholecalciferol, vitamin D3, 1,000 unit (25 mcg) tablet     magnesium oxide (MAG-OX) 400 MG tablet     melatonin 5 MG tablet     metoprolol tartrate (LOPRESSOR) 25 MG tablet     psyllium (METAMUCIL/KONSYL) 58.6 % powder     vit A/vit C/vit E/zinc/copper (PRESERVISION AREDS ORAL)     warfarin ANTICOAGULANT (COUMADIN) 1 MG tablet     acetaminophen (TYLENOL) 325 MG tablet     aspirin (ASA) 81 MG EC tablet     loperamide (IMODIUM A-D) 2 MG tablet      miconazole with skin protectant (ANGELA ANTIFUNGAL) 2 % CREA cream     senna-docusate (SENOKOT-S/PERICOLACE) 8.6-50 MG tablet     No current facility-administered medications for this visit.       ALLERGIES:      Allergies   Allergen Reactions     Azithromycin Hives     Iodinated Contrast Media [Diagnostic X-Ray Materials] Hives and Rash         ROS:  Review of symptoms otherwise negative unless commented about in HPI.     LABS:  Last Basic Metabolic Panel:  Lab Results   Component Value Date     12/05/2022      Lab Results   Component Value Date    POTASSIUM 3.9 12/05/2022    POTASSIUM 4.2 11/18/2022     Lab Results   Component Value Date    CHLORIDE 100 12/05/2022    CHLORIDE 103 11/17/2021     Lab Results   Component Value Date    SHAHEEN 8.5 12/05/2022     Lab Results   Component Value Date    CO2 21 12/05/2022    CO2 28 11/17/2021     Lab Results   Component Value Date    BUN 19.8 12/05/2022    BUN 18 11/17/2021     Lab Results   Component Value Date    CR 1.03 12/05/2022     Lab Results   Component Value Date    GLC 92 12/05/2022     11/30/2022    GLC 85 11/17/2021       Last CBC:   Lab Results   Component Value Date    WBC 5.5 12/05/2022     Lab Results   Component Value Date    RBC 3.15 12/05/2022     Lab Results   Component Value Date    HGB 10.2 12/05/2022     Lab Results   Component Value Date    HCT 32.9 12/05/2022     No components found for: MCT  Lab Results   Component Value Date     12/05/2022     Lab Results   Component Value Date    MCH 32.4 12/05/2022     Lab Results   Component Value Date    MCHC 31.0 12/05/2022     Lab Results   Component Value Date    RDW 22.0 12/05/2022     Lab Results   Component Value Date     12/05/2022       INR:  Lab Results   Component Value Date    INR 2.87 12/01/2022    INR 3.04 11/30/2022    INR 2.15 11/29/2022    INR 1.56 11/28/2022    INR 1.28 11/27/2022    INR 1.11 11/26/2022    INR 1.05 11/25/2022    INR 1.15 11/19/2022    INR 1.24  "11/18/2022    INR 1.35 11/17/2022    INR 1.30 11/16/2022    INR 1.36 11/16/2022         IMAGING: None    PHYSICAL EXAM:   /76 (BP Location: Right arm, Patient Position: Chair, Cuff Size: Adult Regular)   Pulse 90   Ht 1.573 m (5' 1.93\")   Wt 59.7 kg (131 lb 9.6 oz)   SpO2 99%   BMI 24.13 kg/m    General: alert and oriented x 3, pleasant, no acute distress, normal mood and affect  Neuro: no focal deficits   CV: S1 S2, no murmurs, rubs or gallops, regular rate and rhythm, no peripheral edema  Pulm: bilateral breath sounds, clear to auscultation, easy work of breathing  Incision: incisions clean dry and intact without erythema, swelling or drainage    PROCEDURES: None       ASSESSMENT/PLAN:  Jade Walker is a 87 year old female status post LV perforation who returns to clinic for postop visit.     1. Doing well overall from a surgical standpoint. Incisions are healing well with no signs of infection. Increasing activity and strength overall.   2. Hemodynamics are stable. OK to stop bumex and potassium, and protonix.  3. Follow up with your cardiologist, as scheduled  4. Continue Outpatient Cardiac Rehab until completed.   5. Continue sternal precautions for 12 weeks from surgery date.        The total time spent with the patient was 35 minutes, > 50% of which was spent in counseling.    CC  Dave Merritt     "

## 2022-12-20 NOTE — LETTER
12/20/2022      RE: Jade Walker  [address] [facility] [address]       Dear Colleague,    Thank you for the opportunity to participate in the care of your patient, Jade Walker, at the [facility] [place] [facility]. Please see a copy of my visit note below.    CARDIOTHORACIC SURGERY FOLLOW-UP VISIT     Jade Walker   1935   [id number]      Reason for visit: Post-Op from Repair of laceration of lateral wall of LV during TAVR procedure with Dr. Bone on 11/16/22    HPI: Jade Walker is a 87 year old female seen in clinic for a routine follow-up appointment after surgery. Patient has past medical history of severe aortic stenosis HTN, HLD, non-obstructive CAD, CKD stage III, IBS  Hospital course was initially complicated by LV perforation during TAVR procedure requiring sternotomy and patching of LV. Patient was discharged to inpatient rehab on 12/1/22.    Patient has been doing well since discharge and now returns to clinic for postop visit.  Today in clinic patient sounds to be in a regular rhythm with HR <100 bpm.   Discharge weight 147 lbs; weight today 131 lbs.   Appears euvolemic upon examinination with no appreciable JVD, BLE edema, abdominal bloating. LS are CTA.  Discussed the importance of nutrition in healing and staying adequately hydrated.   Midline sternal incision healing well with no noted erythema or drainage or signs of infection. Increasing activity and strength overall.  Patient denies any fever, chills, chest pain, palpitations, edema, SOB, lightheadedness, nausea and vomiting.    Patient is having normal bowel movements and voiding without problems.  Has been attending cardiac rehabilitation and that is going well to start soon.    Patient is on Coumadin and INR is subtherapeutic, recent increase in coumadin dosing per PCP.     PAST MEDICAL HISTORY:  Past Medical History:   Diagnosis Date      Aortic stenosis      Aortic stenosis, moderate      Chronic kidney disease, stage III (moderate) (H)      Colon polyp      Hyperlipidemia      Hypertension      Irritable bowel syndrome      Obesity (BMI 35.0-39.9 without comorbidity)        PAST SURGICAL HISTORY:  Past Surgical History:   Procedure Laterality Date     CV CORONARY ANGIOGRAM N/A 10/20/2022    Procedure: Coronary Angiogram [[id number]];  Surgeon: Tone Hollins MD;  Location:  HEART CARDIAC CATH LAB     CV LEFT HEART CATH N/A 10/20/2022    Procedure: Left Heart Cath;  Surgeon: Tone Hollins MD;  Location:  HEART CARDIAC CATH LAB     CV RIGHT HEART CATH MEASUREMENTS RECORDED N/A 10/20/2022    Procedure: Right Heart Cath;  Surgeon: Tone Hollins MD;  Location:  HEART CARDIAC CATH LAB     CV TRANSCATHETER AORTIC VALVE REPLACEMENT-FEMORAL APPROACH N/A 11/16/2022    Procedure: Transfemoral (Contreras) Transcatheter Aortic Valve Replacement;  Surgeon: Tone Hollins MD;  Location:  OR     EYE SURGERY  06/01/2015     OR TRANSCATHETER AORTIC VALVE REPLACEMENT, OTHER PERCUTANEOUS APPROACH (STANDBY) N/A 11/16/2022    Procedure: Median Sternotomy, repair of aortic VALVE,  cardiopulmonary bypass PUMP, INTRAOPERATIVE TRANSESOPHAGEAL ECHOCARDIOGRAM PER ANESTHESIA;  Surgeon: Dallin Bone MD;  Location:  OR     OTHER SURGICAL HISTORY Bilateral     cataracts     PICC INSERTION - TRIPLE LUMEN Right 11/17/2022    right basilic 5 fr tl picc 39 cm     ZZHC COLONOSCOPY W/WO BRUSH/WASH N/A 12/11/2020    Procedure: COLONOSCOPY;  Surgeon: Stan Porter MD;  [place] [facility] [place]  Service: [place]ology       CURRENT MEDICATIONS:   Current Outpatient Medications   Medication     amiodarone (PACERONE) 200 MG tablet     atorvastatin (LIPITOR) 40 MG tablet     B.ani/L.aci/L.sal/L.plan/L.Rey (PROBIOTIC FORMULA ORAL)     cholecalciferol, vitamin D3, 1,000 unit (25 mcg) tablet     magnesium oxide (MAG-OX) 400 MG tablet      melatonin 5 MG tablet     metoprolol tartrate (LOPRESSOR) 25 MG tablet     psyllium (METAMUCIL/KONSYL) 58.6 % powder     vit A/vit C/vit E/zinc/copper (PRESERVISION AREDS ORAL)     warfarin ANTICOAGULANT (COUMADIN) 1 MG tablet     acetaminophen (TYLENOL) 325 MG tablet     aspirin (ASA) 81 MG EC tablet     loperamide (IMODIUM A-D) 2 MG tablet     miconazole with skin protectant (ANGELA ANTIFUNGAL) 2 % CREA cream     senna-docusate (SENOKOT-S/PERICOLACE) 8.6-50 MG tablet     No current facility-administered medications for this visit.       ALLERGIES:      Allergies   Allergen Reactions     Azithromycin Hives     Iodinated Contrast Media [Diagnostic X-Ray Materials] Hives and Rash         ROS:  Review of symptoms otherwise negative unless commented about in HPI.     LABS:  Last Basic Metabolic Panel:  Lab Results   Component Value Date     12/05/2022      Lab Results   Component Value Date    POTASSIUM 3.9 12/05/2022    POTASSIUM 4.2 11/18/2022     Lab Results   Component Value Date    CHLORIDE 100 12/05/2022    CHLORIDE 103 11/17/2021     Lab Results   Component Value Date    SHAHEEN 8.5 12/05/2022     Lab Results   Component Value Date    CO2 21 12/05/2022    CO2 28 11/17/2021     Lab Results   Component Value Date    BUN 19.8 12/05/2022    BUN 18 11/17/2021     Lab Results   Component Value Date    CR 1.03 12/05/2022     Lab Results   Component Value Date    GLC 92 12/05/2022     11/30/2022    GLC 85 11/17/2021       Last CBC:   Lab Results   Component Value Date    WBC 5.5 12/05/2022     Lab Results   Component Value Date    RBC 3.15 12/05/2022     Lab Results   Component Value Date    HGB 10.2 12/05/2022     Lab Results   Component Value Date    HCT 32.9 12/05/2022     No components found for: MCT  Lab Results   Component Value Date     12/05/2022     Lab Results   Component Value Date    MCH 32.4 12/05/2022     Lab Results   Component Value Date    MCHC 31.0 12/05/2022     Lab Results  "  Component Value Date    RDW 22.0 12/05/2022     Lab Results   Component Value Date     12/05/2022       INR:  Lab Results   Component Value Date    INR 2.87 12/01/2022    INR 3.04 11/30/2022    INR 2.15 11/29/2022    INR 1.56 11/28/2022    INR 1.28 11/27/2022    INR 1.11 11/26/2022    INR 1.05 11/25/2022    INR 1.15 11/19/2022    INR 1.24 11/18/2022    INR 1.35 11/17/2022    INR 1.30 11/16/2022    INR 1.36 11/16/2022         IMAGING: None    PHYSICAL EXAM:   /76 (BP Location: Right arm, Patient Position: Chair, Cuff Size: Adult Regular)   Pulse 90   Ht 1.573 m (5' 1.93\")   Wt 59.7 kg (131 lb 9.6 oz)   SpO2 99%   BMI 24.13 kg/m    General: alert and oriented x 3, pleasant, no acute distress, normal mood and affect  Neuro: no focal deficits   CV: S1 S2, no murmurs, rubs or gallops, regular rate and rhythm, no peripheral edema  Pulm: bilateral breath sounds, clear to auscultation, easy work of breathing  Incision: incisions clean dry and intact without erythema, swelling or drainage    PROCEDURES: None       ASSESSMENT/PLAN:  Jade Walker is a 87 year old female status post LV perforation who returns to clinic for postop visit.     1. Doing well overall from a surgical standpoint. Incisions are healing well with no signs of infection. Increasing activity and strength overall.   2. Hemodynamics are stable. OK to stop bumex and potassium, and protonix.  3. Follow up with your cardiologist, as scheduled  4. Continue Outpatient Cardiac Rehab until completed.   5. Continue sternal precautions for 12 weeks from surgery date.        The total time spent with the patient was 35 minutes, > 50% of which was spent in counseling.    CC  Dave Merritt       "

## 2022-12-20 NOTE — NURSING NOTE
Chief Complaint   Patient presents with     Follow Up     LV perforation repair/incision check Dr. Bone       Vitals were taken and medications reconciled.    Jose Faith, EMT  1:40 PM

## 2022-12-20 NOTE — PATIENT INSTRUCTIONS
Your surgery was on 11/16/22.    Ok to start driving four weeks after the date of surgery as long as you are no longer taking narcotic pain medications.    From the date of surgery: no lifting greater than 10 pounds for 6-8 weeks, then no lifting greater than 20 pounds for an additional 6 weeks.    Do not soak your incisions until fully healed over with no scabbing; this includes hot tubs, baths, pools etc.    You had valve surgery, ensure you take prophylactic antibiotics prior to any dental procedure.    If you have questions or concerns, please call us:    Laial Treadwell  [phone]    John Ley, RN Care Coordinator - [phone]   Kisha Renteria, RN Care Coordinator - [phone]  Zenaida Perkins, RN Care Coordinator - [phone]     1. Doing well overall from a surgical standpoint. Incisions are healing well with no signs of infection. Increasing activity and strength overall.   2. Hemodynamics are stable. OK to stop bumex and potassium, and protonix.  3. Follow up with your cardiologist, as scheduled  4. Continue Outpatient Cardiac Rehab until completed.   5. Continue sternal precautions for 12 weeks from surgery date.

## 2023-01-10 ENCOUNTER — TELEPHONE (OUTPATIENT)
Dept: CARDIOLOGY | Facility: CLINIC | Age: 88
End: 2023-01-10

## 2023-01-10 NOTE — TELEPHONE ENCOUNTER
Patient called to let me know she has covid and found out on Sunday 1/8/2023. We rescheduled her 14 days out  and within her registry timeframe .

## 2023-01-27 ENCOUNTER — OFFICE VISIT (OUTPATIENT)
Dept: CARDIOLOGY | Facility: CLINIC | Age: 88
End: 2023-01-27
Attending: NURSE PRACTITIONER
Payer: COMMERCIAL

## 2023-01-27 ENCOUNTER — LAB (OUTPATIENT)
Dept: LAB | Facility: CLINIC | Age: 88
End: 2023-01-27
Payer: COMMERCIAL

## 2023-01-27 VITALS
SYSTOLIC BLOOD PRESSURE: 123 MMHG | BODY MASS INDEX: 24.44 KG/M2 | OXYGEN SATURATION: 99 % | HEART RATE: 71 BPM | DIASTOLIC BLOOD PRESSURE: 75 MMHG | WEIGHT: 133.3 LBS

## 2023-01-27 DIAGNOSIS — Z95.2 S/P TAVR (TRANSCATHETER AORTIC VALVE REPLACEMENT): Primary | ICD-10-CM

## 2023-01-27 DIAGNOSIS — Z95.2 S/P TAVR (TRANSCATHETER AORTIC VALVE REPLACEMENT): ICD-10-CM

## 2023-01-27 DIAGNOSIS — I97.89 POSTOPERATIVE ATRIAL FIBRILLATION (H): ICD-10-CM

## 2023-01-27 DIAGNOSIS — I48.91 POSTOPERATIVE ATRIAL FIBRILLATION (H): ICD-10-CM

## 2023-01-27 LAB
ALBUMIN SERPL BCG-MCNC: 3.8 G/DL (ref 3.5–5.2)
ALP SERPL-CCNC: 96 U/L (ref 35–104)
ALT SERPL W P-5'-P-CCNC: 17 U/L (ref 10–35)
ANION GAP SERPL CALCULATED.3IONS-SCNC: 10 MMOL/L (ref 7–15)
AST SERPL W P-5'-P-CCNC: 29 U/L (ref 10–35)
BILIRUB SERPL-MCNC: 0.4 MG/DL
BUN SERPL-MCNC: 21 MG/DL (ref 8–23)
CALCIUM SERPL-MCNC: 9.4 MG/DL (ref 8.8–10.2)
CHLORIDE SERPL-SCNC: 103 MMOL/L (ref 98–107)
CREAT SERPL-MCNC: 1.03 MG/DL (ref 0.51–0.95)
DEPRECATED HCO3 PLAS-SCNC: 27 MMOL/L (ref 22–29)
ERYTHROCYTE [DISTWIDTH] IN BLOOD BY AUTOMATED COUNT: 16.6 % (ref 10–15)
GFR SERPL CREATININE-BSD FRML MDRD: 53 ML/MIN/1.73M2
GLUCOSE SERPL-MCNC: 88 MG/DL (ref 70–99)
HCT VFR BLD AUTO: 36.1 % (ref 35–47)
HGB BLD-MCNC: 11.6 G/DL (ref 11.7–15.7)
LVEF ECHO: NORMAL
MCH RBC QN AUTO: 32.5 PG (ref 26.5–33)
MCHC RBC AUTO-ENTMCNC: 32.1 G/DL (ref 31.5–36.5)
MCV RBC AUTO: 101 FL (ref 78–100)
PLATELET # BLD AUTO: 307 10E3/UL (ref 150–450)
POTASSIUM SERPL-SCNC: 4.3 MMOL/L (ref 3.4–5.3)
PROT SERPL-MCNC: 6.8 G/DL (ref 6.4–8.3)
RBC # BLD AUTO: 3.57 10E6/UL (ref 3.8–5.2)
SODIUM SERPL-SCNC: 140 MMOL/L (ref 136–145)
WBC # BLD AUTO: 5.7 10E3/UL (ref 4–11)

## 2023-01-27 PROCEDURE — 85027 COMPLETE CBC AUTOMATED: CPT | Performed by: PATHOLOGY

## 2023-01-27 PROCEDURE — 80053 COMPREHEN METABOLIC PANEL: CPT | Performed by: PATHOLOGY

## 2023-01-27 PROCEDURE — 93005 ELECTROCARDIOGRAM TRACING: CPT

## 2023-01-27 PROCEDURE — G0463 HOSPITAL OUTPT CLINIC VISIT: HCPCS | Mod: 25 | Performed by: NURSE PRACTITIONER

## 2023-01-27 PROCEDURE — 36415 COLL VENOUS BLD VENIPUNCTURE: CPT | Performed by: PATHOLOGY

## 2023-01-27 PROCEDURE — 99214 OFFICE O/P EST MOD 30 MIN: CPT | Mod: 25 | Performed by: NURSE PRACTITIONER

## 2023-01-27 PROCEDURE — G0463 HOSPITAL OUTPT CLINIC VISIT: HCPCS | Mod: 25

## 2023-01-27 PROCEDURE — 93321 DOPPLER ECHO F-UP/LMTD STD: CPT | Performed by: INTERNAL MEDICINE

## 2023-01-27 PROCEDURE — 93308 TTE F-UP OR LMTD: CPT | Performed by: INTERNAL MEDICINE

## 2023-01-27 PROCEDURE — 93325 DOPPLER ECHO COLOR FLOW MAPG: CPT | Performed by: INTERNAL MEDICINE

## 2023-01-27 ASSESSMENT — PAIN SCALES - GENERAL: PAINLEVEL: NO PAIN (0)

## 2023-01-27 NOTE — LETTER
1/27/2023      RE: Jade Walker  [address] [facility] [address]       Dear Colleague,    Thank you for the opportunity to participate in the care of your patient, Jade Walker, at the [facility] HEART CLINIC [place] at [facility]. Please see a copy of my visit note below.        [facility] HEART CARE  CARDIOVASCULAR DIVISION    VALVE CLINIC RETURN VISIT    PERTINENT CLINICAL HISTORY:     Jade Walker is a very pleasant 87 year old female who presents for 2 month TAVR follow-up. She has a history of HTN, HLD, non-obstructive CAD, CKD stage III, IBS and severe aortic stenosis who underwent TAVR with a 23 mm Contreras APOLONIA valve on November 16, 2022.  Post procedure echocardiogram showed well-seated valve with mean gradient of 3 mmHg with no PVL; however, did reveal a moderate sized circumferential pericardial effusion requiring emergent pericardiocentesis. Pericardial effusion recurred eventually requiring open sternotomy which revealed LV perforation requiring patching of the LV.  Course was also notable for volume overload requiring diuretics and paroxysmal atrial fibrillation requiring amiodarone x 1 month and warfarin x 3 months. Patient was discharged to inpatient rehab on 12/1/22. Last evaluated by CVTS on December 20, 2022 at which time patient reported doing well.  Her Bumex and potassium were discontinued.    She presents today with no specific cardiovascular concerns. She has been feeling tired since having COVID a few weeks ago. Also had a dry cough which seems to be improving. She was participating in PT but hasn't been as active since having COVID. She is now starting to exercise again walking around her apartment and lifting light weights. She denies any chest pain, shortness of breath, orthopnea or PND, leg swelling, weight gain. She has not had palpitations, pre or alex syncope. She has no other specific cardiopulmonary  concerns today. Her sternal incision is healing well, she does a hard bump at the proximal end of her incision but no associated pain or redness.     She is tolerating warfarin without bleeding concerns. She has been having trouble maintaining a therapeutic INR which has been frustrating to her. No major changes in diet, no new supplements.    PAST MEDICAL HISTORY:     Past Medical History:   Diagnosis Date     Aortic stenosis      Aortic stenosis, moderate      Chronic kidney disease, stage III (moderate) (H)      Colon polyp      Hyperlipidemia      Hypertension      Irritable bowel syndrome      Obesity (BMI 35.0-39.9 without comorbidity)         PAST SURGICAL HISTORY:     Past Surgical History:   Procedure Laterality Date     CV CORONARY ANGIOGRAM N/A 10/20/2022    Procedure: Coronary Angiogram [[id number]];  Surgeon: Tone Hollins MD;  Location:  HEART CARDIAC CATH LAB     CV LEFT HEART CATH N/A 10/20/2022    Procedure: Left Heart Cath;  Surgeon: Tone Hollins MD;  Location:  HEART CARDIAC CATH LAB     CV RIGHT HEART CATH MEASUREMENTS RECORDED N/A 10/20/2022    Procedure: Right Heart Cath;  Surgeon: Tone Hollins MD;  Location:  HEART CARDIAC CATH LAB     CV TRANSCATHETER AORTIC VALVE REPLACEMENT-FEMORAL APPROACH N/A 11/16/2022    Procedure: Transfemoral (Contreras) Transcatheter Aortic Valve Replacement;  Surgeon: Tone Hollins MD;  Location:  OR     EYE SURGERY  06/01/2015     OR TRANSCATHETER AORTIC VALVE REPLACEMENT, OTHER PERCUTANEOUS APPROACH (STANDBY) N/A 11/16/2022    Procedure: Median Sternotomy, repair of aortic VALVE,  cardiopulmonary bypass PUMP, INTRAOPERATIVE TRANSESOPHAGEAL ECHOCARDIOGRAM PER ANESTHESIA;  Surgeon: Dallin Bone MD;  Location:  OR     OTHER SURGICAL HISTORY Bilateral     cataracts     PICC INSERTION - TRIPLE LUMEN Right 11/17/2022    right basilic 5 fr tl picc 39 cm     ZZHC COLONOSCOPY W/WO BRUSH/WASH N/A 12/11/2020    Procedure:  COLONOSCOPY;  Surgeon: Stan Porter MD;  Location: [facility];  Service: Gastroenterology        CURRENT MEDICATIONS:     Current Outpatient Medications   Medication Sig Dispense Refill     acetaminophen (TYLENOL) 325 MG tablet 2 tablets (650 mg) by Oral or Feeding Tube route every 4 hours as needed for other (For optimal non-opioid multimodal pain management to improve pain control.) (Patient not taking: Reported on 12/20/2022)       amiodarone (PACERONE) 200 MG tablet Take 1 tablet (200 mg) by mouth daily for 30 days 30 tablet 0     amiodarone (PACERONE) 200 MG tablet Take 1 tablet (200 mg) by mouth 2 times daily for 7 days 14 tablet 0     aspirin (ASA) 81 MG EC tablet Take 1 tablet (81 mg) by mouth daily (Patient not taking: Reported on 12/20/2022)       atorvastatin (LIPITOR) 40 MG tablet [ATORVASTATIN (LIPITOR) 40 MG TABLET] Take 40 mg by mouth at bedtime.       B.ani/L.aci/L.sal/L.plan/L.Rey (PROBIOTIC FORMULA ORAL) [B.ANI/L.ACI/L.SAL/L.PLAN/L.REY (PROBIOTIC FORMULA ORAL)] Take 1 capsule by mouth daily.       cholecalciferol, vitamin D3, 1,000 unit (25 mcg) tablet [CHOLECALCIFEROL, VITAMIN D3, 1,000 UNIT (25 MCG) TABLET] Take 1,000 Units by mouth daily.       loperamide (IMODIUM A-D) 2 MG tablet Take 1 tablet (2 mg) by mouth 3 times daily as needed for diarrhea (Patient not taking: Reported on 12/20/2022) 1 tablet 0     magnesium oxide (MAG-OX) 400 MG tablet Take 1 tablet (400 mg) by mouth daily       melatonin 5 MG tablet Take 2.5 mg by mouth nightly as needed for sleep       metoprolol tartrate (LOPRESSOR) 25 MG tablet Take 1 tablet (25 mg) by mouth 2 times daily       miconazole with skin protectant (ANGELA ANTIFUNGAL) 2 % CREA cream Apply topically 2 times daily TO GROIN, PERINEUM, AND PERIANAL REGIONS (Patient not taking: Reported on 12/20/2022)       psyllium (METAMUCIL/KONSYL) 58.6 % powder Take by mouth daily       senna-docusate (SENOKOT-S/PERICOLACE) 8.6-50 MG tablet 1 tablet by Oral or  Feeding Tube route nightly as needed for constipation (Patient not taking: Reported on 12/20/2022)       vit A/vit C/vit E/zinc/copper (PRESERVISION AREDS ORAL) [VIT A/VIT C/VIT E/ZINC/COPPER (PRESERVISION AREDS ORAL)] Take 1 tablet by mouth 2 (two) times a day.       warfarin ANTICOAGULANT (COUMADIN) 1 MG tablet Take 3 tablets (3 mg) by mouth daily          ALLERGIES:     Allergies   Allergen Reactions     Azithromycin Hives     Iodinated Contrast Media [Diagnostic X-Ray Materials] Hives and Rash        FAMILY HISTORY:   No family history on file.     SOCIAL HISTORY:     Social History     Socioeconomic History     Marital status:    Tobacco Use     Smoking status: Never     Smokeless tobacco: Never   Substance and Sexual Activity     Alcohol use: Yes     Comment: Alcoholic Drinks/day: once a month     Drug use: Never        REVIEW OF SYSTEMS:     Constitutional: No fevers or chills  Skin: No new rash or itching  Eyes: No acute change in vision  Ears/Nose/Throat: No purulent rhinorrhea, new hearing loss, or new vertigo  Respiratory: No cough or hemoptysis  Cardiovascular: See HPI  Gastrointestinal: No change in appetite, vomiting, hematemesis or diarrhea  Genitourinary: No dysuria or hematuria  Musculoskeletal: No new back pain, neck pain or muscle pain  Neurologic: No new headaches, focal weakness or behavior changes  Psychiatric: No hallucinations, excessive alcohol consumption or illegal drug usage  Hematologic/Lymphatic/Immunologic: No bleeding, chills, fever, night sweats or weight loss  Endocrine: No new cold intolerance, heat intolerance, polyphagia, polydipsia or polyuria      PHYSICAL EXAMINATION:     /75 (BP Location: Right arm, Patient Position: Supine, Cuff Size: Adult Regular)   Pulse 71   Wt 60.5 kg (133 lb 4.8 oz)   SpO2 99%   BMI 24.44 kg/m      GENERAL: No acute distress.  HEENT: EOMI. Sclerae white, not injected. Nares clear. Pharynx without erythema or exudate.   Neck: No  adenopathy. No thyromegaly. No jugular venous distension.   Heart: Regular rate and rhythm. No murmur.   Lungs: Clear to auscultation. No ronchi, wheezes, rales.   Abdomen: Soft, nontender, nondistended. Bowel sounds present.  Extremities: No clubbing, cyanosis, or edema. Moderate sized firm bump at proximal end of sternal incision, no fluctuation, likely sternum fusing.   Neurologic: Alert and oriented to person/place/time, normal speech and affect. No focal deficits.  Skin: No petechiae, purpura or rash.     LABORATORY DATA:     LIPID RESULTS:  Lab Results   Component Value Date    CHOL 148 08/09/2022    HDL 77 08/09/2022    LDL 58 08/09/2022    TRIG 65 08/09/2022       LIVER ENZYME RESULTS:  Lab Results   Component Value Date    AST 79 (H) 11/19/2022    ALT 11 11/19/2022       CBC RESULTS:  Lab Results   Component Value Date    WBC 5.5 12/05/2022    RBC 3.15 (L) 12/05/2022    HGB 10.2 (L) 12/05/2022    HCT 32.9 (L) 12/05/2022     (H) 12/05/2022    MCH 32.4 12/05/2022    MCHC 31.0 (L) 12/05/2022    RDW 22.0 (H) 12/05/2022     12/05/2022       BMP RESULTS:  Lab Results   Component Value Date     12/05/2022    POTASSIUM 3.9 12/05/2022    POTASSIUM 4.2 11/18/2022    CHLORIDE 100 12/05/2022    CHLORIDE 103 11/17/2021    CO2 21 (L) 12/05/2022    CO2 28 11/17/2021    ANIONGAP 17 (H) 12/05/2022    ANIONGAP 9 11/17/2021    GLC 92 12/05/2022     (H) 11/30/2022    GLC 85 11/17/2021    BUN 19.8 12/05/2022    BUN 18 11/17/2021    CR 1.03 (H) 12/05/2022    GFRESTIMATED 53 (L) 12/05/2022    GFRESTIMATED >60 11/12/2020    GFRESTBLACK >60 11/12/2020    SHAHEEN 8.5 (L) 12/05/2022     INR RESULTS:  Lab Results   Component Value Date    INR 2.87 (H) 12/01/2022    INR 3.04 (H) 11/30/2022        PROCEDURES & FURTHER ASSESSMENTS:     BRIGHT 1/27/23:  Personally reviewed  NSR with OTF    ECHO 1/27/23:  ______________________________________________________________________________  Procedure  Limited Echocardiogram  with portions of two-dimensional, color and spectral  Doppler performed.  ______________________________________________________________________________  Interpretation Summary  Global and regional left ventricular function is normal with an EF of 55-60%.  Global right ventricular function is normal.     s/p TAVR 23mm Contreras Danilo bioprostetic valve. Normal Doppler interrogation.  No AI. MG is 4 mmHg.     Trivial pericardial effusion is present. IVC is normal.     This study was compared with the study from 11/17/2022. There has been no  change.  ______________________________________________________________________________  Left Ventricle  Left ventricular size is normal. Global and regional left ventricular function  is normal with an EF of 55-60%.     Right Ventricle  The right ventricle is normal size. Global right ventricular function is  normal.     Mitral Valve  Moderate mitral annular calcification is present. On Doppler interrogation,  there is no significant stenosis or regurgitation. Trace mitral insufficiency  is present.     Aortic Valve  s/p TAVR 23mm Contreras Danilo bioprostetic valve . No aortic regurgitation is  present. The mean AoV pressure gradient is 3.8 mmHg. The calculated aortic  valve are is 2.2 cm^2.     Tricuspid Valve  The tricuspid valve is normal. The right ventricular systolic pressure is  approximated at 23.0 mmHg plus the right atrial pressure. Trace tricuspid  insufficiency is present.     Pulmonic Valve  The pulmonic valve is normal.     Vessels  The aorta root is normal. The inferior vena cava was normal in size with  preserved respiratory variability.     Pericardium  Trivial pericardial effusion is present.     Compared to Previous Study  This study was compared with the study from 11/17/2022 . There has been no  change.    NYHA Class: I     CLINICAL IMPRESSION:       1.  Severe aortic stenosis status post TAVR 11/16/2022  2.  LV perforation requiring sternotomy with LV patch  repair  3.  HFpEF, chronic  4.  Postop atrial fibrillation on warfarin  Doing well post op, she completed PT, now increasing activity level at home. No cardiac symptoms. Her ECHO today shows well seated TAVR with mean PG of 4 mmHg without PVL, trivial pericardial effusion. Volume status well controlled without diuretics. EKG today shows NSR with chronic LBBB. She completed amiodarone taper 2 weeks ago, remains on metoprolol and warfarin.   - Defer ASA while on warfarin   - Place cardiac monitor in 2 weeks (allow 1 month amio washout) to evaluate for recurrent a-fib.  - If no recurrence we can stop warfarin and start ASA daily  - If recurrent a-fib will continue long term anticoagulation, consider switching to DOAC due to difficulty maintaining therapeutic INR.   - Continue SBE prophylaxis for valve  - ECHO in 1 year    5.  Hypertension  6.  Hyperlipidemia  - Well controlled on metoprolol and atorvastatin    RTC: valve clinic in 4 months     MAX Hardin, CNP  [facility] Cardiology Team      CC  Patient Care Team:  Dave Merritt MD as PCP - General (Family Medicine)  Dave Merritt MD as MD (Family Medicine)  Jazmine Minaya NP as Assigned Heart and Vascular Provider  [facility], MAX Paz CNP as Nurse Practitioner (Geriatric Medicine)  JAZMINE MINAYA        [facility] Cardiomyopathy Questionnaire  (CQ-12)  Date: 1/27/2023 1/27/2023-  30-day post-TAVR    1.  A.  5      B.  5       C.  3  2.  5  3.  5  4.  6  5.  5  6.  4   7.  5  8.  A. 4     B.  4     C.  4             Please do not hesitate to contact me if you have any questions/concerns.     Sincerely,     Jazmine Minaya NP

## 2023-01-27 NOTE — PROGRESS NOTES
[facility] Cardiomyopathy Questionnaire  (CQ-12)  Date: 1/27/2023 1/27/2023-  30-day post-TAVR    1.  A.  5      B.  5       C.  3  2.  5  3.  5  4.  6  5.  5  6.  4   7.  5  8.  A. 4     B.  4     C.  4

## 2023-01-27 NOTE — NURSING NOTE
Chief Complaint   Patient presents with     Follow Up     Return TAVR-  23-75 day follow up s/p TAVR     Vitals were taken, medications reconciled, and EKG was performed.    Dominguez Eduardo, JULISSA  9:42 AM

## 2023-01-27 NOTE — PATIENT INSTRUCTIONS
You were seen today in the Structural Heart Clinic at the [facility].    Cardiology provider you saw during your visit: Jazmine Pathak NP    Instructions:   Continue aspirin 81 mg daily lifelong  Continue warfarin for now - wear heart monitor for 2 weeks (start date 2/14/23)  If cardiac monitor shows no recurrent atrial fibrillation we will stop warfarin - if it shows recurrent a-fib we will continue.  Delay any nonurgent dental procedures for the first 6 month post TAVR.  For all future dental cleanings and procedures you will need to take antibiotics prior - see instructions below.   Increase your activity level - consider cardiac rehab at [place]   Follow-up with me in 6 months    Prevention of Infective (Bacterial) Endocarditis:  You are at increased risk for developing adverse outcomes from infective endocarditis (IE), also known as bacterial endocarditis (BE) because of the new device in your heart. The guidelines for prevention of IE are to give patients antibiotics prior to any dental procedures that involve manipulation of gingival tissue or the periapical region of teeth, or perforation of the oral mucosa:      It is recommended to take Amoxicillin 2 gm by mouth as a single dose 30 to 60 minutes before procedure.     OR if allergic to Penicillin or Ampicillin:     Cephalexin 2 gm by mouth, or  Clindamycin 600 mg by mouth, or  Azithromycin or Clarithromycin 500 mg PO       Questions and scheduling:   First call: Structural Heart  Aziza Camara [phone]    General scheduling line: [phone].   First press #1 for the University and then press #3 for Medical Questions to reach the Cardiology triage nurse.     On Call Cardiologist for after hours or on weekends: [phone], press option #4 and ask to speak to the on-call Cardiologist.

## 2023-01-30 LAB
ATRIAL RATE - MUSE: 78 BPM
DIASTOLIC BLOOD PRESSURE - MUSE: NORMAL MMHG
INTERPRETATION ECG - MUSE: NORMAL
P AXIS - MUSE: 64 DEGREES
PR INTERVAL - MUSE: 178 MS
QRS DURATION - MUSE: 130 MS
QT - MUSE: 442 MS
QTC - MUSE: 503 MS
R AXIS - MUSE: -25 DEGREES
SYSTOLIC BLOOD PRESSURE - MUSE: NORMAL MMHG
T AXIS - MUSE: 114 DEGREES
VENTRICULAR RATE- MUSE: 78 BPM

## 2023-02-07 ENCOUNTER — TRANSCRIBE ORDERS (OUTPATIENT)
Dept: OTHER | Age: 88
End: 2023-02-07

## 2023-02-08 DIAGNOSIS — Z95.2 S/P TAVR (TRANSCATHETER AORTIC VALVE REPLACEMENT): Primary | ICD-10-CM

## 2023-02-17 ENCOUNTER — LAB REQUISITION (OUTPATIENT)
Dept: LAB | Facility: CLINIC | Age: 88
End: 2023-02-17

## 2023-02-17 DIAGNOSIS — E78.5 HYPERLIPIDEMIA, UNSPECIFIED: ICD-10-CM

## 2023-02-17 DIAGNOSIS — I12.9 HYPERTENSIVE CHRONIC KIDNEY DISEASE WITH STAGE 1 THROUGH STAGE 4 CHRONIC KIDNEY DISEASE, OR UNSPECIFIED CHRONIC KIDNEY DISEASE: ICD-10-CM

## 2023-02-17 LAB
ANION GAP SERPL CALCULATED.3IONS-SCNC: 11 MMOL/L (ref 7–15)
BUN SERPL-MCNC: 19.8 MG/DL (ref 8–23)
CALCIUM SERPL-MCNC: 9 MG/DL (ref 8.8–10.2)
CHLORIDE SERPL-SCNC: 106 MMOL/L (ref 98–107)
CHOLEST SERPL-MCNC: 155 MG/DL
CREAT SERPL-MCNC: 0.96 MG/DL (ref 0.51–0.95)
DEPRECATED HCO3 PLAS-SCNC: 25 MMOL/L (ref 22–29)
GFR SERPL CREATININE-BSD FRML MDRD: 58 ML/MIN/1.73M2
GLUCOSE SERPL-MCNC: 90 MG/DL (ref 70–99)
HDLC SERPL-MCNC: 55 MG/DL
LDLC SERPL CALC-MCNC: 74 MG/DL
NONHDLC SERPL-MCNC: 100 MG/DL
POTASSIUM SERPL-SCNC: 4.2 MMOL/L (ref 3.4–5.3)
SODIUM SERPL-SCNC: 142 MMOL/L (ref 136–145)
TRIGL SERPL-MCNC: 128 MG/DL

## 2023-02-17 PROCEDURE — 80061 LIPID PANEL: CPT | Performed by: FAMILY MEDICINE

## 2023-02-17 PROCEDURE — 80048 BASIC METABOLIC PNL TOTAL CA: CPT | Performed by: FAMILY MEDICINE

## 2023-03-09 ENCOUNTER — CARE COORDINATION (OUTPATIENT)
Dept: CARDIOLOGY | Facility: CLINIC | Age: 88
End: 2023-03-09
Payer: COMMERCIAL

## 2023-03-09 NOTE — PROGRESS NOTES
Cardiac monitor shows no atrial fibrillation. Okay to stop warfarin and continue ASA 81 mg daily.     MAX Hardin, CNP  Structural Heart Nurse Practitioner  [facility] Heart [facility]  Clinic: [phone]  Pager: [phone]

## 2023-03-13 ENCOUNTER — HOSPITAL ENCOUNTER (OUTPATIENT)
Dept: CARDIAC REHAB | Facility: HOSPITAL | Age: 88
Discharge: HOME | End: 2023-03-13
Attending: INTERNAL MEDICINE
Payer: COMMERCIAL

## 2023-03-13 PROCEDURE — 93797 PHYS/QHP OP CAR RHAB WO ECG: CPT | Mod: 59

## 2023-03-13 PROCEDURE — 93798 PHYS/QHP OP CAR RHAB W/ECG: CPT

## 2023-03-20 ENCOUNTER — HOSPITAL ENCOUNTER (OUTPATIENT)
Dept: CARDIAC REHAB | Facility: HOSPITAL | Age: 88
Discharge: HOME | End: 2023-03-20
Attending: THORACIC SURGERY (CARDIOTHORACIC VASCULAR SURGERY)
Payer: COMMERCIAL

## 2023-03-20 DIAGNOSIS — Z95.2 S/P TAVR (TRANSCATHETER AORTIC VALVE REPLACEMENT): ICD-10-CM

## 2023-03-20 PROCEDURE — 93798 PHYS/QHP OP CAR RHAB W/ECG: CPT

## 2023-03-22 ENCOUNTER — HOSPITAL ENCOUNTER (OUTPATIENT)
Dept: CARDIAC REHAB | Facility: HOSPITAL | Age: 88
Discharge: HOME | End: 2023-03-22
Attending: THORACIC SURGERY (CARDIOTHORACIC VASCULAR SURGERY)
Payer: COMMERCIAL

## 2023-03-22 PROCEDURE — 93798 PHYS/QHP OP CAR RHAB W/ECG: CPT

## 2023-03-24 ENCOUNTER — HOSPITAL ENCOUNTER (OUTPATIENT)
Dept: CARDIAC REHAB | Facility: HOSPITAL | Age: 88
Discharge: HOME | End: 2023-03-24
Attending: THORACIC SURGERY (CARDIOTHORACIC VASCULAR SURGERY)
Payer: COMMERCIAL

## 2023-03-24 PROCEDURE — 93798 PHYS/QHP OP CAR RHAB W/ECG: CPT

## 2023-03-27 ENCOUNTER — HOSPITAL ENCOUNTER (OUTPATIENT)
Dept: CARDIAC REHAB | Facility: HOSPITAL | Age: 88
Discharge: HOME | End: 2023-03-27
Attending: THORACIC SURGERY (CARDIOTHORACIC VASCULAR SURGERY)
Payer: COMMERCIAL

## 2023-03-27 PROCEDURE — 93798 PHYS/QHP OP CAR RHAB W/ECG: CPT

## 2023-03-29 ENCOUNTER — HOSPITAL ENCOUNTER (OUTPATIENT)
Dept: CARDIAC REHAB | Facility: HOSPITAL | Age: 88
Discharge: HOME | End: 2023-03-29
Attending: THORACIC SURGERY (CARDIOTHORACIC VASCULAR SURGERY)
Payer: COMMERCIAL

## 2023-03-29 PROCEDURE — 93798 PHYS/QHP OP CAR RHAB W/ECG: CPT

## 2023-03-31 ENCOUNTER — HOSPITAL ENCOUNTER (OUTPATIENT)
Dept: CARDIAC REHAB | Facility: HOSPITAL | Age: 88
Discharge: HOME | End: 2023-03-31
Attending: THORACIC SURGERY (CARDIOTHORACIC VASCULAR SURGERY)
Payer: COMMERCIAL

## 2023-03-31 PROCEDURE — 93798 PHYS/QHP OP CAR RHAB W/ECG: CPT

## 2023-04-03 ENCOUNTER — HOSPITAL ENCOUNTER (OUTPATIENT)
Dept: CARDIAC REHAB | Facility: HOSPITAL | Age: 88
Discharge: HOME | End: 2023-04-03
Attending: THORACIC SURGERY (CARDIOTHORACIC VASCULAR SURGERY)
Payer: COMMERCIAL

## 2023-04-03 PROCEDURE — 93798 PHYS/QHP OP CAR RHAB W/ECG: CPT

## 2023-04-05 ENCOUNTER — HOSPITAL ENCOUNTER (OUTPATIENT)
Dept: CARDIAC REHAB | Facility: HOSPITAL | Age: 88
Discharge: HOME | End: 2023-04-05
Attending: THORACIC SURGERY (CARDIOTHORACIC VASCULAR SURGERY)
Payer: COMMERCIAL

## 2023-04-05 PROCEDURE — 93798 PHYS/QHP OP CAR RHAB W/ECG: CPT

## 2023-04-07 ENCOUNTER — HOSPITAL ENCOUNTER (OUTPATIENT)
Dept: CARDIAC REHAB | Facility: HOSPITAL | Age: 88
Discharge: HOME | End: 2023-04-07
Attending: THORACIC SURGERY (CARDIOTHORACIC VASCULAR SURGERY)
Payer: COMMERCIAL

## 2023-04-07 PROCEDURE — 93798 PHYS/QHP OP CAR RHAB W/ECG: CPT

## 2023-04-10 ENCOUNTER — HOSPITAL ENCOUNTER (OUTPATIENT)
Dept: CARDIAC REHAB | Facility: HOSPITAL | Age: 88
Discharge: HOME | End: 2023-04-10
Attending: THORACIC SURGERY (CARDIOTHORACIC VASCULAR SURGERY)
Payer: COMMERCIAL

## 2023-04-10 PROCEDURE — 93798 PHYS/QHP OP CAR RHAB W/ECG: CPT

## 2023-04-12 ENCOUNTER — HOSPITAL ENCOUNTER (OUTPATIENT)
Dept: CARDIAC REHAB | Facility: HOSPITAL | Age: 88
Discharge: HOME | End: 2023-04-12
Attending: INTERNAL MEDICINE
Payer: COMMERCIAL

## 2023-04-12 ENCOUNTER — HOSPITAL ENCOUNTER (OUTPATIENT)
Dept: CARDIAC REHAB | Facility: HOSPITAL | Age: 88
Discharge: HOME | End: 2023-04-12
Attending: THORACIC SURGERY (CARDIOTHORACIC VASCULAR SURGERY)
Payer: COMMERCIAL

## 2023-04-12 PROCEDURE — 93798 PHYS/QHP OP CAR RHAB W/ECG: CPT

## 2023-04-12 PROCEDURE — 93797 PHYS/QHP OP CAR RHAB WO ECG: CPT

## 2023-04-14 ENCOUNTER — HOSPITAL ENCOUNTER (OUTPATIENT)
Dept: CARDIAC REHAB | Facility: HOSPITAL | Age: 88
Discharge: HOME | End: 2023-04-14
Attending: THORACIC SURGERY (CARDIOTHORACIC VASCULAR SURGERY)
Payer: COMMERCIAL

## 2023-04-14 ENCOUNTER — LAB REQUISITION (OUTPATIENT)
Dept: LAB | Facility: CLINIC | Age: 88
End: 2023-04-14

## 2023-04-14 DIAGNOSIS — N18.31 CHRONIC KIDNEY DISEASE, STAGE 3A (H): ICD-10-CM

## 2023-04-14 PROCEDURE — 93798 PHYS/QHP OP CAR RHAB W/ECG: CPT

## 2023-04-14 PROCEDURE — 83735 ASSAY OF MAGNESIUM: CPT | Performed by: FAMILY MEDICINE

## 2023-04-17 ENCOUNTER — HOSPITAL ENCOUNTER (OUTPATIENT)
Dept: CARDIAC REHAB | Facility: HOSPITAL | Age: 88
Discharge: HOME | End: 2023-04-17
Attending: THORACIC SURGERY (CARDIOTHORACIC VASCULAR SURGERY)
Payer: COMMERCIAL

## 2023-04-17 LAB — MAGNESIUM SERPL-MCNC: 1.9 MG/DL (ref 1.7–2.3)

## 2023-04-17 PROCEDURE — 93798 PHYS/QHP OP CAR RHAB W/ECG: CPT

## 2023-04-19 ENCOUNTER — HOSPITAL ENCOUNTER (OUTPATIENT)
Dept: CARDIAC REHAB | Facility: HOSPITAL | Age: 88
Discharge: HOME | End: 2023-04-19
Attending: THORACIC SURGERY (CARDIOTHORACIC VASCULAR SURGERY)
Payer: COMMERCIAL

## 2023-04-19 PROCEDURE — 93798 PHYS/QHP OP CAR RHAB W/ECG: CPT

## 2023-04-21 ENCOUNTER — HOSPITAL ENCOUNTER (OUTPATIENT)
Dept: CARDIAC REHAB | Facility: HOSPITAL | Age: 88
Discharge: HOME | End: 2023-04-21
Attending: THORACIC SURGERY (CARDIOTHORACIC VASCULAR SURGERY)
Payer: COMMERCIAL

## 2023-04-21 PROCEDURE — 93798 PHYS/QHP OP CAR RHAB W/ECG: CPT

## 2023-04-21 PROCEDURE — 93797 PHYS/QHP OP CAR RHAB WO ECG: CPT

## 2023-05-08 ENCOUNTER — OFFICE VISIT (OUTPATIENT)
Dept: CARDIOLOGY | Facility: CLINIC | Age: 88
End: 2023-05-08
Attending: NURSE PRACTITIONER
Payer: COMMERCIAL

## 2023-05-08 VITALS
WEIGHT: 132.6 LBS | HEIGHT: 62 IN | DIASTOLIC BLOOD PRESSURE: 83 MMHG | OXYGEN SATURATION: 100 % | SYSTOLIC BLOOD PRESSURE: 156 MMHG | HEART RATE: 72 BPM | BODY MASS INDEX: 24.4 KG/M2

## 2023-05-08 DIAGNOSIS — I10 BENIGN ESSENTIAL HYPERTENSION: ICD-10-CM

## 2023-05-08 DIAGNOSIS — Z29.8 * * * SBE PROPHYLAXIS * * *: ICD-10-CM

## 2023-05-08 DIAGNOSIS — Z95.2 S/P TAVR (TRANSCATHETER AORTIC VALVE REPLACEMENT): Primary | ICD-10-CM

## 2023-05-08 PROCEDURE — G0463 HOSPITAL OUTPT CLINIC VISIT: HCPCS | Mod: 25 | Performed by: NURSE PRACTITIONER

## 2023-05-08 PROCEDURE — 99214 OFFICE O/P EST MOD 30 MIN: CPT | Performed by: NURSE PRACTITIONER

## 2023-05-08 PROCEDURE — 93005 ELECTROCARDIOGRAM TRACING: CPT

## 2023-05-08 PROCEDURE — 93010 ELECTROCARDIOGRAM REPORT: CPT | Performed by: INTERNAL MEDICINE

## 2023-05-08 RX ORDER — AMOXICILLIN 500 MG/1
2000 CAPSULE ORAL
Qty: 4 CAPSULE | Refills: 3 | Status: SHIPPED | OUTPATIENT
Start: 2023-05-08

## 2023-05-08 RX ORDER — CARVEDILOL 6.25 MG/1
6.25 TABLET ORAL 2 TIMES DAILY WITH MEALS
Qty: 180 TABLET | Refills: 1 | Status: SHIPPED | OUTPATIENT
Start: 2023-05-08 | End: 2023-10-31

## 2023-05-08 ASSESSMENT — PAIN SCALES - GENERAL: PAINLEVEL: NO PAIN (0)

## 2023-05-08 NOTE — PROGRESS NOTES
Structural Heart Coordinator visit:  Provided additional education regarding TAVR/MitraClip procedure, after being present for discussion with physician. Explained the work-up process and next steps for patient. Patient provided our direct contact number and instructed to call with any questions.       KCCQ Results:   1a. 5  1b. 5  1c. 5  2. 5  3. 7  4. 7  5. 5  6. 5  7. 5  8a. 6  8b. 5  8c. 6    Millie Camara CMA  Structural Heart   [facility] Heart [facility]

## 2023-05-08 NOTE — NURSING NOTE
Chief Complaint   Patient presents with     Follow Up     6 month S/P TAVR       Vitals were taken, medications reconciled and EKG performed.    Meme Sharp, EMT   1:04 PM

## 2023-05-08 NOTE — PROGRESS NOTES
[facility]  CARDIOVASCULAR DIVISION    VALVE CLINIC RETURN VISIT    PERTINENT CLINICAL HISTORY:     Jade Walker is a very pleasant 88 year old female who presents for 6 month TAVR follow-up. She has a history of HTN, HLD, minimal CAD on pre TAVR coronary angiogram, CKD stage III, IBS and severe aortic stenosis who underwent TAVR with a 23 mm Contreras APOLONIA valve on November 16, 2022.  Post procedure echocardiogram showed well-seated valve with mean gradient of 3 mmHg with no PVL; however, did reveal a moderate sized circumferential pericardial effusion requiring emergent pericardiocentesis. Pericardial effusion recurred eventually requiring open sternotomy which revealed LV perforation requiring patching of the LV. Course was also notable for volume overload requiring diuretics and paroxysmal atrial fibrillation requiring amiodarone x 1 month and warfarin x 3 months. Patient was discharged to inpatient rehab on 12/1/22. Last seen in structural clinic January 2023 at which time she was feeling well. Her TTE showed normal TAVR function with mean PG 4 mmHg without PVL. A cardiac monitor was placed following amiodarone wash out, showing no recurrent a-fib. Her warfarin was discontinued.     She presents today with no specific cardiovascular concerns. She completed cardiac rehab last month. She continues to walk 30 minutes daily and is able to perform ADLs including preparing food and doing laundry. She has no exertional chest pain or shortness of breath. Yesterday she had some right upper chest pain and right bicep pain after doing laundry which involved reaching into the washing machine and pulling out heavy towels. It went away after an hour, noticed it again when she woke up this morning. Now has resolved. Never experiences right upper chest pain/bicep pain with exertion. It feels musculoskeletal to her. She has no orthopnea, PND, leg swelling, weight gain. She has not had palpitations, pre or  alex syncope.      PAST MEDICAL HISTORY:     Past Medical History:   Diagnosis Date     Aortic stenosis      Aortic stenosis, moderate      Chronic kidney disease, stage III (moderate) (H)      Colon polyp      Hyperlipidemia      Hypertension      Irritable bowel syndrome      Obesity (BMI 35.0-39.9 without comorbidity)         PAST SURGICAL HISTORY:     Past Surgical History:   Procedure Laterality Date     CV CORONARY ANGIOGRAM N/A 10/20/2022    Procedure: Coronary Angiogram [[id number]];  Surgeon: Tone Hollins MD;  Location:  HEART CARDIAC CATH LAB     CV LEFT HEART CATH N/A 10/20/2022    Procedure: Left Heart Cath;  Surgeon: Tone Hollins MD;  Location:  HEART CARDIAC CATH LAB     CV RIGHT HEART CATH MEASUREMENTS RECORDED N/A 10/20/2022    Procedure: Right Heart Cath;  Surgeon: Tone Hollins MD;  Location:  HEART CARDIAC CATH LAB     CV TRANSCATHETER AORTIC VALVE REPLACEMENT-FEMORAL APPROACH N/A 11/16/2022    Procedure: Transfemoral (Contreras) Transcatheter Aortic Valve Replacement;  Surgeon: Tone Hollins MD;  Location:  OR     EYE SURGERY  06/01/2015     OR TRANSCATHETER AORTIC VALVE REPLACEMENT, OTHER PERCUTANEOUS APPROACH (STANDBY) N/A 11/16/2022    Procedure: Median Sternotomy, repair of aortic VALVE,  cardiopulmonary bypass PUMP, INTRAOPERATIVE TRANSESOPHAGEAL ECHOCARDIOGRAM PER ANESTHESIA;  Surgeon: Dallin Bone MD;  Location:  OR     OTHER SURGICAL HISTORY Bilateral     cataracts     PICC INSERTION - TRIPLE LUMEN Right 11/17/2022    right basilic 5 fr tl picc 39 cm     ZZHC COLONOSCOPY W/WO BRUSH/WASH N/A 12/11/2020    Procedure: COLONOSCOPY;  Surgeon: tSan Porter MD;  [place] [facility] [place]  Service: Gastroenterology        CURRENT MEDICATIONS:     Current Outpatient Medications   Medication Sig Dispense Refill     acetaminophen (TYLENOL) 325 MG tablet 2 tablets (650 mg) by Oral or Feeding Tube route every 4 hours as needed for other  (For optimal non-opioid multimodal pain management to improve pain control.) (Patient not taking: Reported on 12/20/2022)       aspirin (ASA) 81 MG EC tablet Take 1 tablet (81 mg) by mouth daily       atorvastatin (LIPITOR) 40 MG tablet [ATORVASTATIN (LIPITOR) 40 MG TABLET] Take 40 mg by mouth at bedtime.       B.ani/L.aci/L.sal/L.plan/L.Rey (PROBIOTIC FORMULA ORAL) [B.ANI/L.ACI/L.SAL/L.PLAN/L.REY (PROBIOTIC FORMULA ORAL)] Take 1 capsule by mouth daily.       cholecalciferol, vitamin D3, 1,000 unit (25 mcg) tablet [CHOLECALCIFEROL, VITAMIN D3, 1,000 UNIT (25 MCG) TABLET] Take 1,000 Units by mouth daily.       loperamide (IMODIUM A-D) 2 MG tablet Take 1 tablet (2 mg) by mouth 3 times daily as needed for diarrhea (Patient not taking: Reported on 12/20/2022) 1 tablet 0     magnesium oxide (MAG-OX) 400 MG tablet Take 1 tablet (400 mg) by mouth daily       melatonin 5 MG tablet Take 2.5 mg by mouth nightly as needed for sleep       metoprolol tartrate (LOPRESSOR) 25 MG tablet Take 1 tablet (25 mg) by mouth 2 times daily       miconazole with skin protectant (ANGELA ANTIFUNGAL) 2 % CREA cream Apply topically 2 times daily TO GROIN, PERINEUM, AND PERIANAL REGIONS (Patient not taking: Reported on 12/20/2022)       psyllium (METAMUCIL/KONSYL) 58.6 % powder Take by mouth daily       senna-docusate (SENOKOT-S/PERICOLACE) 8.6-50 MG tablet 1 tablet by Oral or Feeding Tube route nightly as needed for constipation (Patient not taking: Reported on 12/20/2022)       vit A/vit C/vit E/zinc/copper (PRESERVISION AREDS ORAL) [VIT A/VIT C/VIT E/ZINC/COPPER (PRESERVISION AREDS ORAL)] Take 1 tablet by mouth 2 (two) times a day.          ALLERGIES:     Allergies   Allergen Reactions     Azithromycin Hives     Iodinated Contrast Media [Iodinated Contrast Media] Hives and Rash        FAMILY HISTORY:   No family history on file.     SOCIAL HISTORY:     Social History     Socioeconomic History     Marital status:    Tobacco Use     Smoking  "status: Never     Smokeless tobacco: Never   Substance and Sexual Activity     Alcohol use: Yes     Comment: Alcoholic Drinks/day: once a month     Drug use: Never        REVIEW OF SYSTEMS:     Constitutional: No fevers or chills  Skin: No new rash or itching  Eyes: No acute change in vision  Ears/Nose/Throat: No purulent rhinorrhea, new hearing loss, or new vertigo  Respiratory: No cough or hemoptysis  Cardiovascular: See HPI  Gastrointestinal: No change in appetite, vomiting, hematemesis or diarrhea  Genitourinary: No dysuria or hematuria  Musculoskeletal: No new back pain, neck pain or muscle pain  Neurologic: No new headaches, focal weakness or behavior changes  Psychiatric: No hallucinations, excessive alcohol consumption or illegal drug usage  Hematologic/Lymphatic/Immunologic: No bleeding, chills, fever, night sweats or weight loss  Endocrine: No new cold intolerance, heat intolerance, polyphagia, polydipsia or polyuria      PHYSICAL EXAMINATION:     BP (!) 156/83 (BP Location: Left arm, Patient Position: Sitting, Cuff Size: Adult Regular)   Pulse 72   Ht 1.573 m (5' 1.93\")   Wt 60.1 kg (132 lb 9.6 oz)   SpO2 100%   BMI 24.31 kg/m      GENERAL: No acute distress.  HEENT: EOMI. Sclerae white, not injected. Nares clear. Pharynx without erythema or exudate.   Neck: No adenopathy. No thyromegaly. No jugular venous distension.   Heart: Regular rate and rhythm. No murmur.   Lungs: Clear to auscultation. No ronchi, wheezes, rales.   Abdomen: Soft, nontender, nondistended. Bowel sounds present.  Extremities: No clubbing, cyanosis, or edema. Moderate sized firm bump at proximal end of sternal incision, no fluctuation, likely sternum fusing.   Neurologic: Alert and oriented to person/place/time, normal speech and affect. No focal deficits.  Skin: No petechiae, purpura or rash.     LABORATORY DATA:     LIPID RESULTS:  Lab Results   Component Value Date    CHOL 155 02/17/2023    HDL 55 02/17/2023    LDL 74 " 02/17/2023    TRIG 128 02/17/2023       LIVER ENZYME RESULTS:  Lab Results   Component Value Date    AST 29 01/27/2023    ALT 17 01/27/2023       CBC RESULTS:  Lab Results   Component Value Date    WBC 5.7 01/27/2023    RBC 3.57 (L) 01/27/2023    HGB 11.6 (L) 01/27/2023    HCT 36.1 01/27/2023     (H) 01/27/2023    MCH 32.5 01/27/2023    MCHC 32.1 01/27/2023    RDW 16.6 (H) 01/27/2023     01/27/2023       BMP RESULTS:  Lab Results   Component Value Date     02/17/2023    POTASSIUM 4.2 02/17/2023    POTASSIUM 4.2 11/18/2022    CHLORIDE 106 02/17/2023    CHLORIDE 103 11/17/2021    CO2 25 02/17/2023    CO2 28 11/17/2021    ANIONGAP 11 02/17/2023    ANIONGAP 9 11/17/2021    GLC 90 02/17/2023     (H) 11/30/2022    GLC 85 11/17/2021    BUN 19.8 02/17/2023    BUN 18 11/17/2021    CR 0.96 (H) 02/17/2023    GFRESTIMATED 58 (L) 02/17/2023    GFRESTIMATED >60 11/12/2020    GFRESTBLACK >60 11/12/2020    SHAHEEN 9.0 02/17/2023     INR RESULTS:  Lab Results   Component Value Date    INR 2.87 (H) 12/01/2022    INR 3.04 (H) 11/30/2022        PROCEDURES & FURTHER ASSESSMENTS:     EKG 1/27/23:  Personally reviewed  NSR with LBBB    ECHO 1/27/23:  ______________________________________________________________________________  Procedure  Limited Echocardiogram with portions of two-dimensional, color and spectral  Doppler performed.  ______________________________________________________________________________  Interpretation Summary  Global and regional left ventricular function is normal with an EF of 55-60%.  Global right ventricular function is normal.     s/p TAVR 23mm Contreras Danilo bioprostetic valve. Normal Doppler interrogation.  No AI. MG is 4 mmHg.     Trivial pericardial effusion is present. IVC is normal.     This study was compared with the study from 11/17/2022. There has been no  change.  ______________________________________________________________________________  Left Ventricle  Left  ventricular size is normal. Global and regional left ventricular function  is normal with an EF of 55-60%.     Right Ventricle  The right ventricle is normal size. Global right ventricular function is  normal.     Mitral Valve  Moderate mitral annular calcification is present. On Doppler interrogation,  there is no significant stenosis or regurgitation. Trace mitral insufficiency  is present.     Aortic Valve  s/p TAVR 23mm Contreras Danilo bioprostetic valve . No aortic regurgitation is  present. The mean AoV pressure gradient is 3.8 mmHg. The calculated aortic  valve are is 2.2 cm^2.     Tricuspid Valve  The tricuspid valve is normal. The right ventricular systolic pressure is  approximated at 23.0 mmHg plus the right atrial pressure. Trace tricuspid  insufficiency is present.     Pulmonic Valve  The pulmonic valve is normal.     Vessels  The aorta root is normal. The inferior vena cava was normal in size with  preserved respiratory variability.     Pericardium  Trivial pericardial effusion is present.     Compared to Previous Study  This study was compared with the study from 11/17/2022 . There has been no  change.    NYHA Class: I     CLINICAL IMPRESSION:       1.  Severe aortic stenosis status post TAVR 11/16/2022:  2.  LV perforation requiring sternotomy with LV patch repair:  3.  HFpEF, chronic:  4.  Postop atrial fibrillation on warfarin:  Doing well post op, she completed cardiac rehab, now walking 30 minutes daily at home without cardiac symptoms. Last ECHO January 2023 showed normal TAVR function with mean PG of 4 mmHg, no PVL, trivial pericardial effusion. Follow-up cardiac monitor without atrial fibrillation. Warfarin and amiodarone discontinued. Today reports some atypical chest pain, does not sound like angina. Will perform limited TTE to ensure no recurrent effusion or pericarditis.   - Limited TTE   - Continue ASA 81 mg daily   - Continue SBE prophylaxis for valve  - If recurrent AF, would need to  consider long term anticoagulation     5.  Hypertension:  6.  Hyperlipidemia:  - BP above goal, stop metoprolol and start Coreg for better BP control  - Last LDL 74, continue Atorvastatin     RTC: valve clinic in 6 months, then establish care with Dr. Holt in [place] thereafter     MAX Hardin, CNP  [facility] Cardiology Team      CC  Patient Care Team:  Dave Merritt MD as PCP - General (Family Medicine)  Dave Merritt MD as MD (Family Medicine)  Jazmine Minaya, NP as Assigned Heart and Vascular Provider  [facility]  JAZMINE MINAYA

## 2023-05-08 NOTE — LETTER
5/8/2023      RE: Jade Walker  [address] [facility] [address]       Dear Colleague,    Thank you for the opportunity to participate in the care of your patient, Jade Walker, at the [facility] [place] at [facility]. Please see a copy of my visit note below.        STRUCTURAL HEART CARE  CARDIOVASCULAR DIVISION    VALVE CLINIC RETURN VISIT    PERTINENT CLINICAL HISTORY:     Jade Walker is a very pleasant 88 year old female who presents for 6 month TAVR follow-up. She has a history of HTN, HLD, minimal CAD on pre TAVR coronary angiogram, CKD stage III, IBS and severe aortic stenosis who underwent TAVR with a 23 mm Contreras APOLONIA valve on November 16, 2022.  Post procedure echocardiogram showed well-seated valve with mean gradient of 3 mmHg with no PVL; however, did reveal a moderate sized circumferential pericardial effusion requiring emergent pericardiocentesis. Pericardial effusion recurred eventually requiring open sternotomy which revealed LV perforation requiring patching of the LV. Course was also notable for volume overload requiring diuretics and paroxysmal atrial fibrillation requiring amiodarone x 1 month and warfarin x 3 months. Patient was discharged to inpatient rehab on 12/1/22. Last seen in structural clinic January 2023 at which time she was feeling well. Her TTE showed normal TAVR function with mean PG 4 mmHg without PVL. A cardiac monitor was placed following amiodarone wash out, showing no recurrent a-fib. Her warfarin was discontinued.     She presents today with no specific cardiovascular concerns. She completed cardiac rehab last month. She continues to walk 30 minutes daily and is able to perform ADLs including preparing food and doing laundry. She has no exertional chest pain or shortness of breath. Yesterday she had some right upper chest pain and right bicep pain after doing laundry which involved  reaching into the washing machine and pulling out heavy towels. It went away after an hour, noticed it again when she woke up this morning. Now has resolved. Never experiences right upper chest pain/bicep pain with exertion. It feels musculoskeletal to her. She has no orthopnea, PND, leg swelling, weight gain. She has not had palpitations, pre or alex syncope.      PAST MEDICAL HISTORY:     Past Medical History:   Diagnosis Date    Aortic stenosis     Aortic stenosis, moderate     Chronic kidney disease, stage III (moderate) (H)     Colon polyp     Hyperlipidemia     Hypertension     Irritable bowel syndrome     Obesity (BMI 35.0-39.9 without comorbidity)         PAST SURGICAL HISTORY:     Past Surgical History:   Procedure Laterality Date    CV CORONARY ANGIOGRAM N/A 10/20/2022    Procedure: Coronary Angiogram [[id number]];  Surgeon: Tone Hollins MD;  Location:  HEART CARDIAC CATH LAB    CV LEFT HEART CATH N/A 10/20/2022    Procedure: Left Heart Cath;  Surgeon: Tone Hollins MD;  Location:  HEART CARDIAC CATH LAB    CV RIGHT HEART CATH MEASUREMENTS RECORDED N/A 10/20/2022    Procedure: Right Heart Cath;  Surgeon: Tone Hollins MD;  Location:  HEART CARDIAC CATH LAB    CV TRANSCATHETER AORTIC VALVE REPLACEMENT-FEMORAL APPROACH N/A 11/16/2022    Procedure: Transfemoral (Contreras) Transcatheter Aortic Valve Replacement;  Surgeon: Tone Hollins MD;  Location:  OR    EYE SURGERY  06/01/2015    OR TRANSCATHETER AORTIC VALVE REPLACEMENT, OTHER PERCUTANEOUS APPROACH (STANDBY) N/A 11/16/2022    Procedure: Median Sternotomy, repair of aortic VALVE,  cardiopulmonary bypass PUMP, INTRAOPERATIVE TRANSESOPHAGEAL ECHOCARDIOGRAM PER ANESTHESIA;  Surgeon: Dallin Bone MD;  Location:  OR    OTHER SURGICAL HISTORY Bilateral     cataracts    PICC INSERTION - TRIPLE LUMEN Right 11/17/2022    right basilic 5 fr tl picc 39 cm    ZZHC COLONOSCOPY W/WO BRUSH/WASH N/A 12/11/2020     Procedure: COLONOSCOPY;  Surgeon: Stan Porter MD;  Location: [facility];  Service: Gastroenterology        CURRENT MEDICATIONS:     Current Outpatient Medications   Medication Sig Dispense Refill    acetaminophen (TYLENOL) 325 MG tablet 2 tablets (650 mg) by Oral or Feeding Tube route every 4 hours as needed for other (For optimal non-opioid multimodal pain management to improve pain control.) (Patient not taking: Reported on 12/20/2022)      aspirin (ASA) 81 MG EC tablet Take 1 tablet (81 mg) by mouth daily      atorvastatin (LIPITOR) 40 MG tablet [ATORVASTATIN (LIPITOR) 40 MG TABLET] Take 40 mg by mouth at bedtime.      B.ani/L.aci/L.sal/L.plan/L.Rey (PROBIOTIC FORMULA ORAL) [B.ANI/L.ACI/L.SAL/L.PLAN/L.REY (PROBIOTIC FORMULA ORAL)] Take 1 capsule by mouth daily.      cholecalciferol, vitamin D3, 1,000 unit (25 mcg) tablet [CHOLECALCIFEROL, VITAMIN D3, 1,000 UNIT (25 MCG) TABLET] Take 1,000 Units by mouth daily.      loperamide (IMODIUM A-D) 2 MG tablet Take 1 tablet (2 mg) by mouth 3 times daily as needed for diarrhea (Patient not taking: Reported on 12/20/2022) 1 tablet 0    magnesium oxide (MAG-OX) 400 MG tablet Take 1 tablet (400 mg) by mouth daily      melatonin 5 MG tablet Take 2.5 mg by mouth nightly as needed for sleep      metoprolol tartrate (LOPRESSOR) 25 MG tablet Take 1 tablet (25 mg) by mouth 2 times daily      miconazole with skin protectant (ANGELA ANTIFUNGAL) 2 % CREA cream Apply topically 2 times daily TO GROIN, PERINEUM, AND PERIANAL REGIONS (Patient not taking: Reported on 12/20/2022)      psyllium (METAMUCIL/KONSYL) 58.6 % powder Take by mouth daily      senna-docusate (SENOKOT-S/PERICOLACE) 8.6-50 MG tablet 1 tablet by Oral or Feeding Tube route nightly as needed for constipation (Patient not taking: Reported on 12/20/2022)      vit A/vit C/vit E/zinc/copper (PRESERVISION AREDS ORAL) [VIT A/VIT C/VIT E/ZINC/COPPER (PRESERVISION AREDS ORAL)] Take 1 tablet by mouth 2 (two) times a  "day.          ALLERGIES:     Allergies   Allergen Reactions    Azithromycin Hives    Iodinated Contrast Media [Iodinated Contrast Media] Hives and Rash        FAMILY HISTORY:   No family history on file.     SOCIAL HISTORY:     Social History     Socioeconomic History    Marital status:    Tobacco Use    Smoking status: Never    Smokeless tobacco: Never   Substance and Sexual Activity    Alcohol use: Yes     Comment: Alcoholic Drinks/day: once a month    Drug use: Never        REVIEW OF SYSTEMS:     Constitutional: No fevers or chills  Skin: No new rash or itching  Eyes: No acute change in vision  Ears/Nose/Throat: No purulent rhinorrhea, new hearing loss, or new vertigo  Respiratory: No cough or hemoptysis  Cardiovascular: See HPI  Gastrointestinal: No change in appetite, vomiting, hematemesis or diarrhea  Genitourinary: No dysuria or hematuria  Musculoskeletal: No new back pain, neck pain or muscle pain  Neurologic: No new headaches, focal weakness or behavior changes  Psychiatric: No hallucinations, excessive alcohol consumption or illegal drug usage  Hematologic/Lymphatic/Immunologic: No bleeding, chills, fever, night sweats or weight loss  Endocrine: No new cold intolerance, heat intolerance, polyphagia, polydipsia or polyuria      PHYSICAL EXAMINATION:     BP (!) 156/83 (BP Location: Left arm, Patient Position: Sitting, Cuff Size: Adult Regular)   Pulse 72   Ht 1.573 m (5' 1.93\")   Wt 60.1 kg (132 lb 9.6 oz)   SpO2 100%   BMI 24.31 kg/m      GENERAL: No acute distress.  HEENT: EOMI. Sclerae white, not injected. Nares clear. Pharynx without erythema or exudate.   Neck: No adenopathy. No thyromegaly. No jugular venous distension.   Heart: Regular rate and rhythm. No murmur.   Lungs: Clear to auscultation. No ronchi, wheezes, rales.   Abdomen: Soft, nontender, nondistended. Bowel sounds present.  Extremities: No clubbing, cyanosis, or edema. Moderate sized firm bump at proximal end of sternal " incision, no fluctuation, likely sternum fusing.   Neurologic: Alert and oriented to person/place/time, normal speech and affect. No focal deficits.  Skin: No petechiae, purpura or rash.     LABORATORY DATA:     LIPID RESULTS:  Lab Results   Component Value Date    CHOL 155 02/17/2023    HDL 55 02/17/2023    LDL 74 02/17/2023    TRIG 128 02/17/2023       LIVER ENZYME RESULTS:  Lab Results   Component Value Date    AST 29 01/27/2023    ALT 17 01/27/2023       CBC RESULTS:  Lab Results   Component Value Date    WBC 5.7 01/27/2023    RBC 3.57 (L) 01/27/2023    HGB 11.6 (L) 01/27/2023    HCT 36.1 01/27/2023     (H) 01/27/2023    MCH 32.5 01/27/2023    MCHC 32.1 01/27/2023    RDW 16.6 (H) 01/27/2023     01/27/2023       BMP RESULTS:  Lab Results   Component Value Date     02/17/2023    POTASSIUM 4.2 02/17/2023    POTASSIUM 4.2 11/18/2022    CHLORIDE 106 02/17/2023    CHLORIDE 103 11/17/2021    CO2 25 02/17/2023    CO2 28 11/17/2021    ANIONGAP 11 02/17/2023    ANIONGAP 9 11/17/2021    GLC 90 02/17/2023     (H) 11/30/2022    GLC 85 11/17/2021    BUN 19.8 02/17/2023    BUN 18 11/17/2021    CR 0.96 (H) 02/17/2023    GFRESTIMATED 58 (L) 02/17/2023    GFRESTIMATED >60 11/12/2020    GFRESTBLACK >60 11/12/2020    SHAHEEN 9.0 02/17/2023     INR RESULTS:  Lab Results   Component Value Date    INR 2.87 (H) 12/01/2022    INR 3.04 (H) 11/30/2022        PROCEDURES & FURTHER ASSESSMENTS:     EKG 1/27/23:  Personally reviewed  NSR with LBBB    ECHO 1/27/23:  ______________________________________________________________________________  Procedure  Limited Echocardiogram with portions of two-dimensional, color and spectral  Doppler performed.  ______________________________________________________________________________  Interpretation Summary  Global and regional left ventricular function is normal with an EF of 55-60%.  Global right ventricular function is normal.     s/p TAVR 23mm Contreras Danilo bioprostetic  valve. Normal Doppler interrogation.  No AI. MG is 4 mmHg.     Trivial pericardial effusion is present. IVC is normal.     This study was compared with the study from 11/17/2022. There has been no  change.  ______________________________________________________________________________  Left Ventricle  Left ventricular size is normal. Global and regional left ventricular function  is normal with an EF of 55-60%.     Right Ventricle  The right ventricle is normal size. Global right ventricular function is  normal.     Mitral Valve  Moderate mitral annular calcification is present. On Doppler interrogation,  there is no significant stenosis or regurgitation. Trace mitral insufficiency  is present.     Aortic Valve  s/p TAVR 23mm Contreras Danilo bioprostetic valve . No aortic regurgitation is  present. The mean AoV pressure gradient is 3.8 mmHg. The calculated aortic  valve are is 2.2 cm^2.     Tricuspid Valve  The tricuspid valve is normal. The right ventricular systolic pressure is  approximated at 23.0 mmHg plus the right atrial pressure. Trace tricuspid  insufficiency is present.     Pulmonic Valve  The pulmonic valve is normal.     Vessels  The aorta root is normal. The inferior vena cava was normal in size with  preserved respiratory variability.     Pericardium  Trivial pericardial effusion is present.     Compared to Previous Study  This study was compared with the study from 11/17/2022 . There has been no  change.    NYHA Class: I     CLINICAL IMPRESSION:       1.  Severe aortic stenosis status post TAVR 11/16/2022:  2.  LV perforation requiring sternotomy with LV patch repair:  3.  HFpEF, chronic:  4.  Postop atrial fibrillation on warfarin:  Doing well post op, she completed cardiac rehab, now walking 30 minutes daily at home without cardiac symptoms. Last ECHO January 2023 showed normal TAVR function with mean PG of 4 mmHg, no PVL, trivial pericardial effusion. Follow-up cardiac monitor without atrial  fibrillation. Warfarin and amiodarone discontinued. Today reports some atypical chest pain, does not sound like angina. Will perform limited TTE to ensure no recurrent effusion or pericarditis.   - Limited TTE   - Continue ASA 81 mg daily   - Continue SBE prophylaxis for valve  - If recurrent AF, would need to consider long term anticoagulation     5.  Hypertension:  6.  Hyperlipidemia:  - BP above goal, stop metoprolol and start Coreg for better BP control  - Last LDL 74, continue Atorvastatin     RTC: valve clinic in 6 months, then establish care with Dr. Holt in [place] thereafter     MAX Hardin, CNP  [facility] Cardiology Team      CC  Patient Care Team:  Dave Merritt MD as PCP - General (Family Medicine)  Dave Merritt MD as MD (Family Medicine)  Jazmine Minaya NP as Assigned Heart and Vascular Provider  [facility]  JAZMINE MINAYA        Structural Heart Coordinator visit:  Provided additional education regarding TAVR/MitraClip procedure, after being present for discussion with physician. Explained the work-up process and next steps for patient. Patient provided our direct contact number and instructed to call with any questions.       KCCQ Results:   1a. 5  1b. 5  1c. 5  2. 5  3. 7  4. 7  5. 5  6. 5  7. 5  8a. 6  8b. 5  8c. 6    Millie Camara CMA  Structural Heart   [facility]

## 2023-05-08 NOTE — PATIENT INSTRUCTIONS
You were seen today in the Structural Heart Clinic at the [facility].    Cardiology provider you saw during your visit: Jazmine Pathak NP    Instructions:   Continue aspirin 81 mg daily lifelong  Stop metoprolol and start Coreg 6.25 mg twice daily which has better blood pressure lowering ability  Start logging home blood pressure, we will call in 1 month to review  For all future dental cleanings and procedures you will need to take antibiotics prior - see instructions below.   Continue daily exercise   Follow-up with me in 6 months with echo and labs prior  Will then establish care with Dr. Jonathon Cartagena for your primary cardiologist     Prevention of Infective (Bacterial) Endocarditis:  You are at increased risk for developing adverse outcomes from infective endocarditis (IE), also known as bacterial endocarditis (BE) because of the new device in your heart. The guidelines for prevention of IE are to give patients antibiotics prior to any dental procedures that involve manipulation of gingival tissue or the periapical region of teeth, or perforation of the oral mucosa:      It is recommended to take Amoxicillin 2 gm by mouth as a single dose 30 to 60 minutes before procedure.     OR if allergic to Penicillin or Ampicillin:     Cephalexin 2 gm by mouth, or  Clindamycin 600 mg by mouth, or  Azithromycin or Clarithromycin 500 mg PO     Questions and scheduling:   First call: Structural Heart  Aziza Camara [phone]    General scheduling line: [phone].   First press #1 for the [email or web address] and then press #3 for Medical Questions to reach the Cardiology triage nurse.     On Call Cardiologist for after hours or on weekends: [phone], press option #4 and ask to speak to the on-call Cardiologist.

## 2023-05-09 LAB
ATRIAL RATE - MUSE: 63 BPM
DIASTOLIC BLOOD PRESSURE - MUSE: NORMAL MMHG
INTERPRETATION ECG - MUSE: NORMAL
P AXIS - MUSE: 60 DEGREES
PR INTERVAL - MUSE: 178 MS
QRS DURATION - MUSE: 134 MS
QT - MUSE: 440 MS
QTC - MUSE: 450 MS
R AXIS - MUSE: -25 DEGREES
SYSTOLIC BLOOD PRESSURE - MUSE: NORMAL MMHG
T AXIS - MUSE: 110 DEGREES
VENTRICULAR RATE- MUSE: 63 BPM

## 2023-05-16 ENCOUNTER — TELEPHONE (OUTPATIENT)
Dept: CARDIOLOGY | Facility: CLINIC | Age: 88
End: 2023-05-16
Payer: COMMERCIAL

## 2023-05-24 ENCOUNTER — ANCILLARY PROCEDURE (OUTPATIENT)
Dept: CARDIOLOGY | Facility: CLINIC | Age: 88
End: 2023-05-24
Attending: NURSE PRACTITIONER
Payer: COMMERCIAL

## 2023-05-24 DIAGNOSIS — I10 BENIGN ESSENTIAL HYPERTENSION: ICD-10-CM

## 2023-05-24 DIAGNOSIS — Z95.2 S/P TAVR (TRANSCATHETER AORTIC VALVE REPLACEMENT): ICD-10-CM

## 2023-05-24 DIAGNOSIS — Z29.8 * * * SBE PROPHYLAXIS * * *: ICD-10-CM

## 2023-05-24 LAB — LVEF ECHO: NORMAL

## 2023-05-24 PROCEDURE — 93321 DOPPLER ECHO F-UP/LMTD STD: CPT | Performed by: INTERNAL MEDICINE

## 2023-05-24 PROCEDURE — 93308 TTE F-UP OR LMTD: CPT | Performed by: INTERNAL MEDICINE

## 2023-05-24 PROCEDURE — 93325 DOPPLER ECHO COLOR FLOW MAPG: CPT | Performed by: INTERNAL MEDICINE

## 2023-06-06 ENCOUNTER — TELEPHONE (OUTPATIENT)
Dept: CARDIOLOGY | Facility: CLINIC | Age: 88
End: 2023-06-06
Payer: COMMERCIAL

## 2023-06-06 NOTE — TELEPHONE ENCOUNTER
Called patient and left detailed VM that we are calling to go over her BP log readings. Left contact to call me back.

## 2023-06-14 ENCOUNTER — TELEPHONE (OUTPATIENT)
Dept: CARDIOLOGY | Facility: CLINIC | Age: 88
End: 2023-06-14
Payer: COMMERCIAL

## 2023-06-14 NOTE — TELEPHONE ENCOUNTER
BP Readings: she said its been running around those numbers. She has been walking as well for 30 mins a day. No symptoms of any kind.     116/60 68   112/70 68   113/69 68   112/71 69   122/69 69   122/69 69

## 2023-08-17 ENCOUNTER — LAB REQUISITION (OUTPATIENT)
Dept: LAB | Facility: CLINIC | Age: 88
End: 2023-08-17

## 2023-08-17 DIAGNOSIS — E78.5 HYPERLIPIDEMIA, UNSPECIFIED: ICD-10-CM

## 2023-08-17 DIAGNOSIS — I12.9 HYPERTENSIVE CHRONIC KIDNEY DISEASE WITH STAGE 1 THROUGH STAGE 4 CHRONIC KIDNEY DISEASE, OR UNSPECIFIED CHRONIC KIDNEY DISEASE: ICD-10-CM

## 2023-08-17 LAB
ANION GAP SERPL CALCULATED.3IONS-SCNC: 12 MMOL/L (ref 7–15)
BUN SERPL-MCNC: 15.9 MG/DL (ref 8–23)
CALCIUM SERPL-MCNC: 9.3 MG/DL (ref 8.8–10.2)
CHLORIDE SERPL-SCNC: 105 MMOL/L (ref 98–107)
CHOLEST SERPL-MCNC: 159 MG/DL
CREAT SERPL-MCNC: 0.98 MG/DL (ref 0.51–0.95)
DEPRECATED HCO3 PLAS-SCNC: 25 MMOL/L (ref 22–29)
GFR SERPL CREATININE-BSD FRML MDRD: 56 ML/MIN/1.73M2
GLUCOSE SERPL-MCNC: 83 MG/DL (ref 70–99)
HDLC SERPL-MCNC: 71 MG/DL
LDLC SERPL CALC-MCNC: 77 MG/DL
NONHDLC SERPL-MCNC: 88 MG/DL
POTASSIUM SERPL-SCNC: 4.4 MMOL/L (ref 3.4–5.3)
SODIUM SERPL-SCNC: 142 MMOL/L (ref 136–145)
TRIGL SERPL-MCNC: 57 MG/DL

## 2023-08-17 PROCEDURE — 80048 BASIC METABOLIC PNL TOTAL CA: CPT | Performed by: FAMILY MEDICINE

## 2023-08-17 PROCEDURE — 80061 LIPID PANEL: CPT | Performed by: FAMILY MEDICINE

## 2023-09-15 ENCOUNTER — HOSPITAL ENCOUNTER (OUTPATIENT)
Dept: BONE DENSITY | Facility: HOSPITAL | Age: 88
Discharge: HOME | End: 2023-09-15
Attending: FAMILY MEDICINE | Admitting: FAMILY MEDICINE
Payer: COMMERCIAL

## 2023-09-15 DIAGNOSIS — Z78.0 ASYMPTOMATIC MENOPAUSE: ICD-10-CM

## 2023-09-15 PROCEDURE — 77080 DXA BONE DENSITY AXIAL: CPT

## 2023-10-11 ENCOUNTER — LAB REQUISITION (OUTPATIENT)
Dept: LAB | Facility: CLINIC | Age: 88
End: 2023-10-11

## 2023-10-11 DIAGNOSIS — M85.80 OTHER SPECIFIED DISORDERS OF BONE DENSITY AND STRUCTURE, UNSPECIFIED SITE: ICD-10-CM

## 2023-10-11 PROCEDURE — 82306 VITAMIN D 25 HYDROXY: CPT | Performed by: FAMILY MEDICINE

## 2023-10-12 LAB — VIT D+METAB SERPL-MCNC: 58 NG/ML (ref 20–50)

## 2023-10-15 ENCOUNTER — HEALTH MAINTENANCE LETTER (OUTPATIENT)
Age: 88
End: 2023-10-15

## 2023-10-29 DIAGNOSIS — Z95.2 S/P TAVR (TRANSCATHETER AORTIC VALVE REPLACEMENT): ICD-10-CM

## 2023-10-29 DIAGNOSIS — I10 BENIGN ESSENTIAL HYPERTENSION: ICD-10-CM

## 2023-10-31 RX ORDER — CARVEDILOL 6.25 MG/1
6.25 TABLET ORAL 2 TIMES DAILY WITH MEALS
Qty: 180 TABLET | Refills: 0 | Status: SHIPPED | OUTPATIENT
Start: 2023-10-31 | End: 2024-01-29

## 2023-10-31 NOTE — TELEPHONE ENCOUNTER
Last Clinic Visit: 5/8/2023 [facility]     BP above medication protocol parameters: Refill of Carvedilol was sent for 90 day supply and FYI to Cardiology.    BP Readings from Last 1 Encounters:   05/08/23 (!) 156/83

## 2023-11-10 NOTE — PROGRESS NOTES
[facility] HEART [facility]  CARDIOVASCULAR DIVISION    VALVE CLINIC RETURN VISIT    PERTINENT CLINICAL HISTORY:     Jade Walker is a very pleasant 88 year old female who presents for 1 year TAVR follow-up. She has a history of HTN, HLD, minimal CAD on pre TAVR coronary angiogram, CKD stage III, IBS and severe aortic stenosis who underwent TAVR with a 23 mm Contreras APOLONIA valve on November 16, 2022. Post procedure echocardiogram showed well-seated valve with mean gradient of 3 mmHg with no PVL; however, did reveal a moderate sized circumferential pericardial effusion requiring emergent pericardiocentesis, eventually requiring open sternotomy which revealed LV perforation requiring patching of the LV. Course was also notable for volume overload requiring diuretics and paroxysmal atrial fibrillation requiring amiodarone x 1 month and warfarin x 3 months. Patient was discharged to inpatient rehab on 12/1/22. She was in follow-up January 2023 at which time TTE showed normal TAVR function with mean PG 4 mmHg without PVL. A cardiac monitor was placed following amiodarone wash out, showing no recurrent a-fib. Her warfarin was discontinued. She was last seen May 2023 at which time she was feeling well.     She presents today with no specific cardiovascular concerns. She continues to walk 30 minutes daily and is able to perform ADLs including preparing food and doing laundry. She has no exertional chest pain or shortness of breath. She has no orthopnea, PND, leg swelling, weight gain. She has not had palpitations, pre or alex syncope. She is a little depressed mainly due to her husbands deteriorating health. Also notes that her vision is getting worse related to macular degeneration. She is compliant with her medications without side effects. Her home blood pressure is well controlled with SBP consistently < 130.      PAST MEDICAL HISTORY:     Past Medical History:   Diagnosis Date    Aortic stenosis     Aortic  stenosis, moderate     Chronic kidney disease, stage III (moderate) (H)     Colon polyp     Hyperlipidemia     Hypertension     Irritable bowel syndrome     Obesity (BMI 35.0-39.9 without comorbidity)         PAST SURGICAL HISTORY:     Past Surgical History:   Procedure Laterality Date    CV CORONARY ANGIOGRAM N/A 10/20/2022    Procedure: Coronary Angiogram [[id number]];  Surgeon: Tone Hollins MD;  Location:  HEART CARDIAC CATH LAB    CV LEFT HEART CATH N/A 10/20/2022    Procedure: Left Heart Cath;  Surgeon: Tone Hollins MD;  Location:  HEART CARDIAC CATH LAB    CV RIGHT HEART CATH MEASUREMENTS RECORDED N/A 10/20/2022    Procedure: Right Heart Cath;  Surgeon: Tone Hollins MD;  Location:  HEART CARDIAC CATH LAB    CV TRANSCATHETER AORTIC VALVE REPLACEMENT-FEMORAL APPROACH N/A 11/16/2022    Procedure: Transfemoral (Contreras) Transcatheter Aortic Valve Replacement;  Surgeon: Tone Hollins MD;  Location:  OR    EYE SURGERY  06/01/2015    OR TRANSCATHETER AORTIC VALVE REPLACEMENT, OTHER PERCUTANEOUS APPROACH (STANDBY) N/A 11/16/2022    Procedure: Median Sternotomy, repair of aortic VALVE,  cardiopulmonary bypass PUMP, INTRAOPERATIVE TRANSESOPHAGEAL ECHOCARDIOGRAM PER ANESTHESIA;  Surgeon: Dallin Bone MD;  Location:  OR    OTHER SURGICAL HISTORY Bilateral     cataracts    PICC INSERTION - TRIPLE LUMEN Right 11/17/2022    right basilic 5 fr tl picc 39 cm    ZZHC COLONOSCOPY W/WO BRUSH/WASH N/A 12/11/2020    Procedure: COLONOSCOPY;  Surgeon: Stan Porter MD;  Location: [facility];  Service: Gastroenterology        CURRENT MEDICATIONS:     Current Outpatient Medications   Medication Sig Dispense Refill    acetaminophen (TYLENOL) 325 MG tablet 2 tablets (650 mg) by Oral or Feeding Tube route every 4 hours as needed for other (For optimal non-opioid multimodal pain management to improve pain control.) (Patient not taking: Reported on 12/20/2022)       amoxicillin (AMOXIL) 500 MG capsule Take 4 capsules (2,000 mg) by mouth once as needed (take 30-60 minutes prior to dental procedure/cleaning) 4 capsule 3    aspirin (ASA) 81 MG EC tablet Take 1 tablet (81 mg) by mouth daily      atorvastatin (LIPITOR) 40 MG tablet [ATORVASTATIN (LIPITOR) 40 MG TABLET] Take 40 mg by mouth at bedtime.      B.ani/L.aci/L.sal/L.plan/L.Rey (PROBIOTIC FORMULA ORAL) [B.ANI/L.ACI/L.SAL/L.PLAN/L.REY (PROBIOTIC FORMULA ORAL)] Take 1 capsule by mouth daily. (Patient not taking: Reported on 5/8/2023)      carvedilol (COREG) 6.25 MG tablet Take 1 tablet (6.25 mg) by mouth 2 times daily (with meals) 180 tablet 0    cholecalciferol, vitamin D3, 1,000 unit (25 mcg) tablet [CHOLECALCIFEROL, VITAMIN D3, 1,000 UNIT (25 MCG) TABLET] Take 1,000 Units by mouth daily.      loperamide (IMODIUM A-D) 2 MG tablet Take 1 tablet (2 mg) by mouth 3 times daily as needed for diarrhea (Patient not taking: Reported on 12/20/2022) 1 tablet 0    magnesium oxide (MAG-OX) 400 MG tablet Take 1 tablet (400 mg) by mouth daily (Patient not taking: Reported on 5/8/2023)      melatonin 5 MG tablet Take 2.5 mg by mouth nightly as needed for sleep      miconazole with skin protectant (ANGELA ANTIFUNGAL) 2 % CREA cream Apply topically 2 times daily TO GROIN, PERINEUM, AND PERIANAL REGIONS (Patient not taking: Reported on 12/20/2022)      psyllium (METAMUCIL/KONSYL) 58.6 % powder Take by mouth daily      senna-docusate (SENOKOT-S/PERICOLACE) 8.6-50 MG tablet 1 tablet by Oral or Feeding Tube route nightly as needed for constipation (Patient not taking: Reported on 12/20/2022)      vit A/vit C/vit E/zinc/copper (PRESERVISION AREDS ORAL) [VIT A/VIT C/VIT E/ZINC/COPPER (PRESERVISION AREDS ORAL)] Take 1 tablet by mouth 2 (two) times a day.          ALLERGIES:     Allergies   Allergen Reactions    Azithromycin Hives    Iodinated Contrast Media [Iodinated Contrast Media] Hives and Rash        FAMILY HISTORY:   No family history on  "file.     SOCIAL HISTORY:     Social History     Socioeconomic History    Marital status:    Tobacco Use    Smoking status: Never    Smokeless tobacco: Never   Substance and Sexual Activity    Alcohol use: Yes     Comment: Alcoholic Drinks/day: once a month    Drug use: Never        REVIEW OF SYSTEMS:     Constitutional: No fevers or chills  Skin: No new rash or itching  Eyes: No acute change in vision  Ears/Nose/Throat: No purulent rhinorrhea, new hearing loss, or new vertigo  Respiratory: No cough or hemoptysis  Cardiovascular: See HPI  Gastrointestinal: No change in appetite, vomiting, hematemesis or diarrhea  Genitourinary: No dysuria or hematuria  Musculoskeletal: No new back pain, neck pain or muscle pain  Neurologic: No new headaches, focal weakness or behavior changes  Psychiatric: No hallucinations, excessive alcohol consumption or illegal drug usage  Hematologic/Lymphatic/Immunologic: No bleeding, chills, fever, night sweats or weight loss  Endocrine: No new cold intolerance, heat intolerance, polyphagia, polydipsia or polyuria      PHYSICAL EXAMINATION:     BP (!) 153/83 (BP Location: Right arm, Patient Position: Chair, Cuff Size: Adult Regular)   Pulse 74   Ht 1.565 m (5' 1.61\")   Wt 58.4 kg (128 lb 12.8 oz)   SpO2 96%   BMI 23.85 kg/m      GENERAL: No acute distress.  HEENT: EOMI. Sclerae white, not injected. Nares clear. Pharynx without erythema or exudate.   Neck: No adenopathy. No thyromegaly. No jugular venous distension.   Heart: Regular rate and rhythm. No murmur.   Lungs: Clear to auscultation. No ronchi, wheezes, rales.   Abdomen: Soft, nontender, nondistended. Bowel sounds present.  Extremities: No clubbing, cyanosis, or edema. Moderate sized firm bump at proximal end of sternal incision, no fluctuation, likely sternum fusing.   Neurologic: Alert and oriented to person/place/time, normal speech and affect. No focal deficits.  Skin: No petechiae, purpura or rash.     LABORATORY DATA: "     LIPID RESULTS:  Lab Results   Component Value Date    CHOL 159 08/17/2023    HDL 71 08/17/2023    LDL 77 08/17/2023    TRIG 57 08/17/2023       LIVER ENZYME RESULTS:  Lab Results   Component Value Date    AST 29 01/27/2023    ALT 17 01/27/2023       CBC RESULTS:  Lab Results   Component Value Date    WBC 5.7 01/27/2023    RBC 3.57 (L) 01/27/2023    HGB 11.6 (L) 01/27/2023    HCT 36.1 01/27/2023     (H) 01/27/2023    MCH 32.5 01/27/2023    MCHC 32.1 01/27/2023    RDW 16.6 (H) 01/27/2023     01/27/2023       BMP RESULTS:  Lab Results   Component Value Date     08/17/2023    POTASSIUM 4.4 08/17/2023    POTASSIUM 4.2 11/18/2022    CHLORIDE 105 08/17/2023    CHLORIDE 103 11/17/2021    CO2 25 08/17/2023    CO2 28 11/17/2021    ANIONGAP 12 08/17/2023    ANIONGAP 9 11/17/2021    GLC 83 08/17/2023     (H) 11/30/2022    GLC 85 11/17/2021    BUN 15.9 08/17/2023    BUN 18 11/17/2021    CR 0.98 (H) 08/17/2023    GFRESTIMATED 56 (L) 08/17/2023    GFRESTIMATED >60 11/12/2020    GFRESTBLACK >60 11/12/2020    SHAHEEN 9.3 08/17/2023     INR RESULTS:  Lab Results   Component Value Date    INR 2.87 (H) 12/01/2022    INR 3.04 (H) 11/30/2022        PROCEDURES & FURTHER ASSESSMENTS:   EKG 11/13/23:  NSR with LBBB      ECHO 11/13/23:  Interpretation Summary  S/P TAVR with 23mm Contreras Danilo valve.Mean aortic gradient normal at 6mmHg.  No AI.  ______________________________________________________________________________  Left Ventricle  Global and regional left ventricular function is normal with an EF of 60-65%.  Left ventricular wall thickness is normal. Left ventricular size is normal.  Left ventricular diastolic function is not assessable. No regional wall motion  abnormalities are seen.     Right Ventricle  Right ventricular function, chamber size, wall motion, and thickness are  normal.     Atria  Both atria appear normal.     Mitral Valve  Mild to moderate mitral annular calcification is present. Trace to  mild mitral  insufficiency is present.     Aortic Valve  S/P TAVR with 23mm Contreras Danilo valve.Mean aortic gradient normal at 6mmHg.  No AI.     Tricuspid Valve  The tricuspid valve is normal. Mild tricuspid insufficiency is present. The  right ventricular systolic pressure is approximated at 29.9 mmHg plus the  right atrial pressure. Pulmonary artery systolic pressure is normal.     Pulmonic Valve  The pulmonic valve is normal.     Vessels  The thoracic aorta cannot be assessed. The pulmonary artery and bifurcation  cannot be assessed. The inferior vena cava is normal.     Pericardium  No pericardial effusion is present.     ______________________________________________________________________________  Doppler Measurements & Calculations  MV max P.6 mmHg  MV mean P.9 mmHg  MV V2 VTI: 34.0 cm  Ao V2 max: 174.0 cm/sec  Ao max P.1 mmHg  Ao V2 mean: 108.3 cm/sec  Ao mean P.5 mmHg  Ao V2 VTI: 35.5 cm  LV V1 max P.5 mmHg  LV V1 max: 137.0 cm/sec  LV V1 VTI: 29.5 cm  TR max domo: 273.4 cm/sec  TR max P.9 mmHg  AV Domo Ratio (DI): 0.79    EKG 23:  NSR with LBBB HR 62    ECHO May 2023:  Interpretation Summary     s/p TAVR with 23mm Contreras Danilo valve. Doppler interrogation of the aortic  valve is normal.  Global and regional left ventricular function is normal with an EF of 55-60%.  Global right ventricular function is normal.  IVC diameter <2.1 cm collapsing >50% with sniff suggests a normal RA pressure  of 3 mmHg.  No pericardial effusion is present.  No significant changes noted.  ______________________________________________________________________________  Left Ventricle  Global and regional left ventricular function is normal with an EF of 55-60%.  Left ventricular size is normal. Mild concentric wall thickening consistent  with left ventricular hypertrophy is present. Left ventricular diastolic  function is not assessable.     Right Ventricle  The right ventricle is normal size.  Global right ventricular function is  normal.     Atria  The atria cannot be assessed.     Mitral Valve  Moderate mitral annular calcification is present. Trace mitral insufficiency  is present.     Aortic Valve  The calculated aortic valve are is 1.1 cm^2. The mean AoV pressure gradient  is  11.1 mmHg. Transcutaneous aortic valve replacement is present. Doppler  interrogation of the aortic valve is normal. There is no paravalvular  regurgitation.     Tricuspid Valve  The tricuspid valve cannot be assessed.     Pulmonic Valve  The pulmonic valve cannot be assessed.     Vessels  The aorta root is normal. IVC diameter <2.1 cm collapsing >50% with sniff  suggests a normal RA pressure of 3 mmHg.     Pericardium  No pericardial effusion is present.     Compared to Previous Study  No significant changes noted.      EKG 1/27/23:  Personally reviewed  NSR with OTF    ECHO 1/27/23:  ______________________________________________________________________________  Procedure  Limited Echocardiogram with portions of two-dimensional, color and spectral  Doppler performed.  ______________________________________________________________________________  Interpretation Summary  Global and regional left ventricular function is normal with an EF of 55-60%.  Global right ventricular function is normal.     s/p TAVR 23mm Contreras Danilo bioprostetic valve. Normal Doppler interrogation.  No AI. MG is 4 mmHg.     Trivial pericardial effusion is present. IVC is normal.     This study was compared with the study from 11/17/2022. There has been no  change.  ______________________________________________________________________________  Left Ventricle  Left ventricular size is normal. Global and regional left ventricular function  is normal with an EF of 55-60%.     Right Ventricle  The right ventricle is normal size. Global right ventricular function is  normal.     Mitral Valve  Moderate mitral annular calcification is present. On Doppler  interrogation,  there is no significant stenosis or regurgitation. Trace mitral insufficiency  is present.     Aortic Valve  s/p TAVR 23mm Contreras Danilo bioprostetic valve . No aortic regurgitation is  present. The mean AoV pressure gradient is 3.8 mmHg. The calculated aortic  valve are is 2.2 cm^2.     Tricuspid Valve  The tricuspid valve is normal. The right ventricular systolic pressure is  approximated at 23.0 mmHg plus the right atrial pressure. Trace tricuspid  insufficiency is present.     Pulmonic Valve  The pulmonic valve is normal.     Vessels  The aorta root is normal. The inferior vena cava was normal in size with  preserved respiratory variability.     Pericardium  Trivial pericardial effusion is present.     Compared to Previous Study  This study was compared with the study from 11/17/2022 . There has been no  change.    NYHA Class: I     CLINICAL IMPRESSION:       1.  Severe aortic stenosis status post TAVR 11/16/2022:  2.  LV perforation requiring sternotomy with LV patch repair:  3.  HFpEF, chronic:  4.  Postop atrial fibrillation, resolved:  Has done well since her TAVR c/b LV perf s/p patch repair. ECHOs have shown normal TAVR function, normal EF.  Follow-up cardiac monitor without atrial fibrillation. Warfarin and amiodarone previously discontinued. ECHO today shows normal TAVR function with mean PG of 6 mmHg, no PVL. EKG today shows normal sinus rhythm. Continues to walk 30 minutes daily without cardiac symptoms.   - Continue ASA 81 mg daily   - Continue SBE prophylaxis for valve  - If recurrent AF, would need to consider long term anticoagulation     5.  Hypertension:  6.  Hyperlipidemia:  - BP at goal at home  - Continue Coreg 6.25 mg BID   - Last LDL 77, continue Atorvastatin     RTC: Establish care with [facility] in 1 year with echo and labs prior     MAX Hardin, CNP  [facility] Cardiology Team      CC  Patient Care Team:  Dave Merritt MD as PCP - General (Family  Medicine)  Dave Merritt MD as MD (Family Medicine)  Jazmine Pathak NP as Assigned Heart and Vascular Provider  [facility]  JAZMINE PATHAK

## 2023-11-13 ENCOUNTER — LAB (OUTPATIENT)
Dept: LAB | Facility: CLINIC | Age: 88
End: 2023-11-13
Payer: COMMERCIAL

## 2023-11-13 ENCOUNTER — OFFICE VISIT (OUTPATIENT)
Dept: CARDIOLOGY | Facility: CLINIC | Age: 88
End: 2023-11-13
Attending: NURSE PRACTITIONER
Payer: COMMERCIAL

## 2023-11-13 VITALS
BODY MASS INDEX: 23.7 KG/M2 | HEIGHT: 62 IN | WEIGHT: 128.8 LBS | HEART RATE: 74 BPM | DIASTOLIC BLOOD PRESSURE: 83 MMHG | SYSTOLIC BLOOD PRESSURE: 153 MMHG | OXYGEN SATURATION: 96 %

## 2023-11-13 DIAGNOSIS — Z95.2 S/P TAVR (TRANSCATHETER AORTIC VALVE REPLACEMENT): Primary | ICD-10-CM

## 2023-11-13 DIAGNOSIS — Z95.2 S/P TAVR (TRANSCATHETER AORTIC VALVE REPLACEMENT): ICD-10-CM

## 2023-11-13 DIAGNOSIS — Z29.8 * * * SBE PROPHYLAXIS * * *: ICD-10-CM

## 2023-11-13 DIAGNOSIS — I48.91 POSTOPERATIVE ATRIAL FIBRILLATION (H): ICD-10-CM

## 2023-11-13 DIAGNOSIS — I97.89 POSTOPERATIVE ATRIAL FIBRILLATION (H): ICD-10-CM

## 2023-11-13 LAB
ALBUMIN SERPL BCG-MCNC: 4.1 G/DL (ref 3.5–5.2)
ALP SERPL-CCNC: 83 U/L (ref 35–104)
ALT SERPL W P-5'-P-CCNC: 10 U/L (ref 0–50)
ANION GAP SERPL CALCULATED.3IONS-SCNC: 8 MMOL/L (ref 7–15)
AST SERPL W P-5'-P-CCNC: 24 U/L (ref 0–45)
BILIRUB SERPL-MCNC: 0.5 MG/DL
BUN SERPL-MCNC: 15.2 MG/DL (ref 8–23)
CALCIUM SERPL-MCNC: 8.9 MG/DL (ref 8.8–10.2)
CHLORIDE SERPL-SCNC: 99 MMOL/L (ref 98–107)
CREAT SERPL-MCNC: 0.85 MG/DL (ref 0.51–0.95)
DEPRECATED HCO3 PLAS-SCNC: 27 MMOL/L (ref 22–29)
EGFRCR SERPLBLD CKD-EPI 2021: 66 ML/MIN/1.73M2
ERYTHROCYTE [DISTWIDTH] IN BLOOD BY AUTOMATED COUNT: 14.1 % (ref 10–15)
GLUCOSE SERPL-MCNC: 91 MG/DL (ref 70–99)
HCT VFR BLD AUTO: 34.4 % (ref 35–47)
HGB BLD-MCNC: 11.5 G/DL (ref 11.7–15.7)
LVEF ECHO: NORMAL
MCH RBC QN AUTO: 34.6 PG (ref 26.5–33)
MCHC RBC AUTO-ENTMCNC: 33.4 G/DL (ref 31.5–36.5)
MCV RBC AUTO: 104 FL (ref 78–100)
PLATELET # BLD AUTO: 287 10E3/UL (ref 150–450)
POTASSIUM SERPL-SCNC: 4 MMOL/L (ref 3.4–5.3)
PROT SERPL-MCNC: 6.7 G/DL (ref 6.4–8.3)
RBC # BLD AUTO: 3.32 10E6/UL (ref 3.8–5.2)
SODIUM SERPL-SCNC: 134 MMOL/L (ref 135–145)
WBC # BLD AUTO: 5.5 10E3/UL (ref 4–11)

## 2023-11-13 PROCEDURE — 36415 COLL VENOUS BLD VENIPUNCTURE: CPT | Performed by: PATHOLOGY

## 2023-11-13 PROCEDURE — 93306 TTE W/DOPPLER COMPLETE: CPT | Performed by: INTERNAL MEDICINE

## 2023-11-13 PROCEDURE — 93010 ELECTROCARDIOGRAM REPORT: CPT | Performed by: INTERNAL MEDICINE

## 2023-11-13 PROCEDURE — 99214 OFFICE O/P EST MOD 30 MIN: CPT | Mod: 25 | Performed by: NURSE PRACTITIONER

## 2023-11-13 PROCEDURE — 85027 COMPLETE CBC AUTOMATED: CPT | Performed by: PATHOLOGY

## 2023-11-13 PROCEDURE — G0463 HOSPITAL OUTPT CLINIC VISIT: HCPCS | Performed by: NURSE PRACTITIONER

## 2023-11-13 PROCEDURE — 93005 ELECTROCARDIOGRAM TRACING: CPT

## 2023-11-13 PROCEDURE — 80053 COMPREHEN METABOLIC PANEL: CPT | Performed by: PATHOLOGY

## 2023-11-13 RX ORDER — ALENDRONATE SODIUM 70 MG/1
70 TABLET ORAL
COMMUNITY

## 2023-11-13 RX ORDER — PANTOPRAZOLE SODIUM 40 MG/1
40 TABLET, DELAYED RELEASE ORAL DAILY
COMMUNITY

## 2023-11-13 NOTE — PATIENT INSTRUCTIONS
You were seen today in the Structural Heart Clinic at the [facility].    Cardiology provider you saw during your visit: Jazmine Pathak NP    Instructions:   Continue aspirin 81 mg daily lifelong  Continue Coreg 6.25 mg twice daily which has better blood pressure lowering ability  Start logging home blood pressure, bring to next clinic visit   For all future dental cleanings and procedures you will need to take antibiotics prior - see instructions below.   Continue daily exercise   Follow-up with cardiologist at [place] in 1 year     Prevention of Infective (Bacterial) Endocarditis:  You are at increased risk for developing adverse outcomes from infective endocarditis (IE), also known as bacterial endocarditis (BE) because of the new device in your heart. The guidelines for prevention of IE are to give patients antibiotics prior to any dental procedures that involve manipulation of gingival tissue or the periapical region of teeth, or perforation of the oral mucosa:      It is recommended to take Amoxicillin 2 gm by mouth as a single dose 30 to 60 minutes before procedure.     OR if allergic to Penicillin or Ampicillin:     Cephalexin 2 gm by mouth, or  Clindamycin 600 mg by mouth, or  Azithromycin or Clarithromycin 500 mg PO     Questions and scheduling:   First call: Structural Heart  Aziza Camara [phone]    General scheduling line: [phone].   First press #1 for the University and then press #3 for Medical Questions to reach the Cardiology triage nurse.     On Call Cardiologist for after hours or on weekends: [phone], press option #4 and ask to speak to the on-call Cardiologist.

## 2023-11-13 NOTE — PROGRESS NOTES
Structural Heart Coordinator visit:  Provided additional education regarding TAVR/MitraClip procedure, after being present for discussion with physician. Explained the work-up process and next steps for patient. Patient provided our direct contact number and instructed to call with any questions.             K[facility]Q Results:   1a. 5  1b. 5  1c. 5  2. 5  3. 5  4. 6  5. 5  6. 5  7. 5  8a. 6  8b. 5  8c. 5    Millie Camara CMA  Structural Heart   [facility]

## 2023-11-13 NOTE — NURSING NOTE
Chief Complaint   Patient presents with    Follow Up     1yr s/p TAVR follow up       Vitals were taken, medications reconciled and EKG performed.    Ed Saez, Facilitator  12:29 PM

## 2023-11-13 NOTE — LETTER
11/13/2023      RE: Jade Walker  [address]  [facility] [address]       Dear Colleague,    Thank you for the opportunity to participate in the care of your patient, Jade Walker, at the [facility] [place] [facility]. Please see a copy of my visit note below.        [facility] HEART CARE  CARDIOVASCULAR DIVISION    VALVE CLINIC RETURN VISIT    PERTINENT CLINICAL HISTORY:     Jade Walker is a very pleasant 88 year old female who presents for 1 year TAVR follow-up. She has a history of HTN, HLD, minimal CAD on pre TAVR coronary angiogram, CKD stage III, IBS and severe aortic stenosis who underwent TAVR with a 23 mm Contreras APOLONIA valve on November 16, 2022. Post procedure echocardiogram showed well-seated valve with mean gradient of 3 mmHg with no PVL; however, did reveal a moderate sized circumferential pericardial effusion requiring emergent pericardiocentesis, eventually requiring open sternotomy which revealed LV perforation requiring patching of the LV. Course was also notable for volume overload requiring diuretics and paroxysmal atrial fibrillation requiring amiodarone x 1 month and warfarin x 3 months. Patient was discharged to inpatient rehab on 12/1/22. She was in follow-up January 2023 at which time TTE showed normal TAVR function with mean PG 4 mmHg without PVL. A cardiac monitor was placed following amiodarone wash out, showing no recurrent a-fib. Her warfarin was discontinued. She was last seen May 2023 at which time she was feeling well.     She presents today with no specific cardiovascular concerns. She continues to walk 30 minutes daily and is able to perform ADLs including preparing food and doing laundry. She has no exertional chest pain or shortness of breath. She has no orthopnea, PND, leg swelling, weight gain. She has not had palpitations, pre or alex syncope. She is a little depressed mainly  due to her husbands deteriorating health. Also notes that her vision is getting worse related to macular degeneration. She is compliant with her medications without side effects. Her home blood pressure is well controlled with SBP consistently < 130.      PAST MEDICAL HISTORY:     Past Medical History:   Diagnosis Date    Aortic stenosis     Aortic stenosis, moderate     Chronic kidney disease, stage III (moderate) (H)     Colon polyp     Hyperlipidemia     Hypertension     Irritable bowel syndrome     Obesity (BMI 35.0-39.9 without comorbidity)         PAST SURGICAL HISTORY:     Past Surgical History:   Procedure Laterality Date    CV CORONARY ANGIOGRAM N/A 10/20/2022    Procedure: Coronary Angiogram [[id number]];  Surgeon: Tone Hollins MD;  Location:  HEART CARDIAC CATH LAB    CV LEFT HEART CATH N/A 10/20/2022    Procedure: Left Heart Cath;  Surgeon: Tone Hollins MD;  Location:  HEART CARDIAC CATH LAB    CV RIGHT HEART CATH MEASUREMENTS RECORDED N/A 10/20/2022    Procedure: Right Heart Cath;  Surgeon: Tone Hollins MD;  Location:  HEART CARDIAC CATH LAB    CV TRANSCATHETER AORTIC VALVE REPLACEMENT-FEMORAL APPROACH N/A 11/16/2022    Procedure: Transfemoral (Contreras) Transcatheter Aortic Valve Replacement;  Surgeon: Tone Hollins MD;  Location:  OR    EYE SURGERY  06/01/2015    OR TRANSCATHETER AORTIC VALVE REPLACEMENT, OTHER PERCUTANEOUS APPROACH (STANDBY) N/A 11/16/2022    Procedure: Median Sternotomy, repair of aortic VALVE,  cardiopulmonary bypass PUMP, INTRAOPERATIVE TRANSESOPHAGEAL ECHOCARDIOGRAM PER ANESTHESIA;  Surgeon: Dallin Bone MD;  Location:  OR    OTHER SURGICAL HISTORY Bilateral     cataracts    PICC INSERTION - TRIPLE LUMEN Right 11/17/2022    right basilic 5 fr tl picc 39 cm    ZZHC COLONOSCOPY W/WO BRUSH/WASH N/A 12/11/2020    Procedure: COLONOSCOPY;  Surgeon: Stan Porter MD;  Location: [facility];  Service: Gastroenterology         CURRENT MEDICATIONS:     Current Outpatient Medications   Medication Sig Dispense Refill    acetaminophen (TYLENOL) 325 MG tablet 2 tablets (650 mg) by Oral or Feeding Tube route every 4 hours as needed for other (For optimal non-opioid multimodal pain management to improve pain control.) (Patient not taking: Reported on 12/20/2022)      amoxicillin (AMOXIL) 500 MG capsule Take 4 capsules (2,000 mg) by mouth once as needed (take 30-60 minutes prior to dental procedure/cleaning) 4 capsule 3    aspirin (ASA) 81 MG EC tablet Take 1 tablet (81 mg) by mouth daily      atorvastatin (LIPITOR) 40 MG tablet [ATORVASTATIN (LIPITOR) 40 MG TABLET] Take 40 mg by mouth at bedtime.      B.ani/L.aci/L.sal/L.plan/L.Rey (PROBIOTIC FORMULA ORAL) [B.ANI/L.ACI/L.SAL/L.PLAN/L.REY (PROBIOTIC FORMULA ORAL)] Take 1 capsule by mouth daily. (Patient not taking: Reported on 5/8/2023)      carvedilol (COREG) 6.25 MG tablet Take 1 tablet (6.25 mg) by mouth 2 times daily (with meals) 180 tablet 0    cholecalciferol, vitamin D3, 1,000 unit (25 mcg) tablet [CHOLECALCIFEROL, VITAMIN D3, 1,000 UNIT (25 MCG) TABLET] Take 1,000 Units by mouth daily.      loperamide (IMODIUM A-D) 2 MG tablet Take 1 tablet (2 mg) by mouth 3 times daily as needed for diarrhea (Patient not taking: Reported on 12/20/2022) 1 tablet 0    magnesium oxide (MAG-OX) 400 MG tablet Take 1 tablet (400 mg) by mouth daily (Patient not taking: Reported on 5/8/2023)      melatonin 5 MG tablet Take 2.5 mg by mouth nightly as needed for sleep      miconazole with skin protectant (ANGELA ANTIFUNGAL) 2 % CREA cream Apply topically 2 times daily TO GROIN, PERINEUM, AND PERIANAL REGIONS (Patient not taking: Reported on 12/20/2022)      psyllium (METAMUCIL/KONSYL) 58.6 % powder Take by mouth daily      senna-docusate (SENOKOT-S/PERICOLACE) 8.6-50 MG tablet 1 tablet by Oral or Feeding Tube route nightly as needed for constipation (Patient not taking: Reported on 12/20/2022)      vit A/vit  "C/vit E/zinc/copper (PRESERVISION AREDS ORAL) [VIT A/VIT C/VIT E/ZINC/COPPER (PRESERVISION AREDS ORAL)] Take 1 tablet by mouth 2 (two) times a day.          ALLERGIES:     Allergies   Allergen Reactions    Azithromycin Hives    Iodinated Contrast Media [Iodinated Contrast Media] Hives and Rash        FAMILY HISTORY:   No family history on file.     SOCIAL HISTORY:     Social History     Socioeconomic History    Marital status:    Tobacco Use    Smoking status: Never    Smokeless tobacco: Never   Substance and Sexual Activity    Alcohol use: Yes     Comment: Alcoholic Drinks/day: once a month    Drug use: Never        REVIEW OF SYSTEMS:     Constitutional: No fevers or chills  Skin: No new rash or itching  Eyes: No acute change in vision  Ears/Nose/Throat: No purulent rhinorrhea, new hearing loss, or new vertigo  Respiratory: No cough or hemoptysis  Cardiovascular: See HPI  Gastrointestinal: No change in appetite, vomiting, hematemesis or diarrhea  Genitourinary: No dysuria or hematuria  Musculoskeletal: No new back pain, neck pain or muscle pain  Neurologic: No new headaches, focal weakness or behavior changes  Psychiatric: No hallucinations, excessive alcohol consumption or illegal drug usage  Hematologic/Lymphatic/Immunologic: No bleeding, chills, fever, night sweats or weight loss  Endocrine: No new cold intolerance, heat intolerance, polyphagia, polydipsia or polyuria      PHYSICAL EXAMINATION:     BP (!) 153/83 (BP Location: Right arm, Patient Position: Chair, Cuff Size: Adult Regular)   Pulse 74   Ht 1.565 m (5' 1.61\")   Wt 58.4 kg (128 lb 12.8 oz)   SpO2 96%   BMI 23.85 kg/m      GENERAL: No acute distress.  HEENT: EOMI. Sclerae white, not injected. Nares clear. Pharynx without erythema or exudate.   Neck: No adenopathy. No thyromegaly. No jugular venous distension.   Heart: Regular rate and rhythm. No murmur.   Lungs: Clear to auscultation. No ronchi, wheezes, rales.   Abdomen: Soft, nontender, " nondistended. Bowel sounds present.  Extremities: No clubbing, cyanosis, or edema. Moderate sized firm bump at proximal end of sternal incision, no fluctuation, likely sternum fusing.   Neurologic: Alert and oriented to person/place/time, normal speech and affect. No focal deficits.  Skin: No petechiae, purpura or rash.     LABORATORY DATA:     LIPID RESULTS:  Lab Results   Component Value Date    CHOL 159 08/17/2023    HDL 71 08/17/2023    LDL 77 08/17/2023    TRIG 57 08/17/2023       LIVER ENZYME RESULTS:  Lab Results   Component Value Date    AST 29 01/27/2023    ALT 17 01/27/2023       CBC RESULTS:  Lab Results   Component Value Date    WBC 5.7 01/27/2023    RBC 3.57 (L) 01/27/2023    HGB 11.6 (L) 01/27/2023    HCT 36.1 01/27/2023     (H) 01/27/2023    MCH 32.5 01/27/2023    MCHC 32.1 01/27/2023    RDW 16.6 (H) 01/27/2023     01/27/2023       BMP RESULTS:  Lab Results   Component Value Date     08/17/2023    POTASSIUM 4.4 08/17/2023    POTASSIUM 4.2 11/18/2022    CHLORIDE 105 08/17/2023    CHLORIDE 103 11/17/2021    CO2 25 08/17/2023    CO2 28 11/17/2021    ANIONGAP 12 08/17/2023    ANIONGAP 9 11/17/2021    GLC 83 08/17/2023     (H) 11/30/2022    GLC 85 11/17/2021    BUN 15.9 08/17/2023    BUN 18 11/17/2021    CR 0.98 (H) 08/17/2023    GFRESTIMATED 56 (L) 08/17/2023    GFRESTIMATED >60 11/12/2020    GFRESTBLACK >60 11/12/2020    SHAHEEN 9.3 08/17/2023     INR RESULTS:  Lab Results   Component Value Date    INR 2.87 (H) 12/01/2022    INR 3.04 (H) 11/30/2022        PROCEDURES & FURTHER ASSESSMENTS:   EKG 11/13/23:  NSR with LBBB      ECHO 11/13/23:  Interpretation Summary  S/P TAVR with 23mm Contreras Danilo valve.Mean aortic gradient normal at 6mmHg.  No AI.  ______________________________________________________________________________  Left Ventricle  Global and regional left ventricular function is normal with an EF of 60-65%.  Left ventricular wall thickness is normal. Left ventricular  size is normal.  Left ventricular diastolic function is not assessable. No regional wall motion  abnormalities are seen.     Right Ventricle  Right ventricular function, chamber size, wall motion, and thickness are  normal.     Atria  Both atria appear normal.     Mitral Valve  Mild to moderate mitral annular calcification is present. Trace to mild mitral  insufficiency is present.     Aortic Valve  S/P TAVR with 23mm Contreras Danilo valve.Mean aortic gradient normal at 6mmHg.  No AI.     Tricuspid Valve  The tricuspid valve is normal. Mild tricuspid insufficiency is present. The  right ventricular systolic pressure is approximated at 29.9 mmHg plus the  right atrial pressure. Pulmonary artery systolic pressure is normal.     Pulmonic Valve  The pulmonic valve is normal.     Vessels  The thoracic aorta cannot be assessed. The pulmonary artery and bifurcation  cannot be assessed. The inferior vena cava is normal.     Pericardium  No pericardial effusion is present.     ______________________________________________________________________________  Doppler Measurements & Calculations  MV max P.6 mmHg  MV mean P.9 mmHg  MV V2 VTI: 34.0 cm  Ao V2 max: 174.0 cm/sec  Ao max P.1 mmHg  Ao V2 mean: 108.3 cm/sec  Ao mean P.5 mmHg  Ao V2 VTI: 35.5 cm  LV V1 max P.5 mmHg  LV V1 max: 137.0 cm/sec  LV V1 VTI: 29.5 cm  TR max domo: 273.4 cm/sec  TR max P.9 mmHg  AV Domo Ratio (DI): 0.79    EKG 23:  NSR with LBBB HR 62    ECHO May 2023:  Interpretation Summary     s/p TAVR with 23mm Contreras Danilo valve. Doppler interrogation of the aortic  valve is normal.  Global and regional left ventricular function is normal with an EF of 55-60%.  Global right ventricular function is normal.  IVC diameter <2.1 cm collapsing >50% with sniff suggests a normal RA pressure  of 3 mmHg.  No pericardial effusion is present.  No significant changes  noted.  ______________________________________________________________________________  Left Ventricle  Global and regional left ventricular function is normal with an EF of 55-60%.  Left ventricular size is normal. Mild concentric wall thickening consistent  with left ventricular hypertrophy is present. Left ventricular diastolic  function is not assessable.     Right Ventricle  The right ventricle is normal size. Global right ventricular function is  normal.     Atria  The atria cannot be assessed.     Mitral Valve  Moderate mitral annular calcification is present. Trace mitral insufficiency  is present.     Aortic Valve  The calculated aortic valve are is 1.1 cm^2. The mean AoV pressure gradient  is  11.1 mmHg. Transcutaneous aortic valve replacement is present. Doppler  interrogation of the aortic valve is normal. There is no paravalvular  regurgitation.     Tricuspid Valve  The tricuspid valve cannot be assessed.     Pulmonic Valve  The pulmonic valve cannot be assessed.     Vessels  The aorta root is normal. IVC diameter <2.1 cm collapsing >50% with sniff  suggests a normal RA pressure of 3 mmHg.     Pericardium  No pericardial effusion is present.     Compared to Previous Study  No significant changes noted.      EKG 1/27/23:  Personally reviewed  NSR with OTF    ECHO 1/27/23:  ______________________________________________________________________________  Procedure  Limited Echocardiogram with portions of two-dimensional, color and spectral  Doppler performed.  ______________________________________________________________________________  Interpretation Summary  Global and regional left ventricular function is normal with an EF of 55-60%.  Global right ventricular function is normal.     s/p TAVR 23mm Contreras Danilo bioprostetic valve. Normal Doppler interrogation.  No AI. MG is 4 mmHg.     Trivial pericardial effusion is present. IVC is normal.     This study was compared with the study from 11/17/2022.  There has been no  change.  ______________________________________________________________________________  Left Ventricle  Left ventricular size is normal. Global and regional left ventricular function  is normal with an EF of 55-60%.     Right Ventricle  The right ventricle is normal size. Global right ventricular function is  normal.     Mitral Valve  Moderate mitral annular calcification is present. On Doppler interrogation,  there is no significant stenosis or regurgitation. Trace mitral insufficiency  is present.     Aortic Valve  s/p TAVR 23mm Contreras Danilo bioprostetic valve . No aortic regurgitation is  present. The mean AoV pressure gradient is 3.8 mmHg. The calculated aortic  valve are is 2.2 cm^2.     Tricuspid Valve  The tricuspid valve is normal. The right ventricular systolic pressure is  approximated at 23.0 mmHg plus the right atrial pressure. Trace tricuspid  insufficiency is present.     Pulmonic Valve  The pulmonic valve is normal.     Vessels  The aorta root is normal. The inferior vena cava was normal in size with  preserved respiratory variability.     Pericardium  Trivial pericardial effusion is present.     Compared to Previous Study  This study was compared with the study from 11/17/2022 . There has been no  change.    NYHA Class: I     CLINICAL IMPRESSION:       1.  Severe aortic stenosis status post TAVR 11/16/2022:  2.  LV perforation requiring sternotomy with LV patch repair:  3.  HFpEF, chronic:  4.  Postop atrial fibrillation, resolved:  Has done well since her TAVR c/b LV perf s/p patch repair. ECHOs have shown normal TAVR function, normal EF.  Follow-up cardiac monitor without atrial fibrillation. Warfarin and amiodarone previously discontinued. ECHO today shows normal TAVR function with mean PG of 6 mmHg, no PVL. EKG today shows normal sinus rhythm. Continues to walk 30 minutes daily without cardiac symptoms.   - Continue ASA 81 mg daily   - Continue SBE prophylaxis for valve  -  If recurrent AF, would need to consider long term anticoagulation     5.  Hypertension:  6.  Hyperlipidemia:  - BP at goal at home  - Continue Coreg 6.25 mg BID   - Last LDL 77, continue Atorvastatin     RTC: Establish care with [facility] in 1 year with echo and labs prior     MAX Hardin, CNP  [facility] Cardiology Team      CC  Patient Care Team:  Dave Merritt MD as PCP - General (Family Medicine)  Dave Merritt MD as MD (Family Medicine)  Jazmine Minaya NP as Assigned Heart and Vascular Provider  [facility]  JAZMINE MINAYA        Structural Heart Coordinator visit:  Provided additional education regarding TAVR/MitraClip procedure, after being present for discussion with physician. Explained the work-up process and next steps for patient. Patient provided our direct contact number and instructed to call with any questions.             KCCQ Results:   1a. 5  1b. 5  1c. 5  2. 5  3. 5  4. 6  5. 5  6. 5  7. 5  8a. 6  8b. 5  8c. 5    Millie Camara CMA  Structural Heart   [facility]       Please do not hesitate to contact me if you have any questions/concerns.     Sincerely,     Jazmine Minaya NP

## 2023-11-14 LAB
ATRIAL RATE - MUSE: 62 BPM
DIASTOLIC BLOOD PRESSURE - MUSE: NORMAL MMHG
INTERPRETATION ECG - MUSE: NORMAL
P AXIS - MUSE: 64 DEGREES
PR INTERVAL - MUSE: 178 MS
QRS DURATION - MUSE: 132 MS
QT - MUSE: 452 MS
QTC - MUSE: 458 MS
R AXIS - MUSE: -29 DEGREES
SYSTOLIC BLOOD PRESSURE - MUSE: NORMAL MMHG
T AXIS - MUSE: 106 DEGREES
VENTRICULAR RATE- MUSE: 62 BPM

## 2023-12-18 NOTE — LETTER
Recipient: [employer] [facility]        Sender: [id number]  (P: [phone])        Date: December 1, 2022  Patient Name:  Jade Walker  Patient YOB: 1935  Routing Message:  Please see attached discharge orders.        The documents accompanying this notice contain confidential information belonging to the sender.  This information is intended only for the use of the individual or entity named above.  The authorized recipient of this information is prohibited from disclosing this information to any other party and is required to destroy the information after its stated need has been fulfilled, unless otherwise required by state law.    If you are not the intended recipient, you are hereby notified that any disclosure, copy, distribution or action taken in reliance on the contents of these documents is strictly prohibited.  If you have received this document in error, please return it by fax to [phone] with a note on the cover sheet explaining why you are returning it (e.g. not your patient, not your provider, etc.).  If you need further assistance, please call .  Documents may also be returned by mail to [employer], , [address] [place], [place] [address].   Satisfactory

## 2024-01-23 DIAGNOSIS — Z95.2 S/P TAVR (TRANSCATHETER AORTIC VALVE REPLACEMENT): ICD-10-CM

## 2024-01-23 DIAGNOSIS — I10 BENIGN ESSENTIAL HYPERTENSION: ICD-10-CM

## 2024-01-26 NOTE — TELEPHONE ENCOUNTER
CARVEDILOL 6.25 MG TABLET   Last Written Prescription Date:  10/31/2023  Last Fill Quantity: 180,   # refills: 0  Last Office Visit : 11/13/2023  Future Office visit:  None  Abnormal B/P  Change med?   Change dose?  Add med?  Follow up B/P check?  Refer to clinic for review       BP Readings from Last 3 Encounters:   11/13/23 (!) 153/83   05/08/23 (!) 156/83   01/27/23 123/75     Annabelle Nicholas RN  Central Triage Red Flags/Med Refills

## 2024-01-29 RX ORDER — CARVEDILOL 6.25 MG/1
6.25 TABLET ORAL 2 TIMES DAILY WITH MEALS
Qty: 180 TABLET | Refills: 3 | Status: SHIPPED | OUTPATIENT
Start: 2024-01-29

## 2024-04-18 ENCOUNTER — LAB REQUISITION (OUTPATIENT)
Dept: LAB | Facility: CLINIC | Age: 89
End: 2024-04-18

## 2024-04-18 DIAGNOSIS — E78.5 HYPERLIPIDEMIA, UNSPECIFIED: ICD-10-CM

## 2024-04-18 DIAGNOSIS — I12.9 HYPERTENSIVE CHRONIC KIDNEY DISEASE WITH STAGE 1 THROUGH STAGE 4 CHRONIC KIDNEY DISEASE, OR UNSPECIFIED CHRONIC KIDNEY DISEASE: ICD-10-CM

## 2024-04-18 PROCEDURE — 80061 LIPID PANEL: CPT | Performed by: FAMILY MEDICINE

## 2024-04-18 PROCEDURE — 80048 BASIC METABOLIC PNL TOTAL CA: CPT | Performed by: FAMILY MEDICINE

## 2024-04-20 LAB
ANION GAP SERPL CALCULATED.3IONS-SCNC: 9 MMOL/L (ref 7–15)
BUN SERPL-MCNC: 18.1 MG/DL (ref 8–23)
CALCIUM SERPL-MCNC: 9.2 MG/DL (ref 8.8–10.2)
CHLORIDE SERPL-SCNC: 104 MMOL/L (ref 98–107)
CHOLEST SERPL-MCNC: 127 MG/DL
CREAT SERPL-MCNC: 0.88 MG/DL (ref 0.51–0.95)
DEPRECATED HCO3 PLAS-SCNC: 27 MMOL/L (ref 22–29)
EGFRCR SERPLBLD CKD-EPI 2021: 64 ML/MIN/1.73M2
FASTING STATUS PATIENT QL REPORTED: NORMAL
GLUCOSE SERPL-MCNC: 91 MG/DL (ref 70–99)
HDLC SERPL-MCNC: 59 MG/DL
LDLC SERPL CALC-MCNC: 56 MG/DL
NONHDLC SERPL-MCNC: 68 MG/DL
POTASSIUM SERPL-SCNC: 4.2 MMOL/L (ref 3.4–5.3)
SODIUM SERPL-SCNC: 140 MMOL/L (ref 135–145)
TRIGL SERPL-MCNC: 58 MG/DL

## 2024-10-24 ENCOUNTER — TRANSFERRED RECORDS (OUTPATIENT)
Dept: HEALTH INFORMATION MANAGEMENT | Facility: CLINIC | Age: 89
End: 2024-10-24

## 2024-10-24 ENCOUNTER — LAB REQUISITION (OUTPATIENT)
Dept: LAB | Facility: CLINIC | Age: 89
End: 2024-10-24

## 2024-10-24 DIAGNOSIS — I13.0 HYPERTENSIVE HEART AND CHRONIC KIDNEY DISEASE WITH HEART FAILURE AND STAGE 1 THROUGH STAGE 4 CHRONIC KIDNEY DISEASE, OR UNSPECIFIED CHRONIC KIDNEY DISEASE (H): ICD-10-CM

## 2024-10-24 DIAGNOSIS — E78.5 HYPERLIPIDEMIA, UNSPECIFIED: ICD-10-CM

## 2024-10-24 PROCEDURE — 80061 LIPID PANEL: CPT | Performed by: FAMILY MEDICINE

## 2024-10-24 PROCEDURE — 80048 BASIC METABOLIC PNL TOTAL CA: CPT | Performed by: FAMILY MEDICINE

## 2024-10-25 LAB
ANION GAP SERPL CALCULATED.3IONS-SCNC: 10 MMOL/L (ref 7–15)
BUN SERPL-MCNC: 17.8 MG/DL (ref 8–23)
CALCIUM SERPL-MCNC: 8.9 MG/DL (ref 8.8–10.4)
CHLORIDE SERPL-SCNC: 104 MMOL/L (ref 98–107)
CHOLEST SERPL-MCNC: 137 MG/DL
CREAT SERPL-MCNC: 0.95 MG/DL (ref 0.51–0.95)
EGFRCR SERPLBLD CKD-EPI 2021: 58 ML/MIN/1.73M2
FASTING STATUS PATIENT QL REPORTED: ABNORMAL
FASTING STATUS PATIENT QL REPORTED: NORMAL
GLUCOSE SERPL-MCNC: 108 MG/DL (ref 70–99)
HCO3 SERPL-SCNC: 25 MMOL/L (ref 22–29)
HDLC SERPL-MCNC: 66 MG/DL
LDLC SERPL CALC-MCNC: 57 MG/DL
NONHDLC SERPL-MCNC: 71 MG/DL
POTASSIUM SERPL-SCNC: 4 MMOL/L (ref 3.4–5.3)
SODIUM SERPL-SCNC: 139 MMOL/L (ref 135–145)
TRIGL SERPL-MCNC: 71 MG/DL

## 2024-12-07 ENCOUNTER — HEALTH MAINTENANCE LETTER (OUTPATIENT)
Age: 89
End: 2024-12-07

## 2025-01-27 ENCOUNTER — MYC MEDICAL ADVICE (OUTPATIENT)
Dept: CARDIOLOGY | Facility: CLINIC | Age: 89
End: 2025-01-27
Payer: COMMERCIAL

## 2025-01-27 DIAGNOSIS — I10 BENIGN ESSENTIAL HYPERTENSION: ICD-10-CM

## 2025-01-27 DIAGNOSIS — Z95.2 S/P TAVR (TRANSCATHETER AORTIC VALVE REPLACEMENT): ICD-10-CM

## 2025-01-28 RX ORDER — CARVEDILOL 6.25 MG/1
6.25 TABLET ORAL 2 TIMES DAILY WITH MEALS
Qty: 180 TABLET | Refills: 0 | Status: SHIPPED | OUTPATIENT
Start: 2025-01-28

## 2025-01-30 NOTE — PROGRESS NOTES
[facility]  CARDIOVASCULAR DIVISION    VALVE CLINIC RETURN VISIT    PERTINENT CLINICAL HISTORY:     Jade Walker is a very pleasant 87 year old female who presents for 2 month TAVR follow-up. She has a history of HTN, HLD, non-obstructive CAD, CKD stage III, IBS and severe aortic stenosis who underwent TAVR with a 23 mm Contreras APOLONIA valve on November 16, 2022.  Post procedure echocardiogram showed well-seated valve with mean gradient of 3 mmHg with no PVL; however, did reveal a moderate sized circumferential pericardial effusion requiring emergent pericardiocentesis. Pericardial effusion recurred eventually requiring open sternotomy which revealed LV perforation requiring patching of the LV.  Course was also notable for volume overload requiring diuretics and paroxysmal atrial fibrillation requiring amiodarone x 1 month and warfarin x 3 months. Patient was discharged to inpatient rehab on 12/1/22. Last evaluated by CVTS on December 20, 2022 at which time patient reported doing well.  Her Bumex and potassium were discontinued.    She presents today with no specific cardiovascular concerns. She has been feeling tired since having COVID a few weeks ago. Also had a dry cough which seems to be improving. She was participating in PT but hasn't been as active since having COVID. She is now starting to exercise again walking around her apartment and lifting light weights. She denies any chest pain, shortness of breath, orthopnea or PND, leg swelling, weight gain. She has not had palpitations, pre or alex syncope. She has no other specific cardiopulmonary concerns today. Her sternal incision is healing well, she does a hard bump at the proximal end of her incision but no associated pain or redness.     She is tolerating warfarin without bleeding concerns. She has been having trouble maintaining a therapeutic INR which has been frustrating to her. No major changes in diet, no new supplements.    PAST  MEDICAL HISTORY:     Past Medical History:   Diagnosis Date     Aortic stenosis      Aortic stenosis, moderate      Chronic kidney disease, stage III (moderate) (H)      Colon polyp      Hyperlipidemia      Hypertension      Irritable bowel syndrome      Obesity (BMI 35.0-39.9 without comorbidity)         PAST SURGICAL HISTORY:     Past Surgical History:   Procedure Laterality Date     CV CORONARY ANGIOGRAM N/A 10/20/2022    Procedure: Coronary Angiogram [[id number]];  Surgeon: Tone Hollins MD;  Location:  HEART CARDIAC CATH LAB     CV LEFT HEART CATH N/A 10/20/2022    Procedure: Left Heart Cath;  Surgeon: Tone Hollins MD;  Location:  HEART CARDIAC CATH LAB     CV RIGHT HEART CATH MEASUREMENTS RECORDED N/A 10/20/2022    Procedure: Right Heart Cath;  Surgeon: Tone Hollins MD;  Location:  HEART CARDIAC CATH LAB     CV TRANSCATHETER AORTIC VALVE REPLACEMENT-FEMORAL APPROACH N/A 11/16/2022    Procedure: Transfemoral (Contreras) Transcatheter Aortic Valve Replacement;  Surgeon: Tone Hollins MD;  Location:  OR     EYE SURGERY  06/01/2015     OR TRANSCATHETER AORTIC VALVE REPLACEMENT, OTHER PERCUTANEOUS APPROACH (STANDBY) N/A 11/16/2022    Procedure: Median Sternotomy, repair of aortic VALVE,  cardiopulmonary bypass PUMP, INTRAOPERATIVE TRANSESOPHAGEAL ECHOCARDIOGRAM PER ANESTHESIA;  Surgeon: Dallin Bone MD;  Location:  OR     OTHER SURGICAL HISTORY Bilateral     cataracts     PICC INSERTION - TRIPLE LUMEN Right 11/17/2022    right basilic 5 fr tl picc 39 cm     ZZHC COLONOSCOPY W/WO BRUSH/WASH N/A 12/11/2020    Procedure: COLONOSCOPY;  Surgeon: Stan Porter MD;  [place] [facility] [place]  Service: Gastroenterology        CURRENT MEDICATIONS:     Current Outpatient Medications   Medication Sig Dispense Refill     acetaminophen (TYLENOL) 325 MG tablet 2 tablets (650 mg) by Oral or Feeding Tube route every 4 hours as needed for other (For optimal non-opioid  multimodal pain management to improve pain control.) (Patient not taking: Reported on 12/20/2022)       amiodarone (PACERONE) 200 MG tablet Take 1 tablet (200 mg) by mouth daily for 30 days 30 tablet 0     amiodarone (PACERONE) 200 MG tablet Take 1 tablet (200 mg) by mouth 2 times daily for 7 days 14 tablet 0     aspirin (ASA) 81 MG EC tablet Take 1 tablet (81 mg) by mouth daily (Patient not taking: Reported on 12/20/2022)       atorvastatin (LIPITOR) 40 MG tablet [ATORVASTATIN (LIPITOR) 40 MG TABLET] Take 40 mg by mouth at bedtime.       B.ani/L.aci/L.sal/L.plan/L.Rey (PROBIOTIC FORMULA ORAL) [B.ANI/L.ACI/L.SAL/L.PLAN/L.REY (PROBIOTIC FORMULA ORAL)] Take 1 capsule by mouth daily.       cholecalciferol, vitamin D3, 1,000 unit (25 mcg) tablet [CHOLECALCIFEROL, VITAMIN D3, 1,000 UNIT (25 MCG) TABLET] Take 1,000 Units by mouth daily.       loperamide (IMODIUM A-D) 2 MG tablet Take 1 tablet (2 mg) by mouth 3 times daily as needed for diarrhea (Patient not taking: Reported on 12/20/2022) 1 tablet 0     magnesium oxide (MAG-OX) 400 MG tablet Take 1 tablet (400 mg) by mouth daily       melatonin 5 MG tablet Take 2.5 mg by mouth nightly as needed for sleep       metoprolol tartrate (LOPRESSOR) 25 MG tablet Take 1 tablet (25 mg) by mouth 2 times daily       miconazole with skin protectant (ANGELA ANTIFUNGAL) 2 % CREA cream Apply topically 2 times daily TO GROIN, PERINEUM, AND PERIANAL REGIONS (Patient not taking: Reported on 12/20/2022)       psyllium (METAMUCIL/KONSYL) 58.6 % powder Take by mouth daily       senna-docusate (SENOKOT-S/PERICOLACE) 8.6-50 MG tablet 1 tablet by Oral or Feeding Tube route nightly as needed for constipation (Patient not taking: Reported on 12/20/2022)       vit A/vit C/vit E/zinc/copper (PRESERVISION AREDS ORAL) [VIT A/VIT C/VIT E/ZINC/COPPER (PRESERVISION AREDS ORAL)] Take 1 tablet by mouth 2 (two) times a day.       warfarin ANTICOAGULANT (COUMADIN) 1 MG tablet Take 3 tablets (3 mg) by mouth  daily          ALLERGIES:     Allergies   Allergen Reactions     Azithromycin Hives     Iodinated Contrast Media [Diagnostic X-Ray Materials] Hives and Rash        FAMILY HISTORY:   No family history on file.     SOCIAL HISTORY:     Social History     Socioeconomic History     Marital status:    Tobacco Use     Smoking status: Never     Smokeless tobacco: Never   Substance and Sexual Activity     Alcohol use: Yes     Comment: Alcoholic Drinks/day: once a month     Drug use: Never        REVIEW OF SYSTEMS:     Constitutional: No fevers or chills  Skin: No new rash or itching  Eyes: No acute change in vision  Ears/Nose/Throat: No purulent rhinorrhea, new hearing loss, or new vertigo  Respiratory: No cough or hemoptysis  Cardiovascular: See HPI  Gastrointestinal: No change in appetite, vomiting, hematemesis or diarrhea  Genitourinary: No dysuria or hematuria  Musculoskeletal: No new back pain, neck pain or muscle pain  Neurologic: No new headaches, focal weakness or behavior changes  Psychiatric: No hallucinations, excessive alcohol consumption or illegal drug usage  Hematologic/Lymphatic/Immunologic: No bleeding, chills, fever, night sweats or weight loss  Endocrine: No new cold intolerance, heat intolerance, polyphagia, polydipsia or polyuria      PHYSICAL EXAMINATION:     /75 (BP Location: Right arm, Patient Position: Supine, Cuff Size: Adult Regular)   Pulse 71   Wt 60.5 kg (133 lb 4.8 oz)   SpO2 99%   BMI 24.44 kg/m      GENERAL: No acute distress.  HEENT: EOMI. Sclerae white, not injected. Nares clear. Pharynx without erythema or exudate.   Neck: No adenopathy. No thyromegaly. No jugular venous distension.   Heart: Regular rate and rhythm. No murmur.   Lungs: Clear to auscultation. No ronchi, wheezes, rales.   Abdomen: Soft, nontender, nondistended. Bowel sounds present.  Extremities: No clubbing, cyanosis, or edema. Moderate sized firm bump at proximal end of sternal incision, no fluctuation,  likely sternum fusing.   Neurologic: Alert and oriented to person/place/time, normal speech and affect. No focal deficits.  Skin: No petechiae, purpura or rash.     LABORATORY DATA:     LIPID RESULTS:  Lab Results   Component Value Date    CHOL 148 08/09/2022    HDL 77 08/09/2022    LDL 58 08/09/2022    TRIG 65 08/09/2022       LIVER ENZYME RESULTS:  Lab Results   Component Value Date    AST 79 (H) 11/19/2022    ALT 11 11/19/2022       CBC RESULTS:  Lab Results   Component Value Date    WBC 5.5 12/05/2022    RBC 3.15 (L) 12/05/2022    HGB 10.2 (L) 12/05/2022    HCT 32.9 (L) 12/05/2022     (H) 12/05/2022    MCH 32.4 12/05/2022    MCHC 31.0 (L) 12/05/2022    RDW 22.0 (H) 12/05/2022     12/05/2022       BMP RESULTS:  Lab Results   Component Value Date     12/05/2022    POTASSIUM 3.9 12/05/2022    POTASSIUM 4.2 11/18/2022    CHLORIDE 100 12/05/2022    CHLORIDE 103 11/17/2021    CO2 21 (L) 12/05/2022    CO2 28 11/17/2021    ANIONGAP 17 (H) 12/05/2022    ANIONGAP 9 11/17/2021    GLC 92 12/05/2022     (H) 11/30/2022    GLC 85 11/17/2021    BUN 19.8 12/05/2022    BUN 18 11/17/2021    CR 1.03 (H) 12/05/2022    GFRESTIMATED 53 (L) 12/05/2022    GFRESTIMATED >60 11/12/2020    GFRESTBLACK >60 11/12/2020    SHAHEEN 8.5 (L) 12/05/2022     INR RESULTS:  Lab Results   Component Value Date    INR 2.87 (H) 12/01/2022    INR 3.04 (H) 11/30/2022        PROCEDURES & FURTHER ASSESSMENTS:     EKG 1/27/23:  Personally reviewed  NSR with LBBB    ECHO 1/27/23:  ______________________________________________________________________________  Procedure  Limited Echocardiogram with portions of two-dimensional, color and spectral  Doppler performed.  ______________________________________________________________________________  Interpretation Summary  Global and regional left ventricular function is normal with an EF of 55-60%.  Global right ventricular function is normal.     s/p TAVR 23mm Contreras Danilo bioprostetic  valve. Normal Doppler interrogation.  No AI. MG is 4 mmHg.     Trivial pericardial effusion is present. IVC is normal.     This study was compared with the study from 11/17/2022. There has been no  change.  ______________________________________________________________________________  Left Ventricle  Left ventricular size is normal. Global and regional left ventricular function  is normal with an EF of 55-60%.     Right Ventricle  The right ventricle is normal size. Global right ventricular function is  normal.     Mitral Valve  Moderate mitral annular calcification is present. On Doppler interrogation,  there is no significant stenosis or regurgitation. Trace mitral insufficiency  is present.     Aortic Valve  s/p TAVR 23mm Contreras Danilo bioprostetic valve . No aortic regurgitation is  present. The mean AoV pressure gradient is 3.8 mmHg. The calculated aortic  valve are is 2.2 cm^2.     Tricuspid Valve  The tricuspid valve is normal. The right ventricular systolic pressure is  approximated at 23.0 mmHg plus the right atrial pressure. Trace tricuspid  insufficiency is present.     Pulmonic Valve  The pulmonic valve is normal.     Vessels  The aorta root is normal. The inferior vena cava was normal in size with  preserved respiratory variability.     Pericardium  Trivial pericardial effusion is present.     Compared to Previous Study  This study was compared with the study from 11/17/2022 . There has been no  change.    NYHA Class: I     CLINICAL IMPRESSION:       1.  Severe aortic stenosis status post TAVR 11/16/2022  2.  LV perforation requiring sternotomy with LV patch repair  3.  HFpEF, chronic  4.  Postop atrial fibrillation on warfarin  Doing well post op, she completed PT, now increasing activity level at home. No cardiac symptoms. Her ECHO today shows well seated TAVR with mean PG of 4 mmHg without PVL, trivial pericardial effusion. Volume status well controlled without diuretics. EKG today shows NSR with  chronic LBBB. She completed amiodarone taper 2 weeks ago, remains on metoprolol and warfarin.   - Defer ASA while on warfarin   - Place cardiac monitor in 2 weeks (allow 1 month amio washout) to evaluate for recurrent a-fib.  - If no recurrence we can stop warfarin and start ASA daily  - If recurrent a-fib will continue long term anticoagulation, consider switching to DOAC due to difficulty maintaining therapeutic INR.   - Continue SBE prophylaxis for valve  - ECHO in 1 year    5.  Hypertension  6.  Hyperlipidemia  - Well controlled on metoprolol and atorvastatin    RTC: valve clinic in 4 months     MAX Hardin, CNP  [facility] Cardiology Team      CC  Patient Care Team:  Dave Merritt MD as PCP - General (Family Medicine)  Dave Merritt MD as MD (Family Medicine)  Jazmine Minaya, NP as Assigned Heart and Vascular Provider  Boo Monteiro  At [facility], MAX Paz CNP as Nurse Practitioner (Geriatric Medicine)  JAZMINE MINAYA       Dr. Greco

## 2025-02-02 RX ORDER — CARVEDILOL 6.25 MG/1
6.25 TABLET ORAL 2 TIMES DAILY WITH MEALS
Qty: 180 TABLET | Refills: 3 | OUTPATIENT
Start: 2025-02-02

## 2025-02-11 ENCOUNTER — HOSPITAL ENCOUNTER (OUTPATIENT)
Dept: CARDIOLOGY | Facility: CLINIC | Age: 89
Discharge: HOME | End: 2025-02-11
Attending: NURSE PRACTITIONER
Payer: COMMERCIAL

## 2025-02-11 DIAGNOSIS — Z95.2 S/P TAVR (TRANSCATHETER AORTIC VALVE REPLACEMENT): ICD-10-CM

## 2025-02-11 LAB — LVEF ECHO: NORMAL

## 2025-02-11 PROCEDURE — 93306 TTE W/DOPPLER COMPLETE: CPT | Mod: 26 | Performed by: INTERNAL MEDICINE

## 2025-02-11 PROCEDURE — 93306 TTE W/DOPPLER COMPLETE: CPT

## (undated) DEVICE — TUBING INSUFFLATION PNEUMOCLEAR 0620050100

## (undated) DEVICE — SU PROLENE 4-0 SHDA 36" 8521H

## (undated) DEVICE — Device

## (undated) DEVICE — SU ETHIBOND 3-0 BBDA 36" X588H

## (undated) DEVICE — SU STRATAFIX PDS PLUS 2-0 SPIRAL CT-1 45CM SXPP1B411

## (undated) DEVICE — PREP CHLORAPREP 26ML TINTED HI-LITE ORANGE 930815

## (undated) DEVICE — DRAPE CV SPLIT TIBURON 87"X136.5" 9155

## (undated) DEVICE — CATH DIAG 4FR ANG PIG 538453S

## (undated) DEVICE — WIRE GUIDE 0.035"X150CM EMERALD STR 502542

## (undated) DEVICE — SLEEVE TR BAND RADIAL COMPRESSION DEVICE 24CM TRB24-REG

## (undated) DEVICE — SU PROLENE 4-0 RB-1DA 36" 8557H

## (undated) DEVICE — PACK GOWN 3/PK DISP XL SBA32GPFCB

## (undated) DEVICE — DRAIN CLOSED FLUID SYSTEM ABSCESS EVAC SET 90500040

## (undated) DEVICE — BONE WAX 2.5GM W31G

## (undated) DEVICE — GUIDEWIRE VASC SAFARI2 0.035X275CM H74939406XS1

## (undated) DEVICE — KIT HAND CONTROL ACIST 016795

## (undated) DEVICE — CATH EP PACEL 5FRX110CM 1MM RIGHT HEART CURVE 401763

## (undated) DEVICE — RAD CLOSURE ANGIOSEAL 8FR  610131

## (undated) DEVICE — BLADE SAW STERNAL 20X30MM KM-32

## (undated) DEVICE — ESU GROUND PAD ADULT W/CORD E7507

## (undated) DEVICE — CLOSURE DEVICE 6FR VASC PROGLIDE MEDICATED SUTURE 12673-03

## (undated) DEVICE — WIRE GUIDE 0.035"X150CM EMERALD J TIP 502521

## (undated) DEVICE — SUTURE BOOTS 051003PBX

## (undated) DEVICE — INFLATION DEVICE ATRION QL2530

## (undated) DEVICE — DRAIN MEDIASTINAL 9MM 14609

## (undated) DEVICE — DRSG PRIMAPORE 02X3" 7133

## (undated) DEVICE — DEFIB PADPRO CONMED ADULT/CHILD 2001Z-C

## (undated) DEVICE — WIRE GUIDE LUNDERQUIST 0.035"X260CM DBL CVD

## (undated) DEVICE — SU ETHIBOND 2-0 SHDA 30" X563H

## (undated) DEVICE — GW VASC .035IN DIA 260CML 7CML 3 MM RADIUS J CURVE 502455

## (undated) DEVICE — LEAD PACER MYOCARDIAL BIPOLAR TEMPORARY 53CM 6495F

## (undated) DEVICE — INTRO SHEATH 7FRX10CM PINNACLE RSS702

## (undated) DEVICE — CLIP HORIZON SM RED WIDE SLOT 001201

## (undated) DEVICE — SUCTION DRY CHEST DRAIN OASIS 3600-100

## (undated) DEVICE — TOURNIQUET VASCULAR KIT 7 1/2" 79012

## (undated) DEVICE — SU SILK 0 TIE 6X30" A306H

## (undated) DEVICE — MANIFOLD KIT ANGIO AUTOMATED 014613

## (undated) DEVICE — SU STRATAFIX MONOCRYL 3-0 SPIRAL PS-2 45CM SXMP1B107

## (undated) DEVICE — SYR 10ML LL W/O NDL 302995

## (undated) DEVICE — DRSG TELFA 3X8" 1238

## (undated) DEVICE — CATH ANGIO SUPERTORQUE AL1 6FRX100CM 532-645

## (undated) DEVICE — CLIP HORIZON MED BLUE 002200

## (undated) DEVICE — FASTENER CATH BALLOON CLAMPX2 STATLOCK 0684-00-493

## (undated) DEVICE — SU PLEDGET SOFT TFE 3/8"X3/26"X1/16" PCP40

## (undated) DEVICE — SET PERICARDIOCENTESIS 8.3FRX40CM

## (undated) DEVICE — SYR 50ML LL W/O NDL 309653

## (undated) DEVICE — SU ETHIBOND 0 CT-1 CR 8X18" CX21D

## (undated) DEVICE — INTRO SHEATH 6FRX25CM PINNACLE RSS606

## (undated) DEVICE — INSERT FOGARTY 86MM TRACTION DBL SAFEJAW DSAFE86

## (undated) DEVICE — PACK HEART LEFT CUSTOM

## (undated) DEVICE — SU STRATAFIX PDS PLUS 0 CT 45CM SXPP1A406

## (undated) DEVICE — DRSG DRAIN 4X4" 7086

## (undated) DEVICE — INTRO SHEATH 7FRX25CM PINNACLE RSS706

## (undated) DEVICE — DRSG ABDOMINAL 07 1/2X8" 7197D

## (undated) DEVICE — SHTH INTRO 0.021IN ID 6FR DIA

## (undated) DEVICE — TUBING PRESSURE 30"

## (undated) DEVICE — CATH ANGIO INFINITI PIGTAIL 4FRX110CM 8 SH 538450E

## (undated) DEVICE — DRSG TEGADERM 4X4 3/4" 1626W

## (undated) DEVICE — LINEN TOWEL PACK X30 5481

## (undated) DEVICE — BOWL STERILE 32OZ DYND50320

## (undated) DEVICE — DRAPE IOBAN INCISE 23X17" 6650EZ

## (undated) DEVICE — SU PROLENE 5-0 RB-2DA 30" 8710H

## (undated) DEVICE — POSITIONER ASSIST ESSTECH 3S T401210S

## (undated) DEVICE — ADH SKIN CLOSURE PREMIERPRO EXOFIN 1.0ML 3470

## (undated) DEVICE — SPONGE RAY-TEC 4X8" 7318

## (undated) DEVICE — LIGHT HANDLE X1 31140133

## (undated) DEVICE — CATH ANGIO INFINITI PIGTAIL 145 6 SH 6FRX110CM  534-652S

## (undated) DEVICE — DRAPE STERI FLUOROSCOPE 35X43"1012 LATEX FREE

## (undated) DEVICE — SU PROLENE 3-0 MH 36" 8842H

## (undated) RX ORDER — BUPIVACAINE HYDROCHLORIDE 5 MG/ML
INJECTION, SOLUTION EPIDURAL; INTRACAUDAL
Status: DISPENSED
Start: 2022-11-16

## (undated) RX ORDER — SODIUM CHLORIDE 9 MG/ML
INJECTION, SOLUTION INTRAVENOUS
Status: DISPENSED
Start: 2022-10-20

## (undated) RX ORDER — HEPARIN SODIUM 1000 [USP'U]/ML
INJECTION, SOLUTION INTRAVENOUS; SUBCUTANEOUS
Status: DISPENSED
Start: 2022-10-20

## (undated) RX ORDER — ASPIRIN 325 MG
TABLET ORAL
Status: DISPENSED
Start: 2022-10-20

## (undated) RX ORDER — EPHEDRINE SULFATE 50 MG/ML
INJECTION, SOLUTION INTRAMUSCULAR; INTRAVENOUS; SUBCUTANEOUS
Status: DISPENSED
Start: 2022-11-16

## (undated) RX ORDER — NICARDIPINE HCL-0.9% SOD CHLOR 1 MG/10 ML
SYRINGE (ML) INTRAVENOUS
Status: DISPENSED
Start: 2022-10-20

## (undated) RX ORDER — FENTANYL CITRATE 50 UG/ML
INJECTION, SOLUTION INTRAMUSCULAR; INTRAVENOUS
Status: DISPENSED
Start: 2022-11-16

## (undated) RX ORDER — LIDOCAINE HYDROCHLORIDE 10 MG/ML
INJECTION, SOLUTION EPIDURAL; INFILTRATION; INTRACAUDAL; PERINEURAL
Status: DISPENSED
Start: 2022-11-16

## (undated) RX ORDER — CEFAZOLIN SODIUM/WATER 2 G/20 ML
SYRINGE (ML) INTRAVENOUS
Status: DISPENSED
Start: 2022-11-16

## (undated) RX ORDER — LIDOCAINE 40 MG/G
CREAM TOPICAL
Status: DISPENSED
Start: 2022-10-20

## (undated) RX ORDER — PROTAMINE SULFATE 10 MG/ML
INJECTION, SOLUTION INTRAVENOUS
Status: DISPENSED
Start: 2022-11-16

## (undated) RX ORDER — DIPHENHYDRAMINE HYDROCHLORIDE 50 MG/ML
INJECTION INTRAMUSCULAR; INTRAVENOUS
Status: DISPENSED
Start: 2022-10-20

## (undated) RX ORDER — FENTANYL CITRATE 50 UG/ML
INJECTION, SOLUTION INTRAMUSCULAR; INTRAVENOUS
Status: DISPENSED
Start: 2022-10-20

## (undated) RX ORDER — NITROGLYCERIN 5 MG/ML
VIAL (ML) INTRAVENOUS
Status: DISPENSED
Start: 2022-10-20